# Patient Record
Sex: FEMALE | Race: WHITE | Employment: UNEMPLOYED | URBAN - METROPOLITAN AREA
[De-identification: names, ages, dates, MRNs, and addresses within clinical notes are randomized per-mention and may not be internally consistent; named-entity substitution may affect disease eponyms.]

---

## 2016-09-12 LAB
AVERAGE GLUCOSE: NORMAL
HBA1C MFR BLD: 5.7 %

## 2017-01-03 ENCOUNTER — OFFICE VISIT (OUTPATIENT)
Dept: FAMILY MEDICINE CLINIC | Age: 67
End: 2017-01-03

## 2017-01-03 DIAGNOSIS — M05.762 RHEUMATOID ARTHRITIS INVOLVING LEFT KNEE WITH POSITIVE RHEUMATOID FACTOR (HCC): ICD-10-CM

## 2017-01-03 DIAGNOSIS — S29.019A THORACIC MYOFASCIAL STRAIN, INITIAL ENCOUNTER: Primary | ICD-10-CM

## 2017-01-03 PROCEDURE — 99213 OFFICE O/P EST LOW 20 MIN: CPT | Performed by: FAMILY MEDICINE

## 2017-01-03 RX ORDER — METHYLPREDNISOLONE 4 MG/1
TABLET ORAL
Qty: 21 TABLET | Refills: 1 | Status: SHIPPED | OUTPATIENT
Start: 2017-01-03 | End: 2017-01-12 | Stop reason: ALTCHOICE

## 2017-01-03 RX ORDER — DIAZEPAM 2 MG/1
2 TABLET ORAL EVERY 8 HOURS PRN
Qty: 18 TABLET | Refills: 0 | Status: SHIPPED | OUTPATIENT
Start: 2017-01-03 | End: 2017-01-09

## 2017-01-04 VITALS
BODY MASS INDEX: 23.18 KG/M2 | TEMPERATURE: 97.9 F | DIASTOLIC BLOOD PRESSURE: 86 MMHG | WEIGHT: 130.8 LBS | HEART RATE: 76 BPM | RESPIRATION RATE: 14 BRPM | HEIGHT: 63 IN | SYSTOLIC BLOOD PRESSURE: 138 MMHG

## 2017-01-08 ASSESSMENT — ENCOUNTER SYMPTOMS
BACK PAIN: 1
COUGH: 0
CHEST TIGHTNESS: 0

## 2017-01-12 ENCOUNTER — TELEPHONE (OUTPATIENT)
Dept: FAMILY MEDICINE CLINIC | Age: 67
End: 2017-01-12

## 2017-01-12 ENCOUNTER — OFFICE VISIT (OUTPATIENT)
Dept: FAMILY MEDICINE CLINIC | Age: 67
End: 2017-01-12

## 2017-01-12 VITALS
SYSTOLIC BLOOD PRESSURE: 132 MMHG | BODY MASS INDEX: 23.04 KG/M2 | TEMPERATURE: 97.7 F | RESPIRATION RATE: 12 BRPM | HEART RATE: 80 BPM | DIASTOLIC BLOOD PRESSURE: 78 MMHG | WEIGHT: 130 LBS | HEIGHT: 63 IN

## 2017-01-12 DIAGNOSIS — R30.0 DYSURIA: ICD-10-CM

## 2017-01-12 DIAGNOSIS — R31.9 HEMATURIA: ICD-10-CM

## 2017-01-12 DIAGNOSIS — S46.911S SHOULDER STRAIN, RIGHT, SEQUELA: ICD-10-CM

## 2017-01-12 DIAGNOSIS — N30.90 CYSTITIS: Primary | ICD-10-CM

## 2017-01-12 LAB
BILIRUBIN, POC: ABNORMAL
BLOOD URINE, POC: ABNORMAL
CLARITY, POC: ABNORMAL
COLOR, POC: ABNORMAL
GLUCOSE URINE, POC: ABNORMAL
KETONES, POC: ABNORMAL
LEUKOCYTE EST, POC: ABNORMAL
NITRITE, POC: ABNORMAL
PH, POC: 6
PROTEIN, POC: ABNORMAL
SPECIFIC GRAVITY, POC: 1.02
UROBILINOGEN, POC: ABNORMAL

## 2017-01-12 PROCEDURE — 99213 OFFICE O/P EST LOW 20 MIN: CPT | Performed by: FAMILY MEDICINE

## 2017-01-12 PROCEDURE — 81003 URINALYSIS AUTO W/O SCOPE: CPT | Performed by: FAMILY MEDICINE

## 2017-01-12 RX ORDER — NITROFURANTOIN 25; 75 MG/1; MG/1
100 CAPSULE ORAL 2 TIMES DAILY
Qty: 20 CAPSULE | Refills: 0 | Status: SHIPPED | OUTPATIENT
Start: 2017-01-12 | End: 2017-01-22

## 2017-01-14 ENCOUNTER — TELEPHONE (OUTPATIENT)
Dept: FAMILY MEDICINE CLINIC | Age: 67
End: 2017-01-14

## 2017-01-14 LAB
ORGANISM: ABNORMAL
URINE CULTURE, ROUTINE: ABNORMAL

## 2017-01-22 ASSESSMENT — ENCOUNTER SYMPTOMS
BLOOD IN STOOL: 0
RECTAL PAIN: 0
BACK PAIN: 1
SHORTNESS OF BREATH: 0
NAUSEA: 1
COUGH: 0
ABDOMINAL PAIN: 0
CHEST TIGHTNESS: 0

## 2017-01-24 ENCOUNTER — OFFICE VISIT (OUTPATIENT)
Dept: UROLOGY | Age: 67
End: 2017-01-24

## 2017-01-24 VITALS
HEART RATE: 80 BPM | SYSTOLIC BLOOD PRESSURE: 110 MMHG | BODY MASS INDEX: 23.39 KG/M2 | WEIGHT: 132 LBS | HEIGHT: 63 IN | DIASTOLIC BLOOD PRESSURE: 80 MMHG

## 2017-01-24 DIAGNOSIS — R33.9 URINARY RETENTION: ICD-10-CM

## 2017-01-24 DIAGNOSIS — N39.0 RECURRENT UTI: Primary | ICD-10-CM

## 2017-01-24 LAB
BILIRUBIN, POC: NORMAL
BLOOD URINE, POC: NORMAL
CLARITY, POC: CLEAR
COLOR, POC: YELLOW
GLUCOSE URINE, POC: NORMAL
KETONES, POC: NORMAL
LEUKOCYTE EST, POC: NORMAL
NITRITE, POC: NORMAL
PH, POC: 5.5
POST VOID RESIDUAL (PVR): 101 ML
PROTEIN, POC: NORMAL
SPECIFIC GRAVITY, POC: 1.01
UROBILINOGEN, POC: 0.2

## 2017-01-24 PROCEDURE — 81003 URINALYSIS AUTO W/O SCOPE: CPT | Performed by: UROLOGY

## 2017-01-24 PROCEDURE — 99213 OFFICE O/P EST LOW 20 MIN: CPT | Performed by: UROLOGY

## 2017-01-24 PROCEDURE — 51798 US URINE CAPACITY MEASURE: CPT | Performed by: UROLOGY

## 2017-01-31 ENCOUNTER — TELEPHONE (OUTPATIENT)
Dept: FAMILY MEDICINE CLINIC | Age: 67
End: 2017-01-31

## 2017-01-31 DIAGNOSIS — M05.762 RHEUMATOID ARTHRITIS INVOLVING LEFT KNEE WITH POSITIVE RHEUMATOID FACTOR (HCC): Primary | ICD-10-CM

## 2017-02-23 ENCOUNTER — HOSPITAL ENCOUNTER (OUTPATIENT)
Dept: PHYSICAL THERAPY | Age: 67
Setting detail: THERAPIES SERIES
Discharge: HOME | End: 2017-02-23
Payer: MEDICARE

## 2017-02-23 PROCEDURE — G8978 MOBILITY CURRENT STATUS: HCPCS

## 2017-02-23 PROCEDURE — 97162 PT EVAL MOD COMPLEX 30 MIN: CPT

## 2017-02-23 PROCEDURE — 97110 THERAPEUTIC EXERCISES: CPT

## 2017-02-23 PROCEDURE — G8979 MOBILITY GOAL STATUS: HCPCS

## 2017-02-23 PROCEDURE — 97530 THERAPEUTIC ACTIVITIES: CPT

## 2017-03-03 ENCOUNTER — OFFICE VISIT (OUTPATIENT)
Dept: FAMILY MEDICINE CLINIC | Age: 67
End: 2017-03-03

## 2017-03-03 VITALS
HEART RATE: 79 BPM | WEIGHT: 133 LBS | HEIGHT: 63 IN | OXYGEN SATURATION: 95 % | DIASTOLIC BLOOD PRESSURE: 70 MMHG | TEMPERATURE: 98.3 F | SYSTOLIC BLOOD PRESSURE: 120 MMHG | BODY MASS INDEX: 23.57 KG/M2 | RESPIRATION RATE: 18 BRPM

## 2017-03-03 DIAGNOSIS — J32.9 SINOBRONCHITIS: Primary | ICD-10-CM

## 2017-03-03 DIAGNOSIS — J40 SINOBRONCHITIS: Primary | ICD-10-CM

## 2017-03-03 DIAGNOSIS — M77.41 METATARSALGIA OF RIGHT FOOT: ICD-10-CM

## 2017-03-03 PROCEDURE — 99213 OFFICE O/P EST LOW 20 MIN: CPT | Performed by: FAMILY MEDICINE

## 2017-03-03 RX ORDER — PROMETHAZINE HYDROCHLORIDE AND CODEINE PHOSPHATE 6.25; 1 MG/5ML; MG/5ML
5 SYRUP ORAL 4 TIMES DAILY PRN
Qty: 180 ML | Refills: 0 | Status: SHIPPED | OUTPATIENT
Start: 2017-03-03 | End: 2017-03-10

## 2017-03-03 RX ORDER — AZITHROMYCIN 250 MG/1
TABLET, FILM COATED ORAL
Qty: 1 PACKET | Refills: 0 | Status: SHIPPED | OUTPATIENT
Start: 2017-03-03 | End: 2017-03-13

## 2017-03-13 ASSESSMENT — ENCOUNTER SYMPTOMS
CHEST TIGHTNESS: 1
COUGH: 1
TROUBLE SWALLOWING: 0
VOICE CHANGE: 0
SORE THROAT: 1
SINUS PRESSURE: 1
BLOOD IN STOOL: 0
ABDOMINAL PAIN: 0
RHINORRHEA: 1

## 2017-03-31 ENCOUNTER — TELEPHONE (OUTPATIENT)
Dept: FAMILY MEDICINE CLINIC | Age: 67
End: 2017-03-31

## 2017-03-31 ENCOUNTER — HOSPITAL ENCOUNTER (OUTPATIENT)
Dept: PHYSICAL THERAPY | Age: 67
Setting detail: THERAPIES SERIES
Discharge: HOME | End: 2017-03-31
Payer: MEDICARE

## 2017-03-31 PROCEDURE — G8978 MOBILITY CURRENT STATUS: HCPCS

## 2017-03-31 PROCEDURE — 97116 GAIT TRAINING THERAPY: CPT

## 2017-03-31 PROCEDURE — 97164 PT RE-EVAL EST PLAN CARE: CPT

## 2017-03-31 PROCEDURE — 97530 THERAPEUTIC ACTIVITIES: CPT

## 2017-03-31 PROCEDURE — G8979 MOBILITY GOAL STATUS: HCPCS

## 2017-04-03 ENCOUNTER — OFFICE VISIT (OUTPATIENT)
Dept: FAMILY MEDICINE CLINIC | Age: 67
End: 2017-04-03

## 2017-04-03 DIAGNOSIS — J01.00 ACUTE MAXILLARY SINUSITIS, RECURRENCE NOT SPECIFIED: Primary | ICD-10-CM

## 2017-04-03 PROCEDURE — 99213 OFFICE O/P EST LOW 20 MIN: CPT | Performed by: FAMILY MEDICINE

## 2017-04-03 RX ORDER — AZITHROMYCIN 250 MG/1
TABLET, FILM COATED ORAL
Qty: 1 PACKET | Refills: 0 | Status: SHIPPED | OUTPATIENT
Start: 2017-04-03 | End: 2017-04-13

## 2017-04-07 ENCOUNTER — HOSPITAL ENCOUNTER (OUTPATIENT)
Dept: PHYSICAL THERAPY | Age: 67
Discharge: HOME | End: 2017-04-07

## 2017-04-11 VITALS
HEIGHT: 63 IN | HEART RATE: 82 BPM | DIASTOLIC BLOOD PRESSURE: 88 MMHG | BODY MASS INDEX: 24.1 KG/M2 | WEIGHT: 136 LBS | RESPIRATION RATE: 12 BRPM | TEMPERATURE: 98 F | SYSTOLIC BLOOD PRESSURE: 136 MMHG

## 2017-04-11 ASSESSMENT — ENCOUNTER SYMPTOMS
CHEST TIGHTNESS: 0
ABDOMINAL PAIN: 0
COUGH: 0
BLOOD IN STOOL: 0
SHORTNESS OF BREATH: 0
SINUS PRESSURE: 1
RHINORRHEA: 1
BACK PAIN: 1

## 2017-05-19 RX ORDER — PREDNISONE 1 MG/1
TABLET ORAL
Qty: 60 TABLET | Refills: 2 | Status: SHIPPED | OUTPATIENT
Start: 2017-05-19 | End: 2017-09-20 | Stop reason: SDUPTHER

## 2017-05-22 RX ORDER — GABAPENTIN 300 MG/1
CAPSULE ORAL
Qty: 120 CAPSULE | Refills: 2 | Status: SHIPPED | OUTPATIENT
Start: 2017-05-22 | End: 2017-08-01 | Stop reason: SDUPTHER

## 2017-06-13 ENCOUNTER — TELEPHONE (OUTPATIENT)
Dept: FAMILY MEDICINE CLINIC | Age: 67
End: 2017-06-13

## 2017-06-27 DIAGNOSIS — M62.838 NOCTURNAL MUSCLE SPASM: ICD-10-CM

## 2017-06-27 RX ORDER — CYCLOBENZAPRINE HCL 10 MG
TABLET ORAL
Qty: 90 TABLET | Refills: 1 | Status: SHIPPED | OUTPATIENT
Start: 2017-06-27 | End: 2017-09-20 | Stop reason: SDUPTHER

## 2017-07-06 RX ORDER — LEVOTHYROXINE SODIUM 0.05 MG/1
TABLET ORAL
Qty: 30 TABLET | Refills: 5 | Status: SHIPPED | OUTPATIENT
Start: 2017-07-06 | End: 2018-01-04 | Stop reason: SDUPTHER

## 2017-09-07 ENCOUNTER — HOSPITAL ENCOUNTER (OUTPATIENT)
Age: 67
Setting detail: OBSERVATION
Discharge: HOME | End: 2017-09-09
Attending: EMERGENCY MEDICINE | Admitting: INTERNAL MEDICINE
Payer: MEDICARE

## 2017-09-07 ENCOUNTER — APPOINTMENT (OUTPATIENT)
Dept: GENERAL RADIOLOGY | Age: 67
End: 2017-09-07
Payer: MEDICARE

## 2017-09-07 DIAGNOSIS — N39.0 ACUTE UTI: Primary | ICD-10-CM

## 2017-09-07 DIAGNOSIS — D72.9 NEUTROPHILIC LEUKOCYTOSIS: ICD-10-CM

## 2017-09-07 LAB
ALBUMIN SERPL-MCNC: 4.4 G/DL (ref 3.9–4.9)
ALP BLD-CCNC: 75 U/L (ref 40–130)
ALT SERPL-CCNC: 14 U/L (ref 0–33)
ANION GAP SERPL CALCULATED.3IONS-SCNC: 18 MEQ/L (ref 7–13)
AST SERPL-CCNC: 19 U/L (ref 0–35)
BACTERIA: ABNORMAL /HPF
BANDED NEUTROPHILS RELATIVE PERCENT: 2 % (ref 5–11)
BASOPHILS ABSOLUTE: 0 K/UL (ref 0–0.2)
BASOPHILS RELATIVE PERCENT: 0 %
BILIRUB SERPL-MCNC: 0.6 MG/DL (ref 0–1.2)
BILIRUBIN URINE: NEGATIVE
BLOOD, URINE: ABNORMAL
BUN BLDV-MCNC: 20 MG/DL (ref 8–23)
CALCIUM SERPL-MCNC: 10.9 MG/DL (ref 8.6–10.2)
CHLORIDE BLD-SCNC: 95 MEQ/L (ref 98–107)
CLARITY: ABNORMAL
CO2: 24 MEQ/L (ref 22–29)
COLOR: YELLOW
CREAT SERPL-MCNC: 0.69 MG/DL (ref 0.5–0.9)
EOSINOPHILS ABSOLUTE: 0 K/UL (ref 0–0.7)
EOSINOPHILS RELATIVE PERCENT: 0 %
EPITHELIAL CELLS, UA: ABNORMAL /HPF
GFR AFRICAN AMERICAN: >60
GFR NON-AFRICAN AMERICAN: >60
GLOBULIN: 2.8 G/DL (ref 2.3–3.5)
GLUCOSE BLD-MCNC: 127 MG/DL (ref 74–109)
GLUCOSE URINE: NEGATIVE MG/DL
HCT VFR BLD CALC: 44.3 % (ref 37–47)
HEMOGLOBIN: 14.8 G/DL (ref 12–16)
KETONES, URINE: NEGATIVE MG/DL
LACTIC ACID: 2 MMOL/L (ref 0.5–2.2)
LEUKOCYTE ESTERASE, URINE: ABNORMAL
LYMPHOCYTES ABSOLUTE: 0.9 K/UL (ref 1–4.8)
LYMPHOCYTES RELATIVE PERCENT: 3 %
MAGNESIUM: 1.7 MG/DL (ref 1.7–2.3)
MCH RBC QN AUTO: 31.3 PG (ref 27–31.3)
MCHC RBC AUTO-ENTMCNC: 33.3 % (ref 33–37)
MCV RBC AUTO: 94 FL (ref 82–100)
MONOCYTES ABSOLUTE: 0.3 K/UL (ref 0.2–0.8)
MONOCYTES RELATIVE PERCENT: 1 %
NEUTROPHILS ABSOLUTE: 29.6 K/UL (ref 1.4–6.5)
NEUTROPHILS RELATIVE PERCENT: 94 %
NITRITE, URINE: NEGATIVE
PDW BLD-RTO: 16.6 % (ref 11.5–14.5)
PH UA: 5.5 (ref 5–9)
PLATELET # BLD: 174 K/UL (ref 130–400)
POTASSIUM SERPL-SCNC: 3.9 MEQ/L (ref 3.5–5.1)
PROTEIN UA: NEGATIVE MG/DL
RBC # BLD: 4.72 M/UL (ref 4.2–5.4)
RBC # BLD: NORMAL 10*6/UL
RBC UA: ABNORMAL /HPF (ref 0–2)
SODIUM BLD-SCNC: 137 MEQ/L (ref 132–144)
SPECIFIC GRAVITY UA: 1.01 (ref 1–1.03)
TOTAL PROTEIN: 7.2 G/DL (ref 6.4–8.1)
TSH SERPL DL<=0.05 MIU/L-ACNC: 1.03 UIU/ML (ref 0.27–4.2)
URINE REFLEX TO CULTURE: YES
URINE TYPE: ABNORMAL
UROBILINOGEN, URINE: 0.2 E.U./DL
WBC # BLD: 30.8 K/UL (ref 4.8–10.8)
WBC UA: ABNORMAL /HPF (ref 0–5)

## 2017-09-07 PROCEDURE — 6370000000 HC RX 637 (ALT 250 FOR IP): Performed by: INTERNAL MEDICINE

## 2017-09-07 PROCEDURE — 96365 THER/PROPH/DIAG IV INF INIT: CPT

## 2017-09-07 PROCEDURE — 87086 URINE CULTURE/COLONY COUNT: CPT

## 2017-09-07 PROCEDURE — 6360000002 HC RX W HCPCS: Performed by: INTERNAL MEDICINE

## 2017-09-07 PROCEDURE — 99285 EMERGENCY DEPT VISIT HI MDM: CPT

## 2017-09-07 PROCEDURE — 2580000003 HC RX 258: Performed by: INTERNAL MEDICINE

## 2017-09-07 PROCEDURE — 81001 URINALYSIS AUTO W/SCOPE: CPT

## 2017-09-07 PROCEDURE — 71010 XR CHEST PORTABLE: CPT

## 2017-09-07 PROCEDURE — 6360000002 HC RX W HCPCS: Performed by: EMERGENCY MEDICINE

## 2017-09-07 PROCEDURE — 93005 ELECTROCARDIOGRAM TRACING: CPT

## 2017-09-07 PROCEDURE — 85025 COMPLETE CBC W/AUTO DIFF WBC: CPT

## 2017-09-07 PROCEDURE — 96375 TX/PRO/DX INJ NEW DRUG ADDON: CPT

## 2017-09-07 PROCEDURE — 2580000003 HC RX 258: Performed by: EMERGENCY MEDICINE

## 2017-09-07 PROCEDURE — 83735 ASSAY OF MAGNESIUM: CPT

## 2017-09-07 PROCEDURE — 87186 SC STD MICRODIL/AGAR DIL: CPT

## 2017-09-07 PROCEDURE — G0378 HOSPITAL OBSERVATION PER HR: HCPCS

## 2017-09-07 PROCEDURE — 80053 COMPREHEN METABOLIC PANEL: CPT

## 2017-09-07 PROCEDURE — 36415 COLL VENOUS BLD VENIPUNCTURE: CPT

## 2017-09-07 PROCEDURE — 87077 CULTURE AEROBIC IDENTIFY: CPT

## 2017-09-07 PROCEDURE — 84443 ASSAY THYROID STIM HORMONE: CPT

## 2017-09-07 PROCEDURE — 96366 THER/PROPH/DIAG IV INF ADDON: CPT

## 2017-09-07 PROCEDURE — 96367 TX/PROPH/DG ADDL SEQ IV INF: CPT

## 2017-09-07 PROCEDURE — 83605 ASSAY OF LACTIC ACID: CPT

## 2017-09-07 PROCEDURE — 87040 BLOOD CULTURE FOR BACTERIA: CPT

## 2017-09-07 RX ORDER — KETOROLAC TROMETHAMINE 30 MG/ML
30 INJECTION, SOLUTION INTRAMUSCULAR; INTRAVENOUS ONCE
Status: COMPLETED | OUTPATIENT
Start: 2017-09-07 | End: 2017-09-07

## 2017-09-07 RX ORDER — SODIUM CHLORIDE 9 MG/ML
INJECTION, SOLUTION INTRAVENOUS CONTINUOUS
Status: DISPENSED | OUTPATIENT
Start: 2017-09-07 | End: 2017-09-08

## 2017-09-07 RX ORDER — CYCLOBENZAPRINE HCL 10 MG
10 TABLET ORAL NIGHTLY
Status: DISCONTINUED | OUTPATIENT
Start: 2017-09-07 | End: 2017-09-09 | Stop reason: HOSPADM

## 2017-09-07 RX ORDER — LEVOTHYROXINE SODIUM 0.05 MG/1
50 TABLET ORAL DAILY
Status: DISCONTINUED | OUTPATIENT
Start: 2017-09-07 | End: 2017-09-09 | Stop reason: HOSPADM

## 2017-09-07 RX ORDER — PREDNISONE 1 MG/1
5 TABLET ORAL 2 TIMES DAILY
Status: DISCONTINUED | OUTPATIENT
Start: 2017-09-07 | End: 2017-09-09 | Stop reason: HOSPADM

## 2017-09-07 RX ORDER — ACETAMINOPHEN 325 MG/1
650 TABLET ORAL EVERY 6 HOURS PRN
Status: DISCONTINUED | OUTPATIENT
Start: 2017-09-07 | End: 2017-09-09 | Stop reason: HOSPADM

## 2017-09-07 RX ORDER — LEVOFLOXACIN 5 MG/ML
750 INJECTION, SOLUTION INTRAVENOUS ONCE
Status: COMPLETED | OUTPATIENT
Start: 2017-09-07 | End: 2017-09-07

## 2017-09-07 RX ORDER — CYCLOBENZAPRINE HCL 10 MG
5 TABLET ORAL 3 TIMES DAILY PRN
Status: DISCONTINUED | OUTPATIENT
Start: 2017-09-07 | End: 2017-09-07

## 2017-09-07 RX ORDER — CYCLOBENZAPRINE HCL 10 MG
10 TABLET ORAL 2 TIMES DAILY PRN
Status: DISCONTINUED | OUTPATIENT
Start: 2017-09-07 | End: 2017-09-07

## 2017-09-07 RX ORDER — MECLIZINE HCL 12.5 MG/1
12.5 TABLET ORAL 3 TIMES DAILY PRN
Status: DISCONTINUED | OUTPATIENT
Start: 2017-09-07 | End: 2017-09-09 | Stop reason: HOSPADM

## 2017-09-07 RX ORDER — 0.9 % SODIUM CHLORIDE 0.9 %
1000 INTRAVENOUS SOLUTION INTRAVENOUS ONCE
Status: COMPLETED | OUTPATIENT
Start: 2017-09-07 | End: 2017-09-07

## 2017-09-07 RX ORDER — ONDANSETRON 2 MG/ML
4 INJECTION INTRAMUSCULAR; INTRAVENOUS ONCE
Status: COMPLETED | OUTPATIENT
Start: 2017-09-07 | End: 2017-09-07

## 2017-09-07 RX ORDER — OXYCODONE HYDROCHLORIDE 5 MG/1
5 TABLET ORAL EVERY 6 HOURS PRN
Status: DISCONTINUED | OUTPATIENT
Start: 2017-09-07 | End: 2017-09-09 | Stop reason: HOSPADM

## 2017-09-07 RX ORDER — GABAPENTIN 300 MG/1
300 CAPSULE ORAL 2 TIMES DAILY
Status: DISCONTINUED | OUTPATIENT
Start: 2017-09-07 | End: 2017-09-09 | Stop reason: HOSPADM

## 2017-09-07 RX ADMIN — OXYCODONE HYDROCHLORIDE 5 MG: 5 TABLET ORAL at 21:34

## 2017-09-07 RX ADMIN — LEVOFLOXACIN 750 MG: 750 INJECTION, SOLUTION INTRAVENOUS at 14:09

## 2017-09-07 RX ADMIN — CYCLOBENZAPRINE HYDROCHLORIDE 10 MG: 10 TABLET, FILM COATED ORAL at 20:09

## 2017-09-07 RX ADMIN — SODIUM CHLORIDE 1000 ML: 9 INJECTION, SOLUTION INTRAVENOUS at 12:41

## 2017-09-07 RX ADMIN — PREDNISONE 5 MG: 5 TABLET ORAL at 20:09

## 2017-09-07 RX ADMIN — CEFTRIAXONE 1 G: 1 INJECTION, POWDER, FOR SOLUTION INTRAMUSCULAR; INTRAVENOUS at 17:29

## 2017-09-07 RX ADMIN — KETOROLAC TROMETHAMINE 30 MG: 30 INJECTION, SOLUTION INTRAMUSCULAR at 12:41

## 2017-09-07 RX ADMIN — ACETAMINOPHEN 650 MG: 325 TABLET ORAL at 23:48

## 2017-09-07 RX ADMIN — ACETAMINOPHEN 650 MG: 325 TABLET ORAL at 18:55

## 2017-09-07 RX ADMIN — GABAPENTIN 300 MG: 300 CAPSULE ORAL at 20:09

## 2017-09-07 RX ADMIN — PIPERACILLIN SODIUM,TAZOBACTAM SODIUM 3.38 G: 3; .375 INJECTION, POWDER, FOR SOLUTION INTRAVENOUS at 18:02

## 2017-09-07 RX ADMIN — ONDANSETRON 4 MG: 2 INJECTION INTRAMUSCULAR; INTRAVENOUS at 12:41

## 2017-09-07 RX ADMIN — SODIUM CHLORIDE: 9 INJECTION, SOLUTION INTRAVENOUS at 21:45

## 2017-09-07 ASSESSMENT — ENCOUNTER SYMPTOMS
NAUSEA: 0
SORE THROAT: 0
WHEEZING: 0
ABDOMINAL DISTENTION: 0
COUGH: 0
BLURRED VISION: 0
APNEA: 0
SHORTNESS OF BREATH: 0
ABDOMINAL PAIN: 0
BACK PAIN: 0
DOUBLE VISION: 0
DIARRHEA: 0
PHOTOPHOBIA: 0
RHINORRHEA: 0
SINUS PRESSURE: 0
COLOR CHANGE: 0
HEARTBURN: 0
EYE PAIN: 0
CONSTIPATION: 0
VOMITING: 0

## 2017-09-07 ASSESSMENT — PAIN SCALES - GENERAL
PAINLEVEL_OUTOF10: 6
PAINLEVEL_OUTOF10: 5
PAINLEVEL_OUTOF10: 6

## 2017-09-07 ASSESSMENT — PAIN DESCRIPTION - FREQUENCY
FREQUENCY: CONTINUOUS
FREQUENCY: CONTINUOUS

## 2017-09-07 ASSESSMENT — PAIN DESCRIPTION - LOCATION
LOCATION: HEAD
LOCATION: HAND

## 2017-09-07 ASSESSMENT — PAIN DESCRIPTION - DESCRIPTORS
DESCRIPTORS: ACHING
DESCRIPTORS: ACHING

## 2017-09-07 ASSESSMENT — PAIN DESCRIPTION - PAIN TYPE
TYPE: ACUTE PAIN
TYPE: ACUTE PAIN

## 2017-09-08 VITALS
HEIGHT: 63 IN | SYSTOLIC BLOOD PRESSURE: 155 MMHG | TEMPERATURE: 97.8 F | DIASTOLIC BLOOD PRESSURE: 68 MMHG | RESPIRATION RATE: 16 BRPM | OXYGEN SATURATION: 98 % | BODY MASS INDEX: 23.04 KG/M2 | WEIGHT: 130 LBS | HEART RATE: 68 BPM

## 2017-09-08 LAB
ANION GAP SERPL CALCULATED.3IONS-SCNC: 14 MEQ/L (ref 7–13)
BASOPHILS ABSOLUTE: 0 K/UL (ref 0–0.2)
BASOPHILS RELATIVE PERCENT: 0.2 %
BUN BLDV-MCNC: 17 MG/DL (ref 8–23)
CALCIUM SERPL-MCNC: 9.5 MG/DL (ref 8.6–10.2)
CHLORIDE BLD-SCNC: 105 MEQ/L (ref 98–107)
CO2: 24 MEQ/L (ref 22–29)
CREAT SERPL-MCNC: 0.73 MG/DL (ref 0.5–0.9)
EOSINOPHILS ABSOLUTE: 0 K/UL (ref 0–0.7)
EOSINOPHILS RELATIVE PERCENT: 0 %
GFR AFRICAN AMERICAN: >60
GFR NON-AFRICAN AMERICAN: >60
GLUCOSE BLD-MCNC: 147 MG/DL (ref 74–109)
HCT VFR BLD CALC: 37.8 % (ref 37–47)
HEMOGLOBIN: 12.7 G/DL (ref 12–16)
LYMPHOCYTES ABSOLUTE: 0.9 K/UL (ref 1–4.8)
LYMPHOCYTES RELATIVE PERCENT: 5.6 %
MCH RBC QN AUTO: 31.5 PG (ref 27–31.3)
MCHC RBC AUTO-ENTMCNC: 33.5 % (ref 33–37)
MCV RBC AUTO: 94 FL (ref 82–100)
MONOCYTES ABSOLUTE: 0.3 K/UL (ref 0.2–0.8)
MONOCYTES RELATIVE PERCENT: 1.9 %
NEUTROPHILS ABSOLUTE: 14.8 K/UL (ref 1.4–6.5)
NEUTROPHILS RELATIVE PERCENT: 92.3 %
PDW BLD-RTO: 16.9 % (ref 11.5–14.5)
PLATELET # BLD: 162 K/UL (ref 130–400)
POTASSIUM SERPL-SCNC: 4.7 MEQ/L (ref 3.5–5.1)
RBC # BLD: 4.03 M/UL (ref 4.2–5.4)
SODIUM BLD-SCNC: 143 MEQ/L (ref 132–144)
WBC # BLD: 16.1 K/UL (ref 4.8–10.8)

## 2017-09-08 PROCEDURE — 6370000000 HC RX 637 (ALT 250 FOR IP): Performed by: INTERNAL MEDICINE

## 2017-09-08 PROCEDURE — 36415 COLL VENOUS BLD VENIPUNCTURE: CPT

## 2017-09-08 PROCEDURE — 2580000003 HC RX 258: Performed by: INTERNAL MEDICINE

## 2017-09-08 PROCEDURE — 6360000002 HC RX W HCPCS: Performed by: INTERNAL MEDICINE

## 2017-09-08 PROCEDURE — 96366 THER/PROPH/DIAG IV INF ADDON: CPT

## 2017-09-08 PROCEDURE — 85025 COMPLETE CBC W/AUTO DIFF WBC: CPT

## 2017-09-08 PROCEDURE — G0378 HOSPITAL OBSERVATION PER HR: HCPCS

## 2017-09-08 PROCEDURE — 80048 BASIC METABOLIC PNL TOTAL CA: CPT

## 2017-09-08 PROCEDURE — 96372 THER/PROPH/DIAG INJ SC/IM: CPT

## 2017-09-08 RX ORDER — SODIUM CHLORIDE 9 MG/ML
INJECTION, SOLUTION INTRAVENOUS CONTINUOUS
Status: DISPENSED | OUTPATIENT
Start: 2017-09-08 | End: 2017-09-09

## 2017-09-08 RX ADMIN — ENOXAPARIN SODIUM 40 MG: 40 INJECTION SUBCUTANEOUS at 11:50

## 2017-09-08 RX ADMIN — ACETAMINOPHEN 650 MG: 325 TABLET ORAL at 05:24

## 2017-09-08 RX ADMIN — GABAPENTIN 300 MG: 300 CAPSULE ORAL at 08:24

## 2017-09-08 RX ADMIN — LEVOTHYROXINE SODIUM 50 MCG: 50 TABLET ORAL at 08:24

## 2017-09-08 RX ADMIN — SODIUM CHLORIDE: 9 INJECTION, SOLUTION INTRAVENOUS at 11:50

## 2017-09-08 RX ADMIN — GABAPENTIN 300 MG: 300 CAPSULE ORAL at 22:13

## 2017-09-08 RX ADMIN — PREDNISONE 5 MG: 5 TABLET ORAL at 22:13

## 2017-09-08 RX ADMIN — PREDNISONE 5 MG: 5 TABLET ORAL at 08:24

## 2017-09-08 RX ADMIN — CYCLOBENZAPRINE HYDROCHLORIDE 10 MG: 10 TABLET, FILM COATED ORAL at 22:13

## 2017-09-08 RX ADMIN — ACETAMINOPHEN 650 MG: 325 TABLET ORAL at 14:46

## 2017-09-08 RX ADMIN — CEFTRIAXONE 1 G: 1 INJECTION, POWDER, FOR SOLUTION INTRAMUSCULAR; INTRAVENOUS at 14:43

## 2017-09-08 ASSESSMENT — PAIN SCALES - GENERAL
PAINLEVEL_OUTOF10: 0
PAINLEVEL_OUTOF10: 6
PAINLEVEL_OUTOF10: 4
PAINLEVEL_OUTOF10: 4
PAINLEVEL_OUTOF10: 0

## 2017-09-08 ASSESSMENT — PAIN DESCRIPTION - LOCATION: LOCATION: HEAD

## 2017-09-08 ASSESSMENT — PAIN DESCRIPTION - PAIN TYPE: TYPE: ACUTE PAIN

## 2017-09-09 LAB
ANION GAP SERPL CALCULATED.3IONS-SCNC: 16 MEQ/L (ref 7–13)
BASOPHILS ABSOLUTE: 0 K/UL (ref 0–0.2)
BASOPHILS RELATIVE PERCENT: 0.2 %
BUN BLDV-MCNC: 14 MG/DL (ref 8–23)
CALCIUM SERPL-MCNC: 8.9 MG/DL (ref 8.6–10.2)
CHLORIDE BLD-SCNC: 108 MEQ/L (ref 98–107)
CO2: 21 MEQ/L (ref 22–29)
CREAT SERPL-MCNC: 0.6 MG/DL (ref 0.5–0.9)
EOSINOPHILS ABSOLUTE: 0.1 K/UL (ref 0–0.7)
EOSINOPHILS RELATIVE PERCENT: 0.5 %
GFR AFRICAN AMERICAN: >60
GFR NON-AFRICAN AMERICAN: >60
GLUCOSE BLD-MCNC: 105 MG/DL (ref 74–109)
HCT VFR BLD CALC: 38.8 % (ref 37–47)
HEMOGLOBIN: 12.9 G/DL (ref 12–16)
LYMPHOCYTES ABSOLUTE: 1 K/UL (ref 1–4.8)
LYMPHOCYTES RELATIVE PERCENT: 10.6 %
MCH RBC QN AUTO: 31.6 PG (ref 27–31.3)
MCHC RBC AUTO-ENTMCNC: 33.3 % (ref 33–37)
MCV RBC AUTO: 95 FL (ref 82–100)
MONOCYTES ABSOLUTE: 0.2 K/UL (ref 0.2–0.8)
MONOCYTES RELATIVE PERCENT: 2.6 %
NEUTROPHILS ABSOLUTE: 8 K/UL (ref 1.4–6.5)
NEUTROPHILS RELATIVE PERCENT: 86.1 %
PDW BLD-RTO: 16.9 % (ref 11.5–14.5)
PLATELET # BLD: 165 K/UL (ref 130–400)
POTASSIUM SERPL-SCNC: 4.2 MEQ/L (ref 3.5–5.1)
RBC # BLD: 4.08 M/UL (ref 4.2–5.4)
SODIUM BLD-SCNC: 145 MEQ/L (ref 132–144)
WBC # BLD: 9.3 K/UL (ref 4.8–10.8)

## 2017-09-09 PROCEDURE — G0378 HOSPITAL OBSERVATION PER HR: HCPCS

## 2017-09-09 PROCEDURE — 36415 COLL VENOUS BLD VENIPUNCTURE: CPT

## 2017-09-09 PROCEDURE — 96372 THER/PROPH/DIAG INJ SC/IM: CPT

## 2017-09-09 PROCEDURE — 85025 COMPLETE CBC W/AUTO DIFF WBC: CPT

## 2017-09-09 PROCEDURE — 6370000000 HC RX 637 (ALT 250 FOR IP): Performed by: INTERNAL MEDICINE

## 2017-09-09 PROCEDURE — 6360000002 HC RX W HCPCS: Performed by: INTERNAL MEDICINE

## 2017-09-09 PROCEDURE — 80048 BASIC METABOLIC PNL TOTAL CA: CPT

## 2017-09-09 RX ORDER — ASPIRIN 81 MG/1
81 TABLET ORAL DAILY
Qty: 30 TABLET | Refills: 3 | Status: ON HOLD | OUTPATIENT
Start: 2017-09-09 | End: 2019-03-27

## 2017-09-09 RX ORDER — NITROFURANTOIN 25; 75 MG/1; MG/1
100 CAPSULE ORAL 2 TIMES DAILY
Qty: 14 CAPSULE | Refills: 0 | Status: SHIPPED | OUTPATIENT
Start: 2017-09-09 | End: 2017-09-19

## 2017-09-09 RX ADMIN — PREDNISONE 5 MG: 5 TABLET ORAL at 08:25

## 2017-09-09 RX ADMIN — ENOXAPARIN SODIUM 40 MG: 40 INJECTION SUBCUTANEOUS at 08:25

## 2017-09-09 RX ADMIN — LEVOTHYROXINE SODIUM 50 MCG: 50 TABLET ORAL at 08:25

## 2017-09-09 RX ADMIN — GABAPENTIN 300 MG: 300 CAPSULE ORAL at 08:25

## 2017-09-09 ASSESSMENT — PAIN SCALES - GENERAL: PAINLEVEL_OUTOF10: 0

## 2017-09-10 LAB
ORGANISM: ABNORMAL
URINE CULTURE, ROUTINE: ABNORMAL

## 2017-09-11 ENCOUNTER — CARE COORDINATION (OUTPATIENT)
Dept: CASE MANAGEMENT | Age: 67
End: 2017-09-11

## 2017-09-12 LAB
BLOOD CULTURE, ROUTINE: NORMAL
CULTURE, BLOOD 2: NORMAL

## 2017-09-20 ENCOUNTER — OFFICE VISIT (OUTPATIENT)
Dept: FAMILY MEDICINE CLINIC | Age: 67
End: 2017-09-20

## 2017-09-20 VITALS
HEIGHT: 63 IN | TEMPERATURE: 97.8 F | BODY MASS INDEX: 22.5 KG/M2 | RESPIRATION RATE: 14 BRPM | WEIGHT: 127 LBS | SYSTOLIC BLOOD PRESSURE: 102 MMHG | HEART RATE: 66 BPM | DIASTOLIC BLOOD PRESSURE: 74 MMHG

## 2017-09-20 DIAGNOSIS — J31.0 CHRONIC RHINITIS: ICD-10-CM

## 2017-09-20 DIAGNOSIS — R73.01 IFG (IMPAIRED FASTING GLUCOSE): ICD-10-CM

## 2017-09-20 DIAGNOSIS — D72.829 LEUKOCYTOSIS, UNSPECIFIED TYPE: ICD-10-CM

## 2017-09-20 DIAGNOSIS — Z12.31 ENCOUNTER FOR SCREENING MAMMOGRAM FOR BREAST CANCER: ICD-10-CM

## 2017-09-20 DIAGNOSIS — Z12.11 SCREENING FOR COLON CANCER: ICD-10-CM

## 2017-09-20 DIAGNOSIS — M05.772 RHEUMATOID ARTHRITIS INVOLVING BOTH ANKLES WITH POSITIVE RHEUMATOID FACTOR (HCC): ICD-10-CM

## 2017-09-20 DIAGNOSIS — M05.771 RHEUMATOID ARTHRITIS INVOLVING BOTH ANKLES WITH POSITIVE RHEUMATOID FACTOR (HCC): ICD-10-CM

## 2017-09-20 DIAGNOSIS — N39.0 ACUTE UTI: Primary | ICD-10-CM

## 2017-09-20 DIAGNOSIS — M62.838 NOCTURNAL MUSCLE SPASM: ICD-10-CM

## 2017-09-20 DIAGNOSIS — G60.3 IDIOPATHIC PROGRESSIVE NEUROPATHY: ICD-10-CM

## 2017-09-20 LAB
BASOPHILS ABSOLUTE: 0.1 K/UL (ref 0–0.2)
BASOPHILS RELATIVE PERCENT: 0.9 %
BILIRUBIN, POC: NORMAL
BLOOD URINE, POC: NORMAL
CLARITY, POC: NORMAL
COLOR, POC: YELLOW
EOSINOPHILS ABSOLUTE: 0.1 K/UL (ref 0–0.7)
EOSINOPHILS RELATIVE PERCENT: 0.6 %
GLUCOSE URINE, POC: NORMAL
HBA1C MFR BLD: 5.6 % (ref 4.8–5.9)
HCT VFR BLD CALC: 43 % (ref 37–47)
HEMOGLOBIN: 13.7 G/DL (ref 12–16)
KETONES, POC: NORMAL
LEUKOCYTE EST, POC: NORMAL
LYMPHOCYTES ABSOLUTE: 2.7 K/UL (ref 1–4.8)
LYMPHOCYTES RELATIVE PERCENT: 29.9 %
MCH RBC QN AUTO: 31.2 PG (ref 27–31.3)
MCHC RBC AUTO-ENTMCNC: 32 % (ref 33–37)
MCV RBC AUTO: 97.7 FL (ref 82–100)
MONOCYTES ABSOLUTE: 0.4 K/UL (ref 0.2–0.8)
MONOCYTES RELATIVE PERCENT: 4.6 %
NEUTROPHILS ABSOLUTE: 5.8 K/UL (ref 1.4–6.5)
NEUTROPHILS RELATIVE PERCENT: 64 %
NITRITE, POC: NORMAL
PDW BLD-RTO: 17 % (ref 11.5–14.5)
PH, POC: 6.5
PLATELET # BLD: 223 K/UL (ref 130–400)
PROTEIN, POC: NORMAL
RBC # BLD: 4.4 M/UL (ref 4.2–5.4)
SPECIFIC GRAVITY, POC: 1.02
UROBILINOGEN, POC: NORMAL
WBC # BLD: 9.1 K/UL (ref 4.8–10.8)

## 2017-09-20 PROCEDURE — 99214 OFFICE O/P EST MOD 30 MIN: CPT | Performed by: FAMILY MEDICINE

## 2017-09-20 PROCEDURE — 81003 URINALYSIS AUTO W/O SCOPE: CPT | Performed by: FAMILY MEDICINE

## 2017-09-20 RX ORDER — CYCLOBENZAPRINE HCL 10 MG
TABLET ORAL
Qty: 30 TABLET | Refills: 3 | Status: ON HOLD | OUTPATIENT
Start: 2017-09-20 | End: 2019-03-27

## 2017-09-20 RX ORDER — PREDNISONE 1 MG/1
TABLET ORAL
Qty: 60 TABLET | Refills: 3 | Status: SHIPPED | OUTPATIENT
Start: 2017-09-20 | End: 2018-01-30 | Stop reason: SDUPTHER

## 2017-09-20 RX ORDER — PREDNISONE 1 MG/1
TABLET ORAL
Qty: 60 TABLET | Refills: 2 | Status: CANCELLED | OUTPATIENT
Start: 2017-09-20

## 2017-09-20 RX ORDER — PREDNISOLONE ACETATE 10 MG/ML
SUSPENSION/ DROPS OPHTHALMIC
Refills: 0 | COMMUNITY
Start: 2017-07-14

## 2017-09-20 RX ORDER — CYCLOBENZAPRINE HCL 10 MG
TABLET ORAL
Qty: 90 TABLET | Refills: 1 | Status: CANCELLED | OUTPATIENT
Start: 2017-09-20

## 2017-09-20 RX ORDER — FLUTICASONE PROPIONATE 50 MCG
1 SPRAY, SUSPENSION (ML) NASAL DAILY
Qty: 1 BOTTLE | Refills: 3 | Status: SHIPPED | OUTPATIENT
Start: 2017-09-20 | End: 2018-10-16 | Stop reason: ALTCHOICE

## 2017-09-20 ASSESSMENT — PATIENT HEALTH QUESTIONNAIRE - PHQ9
SUM OF ALL RESPONSES TO PHQ QUESTIONS 1-9: 0
SUM OF ALL RESPONSES TO PHQ9 QUESTIONS 1 & 2: 0
2. FEELING DOWN, DEPRESSED OR HOPELESS: 0
1. LITTLE INTEREST OR PLEASURE IN DOING THINGS: 0

## 2017-09-20 NOTE — MR AVS SNAPSHOT
After Visit Summary             Diane Carlisle   2017 2:30 PM   Office Visit    Description:  Female : 1950   Provider:  Harriet Reyes DO   Department:  [facility]              Your Follow-Up and Future Appointments         Below is a list of your follow-up and future appointments. This may not be a complete list as you may have made appointments directly with providers that we are not aware of or your providers may have made some for you. Please call your providers to confirm appointments. It is important to keep your appointments. Please bring your current insurance card, photo ID, co-pay, and all medication bottles to your appointment. If self-pay, payment is expected at the time of service. Your To-Do List     Future Appointments Provider Department Dept Phone    2017 1:00 PM DO Sameer Day [phone]    Please arrive 15 minutes prior to appointment, bring photo ID and insurance card. Future Orders Complete By Expires    Hemoglobin A1c [LAB90 Custom]  10/20/2017 2018    JEN Digital Screen Bilateral [HTH7173] [ Custom]  10/20/2017 2018    POCT FECAL IMMUNOCHEMICAL TEST (FIT) [[id number] Custom]  10/20/2017 2018    CBC Auto Differential [NDM6527 Custom]  2017    Follow-Up    Return in about 2 months (around 2017). Information from Your Visit        Department     Name Address Phone Fax    Sameer Ybarra PCP [address] [facility].   [place] [address] [phone]      You Were Seen for:         Comments    Acute UTI   [120231]         Vital Signs     Blood Pressure Pulse Temperature Respirations Height Weight    102/74 66 97.8 °F (36.6 °C) (Temporal) 14 5' 3\" (1.6 m) 127 lb (57.6 kg)    Last Menstrual Period Body Mass Index Smoking Status             (LMP Unknown) 22.5 kg/m2 Never Smoker            Today's Medication Changes Allergies           No Known Allergies      We Ordered/Performed the following           POCT Urinalysis No Micro (Auto)          Result Summary for POCT Urinalysis No Micro (Auto)      Result Information       Status          Normal Final result (Collected: 9/20/2017  2:43 PM)           9/20/2017  2:44 PM      Component Results     Component Value    Color, UA Yellow    Clarity, UA Cloudy    Glucose, UA POC Neg    Bilirubin, UA Neg    Ketones, UA Neg    Spec Grav, UA 1.020    Blood, UA POC Neg    pH, UA 6.5    Protein, UA POC Neg    Urobilinogen, UA 3.5 umol/L    Leukocytes, UA Neg    Nitrite, UA Neg               Additional Information        Basic Information     Date Of Birth Sex Race Ethnicity Preferred Language    1950 Female White Non-/Non  English      Problem List as of 9/20/2017  Date Reviewed: 4/11/2017                Rheumatoid arthritis involving both knees with positive rheumatoid factor (HCC)    UTI due to Klebsiella species    Obstructive pyelonephritis    Diabetes mellitus, type II (HCC)    Retinal macular atrophy    History of cornea transplant    Purpura ([facility] [address]    Infection of the upper respiratory tract    Foot pain    Pain in shoulder    Rash    GERD (gastroesophageal reflux disease)    Gonalgia    Vitamin D deficiency    Extremity pain    Anxiety    Depression    OA (osteoarthritis)    Rheumatoid arthritis (HCC)    Hypercholesteremia    Hypothyroidism    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 However these guidelines can be individualized by your provider. 11/5/2016    Hemoglobin A1C (Test For Long-Term Glucose Control) 9/12/2017    Cholesterol Screening 10/20/2017 (Originally 10/3/2014)    Yearly Flu Vaccine (1) 10/20/2017 (Originally 9/1/2017)    Pneumococcal Vaccines (two) for age 72 years & over with: cerebrospinal fluid leaks, cochlear implants, hemoglobinopathies,  asplenia, immunodeficiencies, HIV infection, or chronic renal failure (1 of 2 - PCV13) 10/20/2017 (Originally 8/29/2014)    Tetanus Combination Vaccine (1 - Tdap) 11/17/2017 (Originally 8/29/1968)            [address] [place] [address] records indicate that you have an active [employer] account. You can view your After Visit Summary by going to [email or web address] and logging in with your [employer] username and password. If you don't have a [employer] username and password but a parent or guardian has access to your record, the parent or guardian should login with their own [employer] username and password and access your record to view the After Visit Summary. Additional Information  If you have questions, please contact the physician practice where you receive care. Remember, [employer] is NOT to be used for urgent needs. For medical emergencies, dial 911. For questions regarding your [employer] account call [phone]. If you have a clinical question, please call your doctor's office.

## 2017-09-20 NOTE — PROGRESS NOTES
Subjective  Shirin Allen, 75 y.o. female presents today with:  Chief Complaint   Patient presents with    Follow-Up from Hospital     f/u from [facility] for UTI        HPI  Patient presents today for a follow up from [facility] for UTI. Long disc re: elevated wbc and adm w/ presumed uti and d/c on macr--no gi-gu sxs now  Has chr rhinorrhea and no cough/cp/tavera  Rev/rxs; No abuse nor side effects on meds   Still needs neurontin for numbness in lower legs  Has RA and doing --ok 10mg pred/d--no s-e  Needs colon/breast screening  D/c on neg blood c&s and pos ua. ....  ;No cardio-pulmonary probs;compliant with diet/rxs;no new gi-gu complaints   Review of Systems   Constitutional: Positive for fatigue. Negative for fever. HENT: Positive for congestion, hearing loss and rhinorrhea. Negative for ear pain and sinus pressure. Respiratory: Negative for cough and shortness of breath. Cardiovascular: Negative for chest pain, palpitations and leg swelling. Gastrointestinal: Negative for abdominal pain and blood in stool. Genitourinary: Positive for frequency. Negative for dysuria, flank pain, hematuria and vaginal discharge. Musculoskeletal: Positive for arthralgias, back pain, joint swelling and neck pain. Skin: Negative for rash. Neurological: Positive for weakness, light-headedness and numbness. Negative for headaches. Psychiatric/Behavioral: Positive for dysphoric mood.        No Known Allergies    Past Medical History:   Diagnosis Date    Depression     GERD (gastroesophageal reflux disease)     History of kidney stones     hospitalized 9/2016 obstructive pyelonephritis    Hypercholesteremia     Hypothyroidism     Osteoarthritis     Osteopenia     RA (rheumatoid arthritis) ([facility] [address]      Past Surgical History:   Procedure Laterality Date    CYSTOSCOPY Left 08/28/2016    Mary Yeh MD  PYELOGRAM & DOUBLE-J STENT PLACEMENT    GALLBLADDER SURGERY      HYSTERECTOMY      KNEE SURGERY      left  LITHOTRIPSY Left 10/12/2016    HOLMIUM LASER LITHOTRIPSY AND REMOVAL OF LEFT J STENT  performed by Alec Redd MD at [facility]  08/09/2013    DR. RHODES    URETER SURGERY Left 10/12/2016    /left ureteroscopy/ cysto/ retrogrades/pyelogram/ holmium laser lithotripsy removal left urerteral stent performed by Alec Redd MD at [place] [address] Marital status:      Spouse name: N/A    Number of children: N/A    Years of education: N/A     Occupational History    Not on file. Social History Main Topics    Smoking status: Never Smoker    Smokeless tobacco: Never Used    Alcohol use No    Drug use: No    Sexual activity: Not Currently     Other Topics Concern    Not on file     Social History Narrative     Family History   Problem Relation Age of Onset    Diabetes Father     Heart Disease Father           Current Outpatient Prescriptions on File Prior to Visit   Medication Sig Dispense Refill    aspirin EC 81 MG EC tablet Take 1 tablet by mouth daily 30 tablet 3    gabapentin (NEURONTIN) 300 MG capsule TAKE 2 CAPSULES BY MOUTH TWICE DAILY 120 capsule 2    levothyroxine (SYNTHROID) 50 MCG tablet TAKE 1 TABLET BY MOUTH DAILY 30 tablet 5     No current facility-administered medications on file prior to visit. Objective    Vitals:    09/20/17 1424   BP: 102/74   Pulse: 66   Resp: 14   Temp: 97.8 °F (36.6 °C)   TempSrc: Temporal   Weight: 127 lb (57.6 kg)   Height: 5' 3\" (1.6 m)     Physical Exam   Constitutional: She is oriented to person, place, and time and well-developed, well-nourished, and in no distress. No distress. Eyes: No scleral icterus. Neck: Normal range of motion. Neck supple. Carotid bruit is not present. No rigidity. No thyroid mass and no thyromegaly present. Cardiovascular: Normal rate, regular rhythm, S1 normal, S2 normal and normal heart sounds. No extrasystoles are present.    No murmur 09/08/2017 0.3  0.2 - 0.8 K/uL Final    Eosinophils # 09/08/2017 0.0  0.0 - 0.7 K/uL Final    Basophils # 09/08/2017 0.0  0.0 - 0.2 K/uL Final    Sodium 09/08/2017 143  132 - 144 mEq/L Final    Potassium 09/08/2017 4.7  3.5 - 5.1 mEq/L Final    Chloride 09/08/2017 105  98 - 107 mEq/L Final    CO2 09/08/2017 24  22 - 29 mEq/L Final    Anion Gap 09/08/2017 14* 7 - 13 mEq/L Final    Glucose 09/08/2017 147* 74 - 109 mg/dL Final    BUN 09/08/2017 17  8 - 23 mg/dL Final    CREATININE 09/08/2017 0.73  0.50 - 0.90 mg/dL Final    GFR Non- 09/08/2017 >60.0  >60 Final    Comment: >60 mL/min/1.73m2 EGFR, calc. for ages 25 and older using the  MDRD formula (not corrected for weight), is valid for stable  renal function.  GFR  09/08/2017 >60.0  >60 Final    Comment: >60 mL/min/1.73m2 EGFR, calc. for ages 25 and older using the  MDRD formula (not corrected for weight), is valid for stable  renal function.       Calcium 09/08/2017 9.5  8.6 - 10.2 mg/dL Final    WBC 09/09/2017 9.3  4.8 - 10.8 K/uL Final    RBC 09/09/2017 4.08* 4.20 - 5.40 M/uL Final    Hemoglobin 09/09/2017 12.9  12.0 - 16.0 g/dL Final    Hematocrit 09/09/2017 38.8  37.0 - 47.0 % Final    MCV 09/09/2017 95.0  82.0 - 100.0 fL Final    MCH 09/09/2017 31.6* 27.0 - 31.3 pg Final    MCHC 09/09/2017 33.3  33.0 - 37.0 % Final    RDW 09/09/2017 16.9* 11.5 - 14.5 % Final    Platelets 25/94/6820 165  130 - 400 K/uL Final    Neutrophils % 09/09/2017 86.1  % Final    Lymphocytes % 09/09/2017 10.6  % Final    Monocytes % 09/09/2017 2.6  % Final    Eosinophils % 09/09/2017 0.5  % Final    Basophils % 09/09/2017 0.2  % Final    Neutrophils # 09/09/2017 8.0* 1.4 - 6.5 K/uL Final    Lymphocytes # 09/09/2017 1.0  1.0 - 4.8 K/uL Final    Monocytes # 09/09/2017 0.2  0.2 - 0.8 K/uL Final    Eosinophils # 09/09/2017 0.1  0.0 - 0.7 K/uL Final    Basophils # 09/09/2017 0.0  0.0 - 0.2 K/uL Final    Sodium 09/09/2017 145* 132 - 144 mEq/L Final    Potassium 09/09/2017 4.2  3.5 - 5.1 mEq/L Final    Chloride 09/09/2017 108* 98 - 107 mEq/L Final    CO2 09/09/2017 21* 22 - 29 mEq/L Final    Anion Gap 09/09/2017 16* 7 - 13 mEq/L Final    Glucose 09/09/2017 105  74 - 109 mg/dL Final    BUN 09/09/2017 14  8 - 23 mg/dL Final    CREATININE 09/09/2017 0.60  0.50 - 0.90 mg/dL Final    GFR Non- 09/09/2017 >60.0  >60 Final    Comment: >60 mL/min/1.73m2 EGFR, calc. for ages 25 and older using the  MDRD formula (not corrected for weight), is valid for stable  renal function.  GFR  09/09/2017 >60.0  >60 Final    Comment: >60 mL/min/1.73m2 EGFR, calc. for ages 25 and older using the  MDRD formula (not corrected for weight), is valid for stable  renal function.  Calcium 09/09/2017 8.9  8.6 - 10.2 mg/dL Final   9/20/2017  2:44 PM - Akin Andrews   Component Results   Component Collected Lab   Color, UA 09/20/2017  2:43 PM Unknown   Yellow   Clarity, UA 09/20/2017  2:43 PM Unknown   Cloudy   Glucose, UA POC 09/20/2017  2:43 PM Unknown   Neg   Bilirubin, UA 09/20/2017  2:43 PM Unknown   Neg   Ketones, UA 09/20/2017  2:43 PM Unknown   Neg   Spec Grav, UA 09/20/2017  2:43 PM Unknown   1.020   Blood, UA POC 09/20/2017  2:43 PM Unknown   Neg   pH, UA 09/20/2017  2:43 PM Unknown   6.5   Protein, UA POC 09/20/2017  2:43 PM Unknown   Neg   Urobilinogen, UA 09/20/2017  2:43 PM Unknown   3.5 umol/L   Leukocytes, UA 09/20/2017  2:43 PM Unknown   Neg   Nitrite, UA 09/20/2017  2:43 PM Unknown   Neg     Rev/ua--pt   ;detailed rev/ua/lab/dx/rx/paln   Assessment & Plan   1. Acute UTI,symptoms resolved  POCT Urinalysis No Micro (Auto)   2. Nocturnal muscle spasm  cyclobenzaprine (FLEXERIL) 10 MG tablet   3. Encounter for screening mammogram for breast cancer  JEN Digital Screen Bilateral [[id number]]   4. Screening for colon cancer  POCT FECAL IMMUNOCHEMICAL TEST (FIT)   5.  Chronic rhinitis  fluticasone (FLONASE) 50 MCG/ACT nasal spray   6. Rheumatoid arthritis involving both ankles with positive rheumatoid factor (HCC)  predniSONE (DELTASONE) 5 MG tablet   7. Leukocytosis, unspecified type,improving  CBC Auto Differential   8. IFG (impaired fasting glucose)  Hemoglobin A1c   9. Idiopathic progressive neuropathy     disc neg ua after macro;push liquids  ;See above orders, as use and side effects reviewed ;Continue same meds, as common side effects and use reviewed-call if ineffective or problems ; Outpatient Encounter Prescriptions as of 9/20/2017   Medication Sig Dispense Refill    prednisoLONE acetate (PRED FORTE) 1 % ophthalmic suspension instill 1 (ONE) DROP in left eye TWICE DAILY  0    cyclobenzaprine (FLEXERIL) 10 MG tablet TAKE ONE TABLET BY MOUTH ONCE DAILY IN THE EVENING 30 tablet 3    predniSONE (DELTASONE) 5 MG tablet TAKE ONE TABLET BY MOUTH TWICE DAILY 60 tablet 3    fluticasone (FLONASE) 50 MCG/ACT nasal spray 1 spray by Nasal route daily 1 Bottle 3    aspirin EC 81 MG EC tablet Take 1 tablet by mouth daily 30 tablet 3    gabapentin (NEURONTIN) 300 MG capsule TAKE 2 CAPSULES BY MOUTH TWICE DAILY 120 capsule 2    levothyroxine (SYNTHROID) 50 MCG tablet TAKE 1 TABLET BY MOUTH DAILY 30 tablet 5    [DISCONTINUED] cyclobenzaprine (FLEXERIL) 10 MG tablet TAKE ONE TABLET BY MOUTH ONCE DAILY IN THE EVENING 90 tablet 1    [DISCONTINUED] predniSONE (DELTASONE) 5 MG tablet TAKE ONE TABLET BY MOUTH TWICE DAILY 60 tablet 2     No facility-administered encounter medications on file as of 9/20/2017. Will call[recheck cbc]  ;Call/recheck appt. If fails to improve in 3-5 days; if significant abdominal and/or flank pain, get to ER-especially if unable to take meds or fluids.    Orders Placed This Encounter   Procedures    JEN Digital Screen Bilateral [id number]     Standing Status:   Future     Standing Expiration Date:   9/20/2018     Order Specific Question:   Reason for exam:     Answer:   Screening for malignant neoplasm of breast    Hemoglobin A1c     Standing Status:   Future     Number of Occurrences:   1     Standing Expiration Date:   2018    CBC Auto Differential     Standing Status:   Future     Number of Occurrences:   1     Standing Expiration Date:   2018    POCT Urinalysis No Micro (Auto)    POCT FECAL IMMUNOCHEMICAL TEST (FIT)     Standing Status:   Future     Standing Expiration Date:   2018     Orders Placed This Encounter   Medications    cyclobenzaprine (FLEXERIL) 10 MG tablet     Sig: TAKE ONE TABLET BY MOUTH ONCE DAILY IN THE EVENING     Dispense:  30 tablet     Refill:  3     Tolerates, as others have failed    predniSONE (DELTASONE) 5 MG tablet     Sig: TAKE ONE TABLET BY MOUTH TWICE DAILY     Dispense:  60 tablet     Refill:  3    fluticasone (FLONASE) 50 MCG/ACT nasal spray     Si spray by Nasal route daily     Dispense:  1 Bottle     Refill:  3     Medications Discontinued During This Encounter   Medication Reason    cyclobenzaprine (FLEXERIL) 10 MG tablet Reorder    predniSONE (DELTASONE) 5 MG tablet Reorder     Return in about 2 months (around 2017) for bladder/RA. Call or return to clinic prn if these symptoms worsen or fail to improve as anticipated.       Jani May, DO

## 2017-10-01 ASSESSMENT — ENCOUNTER SYMPTOMS
BLOOD IN STOOL: 0
COUGH: 0
SINUS PRESSURE: 0
RHINORRHEA: 1
BACK PAIN: 1
ABDOMINAL PAIN: 0
SHORTNESS OF BREATH: 0

## 2017-10-03 LAB
EKG ATRIAL RATE: 89 BPM
EKG P AXIS: 41 DEGREES
EKG P-R INTERVAL: 120 MS
EKG Q-T INTERVAL: 336 MS
EKG QRS DURATION: 76 MS
EKG QTC CALCULATION (BAZETT): 408 MS
EKG R AXIS: -11 DEGREES
EKG T AXIS: 28 DEGREES
EKG VENTRICULAR RATE: 89 BPM

## 2017-11-12 ENCOUNTER — HOSPITAL ENCOUNTER (EMERGENCY)
Age: 67
Discharge: HOME | End: 2017-11-12
Attending: EMERGENCY MEDICINE
Payer: MEDICARE

## 2017-11-12 ENCOUNTER — APPOINTMENT (OUTPATIENT)
Dept: CT IMAGING | Age: 67
End: 2017-11-12
Payer: MEDICARE

## 2017-11-12 ENCOUNTER — APPOINTMENT (OUTPATIENT)
Dept: GENERAL RADIOLOGY | Age: 67
End: 2017-11-12
Payer: MEDICARE

## 2017-11-12 VITALS
DIASTOLIC BLOOD PRESSURE: 89 MMHG | BODY MASS INDEX: 22.5 KG/M2 | RESPIRATION RATE: 20 BRPM | OXYGEN SATURATION: 98 % | SYSTOLIC BLOOD PRESSURE: 162 MMHG | HEART RATE: 98 BPM | WEIGHT: 127 LBS | HEIGHT: 63 IN | TEMPERATURE: 98.3 F

## 2017-11-12 DIAGNOSIS — G44.209 TENSION-TYPE HEADACHE, NOT INTRACTABLE, UNSPECIFIED CHRONICITY PATTERN: Primary | ICD-10-CM

## 2017-11-12 DIAGNOSIS — R07.9 CHEST PAIN OF UNCERTAIN ETIOLOGY: ICD-10-CM

## 2017-11-12 LAB
ALBUMIN SERPL-MCNC: 4.4 G/DL (ref 3.9–4.9)
ALP BLD-CCNC: 100 U/L (ref 40–130)
ALT SERPL-CCNC: 35 U/L (ref 0–33)
ANION GAP SERPL CALCULATED.3IONS-SCNC: 14 MEQ/L (ref 7–13)
AST SERPL-CCNC: 27 U/L (ref 0–35)
BASOPHILS ABSOLUTE: 0 K/UL (ref 0–0.2)
BASOPHILS RELATIVE PERCENT: 0.3 %
BILIRUB SERPL-MCNC: 0.4 MG/DL (ref 0–1.2)
BILIRUBIN DIRECT: 0.2 MG/DL (ref 0–0.3)
BILIRUBIN, INDIRECT: 0.2 MG/DL (ref 0–0.6)
BUN BLDV-MCNC: 22 MG/DL (ref 8–23)
CALCIUM SERPL-MCNC: 10.6 MG/DL (ref 8.6–10.2)
CHLORIDE BLD-SCNC: 103 MEQ/L (ref 98–107)
CO2: 27 MEQ/L (ref 22–29)
CREAT SERPL-MCNC: 0.66 MG/DL (ref 0.5–0.9)
EKG ATRIAL RATE: 83 BPM
EKG P AXIS: 40 DEGREES
EKG P-R INTERVAL: 120 MS
EKG Q-T INTERVAL: 362 MS
EKG QRS DURATION: 82 MS
EKG QTC CALCULATION (BAZETT): 425 MS
EKG R AXIS: -15 DEGREES
EKG T AXIS: 84 DEGREES
EKG VENTRICULAR RATE: 83 BPM
EOSINOPHILS ABSOLUTE: 0.1 K/UL (ref 0–0.7)
EOSINOPHILS RELATIVE PERCENT: 0.7 %
GFR AFRICAN AMERICAN: >60
GFR NON-AFRICAN AMERICAN: >60
GLUCOSE BLD-MCNC: 124 MG/DL (ref 74–109)
HCT VFR BLD CALC: 41.9 % (ref 37–47)
HEMOGLOBIN: 13.8 G/DL (ref 12–16)
LIPASE: 37 U/L (ref 13–60)
LYMPHOCYTES ABSOLUTE: 1.9 K/UL (ref 1–4.8)
LYMPHOCYTES RELATIVE PERCENT: 18.5 %
MCH RBC QN AUTO: 31.9 PG (ref 27–31.3)
MCHC RBC AUTO-ENTMCNC: 32.9 % (ref 33–37)
MCV RBC AUTO: 97.1 FL (ref 82–100)
MONOCYTES ABSOLUTE: 0.7 K/UL (ref 0.2–0.8)
MONOCYTES RELATIVE PERCENT: 6.6 %
NEUTROPHILS ABSOLUTE: 7.4 K/UL (ref 1.4–6.5)
NEUTROPHILS RELATIVE PERCENT: 73.9 %
PDW BLD-RTO: 13.6 % (ref 11.5–14.5)
PLATELET # BLD: 242 K/UL (ref 130–400)
POTASSIUM SERPL-SCNC: 3.9 MEQ/L (ref 3.5–5.1)
RBC # BLD: 4.32 M/UL (ref 4.2–5.4)
SODIUM BLD-SCNC: 144 MEQ/L (ref 132–144)
TOTAL PROTEIN: 7.4 G/DL (ref 6.4–8.1)
TROPONIN: <0.01 NG/ML (ref 0–0.01)
WBC # BLD: 10 K/UL (ref 4.8–10.8)

## 2017-11-12 PROCEDURE — 71020 XR CHEST STANDARD TWO VW: CPT

## 2017-11-12 PROCEDURE — 2580000003 HC RX 258: Performed by: EMERGENCY MEDICINE

## 2017-11-12 PROCEDURE — 80048 BASIC METABOLIC PNL TOTAL CA: CPT

## 2017-11-12 PROCEDURE — 80076 HEPATIC FUNCTION PANEL: CPT

## 2017-11-12 PROCEDURE — 93005 ELECTROCARDIOGRAM TRACING: CPT

## 2017-11-12 PROCEDURE — 99284 EMERGENCY DEPT VISIT MOD MDM: CPT

## 2017-11-12 PROCEDURE — 36415 COLL VENOUS BLD VENIPUNCTURE: CPT

## 2017-11-12 PROCEDURE — 84484 ASSAY OF TROPONIN QUANT: CPT

## 2017-11-12 PROCEDURE — 70450 CT HEAD/BRAIN W/O DYE: CPT

## 2017-11-12 PROCEDURE — 85025 COMPLETE CBC W/AUTO DIFF WBC: CPT

## 2017-11-12 PROCEDURE — 83690 ASSAY OF LIPASE: CPT

## 2017-11-12 RX ORDER — SODIUM CHLORIDE 9 MG/ML
INJECTION, SOLUTION INTRAVENOUS
Status: DISCONTINUED
Start: 2017-11-12 | End: 2017-11-12 | Stop reason: HOSPADM

## 2017-11-12 RX ORDER — 0.9 % SODIUM CHLORIDE 0.9 %
1000 INTRAVENOUS SOLUTION INTRAVENOUS ONCE
Status: COMPLETED | OUTPATIENT
Start: 2017-11-12 | End: 2017-11-12

## 2017-11-12 RX ADMIN — SODIUM CHLORIDE 1000 ML: 9 INJECTION, SOLUTION INTRAVENOUS at 13:30

## 2017-11-12 ASSESSMENT — ENCOUNTER SYMPTOMS
COUGH: 0
NAUSEA: 1
SORE THROAT: 0
DIARRHEA: 0
SHORTNESS OF BREATH: 0
BACK PAIN: 0
WHEEZING: 0
VOMITING: 0
ABDOMINAL PAIN: 0
CHEST TIGHTNESS: 0

## 2017-11-12 ASSESSMENT — PAIN DESCRIPTION - PAIN TYPE: TYPE: ACUTE PAIN

## 2017-11-12 ASSESSMENT — PAIN DESCRIPTION - DESCRIPTORS: DESCRIPTORS: ACHING

## 2017-11-12 ASSESSMENT — PAIN DESCRIPTION - LOCATION: LOCATION: HEAD

## 2017-11-12 ASSESSMENT — PAIN DESCRIPTION - FREQUENCY: FREQUENCY: CONTINUOUS

## 2017-11-12 ASSESSMENT — PAIN SCALES - GENERAL: PAINLEVEL_OUTOF10: 7

## 2017-11-12 NOTE — ED NOTES
Patient resting in bed. Denies any new distress at this time. Vitals stable. Will cont.  To monitor     Mary Bullock RN  11/12/17 6787

## 2017-11-12 NOTE — ED NOTES
Placed follow up appointment & left pt information with [facility] PCP scheduling center at this time.      Maria Del Rosario Chapa  11/12/17 2816

## 2017-11-12 NOTE — ED PROVIDER NOTES
eye TWICE DAILY    PREDNISONE (DELTASONE) 5 MG TABLET    TAKE ONE TABLET BY MOUTH TWICE DAILY       ALLERGIES     Review of patient's allergies indicates no known allergies. FAMILY HISTORY       Family History   Problem Relation Age of Onset    Diabetes Father     Heart Disease Father           SOCIAL HISTORY       Social History     Social History    Marital status:      Spouse name: N/A    Number of children: N/A    Years of education: N/A     Social History Main Topics    Smoking status: Never Smoker    Smokeless tobacco: Never Used    Alcohol use No    Drug use: No    Sexual activity: Not Currently     Other Topics Concern    Not on file     Social History Narrative    No narrative on file       SCREENINGS   NIH Stroke Scale  Interval: Baseline  Level of Consciousness (1a. ): Alert  LOC Questions (1b. ): Answers both correctly  LOC Commands (1c. ): Obeys both correctly  Best Gaze (2. ): Normal  Visual (3. ): No visual loss  Facial Palsy (4. ): Normal  Motor Arm, Left (5a. ): No drift  Motor Arm, Right (5b. ): No drift  Motor Leg, Left (6a. ): No drift  Motor Leg, Right (6b. ): No drift  Limb Ataxia (7. ): Absent  Sensory (8. ): Normal  Best Language (9. ): No aphasia  Dysarthria (10. ): Normal  Extinction and Inattention (11): No neglect  Total: 0Glasgow Coma Scale  Eye Opening: Spontaneous  Best Verbal Response: Oriented  Best Motor Response: Obeys commands  [place] Coma Scale Score: 15      PHYSICAL EXAM    (up to 7 for level 4, 8 or more for level 5)     ED Triage Vitals [11/12/17 1308]   BP Temp Temp Source Pulse Resp SpO2 Height Weight   (!) 181/102 98.3 °F (36.8 °C) Oral 87 16 94 % 5' 3\" (1.6 m) 127 lb (57.6 kg)       Physical Exam   Constitutional: She is oriented to person, place, and time. She appears well-developed and well-nourished. No distress. HENT:   Head: Normocephalic and atraumatic. Nose: Nose normal.   Mouth/Throat: No oropharyngeal exudate.    Eyes: Conjunctivae are normal. Pupils are equal, round, and reactive to light. Right eye exhibits no discharge. Left eye exhibits no discharge. Neck: Normal range of motion. Neck supple. Cardiovascular: Normal rate, regular rhythm and normal heart sounds. Exam reveals no friction rub. No murmur heard. Pulmonary/Chest: Effort normal and breath sounds normal. No stridor. No respiratory distress. She has no wheezes. Abdominal: Soft. Bowel sounds are normal. She exhibits no distension. There is no rebound and no guarding. Musculoskeletal: Normal range of motion. She exhibits no edema or tenderness. Lymphadenopathy:     She has no cervical adenopathy. Neurological: She is alert and oriented to person, place, and time. She displays normal reflexes. No cranial nerve deficit. Coordination normal.   Skin: Skin is warm and dry. No rash noted. Psychiatric: She has a normal mood and affect. Her behavior is normal. Thought content normal.   Nursing note and vitals reviewed. DIAGNOSTIC RESULTS     EKG: All EKG's are interpreted by the Emergency Department Physician who either signs or Co-signs this chart in the absence of a cardiologist.    Sinus rhythm at 83 beats per minute, LVH, , normal OH interval at 120    RADIOLOGY:   Non-plain film images such as CT, Ultrasound and MRI are read by the radiologist. Plain radiographic images are visualized and preliminarily interpreted by the emergency physician with the below findings:      Interpretation per the Radiologist below (if available at the time of note entry):    CT Head WO Contrast   Final Result   NO CHANGE. NO ACUTE INTRACRANIAL PATHOLOGY. XR CHEST STANDARD (2 VW)    (Results Pending)     Two-view chest x-ray negative for acute process based on my independent interpretation.   She does have chronic changes but it looks unchanged from her most recent period    LABS:  [address] - Abnormal; Notable for the following: intervention. This includes discussing the case with consultants, reviewing laboratory studies and images independently, arranging disposition, and speaking with patient/family    CONSULTS:  None    PROCEDURES:  Unless otherwise noted below, none     Procedures    FINAL IMPRESSION      1. Tension-type headache, not intractable, unspecified chronicity pattern    2.  Chest pain of uncertain etiology          DISPOSITION/PLAN   DISPOSITION Decision to Discharge    PATIENT REFERRED TO:  Saroj Steele DO  [address] [id number] [phone]    In 2 days        DISCHARGE MEDICATIONS:  New Prescriptions    No medications on file          (Please note that portions of this note were completed with a voice recognition program.  Efforts were made to edit the dictations but occasionally words and phrases are mis-transcribed.)    Navjot Alberts MD (electronically signed)  Attending Emergency Physician              Audra Rico MD  11/12/17 5900

## 2017-11-14 ENCOUNTER — OFFICE VISIT (OUTPATIENT)
Dept: FAMILY MEDICINE CLINIC | Age: 67
End: 2017-11-14

## 2017-11-14 VITALS
HEIGHT: 63 IN | RESPIRATION RATE: 14 BRPM | SYSTOLIC BLOOD PRESSURE: 128 MMHG | WEIGHT: 126 LBS | HEART RATE: 66 BPM | BODY MASS INDEX: 22.32 KG/M2 | DIASTOLIC BLOOD PRESSURE: 86 MMHG | TEMPERATURE: 97.5 F

## 2017-11-14 DIAGNOSIS — M05.762 RHEUMATOID ARTHRITIS INVOLVING BOTH KNEES WITH POSITIVE RHEUMATOID FACTOR (HCC): ICD-10-CM

## 2017-11-14 DIAGNOSIS — M05.761 RHEUMATOID ARTHRITIS INVOLVING BOTH KNEES WITH POSITIVE RHEUMATOID FACTOR (HCC): ICD-10-CM

## 2017-11-14 DIAGNOSIS — G44.229 CHRONIC TENSION-TYPE HEADACHE, NOT INTRACTABLE: ICD-10-CM

## 2017-11-14 DIAGNOSIS — G43.109 MIGRAINE WITH AURA AND WITHOUT STATUS MIGRAINOSUS, NOT INTRACTABLE: Primary | ICD-10-CM

## 2017-11-14 PROCEDURE — 99214 OFFICE O/P EST MOD 30 MIN: CPT | Performed by: FAMILY MEDICINE

## 2017-11-14 RX ORDER — BUTALBITAL, ACETAMINOPHEN AND CAFFEINE 50; 325; 40 MG/1; MG/1; MG/1
1 TABLET ORAL EVERY 6 HOURS PRN
Qty: 25 TABLET | Refills: 1 | Status: ON HOLD | OUTPATIENT
Start: 2017-11-14 | End: 2019-03-27

## 2017-11-23 ASSESSMENT — ENCOUNTER SYMPTOMS
SHORTNESS OF BREATH: 0
COUGH: 0
NAUSEA: 0
BACK PAIN: 1
ABDOMINAL PAIN: 0
EYE PAIN: 0
BLOOD IN STOOL: 0

## 2017-11-23 NOTE — PROGRESS NOTES
Subjective:      Patient ID: Richi Espinoza is a 75 y.o. female.     HPI    Review of Systems    Objective:   Physical Exam    Assessment:      ***      Plan:      ***
intractable  butalbital-acetaminophen-caffeine (FIORICET, ESGIC) -40 MG per tablet   2. Chronic tension-type headache, not intractable  butalbital-acetaminophen-caffeine (FIORICET, ESGIC) -40 MG per tablet   3. Rheumatoid arthritis involving both knees with positive rheumatoid factor (HCC)     disc rx options  Disc er eval w/ pt-family  ;See above orders, as use and side effects reviewed ;Continue same meds, as common side effects and use reviewed-call if ineffective or problems ; Outpatient Encounter Prescriptions as of 11/14/2017   Medication Sig Dispense Refill    prednisoLONE acetate (PRED FORTE) 1 % ophthalmic suspension instill 1 (ONE) DROP in left eye TWICE DAILY  0    cyclobenzaprine (FLEXERIL) 10 MG tablet TAKE ONE TABLET BY MOUTH ONCE DAILY IN THE EVENING 30 tablet 3    fluticasone (FLONASE) 50 MCG/ACT nasal spray 1 spray by Nasal route daily 1 Bottle 3    aspirin EC 81 MG EC tablet Take 1 tablet by mouth daily 30 tablet 3    levothyroxine (SYNTHROID) 50 MCG tablet TAKE 1 TABLET BY MOUTH DAILY 30 tablet 5    butalbital-acetaminophen-caffeine (FIORICET, ESGIC) -40 MG per tablet Take 1 tablet by mouth every 6 hours as needed for Headaches 25 tablet 1    predniSONE (DELTASONE) 5 MG tablet TAKE ONE TABLET BY MOUTH TWICE DAILY 60 tablet 3    gabapentin (NEURONTIN) 300 MG capsule TAKE 2 CAPSULES BY MOUTH TWICE DAILY 120 capsule 2     No facility-administered encounter medications on file as of 11/14/2017.     call if worse  Start fioricet prn  Recheck 2 mos for ? ra-meds  No orders of the defined types were placed in this encounter. Orders Placed This Encounter   Medications    butalbital-acetaminophen-caffeine (FIORICET, ESGIC) -40 MG per tablet     Sig: Take 1 tablet by mouth every 6 hours as needed for Headaches     Dispense:  25 tablet     Refill:  1     There are no discontinued medications. Return in about 2 months (around 1/14/2018) for ra/ha.     Call or return to

## 2017-12-08 ENCOUNTER — TELEPHONE (OUTPATIENT)
Dept: OTHER | Facility: CLINIC | Age: 67
End: 2017-12-08

## 2018-01-04 RX ORDER — LEVOTHYROXINE SODIUM 0.05 MG/1
TABLET ORAL
Qty: 30 TABLET | Refills: 5 | Status: SHIPPED | OUTPATIENT
Start: 2018-01-04 | End: 2018-01-30 | Stop reason: SDUPTHER

## 2018-01-04 NOTE — TELEPHONE ENCOUNTER
PHARMACY REQUESTING REFILL  PATIENT LAST SEEN 11/14/17  LAST REFILL 7/6/17  PLEASE APPROVE OR DENY.        Future Appointments  Date Time Provider Jeanine Ortiz   1/22/2018 1:00 PM DO LAUREANO [facility] PCP [facility] [place]

## 2018-01-16 ENCOUNTER — TELEPHONE (OUTPATIENT)
Dept: FAMILY MEDICINE CLINIC | Age: 68
End: 2018-01-16

## 2018-01-16 DIAGNOSIS — M05.762 RHEUMATOID ARTHRITIS INVOLVING BOTH KNEES WITH POSITIVE RHEUMATOID FACTOR (HCC): Primary | ICD-10-CM

## 2018-01-16 DIAGNOSIS — M05.761 RHEUMATOID ARTHRITIS INVOLVING BOTH KNEES WITH POSITIVE RHEUMATOID FACTOR (HCC): Primary | ICD-10-CM

## 2018-01-16 RX ORDER — METHYLPREDNISOLONE 4 MG/1
TABLET ORAL
Qty: 21 TABLET | Refills: 1 | Status: SHIPPED | OUTPATIENT
Start: 2018-01-16 | End: 2018-01-30 | Stop reason: ALTCHOICE

## 2018-01-20 ENCOUNTER — APPOINTMENT (OUTPATIENT)
Dept: CT IMAGING | Age: 68
End: 2018-01-20
Payer: MEDICARE

## 2018-01-20 ENCOUNTER — HOSPITAL ENCOUNTER (EMERGENCY)
Age: 68
Discharge: HOME | End: 2018-01-20
Attending: EMERGENCY MEDICINE
Payer: MEDICARE

## 2018-01-20 ENCOUNTER — APPOINTMENT (OUTPATIENT)
Dept: GENERAL RADIOLOGY | Age: 68
End: 2018-01-20
Payer: MEDICARE

## 2018-01-20 VITALS
RESPIRATION RATE: 16 BRPM | DIASTOLIC BLOOD PRESSURE: 96 MMHG | HEART RATE: 75 BPM | SYSTOLIC BLOOD PRESSURE: 168 MMHG | OXYGEN SATURATION: 98 % | HEIGHT: 63 IN | WEIGHT: 130 LBS | BODY MASS INDEX: 23.04 KG/M2 | TEMPERATURE: 98.4 F

## 2018-01-20 DIAGNOSIS — S70.02XA HEMATOMA OF LEFT HIP, INITIAL ENCOUNTER: Primary | ICD-10-CM
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
## 2018-01-20 DIAGNOSIS — E87.20 LACTIC ACIDOSIS: ICD-10-CM

## 2018-01-20 LAB
ANION GAP SERPL CALCULATED.3IONS-SCNC: 18 MEQ/L (ref 7–13)
APTT: 21 SEC (ref 21.6–35.4)
BASOPHILS ABSOLUTE: 0 K/UL (ref 0–0.2)
BASOPHILS RELATIVE PERCENT: 0.1 %
BILIRUBIN URINE: NEGATIVE
BLOOD, URINE: NEGATIVE
BUN BLDV-MCNC: 19 MG/DL (ref 8–23)
CALCIUM SERPL-MCNC: 9.8 MG/DL (ref 8.6–10.2)
CHLORIDE BLD-SCNC: 98 MEQ/L (ref 98–107)
CLARITY: CLEAR
CO2: 24 MEQ/L (ref 22–29)
COLOR: YELLOW
CREAT SERPL-MCNC: 0.59 MG/DL (ref 0.5–0.9)
EKG ATRIAL RATE: 135 BPM
EKG P AXIS: -16 DEGREES
EKG P-R INTERVAL: 114 MS
EKG Q-T INTERVAL: 270 MS
EKG QRS DURATION: 74 MS
EKG QTC CALCULATION (BAZETT): 405 MS
EKG R AXIS: -16 DEGREES
EKG T AXIS: 190 DEGREES
EKG VENTRICULAR RATE: 135 BPM
EOSINOPHILS ABSOLUTE: 0 K/UL (ref 0–0.7)
EOSINOPHILS RELATIVE PERCENT: 0 %
GFR AFRICAN AMERICAN: >60
GFR NON-AFRICAN AMERICAN: >60
GLUCOSE BLD-MCNC: 186 MG/DL (ref 74–109)
GLUCOSE URINE: NEGATIVE MG/DL
HCT VFR BLD CALC: 42.1 % (ref 37–47)
HEMOGLOBIN: 14 G/DL (ref 12–16)
INR BLD: 1
KETONES, URINE: NEGATIVE MG/DL
LACTIC ACID: 1.5 MMOL/L (ref 0.5–2.2)
LACTIC ACID: 3.3 MMOL/L (ref 0.5–2.2)
LEUKOCYTE ESTERASE, URINE: NEGATIVE
LYMPHOCYTES ABSOLUTE: 1.6 K/UL (ref 1–4.8)
LYMPHOCYTES RELATIVE PERCENT: 10.5 %
MAGNESIUM: 1.9 MG/DL (ref 1.7–2.3)
MCH RBC QN AUTO: 32.2 PG (ref 27–31.3)
MCHC RBC AUTO-ENTMCNC: 33.3 % (ref 33–37)
MCV RBC AUTO: 96.7 FL (ref 82–100)
MONOCYTES ABSOLUTE: 0.2 K/UL (ref 0.2–0.8)
MONOCYTES RELATIVE PERCENT: 1.2 %
NEUTROPHILS ABSOLUTE: 13.5 K/UL (ref 1.4–6.5)
NEUTROPHILS RELATIVE PERCENT: 88.2 %
NITRITE, URINE: NEGATIVE
PDW BLD-RTO: 14.6 % (ref 11.5–14.5)
PH UA: 6.5 (ref 5–9)
PLATELET # BLD: 299 K/UL (ref 130–400)
POTASSIUM SERPL-SCNC: 3.8 MEQ/L (ref 3.5–5.1)
PROTEIN UA: NEGATIVE MG/DL
PROTHROMBIN TIME: 9.6 SEC (ref 9.6–12.3)
RBC # BLD: 4.35 M/UL (ref 4.2–5.4)
SODIUM BLD-SCNC: 140 MEQ/L (ref 132–144)
SPECIFIC GRAVITY UA: 1.01 (ref 1–1.03)
URINE REFLEX TO CULTURE: NORMAL
UROBILINOGEN, URINE: 0.2 E.U./DL
WBC # BLD: 15.3 K/UL (ref 4.8–10.8)

## 2018-01-20 PROCEDURE — 83735 ASSAY OF MAGNESIUM: CPT

## 2018-01-20 PROCEDURE — 93005 ELECTROCARDIOGRAM TRACING: CPT

## 2018-01-20 PROCEDURE — 81003 URINALYSIS AUTO W/O SCOPE: CPT

## 2018-01-20 PROCEDURE — 2580000003 HC RX 258: Performed by: EMERGENCY MEDICINE

## 2018-01-20 PROCEDURE — 73702 CT LWR EXTREMITY W/O&W/DYE: CPT

## 2018-01-20 PROCEDURE — 6360000002 HC RX W HCPCS: Performed by: EMERGENCY MEDICINE

## 2018-01-20 PROCEDURE — 85610 PROTHROMBIN TIME: CPT

## 2018-01-20 PROCEDURE — 85025 COMPLETE CBC W/AUTO DIFF WBC: CPT

## 2018-01-20 PROCEDURE — 99284 EMERGENCY DEPT VISIT MOD MDM: CPT

## 2018-01-20 PROCEDURE — 87040 BLOOD CULTURE FOR BACTERIA: CPT

## 2018-01-20 PROCEDURE — 71045 X-RAY EXAM CHEST 1 VIEW: CPT

## 2018-01-20 PROCEDURE — 96374 THER/PROPH/DIAG INJ IV PUSH: CPT

## 2018-01-20 PROCEDURE — 6360000004 HC RX CONTRAST MEDICATION: Performed by: EMERGENCY MEDICINE

## 2018-01-20 PROCEDURE — 36415 COLL VENOUS BLD VENIPUNCTURE: CPT

## 2018-01-20 PROCEDURE — 85730 THROMBOPLASTIN TIME PARTIAL: CPT

## 2018-01-20 PROCEDURE — 80048 BASIC METABOLIC PNL TOTAL CA: CPT

## 2018-01-20 PROCEDURE — 96361 HYDRATE IV INFUSION ADD-ON: CPT

## 2018-01-20 PROCEDURE — 83605 ASSAY OF LACTIC ACID: CPT

## 2018-01-20 RX ORDER — 0.9 % SODIUM CHLORIDE 0.9 %
1000 INTRAVENOUS SOLUTION INTRAVENOUS ONCE
Status: COMPLETED | OUTPATIENT
Start: 2018-01-20 | End: 2018-01-20

## 2018-01-20 RX ORDER — KETOROLAC TROMETHAMINE 10 MG/1
10 TABLET, FILM COATED ORAL EVERY 6 HOURS PRN
Qty: 20 TABLET | Refills: 0 | Status: SHIPPED | OUTPATIENT
Start: 2018-01-20 | End: 2018-04-04

## 2018-01-20 RX ORDER — KETOROLAC TROMETHAMINE 15 MG/ML
15 INJECTION, SOLUTION INTRAMUSCULAR; INTRAVENOUS ONCE
Status: COMPLETED | OUTPATIENT
Start: 2018-01-20 | End: 2018-01-20

## 2018-01-20 RX ADMIN — SODIUM CHLORIDE 1000 ML: 9 INJECTION, SOLUTION INTRAVENOUS at 16:48

## 2018-01-20 RX ADMIN — SODIUM CHLORIDE 1000 ML: 9 INJECTION, SOLUTION INTRAVENOUS at 18:04

## 2018-01-20 RX ADMIN — KETOROLAC TROMETHAMINE 15 MG: 15 INJECTION, SOLUTION INTRAMUSCULAR; INTRAVENOUS at 16:48

## 2018-01-20 RX ADMIN — IOPAMIDOL 100 ML: 755 INJECTION, SOLUTION INTRAVENOUS at 17:17

## 2018-01-20 ASSESSMENT — ENCOUNTER SYMPTOMS
CONSTIPATION: 0
ABDOMINAL DISTENTION: 0
WHEEZING: 0
RHINORRHEA: 0
DIARRHEA: 0
COLOR CHANGE: 0
VOMITING: 0
NAUSEA: 0
SINUS PRESSURE: 0
COUGH: 0
PHOTOPHOBIA: 0
BACK PAIN: 0
APNEA: 0
ABDOMINAL PAIN: 0
SHORTNESS OF BREATH: 0
SORE THROAT: 0
EYE PAIN: 0

## 2018-01-20 ASSESSMENT — PAIN DESCRIPTION - LOCATION
LOCATION: HIP
LOCATION: HIP

## 2018-01-20 ASSESSMENT — PAIN DESCRIPTION - PAIN TYPE
TYPE: ACUTE PAIN

## 2018-01-20 ASSESSMENT — PAIN SCALES - GENERAL
PAINLEVEL_OUTOF10: 3
PAINLEVEL_OUTOF10: 3
PAINLEVEL_OUTOF10: 5

## 2018-01-20 ASSESSMENT — PAIN DESCRIPTION - ORIENTATION
ORIENTATION: LEFT
ORIENTATION: LEFT

## 2018-01-20 ASSESSMENT — PAIN DESCRIPTION - PROGRESSION: CLINICAL_PROGRESSION: GRADUALLY IMPROVING

## 2018-01-20 ASSESSMENT — PAIN - FUNCTIONAL ASSESSMENT: PAIN_FUNCTIONAL_ASSESSMENT: ADVANCED DEMENTIA

## 2018-01-20 ASSESSMENT — PAIN DESCRIPTION - DESCRIPTORS
DESCRIPTORS: ACHING
DESCRIPTORS: ACHING

## 2018-01-20 ASSESSMENT — PAIN DESCRIPTION - ONSET: ONSET: ON-GOING

## 2018-01-20 ASSESSMENT — PAIN DESCRIPTION - FREQUENCY: FREQUENCY: CONTINUOUS

## 2018-01-20 NOTE — ED PROVIDER NOTES
[facility] ED  eMERGENCY dEPARTMENT eNCOUnter      Pt Name: Lloyd Ceja  MRN: [id number]  [place] 1950  Date of evaluation: 1/20/2018  Provider: Gopal Chicas MD    [address] [place] [address]       Chief Complaint   Patient presents with    Hip Pain     fell today         HISTORY OF PRESENT ILLNESS   (Location/Symptom, Timing/Onset, Context/Setting, Quality, Duration, Modifying Factors, Severity)  Note limiting factors. Lloyd Ceja is a 75 y.o. female who presents to the emergency department with complaint of Left hip pain and swelling status post trip and fall earlier today. Dionne Valencia at her mom's after tripping on carpet, and striking left hip on a chair. Was able to get up immediately and walk. Denies head injury. Pain is 5 in a scale of 1-10. Pain is sharp and nonradiating. Also has bruising to the face from fall last week. Denies chest pain or dizziness prior to falls. Denies palpitations or any other systemic symptoms. HPI    Nursing Notes were reviewed. REVIEW OF SYSTEMS    (2-9 systems for level 4, 10 or more for level 5)     Review of Systems   Constitutional: Negative. Negative for activity change, appetite change, chills, fatigue and fever. HENT: Negative for congestion, ear discharge, ear pain, hearing loss, rhinorrhea, sinus pressure and sore throat. Eyes: Negative for photophobia, pain and visual disturbance. Respiratory: Negative for apnea, cough, shortness of breath and wheezing. Cardiovascular: Positive for leg swelling. Negative for chest pain and palpitations. Gastrointestinal: Negative for abdominal distention, abdominal pain, constipation, diarrhea, nausea and vomiting. Endocrine: Negative for cold intolerance, heat intolerance and polyuria. Genitourinary: Negative for dysuria, flank pain, frequency and urgency. Musculoskeletal: Positive for arthralgias and joint swelling. Negative for back pain, gait problem, myalgias and neck stiffness.    Skin: Negative for color change, pallor and rash. Allergic/Immunologic: Negative for food allergies and immunocompromised state. Neurological: Negative for dizziness, tremors, syncope, weakness, light-headedness and headaches. Psychiatric/Behavioral: Negative for agitation, confusion and hallucinations. All other systems reviewed and are negative. Except as noted above the remainder of the review of systems was reviewed and negative. PAST MEDICAL HISTORY     Past Medical History:   Diagnosis Date    Depression     GERD (gastroesophageal reflux disease)     History of kidney stones     hospitalized 9/2016 obstructive pyelonephritis    Hypercholesteremia     Hypothyroidism     Osteoarthritis     Osteopenia     RA (rheumatoid arthritis) ([facility] [address]          SURGICAL HISTORY       Past Surgical History:   Procedure Laterality Date    CYSTOSCOPY Left 08/28/2016    Michelle Morales MD  PYELOGRAM & DOUBLE-J STENT PLACEMENT    GALLBLADDER SURGERY      HYSTERECTOMY      KNEE SURGERY      left    LITHOTRIPSY Left 10/12/2016    HOLMIUM LASER LITHOTRIPSY AND REMOVAL OF LEFT J STENT  performed by Tani Childers MD at [facility]  08/09/2013    DR. RHODES    URETER SURGERY Left 10/12/2016    /left ureteroscopy/ cysto/ retrogrades/pyelogram/ holmium laser lithotripsy removal left urerteral stent performed by Tani Childers MD at [facility]       Discharge Medication List as of 1/20/2018  8:13 PM      CONTINUE these medications which have NOT CHANGED    Details   methylPREDNISolone (MEDROL DOSEPACK) 4 MG tablet Take by mouth., Disp-21 tablet, R-1Normal      levothyroxine (SYNTHROID) 50 MCG tablet TAKE 1 TABLET BY MOUTH DAILY, Disp-30 tablet, R-5This prescription was filled on 1/4/2018.  Any refills authorized will be placed on file for next fill in FebNormal      butalbital-acetaminophen-caffeine (FIORICET, ESGIC) -40 MG per tablet Take 1 tablet by place, and time. She appears well-developed and well-nourished. No distress. HENT:   Head: Normocephalic and atraumatic. Nose: Nose normal.   Mouth/Throat: Oropharynx is clear and moist. No oropharyngeal exudate. Resolving facial ecchymoses   Eyes: Conjunctivae and EOM are normal. Pupils are equal, round, and reactive to light. Right eye exhibits no discharge. Left eye exhibits no discharge. No scleral icterus. Neck: Normal range of motion. Neck supple. No JVD present. No tracheal deviation present. No thyromegaly present. Cardiovascular: Regular rhythm, normal heart sounds and intact distal pulses. Exam reveals no gallop and no friction rub. No murmur heard. Tachycardia   Pulmonary/Chest: Effort normal and breath sounds normal. No stridor. No respiratory distress. She has no wheezes. She has no rales. She exhibits no tenderness. Abdominal: Soft. Bowel sounds are normal. She exhibits no distension and no mass. There is no tenderness. There is no rebound and no guarding. Musculoskeletal: Normal range of motion. She exhibits no edema, tenderness or deformity. Significant swelling lateral to the left hip concerning for hematoma versus fracture. Good distal pulses. Lymphadenopathy:     She has no cervical adenopathy. Neurological: She is alert and oriented to person, place, and time. She has normal reflexes. No cranial nerve deficit. She exhibits normal muscle tone. Coordination normal.   Skin: Skin is warm and dry. No rash noted. She is not diaphoretic. No erythema. No pallor. Psychiatric: She has a normal mood and affect. Her behavior is normal. Judgment and thought content normal.   Nursing note and vitals reviewed.       DIAGNOSTIC RESULTS     EKG: All EKG's are interpreted by the Emergency Department Physician who either signs or Co-signs this chart in the absence of a cardiologist.    Reviewed EKG shows sinus tachycardia with occasional premature ventricular complexes, rate 1 35 bpm, normal electrical axis, normal intervals, no acute ST-T wave changes. RADIOLOGY:   Non-plain film images such as CT, Ultrasound and MRI are read by the radiologist. Plain radiographic images are visualized and preliminarily interpreted by the emergency physician with the below findings:    Chest x-ray shows no acute cardiopulmonary process. Interpretation per the Radiologist below, if available at the time of this note:    XR CHEST PORTABLE   Final Result      No acute cardiopulmonary disease. CT HIP LEFT W WO CONTRAST    (Results Pending)         ED BEDSIDE ULTRASOUND:   Performed by ED Physician - none    LABS:  Labs Reviewed   BASIC METABOLIC PANEL - Abnormal; Notable for the following:        Result Value    Anion Gap 18 (*)     Glucose 186 (*)     All other components within normal limits   CBC WITH AUTO DIFFERENTIAL - Abnormal; Notable for the following:     WBC 15.3 (*)     MCH 32.2 (*)     RDW 14.6 (*)     Neutrophils # 13.5 (*)     All other components within normal limits   LACTIC ACID, PLASMA - Abnormal; Notable for the following:     Lactic Acid 3.3 (*)     All other components within normal limits    Narrative:     CALL  Nicole  [facility] tel. [phone],  Chemistry results called to and read back by Minor vasquez rn, 01/20/2018 17:01,  by [facility]   APTT - Abnormal; Notable for the following:     aPTT 21.0 (*)     All other components within normal limits   CULTURE BLOOD #1   CULTURE BLOOD #2   MAGNESIUM   URINE RT REFLEX TO CULTURE   PROTIME-INR   LACTIC ACID, PLASMA       All other labs were within normal range or not returned as of this dictation. EMERGENCY DEPARTMENT COURSE and DIFFERENTIAL DIAGNOSIS/MDM:    X-rays did not and CT scan did not show any acute fractures but in the lateral upper thigh at the level of the the left hipshowed a large subcutaneous hematoma measuring 9 x 5 x 5 cm.     Discussed care with orthopedic on call Dr. Chicho Saul who advised discharge and follow-up in his office on Monday , in so much as patient is adequately bearing weight, and she is. Sepsis workup was completed giving pulse rate in the 130s. Lactic acid was 3. Patient was hydrated with 2 L of normal saline. We'll repeat lactic acid level and plan discharge. Plan were discussed with patient and spouse and they are agreeable. All of their questions were addressed to their satisfaction. Vitals:    Vitals:    01/20/18 1601 01/20/18 1803 01/20/18 2034   BP: (!) 178/97 (!) 168/96    Pulse: 134 80 75   Resp: 12 20 16   Temp: 98.4 °F (36.9 °C)  98.4 °F (36.9 °C)   TempSrc: Oral  Oral   SpO2: 97% 98% 98%   Weight: 130 lb (59 kg)     Height: 5' 3\" (1.6 m)             MDM  Number of Diagnoses or Management Options     Amount and/or Complexity of Data Reviewed  Clinical lab tests: reviewed and ordered  Tests in the radiology section of CPT®: reviewed and ordered    Risk of Complications, Morbidity, and/or Mortality  Presenting problems: moderate  Diagnostic procedures: moderate  Management options: moderate    Patient Progress  Patient progress: improved      CRITICAL CARE TIME   Total Critical Care time was  minutes, excluding separately reportable procedures. There was a high probability of clinically significant/life threatening deterioration in the patient's condition which required my urgent intervention. CONSULTS:  None    PROCEDURES:  Unless otherwise noted below, none     Procedures    FINAL IMPRESSION      1. Hematoma of left hip, initial encounter    2. Accident due to mechanical fall without injury, initial encounter    3.  Lactic acidosis          DISPOSITION/PLAN   DISPOSITION        PATIENT REFERRED TO:  Eva Momin DO  [address] [place] [address] [place] [address]  [phone]    In 2 days      Sumit Oconnor MD  [address]   [address] [place] [address]  [phone]    In 2 days        DISCHARGE MEDICATIONS:  Discharge Medication List as of 1/20/2018  8:13 PM      START taking these medications

## 2018-01-21 NOTE — ED NOTES
Per Dr. Carlos Bryant a consult to Dr. Samara Trimble was called at 19:04.     Dario Peters  01/20/18 1904    Dr. Samara Trimble called back at 19:05.     Dario Peters  01/20/18 1909

## 2018-01-25 LAB
BLOOD CULTURE, ROUTINE: NORMAL
CULTURE, BLOOD 2: NORMAL

## 2018-01-30 ENCOUNTER — OFFICE VISIT (OUTPATIENT)
Dept: FAMILY MEDICINE CLINIC | Age: 68
End: 2018-01-30
Payer: MEDICARE

## 2018-01-30 VITALS
DIASTOLIC BLOOD PRESSURE: 80 MMHG | HEART RATE: 80 BPM | BODY MASS INDEX: 21.62 KG/M2 | WEIGHT: 122 LBS | HEIGHT: 63 IN | SYSTOLIC BLOOD PRESSURE: 122 MMHG | RESPIRATION RATE: 14 BRPM | TEMPERATURE: 98.1 F

## 2018-01-30 DIAGNOSIS — M05.771 RHEUMATOID ARTHRITIS INVOLVING BOTH ANKLES WITH POSITIVE RHEUMATOID FACTOR (HCC): ICD-10-CM

## 2018-01-30 DIAGNOSIS — E03.9 ACQUIRED HYPOTHYROIDISM: ICD-10-CM

## 2018-01-30 DIAGNOSIS — S70.02XD: Primary | ICD-10-CM

## 2018-01-30 DIAGNOSIS — Z23 NEED FOR INFLUENZA VACCINATION: ICD-10-CM

## 2018-01-30 DIAGNOSIS — M05.772 RHEUMATOID ARTHRITIS INVOLVING BOTH ANKLES WITH POSITIVE RHEUMATOID FACTOR (HCC): ICD-10-CM

## 2018-01-30 PROCEDURE — 99213 OFFICE O/P EST LOW 20 MIN: CPT | Performed by: FAMILY MEDICINE

## 2018-01-30 PROCEDURE — 90662 IIV NO PRSV INCREASED AG IM: CPT | Performed by: FAMILY MEDICINE

## 2018-01-30 PROCEDURE — G0008 ADMIN INFLUENZA VIRUS VAC: HCPCS | Performed by: FAMILY MEDICINE

## 2018-01-30 RX ORDER — LEVOTHYROXINE SODIUM 0.05 MG/1
TABLET ORAL
Qty: 30 TABLET | Refills: 5 | Status: SHIPPED | OUTPATIENT
Start: 2018-01-30 | End: 2018-08-07 | Stop reason: SDUPTHER

## 2018-01-30 RX ORDER — PREDNISONE 1 MG/1
TABLET ORAL
Qty: 60 TABLET | Refills: 3 | Status: SHIPPED | OUTPATIENT
Start: 2018-01-30 | End: 2018-09-24 | Stop reason: SDUPTHER

## 2018-01-30 RX ORDER — GABAPENTIN 300 MG/1
CAPSULE ORAL
Qty: 180 CAPSULE | Refills: 2 | Status: SHIPPED | OUTPATIENT
Start: 2018-01-30 | End: 2018-03-27 | Stop reason: SDUPTHER

## 2018-01-30 NOTE — PROGRESS NOTES
Height: 5' 3\" (1.6 m)     Physical Exam   Constitutional: She is well-developed, well-nourished, and in no distress. No distress. Eyes: No scleral icterus. Neck: Normal range of motion. Neck supple. Carotid bruit is not present. No neck rigidity. No thyroid mass and no thyromegaly present. Cardiovascular: Normal rate, regular rhythm, S1 normal, S2 normal and normal heart sounds. No extrasystoles are present. No murmur heard. Pulses:       Dorsalis pedis pulses are 2+ on the right side, and 2+ on the left side. Posterior tibial pulses are 2+ on the right side, and 2+ on the left side. Pulmonary/Chest: Effort normal and breath sounds normal.   Abdominal: Soft. Normal appearance and bowel sounds are normal. She exhibits no abdominal bruit and no mass. There is no hepatosplenomegaly. There is no tenderness. There is no rebound and no CVA tenderness. Musculoskeletal: She exhibits no edema. Left hip: She exhibits decreased range of motion, decreased strength, tenderness and swelling (8x9cm hematoma lateral hip). Left hand: She exhibits decreased range of motion, bony tenderness and swelling. She exhibits normal capillary refill. Hands:       Legs:  Neurological: She is alert. She displays no atrophy and no tremor. She has a normal Straight Leg Raise Test and a normal Cerebellar Exam. Gait abnormal.   Skin: She is not diaphoretic. Psychiatric: Affect normal. She exhibits a depressed mood (mildly).      ;  Assessment & Plan   1. Traumatic hematoma of left hip, subsequent encounter     2. Rheumatoid arthritis involving both ankles with positive rheumatoid factor (HCC)  predniSONE (DELTASONE) 5 MG tablet    gabapentin (NEURONTIN) 300 MG capsule   3. Need for influenza vaccination  INFLUENZA, HIGH DOSE, 65 YRS +, IM, PF, PREFILL SYR, 0.5ML (FLUZONE HD)   4.  Acquired hypothyroidism  levothyroxine (SYNTHROID) 50 MCG tablet   disc tx-options and prefers increase in pred;call if worse

## 2018-02-07 ASSESSMENT — ENCOUNTER SYMPTOMS: BACK PAIN: 1

## 2018-03-27 ENCOUNTER — OFFICE VISIT (OUTPATIENT)
Dept: FAMILY MEDICINE CLINIC | Age: 68
End: 2018-03-27
Payer: MEDICARE

## 2018-03-27 VITALS
BODY MASS INDEX: 20.2 KG/M2 | TEMPERATURE: 97.5 F | SYSTOLIC BLOOD PRESSURE: 126 MMHG | WEIGHT: 114 LBS | HEIGHT: 63 IN | DIASTOLIC BLOOD PRESSURE: 82 MMHG | HEART RATE: 67 BPM | RESPIRATION RATE: 14 BRPM

## 2018-03-27 DIAGNOSIS — M05.731 RHEUMATOID ARTHRITIS INVOLVING BOTH WRISTS WITH POSITIVE RHEUMATOID FACTOR (HCC): Primary | ICD-10-CM

## 2018-03-27 DIAGNOSIS — F43.21 ADJUSTMENT REACTION WITH PROLONGED DEPRESSIVE REACTION: ICD-10-CM

## 2018-03-27 DIAGNOSIS — M05.732 RHEUMATOID ARTHRITIS INVOLVING BOTH WRISTS WITH POSITIVE RHEUMATOID FACTOR (HCC): Primary | ICD-10-CM

## 2018-03-27 DIAGNOSIS — G60.9 HEREDITARY AND IDIOPATHIC PERIPHERAL NEUROPATHY: ICD-10-CM

## 2018-03-27 DIAGNOSIS — E03.9 ACQUIRED HYPOTHYROIDISM: ICD-10-CM

## 2018-03-27 DIAGNOSIS — R73.01 IFG (IMPAIRED FASTING GLUCOSE): ICD-10-CM

## 2018-03-27 PROCEDURE — 99213 OFFICE O/P EST LOW 20 MIN: CPT | Performed by: FAMILY MEDICINE

## 2018-03-27 RX ORDER — GABAPENTIN 300 MG/1
CAPSULE ORAL
Qty: 180 CAPSULE | Refills: 2 | Status: SHIPPED | OUTPATIENT
Start: 2018-03-27 | End: 2018-05-31 | Stop reason: DRUGHIGH

## 2018-03-27 RX ORDER — METHYLPREDNISOLONE 4 MG/1
TABLET ORAL
Qty: 21 TABLET | Refills: 1 | Status: SHIPPED | OUTPATIENT
Start: 2018-03-27 | End: 2018-05-31 | Stop reason: SDUPTHER

## 2018-03-27 RX ORDER — FLUOXETINE 10 MG/1
CAPSULE ORAL
Qty: 30 CAPSULE | Refills: 3 | Status: ON HOLD | OUTPATIENT
Start: 2018-03-27 | End: 2019-03-27

## 2018-03-27 RX ORDER — METHYLPREDNISOLONE 4 MG/1
TABLET ORAL
Refills: 1 | COMMUNITY
Start: 2018-02-11 | End: 2018-03-27 | Stop reason: SDUPTHER

## 2018-03-28 ENCOUNTER — TELEPHONE (OUTPATIENT)
Dept: FAMILY MEDICINE CLINIC | Age: 68
End: 2018-03-28

## 2018-03-28 DIAGNOSIS — G60.0 HEREDITARY SENSORIMOTOR NEUROPATHY: Primary | ICD-10-CM

## 2018-03-28 RX ORDER — GABAPENTIN 300 MG/1
CAPSULE ORAL
Qty: 270 CAPSULE | Refills: 3 | Status: SHIPPED | OUTPATIENT
Start: 2018-03-28 | End: 2019-02-22 | Stop reason: SDUPTHER

## 2018-04-04 ASSESSMENT — ENCOUNTER SYMPTOMS
NAUSEA: 1
BACK PAIN: 1
BLOOD IN STOOL: 0
SHORTNESS OF BREATH: 0
ABDOMINAL PAIN: 0
COUGH: 0
CHEST TIGHTNESS: 0

## 2018-05-23 DIAGNOSIS — R73.01 IFG (IMPAIRED FASTING GLUCOSE): ICD-10-CM

## 2018-05-23 LAB
ANION GAP SERPL CALCULATED.3IONS-SCNC: 17 MEQ/L (ref 7–13)
BUN BLDV-MCNC: 17 MG/DL (ref 8–23)
CALCIUM SERPL-MCNC: 9.7 MG/DL (ref 8.6–10.2)
CHLORIDE BLD-SCNC: 103 MEQ/L (ref 98–107)
CO2: 23 MEQ/L (ref 22–29)
CREAT SERPL-MCNC: 0.54 MG/DL (ref 0.5–0.9)
GFR AFRICAN AMERICAN: >60
GFR NON-AFRICAN AMERICAN: >60
GLUCOSE BLD-MCNC: 102 MG/DL (ref 74–109)
HBA1C MFR BLD: 5.8 % (ref 4.8–5.9)
POTASSIUM SERPL-SCNC: 3.9 MEQ/L (ref 3.5–5.1)
SODIUM BLD-SCNC: 143 MEQ/L (ref 132–144)

## 2018-05-31 ENCOUNTER — OFFICE VISIT (OUTPATIENT)
Dept: FAMILY MEDICINE CLINIC | Age: 68
End: 2018-05-31
Payer: MEDICARE

## 2018-05-31 VITALS
HEART RATE: 60 BPM | DIASTOLIC BLOOD PRESSURE: 88 MMHG | BODY MASS INDEX: 20.55 KG/M2 | TEMPERATURE: 98.4 F | SYSTOLIC BLOOD PRESSURE: 136 MMHG | HEIGHT: 63 IN | WEIGHT: 116 LBS | RESPIRATION RATE: 16 BRPM

## 2018-05-31 DIAGNOSIS — R23.3 SPONTANEOUS ECCHYMOSES: ICD-10-CM

## 2018-05-31 DIAGNOSIS — Z12.11 SCREENING FOR COLON CANCER: ICD-10-CM

## 2018-05-31 DIAGNOSIS — M05.731 RHEUMATOID ARTHRITIS INVOLVING BOTH WRISTS WITH POSITIVE RHEUMATOID FACTOR (HCC): Primary | ICD-10-CM

## 2018-05-31 DIAGNOSIS — F43.21 ADJUSTMENT REACTION WITH PROLONGED DEPRESSIVE REACTION: ICD-10-CM

## 2018-05-31 DIAGNOSIS — M05.732 RHEUMATOID ARTHRITIS INVOLVING BOTH WRISTS WITH POSITIVE RHEUMATOID FACTOR (HCC): Primary | ICD-10-CM

## 2018-05-31 DIAGNOSIS — E03.9 ACQUIRED HYPOTHYROIDISM: ICD-10-CM

## 2018-05-31 LAB
BASOPHILS ABSOLUTE: 0 K/UL (ref 0–0.2)
BASOPHILS RELATIVE PERCENT: 0.2 %
EOSINOPHILS ABSOLUTE: 0 K/UL (ref 0–0.7)
EOSINOPHILS RELATIVE PERCENT: 0.1 %
HCT VFR BLD CALC: 42.1 % (ref 37–47)
HEMOGLOBIN: 13.8 G/DL (ref 12–16)
LYMPHOCYTES ABSOLUTE: 1.7 K/UL (ref 1–4.8)
LYMPHOCYTES RELATIVE PERCENT: 18.9 %
MCH RBC QN AUTO: 34.1 PG (ref 27–31.3)
MCHC RBC AUTO-ENTMCNC: 32.7 % (ref 33–37)
MCV RBC AUTO: 104.1 FL (ref 82–100)
MONOCYTES ABSOLUTE: 0.4 K/UL (ref 0.2–0.8)
MONOCYTES RELATIVE PERCENT: 4.8 %
NEUTROPHILS ABSOLUTE: 6.6 K/UL (ref 1.4–6.5)
NEUTROPHILS RELATIVE PERCENT: 76 %
PDW BLD-RTO: 14.9 % (ref 11.5–14.5)
PLATELET # BLD: 216 K/UL (ref 130–400)
RBC # BLD: 4.05 M/UL (ref 4.2–5.4)
WBC # BLD: 8.7 K/UL (ref 4.8–10.8)

## 2018-05-31 PROCEDURE — 99213 OFFICE O/P EST LOW 20 MIN: CPT | Performed by: FAMILY MEDICINE

## 2018-05-31 RX ORDER — METHYLPREDNISOLONE 4 MG/1
TABLET ORAL
Qty: 21 TABLET | Refills: 1 | Status: SHIPPED | OUTPATIENT
Start: 2018-05-31 | End: 2018-10-16 | Stop reason: ALTCHOICE

## 2018-05-31 RX ORDER — CELECOXIB 200 MG/1
CAPSULE ORAL
Qty: 30 CAPSULE | Refills: 3 | Status: SHIPPED | OUTPATIENT
Start: 2018-05-31 | End: 2018-11-01 | Stop reason: ALTCHOICE

## 2018-06-01 DIAGNOSIS — G60.3 IDIOPATHIC PROGRESSIVE NEUROPATHY: ICD-10-CM

## 2018-06-01 DIAGNOSIS — D53.9 MACROCYTIC ANEMIA: Primary | ICD-10-CM

## 2018-06-05 ENCOUNTER — TELEPHONE (OUTPATIENT)
Dept: FAMILY MEDICINE CLINIC | Age: 68
End: 2018-06-05

## 2018-06-06 PROBLEM — F43.21 ADJUSTMENT REACTION WITH PROLONGED DEPRESSIVE REACTION: Status: ACTIVE | Noted: 2018-06-06

## 2018-06-06 ASSESSMENT — ENCOUNTER SYMPTOMS
BACK PAIN: 1
EYE ITCHING: 0
CHEST TIGHTNESS: 0
EYE PAIN: 0
SHORTNESS OF BREATH: 0
EYE REDNESS: 0
ABDOMINAL PAIN: 0
EYE DISCHARGE: 0
NAUSEA: 0

## 2018-06-07 ENCOUNTER — TELEPHONE (OUTPATIENT)
Dept: FAMILY MEDICINE CLINIC | Age: 68
End: 2018-06-07

## 2018-06-07 DIAGNOSIS — M05.761 RHEUMATOID ARTHRITIS INVOLVING BOTH KNEES WITH POSITIVE RHEUMATOID FACTOR (HCC): Primary | ICD-10-CM

## 2018-06-07 DIAGNOSIS — M05.762 RHEUMATOID ARTHRITIS INVOLVING BOTH KNEES WITH POSITIVE RHEUMATOID FACTOR (HCC): Primary | ICD-10-CM

## 2018-06-07 RX ORDER — NAPROXEN 375 MG/1
375 TABLET ORAL 2 TIMES DAILY WITH MEALS
Qty: 60 TABLET | Refills: 3 | Status: SHIPPED | OUTPATIENT
Start: 2018-06-07 | End: 2018-10-04 | Stop reason: SDUPTHER

## 2018-08-07 DIAGNOSIS — E03.9 ACQUIRED HYPOTHYROIDISM: ICD-10-CM

## 2018-08-07 RX ORDER — LEVOTHYROXINE SODIUM 0.05 MG/1
TABLET ORAL
Qty: 90 TABLET | Refills: 3 | Status: SHIPPED | OUTPATIENT
Start: 2018-08-07

## 2018-08-20 ENCOUNTER — TELEPHONE (OUTPATIENT)
Dept: FAMILY MEDICINE CLINIC | Age: 68
End: 2018-08-20

## 2018-08-20 RX ORDER — DESLORATADINE 5 MG/1
5 TABLET ORAL DAILY
Qty: 30 TABLET | Refills: 3 | Status: ON HOLD | OUTPATIENT
Start: 2018-08-20 | End: 2019-03-27

## 2018-08-20 NOTE — TELEPHONE ENCOUNTER
PT CALLED IN HAVING A LOT OF TROUBLE WITH EYES, WATERING A LOT AND HEADACHES, EYE DR PRESCRIBED DROPS THAT ARE NOT HELPING, SHE WANTS DR CARRERA ADVICE  SHE HAS TRIED OTC THINGS AND NONE HELP      PLEASE ADVISE      PT [facility]: [phone]

## 2018-08-28 ENCOUNTER — TELEPHONE (OUTPATIENT)
Dept: FAMILY MEDICINE CLINIC | Age: 68
End: 2018-08-28

## 2018-08-28 DIAGNOSIS — M05.762 RHEUMATOID ARTHRITIS INVOLVING BOTH KNEES WITH POSITIVE RHEUMATOID FACTOR (HCC): Primary | ICD-10-CM

## 2018-08-28 DIAGNOSIS — M05.761 RHEUMATOID ARTHRITIS INVOLVING BOTH KNEES WITH POSITIVE RHEUMATOID FACTOR (HCC): Primary | ICD-10-CM

## 2018-08-28 RX ORDER — METHYLPREDNISOLONE 4 MG/1
TABLET ORAL
Qty: 21 TABLET | Refills: 1 | Status: SHIPPED | OUTPATIENT
Start: 2018-08-28 | End: 2018-10-04 | Stop reason: ALTCHOICE

## 2018-08-28 NOTE — TELEPHONE ENCOUNTER
PATIENT CALLED STATING HER ARTHRITIS IS ACTING UP DUE TO THE WEATHER, AIDE SAID SHE USUALLY GETS A ZPAK.  PLEASE ADVISE     LAST SEEN 5/31/18

## 2018-10-04 ENCOUNTER — OFFICE VISIT (OUTPATIENT)
Dept: FAMILY MEDICINE CLINIC | Age: 68
End: 2018-10-04
Payer: MEDICARE

## 2018-10-04 VITALS
HEART RATE: 64 BPM | BODY MASS INDEX: 20.69 KG/M2 | TEMPERATURE: 98.2 F | RESPIRATION RATE: 14 BRPM | DIASTOLIC BLOOD PRESSURE: 64 MMHG | WEIGHT: 116.8 LBS | SYSTOLIC BLOOD PRESSURE: 122 MMHG

## 2018-10-04 DIAGNOSIS — M94.0 COSTOCHONDRITIS: Primary | ICD-10-CM

## 2018-10-04 PROCEDURE — 99213 OFFICE O/P EST LOW 20 MIN: CPT | Performed by: INTERNAL MEDICINE

## 2018-10-04 RX ORDER — OMEPRAZOLE 20 MG/1
20 CAPSULE, DELAYED RELEASE ORAL DAILY
Qty: 30 CAPSULE | Refills: 1 | Status: SHIPPED | OUTPATIENT
Start: 2018-10-04 | End: 2018-10-04 | Stop reason: ALTCHOICE

## 2018-10-04 RX ORDER — NAPROXEN 375 MG/1
375 TABLET ORAL 2 TIMES DAILY WITH MEALS
Qty: 60 TABLET | Refills: 0 | Status: SHIPPED | OUTPATIENT
Start: 2018-10-04 | End: 2019-03-22

## 2018-10-04 ASSESSMENT — PATIENT HEALTH QUESTIONNAIRE - PHQ9
2. FEELING DOWN, DEPRESSED OR HOPELESS: 0
SUM OF ALL RESPONSES TO PHQ QUESTIONS 1-9: 0
SUM OF ALL RESPONSES TO PHQ QUESTIONS 1-9: 0
SUM OF ALL RESPONSES TO PHQ9 QUESTIONS 1 & 2: 0
1. LITTLE INTEREST OR PLEASURE IN DOING THINGS: 0

## 2018-10-04 NOTE — PROGRESS NOTES
daily 1 Bottle 3    aspirin EC 81 MG EC tablet Take 1 tablet by mouth daily 30 tablet 3    gabapentin (NEURONTIN) 300 MG capsule Take 3 qhs. 270 capsule 3     No current facility-administered medications on file prior to visit. Review of Systems   Constitutional: Negative for chills, diaphoresis, fatigue and fever. HENT: Negative for congestion, ear discharge, ear pain, postnasal drip, rhinorrhea, sinus pain, sinus pressure, sneezing, sore throat and trouble swallowing. Respiratory: Negative for cough, shortness of breath and wheezing. Cardiovascular: Positive for chest pain. Negative for palpitations. Gastrointestinal: Negative for abdominal pain, diarrhea, nausea and vomiting. Endocrine: Negative for cold intolerance and heat intolerance. Genitourinary: Negative for dysuria, flank pain and frequency. Skin: Negative for rash. Neurological: Negative for dizziness and light-headedness. Psychiatric/Behavioral: Negative for decreased concentration. The patient is not nervous/anxious. Objective:   /64   Pulse 64   Temp 98.2 °F (36.8 °C) (Temporal)   Resp 14   Wt 116 lb 12.8 oz (53 kg)   LMP  (LMP Unknown)   BMI 20.69 kg/m²     Physical Exam   Constitutional: She is oriented to person, place, and time. She appears well-developed and well-nourished. No distress. Cardiovascular: Normal rate, regular rhythm and normal heart sounds. Pulmonary/Chest: Effort normal and breath sounds normal. No respiratory distress. She has no wheezes. She exhibits tenderness (right chest wall TTP). Abdominal: Soft. Normal appearance and bowel sounds are normal. There is no hepatosplenomegaly. There is no tenderness. There is no CVA tenderness. Neurological: She is alert and oriented to person, place, and time. Skin: No rash noted. No erythema. Assessment:       Diagnosis Orders   1.  Costochondritis  naproxen (NAPROSYN) 375 MG tablet         Plan:      No orders of the defined

## 2018-10-05 ASSESSMENT — ENCOUNTER SYMPTOMS
DIARRHEA: 0
SHORTNESS OF BREATH: 0
RHINORRHEA: 0
VOMITING: 0
SINUS PRESSURE: 0
SINUS PAIN: 0
ABDOMINAL PAIN: 0
NAUSEA: 0
COUGH: 0
SORE THROAT: 0
WHEEZING: 0
TROUBLE SWALLOWING: 0

## 2018-10-08 ENCOUNTER — TELEPHONE (OUTPATIENT)
Dept: FAMILY MEDICINE CLINIC | Age: 68
End: 2018-10-08

## 2018-10-08 DIAGNOSIS — R07.89 ACUTE CHEST WALL PAIN: Primary | ICD-10-CM

## 2018-10-08 RX ORDER — METHYLPREDNISOLONE 4 MG/1
TABLET ORAL
Qty: 21 TABLET | Refills: 1 | Status: SHIPPED | OUTPATIENT
Start: 2018-10-08 | End: 2018-10-16 | Stop reason: ALTCHOICE

## 2018-10-08 NOTE — TELEPHONE ENCOUNTER
Benedicto calling for states Jc saw Dr. Clau Levine recently and was prescribed same medication she had at home. She is having a burning sensation in right upper chest.  They would like to discuss with Dr. Jonathan May to see if there is another medication that would work. Send to [facility] in [facility].

## 2018-10-11 ENCOUNTER — TELEPHONE (OUTPATIENT)
Dept: FAMILY MEDICINE CLINIC | Age: 68
End: 2018-10-11

## 2018-10-11 DIAGNOSIS — M94.0 COSTOCHONDRITIS, ACUTE: Primary | ICD-10-CM

## 2018-10-11 RX ORDER — PREDNISONE 10 MG/1
TABLET ORAL
Qty: 19 TABLET | Refills: 0 | Status: ON HOLD | OUTPATIENT
Start: 2018-10-11 | End: 2019-03-29 | Stop reason: HOSPADM

## 2018-10-11 RX ORDER — TRAMADOL HYDROCHLORIDE 50 MG/1
50 TABLET ORAL EVERY 8 HOURS PRN
Qty: 15 TABLET | Refills: 0 | Status: SHIPPED | OUTPATIENT
Start: 2018-10-11 | End: 2018-10-16

## 2018-10-11 NOTE — TELEPHONE ENCOUNTER
PT CALLED IN, THE MEDS THAT PICKED UP ON Monday IS NOT HELPING AND IS CONCERNED SHE IS DEALING WITH THE SAME  URINE INFECTION SHE HAS LAST TIME THAT WENT INTO HER BLOOD.       SHE IS HAVING THE PAIN IN THE UPPER CHEST,      PLEASE ADVISE       PLEASE CALL AND LET THEM KNOW WHATS GOING ON      [phone]

## 2018-10-16 ENCOUNTER — TELEPHONE (OUTPATIENT)
Dept: FAMILY MEDICINE CLINIC | Age: 68
End: 2018-10-16

## 2018-10-16 ENCOUNTER — OFFICE VISIT (OUTPATIENT)
Dept: FAMILY MEDICINE CLINIC | Age: 68
End: 2018-10-16
Payer: MEDICARE

## 2018-10-16 VITALS
BODY MASS INDEX: 21.62 KG/M2 | TEMPERATURE: 97.5 F | SYSTOLIC BLOOD PRESSURE: 108 MMHG | RESPIRATION RATE: 14 BRPM | WEIGHT: 122 LBS | HEART RATE: 71 BPM | DIASTOLIC BLOOD PRESSURE: 70 MMHG | HEIGHT: 63 IN

## 2018-10-16 DIAGNOSIS — S23.41XD: Primary | ICD-10-CM

## 2018-10-16 DIAGNOSIS — R94.31 ABNORMAL ELECTROCARDIOGRAM: ICD-10-CM

## 2018-10-16 DIAGNOSIS — M05.719 RHEUMATOID ARTHRITIS INVOLVING SHOULDER WITH POSITIVE RHEUMATOID FACTOR, UNSPECIFIED LATERALITY (HCC): ICD-10-CM

## 2018-10-16 DIAGNOSIS — R07.1 CHEST PAIN ON BREATHING: ICD-10-CM

## 2018-10-16 DIAGNOSIS — E78.00 HYPERCHOLESTEREMIA: ICD-10-CM

## 2018-10-16 PROCEDURE — 93000 ELECTROCARDIOGRAM COMPLETE: CPT | Performed by: FAMILY MEDICINE

## 2018-10-16 PROCEDURE — 99214 OFFICE O/P EST MOD 30 MIN: CPT | Performed by: FAMILY MEDICINE

## 2018-10-16 RX ORDER — OXYCODONE HYDROCHLORIDE AND ACETAMINOPHEN 5; 325 MG/1; MG/1
1 TABLET ORAL EVERY 8 HOURS PRN
Qty: 45 TABLET | Refills: 0 | Status: SHIPPED | OUTPATIENT
Start: 2018-10-16 | End: 2019-03-26 | Stop reason: SDUPTHER

## 2018-10-16 ASSESSMENT — ENCOUNTER SYMPTOMS
SHORTNESS OF BREATH: 1
VOMITING: 0
COUGH: 0
BLOOD IN STOOL: 0
NAUSEA: 0
BACK PAIN: 1
WHEEZING: 0
ABDOMINAL DISTENTION: 0
ABDOMINAL PAIN: 0

## 2018-10-19 ENCOUNTER — HOSPITAL ENCOUNTER (OUTPATIENT)
Dept: NON INVASIVE DIAGNOSTICS | Age: 68
Discharge: HOME | End: 2018-10-19
Payer: MEDICARE

## 2018-10-19 ENCOUNTER — HOSPITAL ENCOUNTER (OUTPATIENT)
Dept: NUCLEAR MEDICINE | Age: 68
Discharge: HOME | End: 2018-10-21
Payer: MEDICARE

## 2018-10-19 VITALS — DIASTOLIC BLOOD PRESSURE: 80 MMHG | SYSTOLIC BLOOD PRESSURE: 130 MMHG

## 2018-10-19 DIAGNOSIS — S23.41XD: ICD-10-CM

## 2018-10-19 DIAGNOSIS — R07.1 CHEST PAIN ON BREATHING: ICD-10-CM

## 2018-10-19 DIAGNOSIS — R94.31 ABNORMAL ELECTROCARDIOGRAM: ICD-10-CM

## 2018-10-19 PROCEDURE — A9502 TC99M TETROFOSMIN: HCPCS | Performed by: FAMILY MEDICINE

## 2018-10-19 PROCEDURE — 93017 CV STRESS TEST TRACING ONLY: CPT

## 2018-10-19 PROCEDURE — 2580000003 HC RX 258: Performed by: FAMILY MEDICINE

## 2018-10-19 PROCEDURE — 6360000004 HC RX CONTRAST MEDICATION: Performed by: FAMILY MEDICINE

## 2018-10-19 PROCEDURE — 3430000000 HC RX DIAGNOSTIC RADIOPHARMACEUTICAL: Performed by: FAMILY MEDICINE

## 2018-10-19 PROCEDURE — 78452 HT MUSCLE IMAGE SPECT MULT: CPT

## 2018-10-19 RX ORDER — SODIUM CHLORIDE 0.9 % (FLUSH) 0.9 %
10 SYRINGE (ML) INJECTION PRN
Status: COMPLETED | OUTPATIENT
Start: 2018-10-19 | End: 2018-10-19

## 2018-10-19 RX ADMIN — Medication 10 ML: at 10:44

## 2018-10-19 RX ADMIN — TETROFOSMIN 10.9 MILLICURIE: 0.23 INJECTION, POWDER, LYOPHILIZED, FOR SOLUTION INTRAVENOUS at 08:23

## 2018-10-19 RX ADMIN — REGADENOSON 0.4 MG: 0.08 INJECTION, SOLUTION INTRAVENOUS at 10:42

## 2018-10-19 RX ADMIN — TETROFOSMIN 34.9 MILLICURIE: 0.23 INJECTION, POWDER, LYOPHILIZED, FOR SOLUTION INTRAVENOUS at 10:43

## 2018-10-19 RX ADMIN — Medication 10 ML: at 08:35

## 2018-10-19 RX ADMIN — Medication 10 ML: at 10:00

## 2018-11-01 ENCOUNTER — TELEPHONE (OUTPATIENT)
Dept: PHARMACY | Facility: CLINIC | Age: 68
End: 2018-11-01

## 2018-11-01 NOTE — TELEPHONE ENCOUNTER
CLINICAL PHARMACY NOTE - Medication/Adherence Review    Subjective: Identified as RA care gap for [place]: DMARD therapy. Objective: Allergies  No Known Allergies    Medications per current medication list  Current Outpatient Prescriptions   Medication Sig Dispense Refill    predniSONE (DELTASONE) 10 MG tablet Take 3 tabs/day x 3 days, 2 tabs/day x 3 days, then 1 tab daily until gone with food 19 tablet 0    naproxen (NAPROSYN) 375 MG tablet Take 1 tablet by mouth 2 times daily (with meals) 60 tablet 0    predniSONE (DELTASONE) 5 MG tablet TAKE 1 TABLET BY MOUTH TWICE DAILY 60 tablet 2    desloratadine (CLARINEX) 5 MG tablet Take 1 tablet by mouth daily 30 tablet 3    levothyroxine (SYNTHROID) 50 MCG tablet TAKE 1 TABLET BY MOUTH ONCE DAILY 90 tablet 3    gabapentin (NEURONTIN) 300 MG capsule Take 3 qhs. 270 capsule 3    FLUoxetine (PROZAC) 10 MG capsule Take 1 qam after breakfast for mood 30 capsule 3    butalbital-acetaminophen-caffeine (FIORICET, ESGIC) -40 MG per tablet Take 1 tablet by mouth every 6 hours as needed for Headaches 25 tablet 1    prednisoLONE acetate (PRED FORTE) 1 % ophthalmic suspension instill 1 (ONE) DROP in left eye TWICE DAILY  0    cyclobenzaprine (FLEXERIL) 10 MG tablet TAKE ONE TABLET BY MOUTH ONCE DAILY IN THE EVENING 30 tablet 3    aspirin EC 81 MG EC tablet Take 1 tablet by mouth daily 30 tablet 3     No current facility-administered medications for this visit. Labs:  Lab    Rheumatoid Factor (IU/mL)    CRP/ C-Reactive Protein (mg/L) 4.8   Sed Rate / Erthrocyte Sedimentation Rate (mm)  25       Assessment:   Patient was previously seen by Dr. Rico Mendoza , Rheumatologist   Patient is currently taking prednisone for rheumatoid arthritis   Patient has been placed on a DMARD agent in the past including methotrexate and leflunomide. Patient was not open to biologic DMARDs due to cost per patient chart.      Plan:   Patient was contacted by phone and stated her pain is well controlled on her steroid and that she is no longer taking celecoxib. With past use and discontinuation of DMARDs due to cost and side effects, patient is not a candidate for DMARD therapy at this time.     - Celecoxib removed from patient's medication list     David Pierre PharmD  PGY-1 Pharmacy Resident   11/1/2018  4:19 PM

## 2019-02-04 ENCOUNTER — TELEPHONE (OUTPATIENT)
Dept: FAMILY MEDICINE CLINIC | Age: 69
End: 2019-02-04

## 2019-02-15 ENCOUNTER — TELEPHONE (OUTPATIENT)
Dept: FAMILY MEDICINE CLINIC | Age: 69
End: 2019-02-15

## 2019-02-22 DIAGNOSIS — M65.9 SYNOVITIS OF MULTIPLE SITES: ICD-10-CM

## 2019-02-22 DIAGNOSIS — G60.0 HEREDITARY SENSORIMOTOR NEUROPATHY: ICD-10-CM

## 2019-02-22 RX ORDER — GABAPENTIN 300 MG/1
CAPSULE ORAL
Qty: 90 CAPSULE | Refills: 1 | Status: SHIPPED | OUTPATIENT
Start: 2019-02-22 | End: 2021-11-08

## 2019-02-22 RX ORDER — METHYLPREDNISOLONE 4 MG/1
TABLET ORAL
Qty: 1 KIT | Refills: 1 | Status: ON HOLD | OUTPATIENT
Start: 2019-02-22 | End: 2019-03-29 | Stop reason: HOSPADM

## 2019-03-01 DIAGNOSIS — M05.761 RHEUMATOID ARTHRITIS INVOLVING BOTH KNEES WITH POSITIVE RHEUMATOID FACTOR (HCC): ICD-10-CM

## 2019-03-01 DIAGNOSIS — M05.762 RHEUMATOID ARTHRITIS INVOLVING BOTH KNEES WITH POSITIVE RHEUMATOID FACTOR (HCC): ICD-10-CM

## 2019-03-01 RX ORDER — NAPROXEN 375 MG/1
TABLET ORAL
Qty: 60 TABLET | Refills: 3 | Status: SHIPPED | OUTPATIENT
Start: 2019-03-01 | End: 2019-03-22

## 2019-03-22 ENCOUNTER — HOSPITAL ENCOUNTER (EMERGENCY)
Age: 69
Discharge: HOME | End: 2019-03-22
Payer: MEDICARE

## 2019-03-22 ENCOUNTER — APPOINTMENT (OUTPATIENT)
Dept: CT IMAGING | Age: 69
End: 2019-03-22
Payer: MEDICARE

## 2019-03-22 VITALS
BODY MASS INDEX: 22.15 KG/M2 | SYSTOLIC BLOOD PRESSURE: 134 MMHG | WEIGHT: 125 LBS | HEART RATE: 66 BPM | TEMPERATURE: 97.8 F | HEIGHT: 63 IN | DIASTOLIC BLOOD PRESSURE: 84 MMHG | RESPIRATION RATE: 16 BRPM | OXYGEN SATURATION: 99 %

## 2019-03-22 DIAGNOSIS — R10.9 RIGHT FLANK PAIN: ICD-10-CM

## 2019-03-22 DIAGNOSIS — N30.00 ACUTE CYSTITIS WITHOUT HEMATURIA: Primary | ICD-10-CM

## 2019-03-22 LAB
ALBUMIN SERPL-MCNC: 4.4 G/DL (ref 3.5–4.6)
ALP BLD-CCNC: 61 U/L (ref 40–130)
ALT SERPL-CCNC: 16 U/L (ref 0–33)
ANION GAP SERPL CALCULATED.3IONS-SCNC: 14 MEQ/L (ref 9–15)
AST SERPL-CCNC: 40 U/L (ref 0–35)
BACTERIA: ABNORMAL /HPF
BASOPHILS ABSOLUTE: 0 K/UL (ref 0–0.2)
BASOPHILS RELATIVE PERCENT: 0.3 %
BILIRUB SERPL-MCNC: 0.3 MG/DL (ref 0.2–0.7)
BILIRUBIN URINE: NEGATIVE
BLOOD, URINE: NEGATIVE
BUN BLDV-MCNC: 16 MG/DL (ref 8–23)
CALCIUM SERPL-MCNC: 9.9 MG/DL (ref 8.5–9.9)
CHLORIDE BLD-SCNC: 102 MEQ/L (ref 95–107)
CLARITY: ABNORMAL
CO2: 19 MEQ/L (ref 20–31)
COLOR: YELLOW
CREAT SERPL-MCNC: 0.71 MG/DL (ref 0.5–0.9)
EOSINOPHILS ABSOLUTE: 0 K/UL (ref 0–0.7)
EOSINOPHILS RELATIVE PERCENT: 0.3 %
EPITHELIAL CELLS, UA: ABNORMAL /HPF (ref 0–5)
GFR AFRICAN AMERICAN: >60
GFR NON-AFRICAN AMERICAN: >60
GLOBULIN: 3.4 G/DL (ref 2.3–3.5)
GLUCOSE BLD-MCNC: 94 MG/DL (ref 70–99)
GLUCOSE URINE: NEGATIVE MG/DL
HCT VFR BLD CALC: 39.8 % (ref 37–47)
HEMOGLOBIN: 13.1 G/DL (ref 12–16)
HYALINE CASTS: ABNORMAL /HPF (ref 0–5)
KETONES, URINE: NEGATIVE MG/DL
LACTIC ACID: 2.1 MMOL/L (ref 0.5–2.2)
LEUKOCYTE ESTERASE, URINE: ABNORMAL
LIPASE: 32 U/L (ref 12–95)
LYMPHOCYTES ABSOLUTE: 1.9 K/UL (ref 1–4.8)
LYMPHOCYTES RELATIVE PERCENT: 21.6 %
MCH RBC QN AUTO: 33.4 PG (ref 27–31.3)
MCHC RBC AUTO-ENTMCNC: 33 % (ref 33–37)
MCV RBC AUTO: 101.2 FL (ref 82–100)
MONOCYTES ABSOLUTE: 0.5 K/UL (ref 0.2–0.8)
MONOCYTES RELATIVE PERCENT: 6.2 %
NEUTROPHILS ABSOLUTE: 6.1 K/UL (ref 1.4–6.5)
NEUTROPHILS RELATIVE PERCENT: 71.6 %
NITRITE, URINE: NEGATIVE
PDW BLD-RTO: 17 % (ref 11.5–14.5)
PH UA: 5.5 (ref 5–9)
PLATELET # BLD: 229 K/UL (ref 130–400)
POTASSIUM SERPL-SCNC: 5.5 MEQ/L (ref 3.4–4.9)
PROTEIN UA: 30 MG/DL
RBC # BLD: 3.93 M/UL (ref 4.2–5.4)
RBC UA: ABNORMAL /HPF (ref 0–5)
SODIUM BLD-SCNC: 135 MEQ/L (ref 135–144)
SPECIFIC GRAVITY UA: 1.02 (ref 1–1.03)
TOTAL PROTEIN: 7.8 G/DL (ref 6.3–8)
URINE REFLEX TO CULTURE: YES
UROBILINOGEN, URINE: 0.2 E.U./DL
WBC # BLD: 8.6 K/UL (ref 4.8–10.8)
WBC UA: >100 /HPF (ref 0–5)

## 2019-03-22 PROCEDURE — 87077 CULTURE AEROBIC IDENTIFY: CPT

## 2019-03-22 PROCEDURE — 80053 COMPREHEN METABOLIC PANEL: CPT

## 2019-03-22 PROCEDURE — 2580000003 HC RX 258: Performed by: PHYSICIAN ASSISTANT

## 2019-03-22 PROCEDURE — 85025 COMPLETE CBC W/AUTO DIFF WBC: CPT

## 2019-03-22 PROCEDURE — 81001 URINALYSIS AUTO W/SCOPE: CPT

## 2019-03-22 PROCEDURE — 83690 ASSAY OF LIPASE: CPT

## 2019-03-22 PROCEDURE — 6370000000 HC RX 637 (ALT 250 FOR IP): Performed by: PHYSICIAN ASSISTANT

## 2019-03-22 PROCEDURE — 96365 THER/PROPH/DIAG IV INF INIT: CPT

## 2019-03-22 PROCEDURE — 99284 EMERGENCY DEPT VISIT MOD MDM: CPT

## 2019-03-22 PROCEDURE — 96375 TX/PRO/DX INJ NEW DRUG ADDON: CPT

## 2019-03-22 PROCEDURE — 6360000002 HC RX W HCPCS: Performed by: PHYSICIAN ASSISTANT

## 2019-03-22 PROCEDURE — 36415 COLL VENOUS BLD VENIPUNCTURE: CPT

## 2019-03-22 PROCEDURE — 87086 URINE CULTURE/COLONY COUNT: CPT

## 2019-03-22 PROCEDURE — 74150 CT ABDOMEN W/O CONTRAST: CPT

## 2019-03-22 PROCEDURE — 83605 ASSAY OF LACTIC ACID: CPT

## 2019-03-22 PROCEDURE — 87186 SC STD MICRODIL/AGAR DIL: CPT

## 2019-03-22 RX ORDER — TRAMADOL HYDROCHLORIDE 50 MG/1
50 TABLET ORAL EVERY 6 HOURS PRN
Qty: 12 TABLET | Refills: 0 | Status: SHIPPED | OUTPATIENT
Start: 2019-03-22 | End: 2019-03-25

## 2019-03-22 RX ORDER — CEPHALEXIN 500 MG/1
500 CAPSULE ORAL 4 TIMES DAILY
Qty: 40 CAPSULE | Refills: 0 | Status: ON HOLD | OUTPATIENT
Start: 2019-03-22 | End: 2019-03-29 | Stop reason: HOSPADM

## 2019-03-22 RX ORDER — MORPHINE SULFATE 2 MG/ML
4 INJECTION, SOLUTION INTRAMUSCULAR; INTRAVENOUS ONCE
Status: COMPLETED | OUTPATIENT
Start: 2019-03-22 | End: 2019-03-22

## 2019-03-22 RX ORDER — ONDANSETRON 2 MG/ML
4 INJECTION INTRAMUSCULAR; INTRAVENOUS ONCE
Status: COMPLETED | OUTPATIENT
Start: 2019-03-22 | End: 2019-03-22

## 2019-03-22 RX ORDER — 0.9 % SODIUM CHLORIDE 0.9 %
1000 INTRAVENOUS SOLUTION INTRAVENOUS ONCE
Status: COMPLETED | OUTPATIENT
Start: 2019-03-22 | End: 2019-03-22

## 2019-03-22 RX ORDER — NAPROXEN 500 MG/1
500 TABLET ORAL 2 TIMES DAILY
Qty: 60 TABLET | Refills: 0 | Status: SHIPPED | OUTPATIENT
Start: 2019-03-22 | End: 2021-08-29

## 2019-03-22 RX ORDER — KETOROLAC TROMETHAMINE 15 MG/ML
15 INJECTION, SOLUTION INTRAMUSCULAR; INTRAVENOUS ONCE
Status: COMPLETED | OUTPATIENT
Start: 2019-03-22 | End: 2019-03-22

## 2019-03-22 RX ORDER — ONDANSETRON 4 MG/1
4 TABLET, ORALLY DISINTEGRATING ORAL EVERY 8 HOURS PRN
Qty: 20 TABLET | Refills: 0 | Status: SHIPPED | OUTPATIENT
Start: 2019-03-22

## 2019-03-22 RX ORDER — TRAMADOL HYDROCHLORIDE 50 MG/1
100 TABLET ORAL ONCE
Status: COMPLETED | OUTPATIENT
Start: 2019-03-22 | End: 2019-03-22

## 2019-03-22 RX ADMIN — KETOROLAC TROMETHAMINE 15 MG: 15 INJECTION, SOLUTION INTRAMUSCULAR; INTRAVENOUS at 14:43

## 2019-03-22 RX ADMIN — ONDANSETRON 4 MG: 2 INJECTION INTRAMUSCULAR; INTRAVENOUS at 14:43

## 2019-03-22 RX ADMIN — CEFTRIAXONE SODIUM 1 G: 1 INJECTION, POWDER, FOR SOLUTION INTRAMUSCULAR; INTRAVENOUS at 17:28

## 2019-03-22 RX ADMIN — TRAMADOL HYDROCHLORIDE 100 MG: 50 TABLET, FILM COATED ORAL at 17:58

## 2019-03-22 RX ADMIN — SODIUM CHLORIDE 1000 ML: 9 INJECTION, SOLUTION INTRAVENOUS at 14:43

## 2019-03-22 RX ADMIN — HYDROMORPHONE HYDROCHLORIDE 0.5 MG: 1 INJECTION, SOLUTION INTRAMUSCULAR; INTRAVENOUS; SUBCUTANEOUS at 17:27

## 2019-03-22 RX ADMIN — MORPHINE SULFATE 4 MG: 2 INJECTION, SOLUTION INTRAMUSCULAR; INTRAVENOUS at 15:52

## 2019-03-22 ASSESSMENT — PAIN DESCRIPTION - PAIN TYPE
TYPE: ACUTE PAIN

## 2019-03-22 ASSESSMENT — PAIN SCALES - GENERAL
PAINLEVEL_OUTOF10: 9

## 2019-03-22 ASSESSMENT — PAIN DESCRIPTION - DESCRIPTORS
DESCRIPTORS: ACHING;SHARP

## 2019-03-22 ASSESSMENT — PAIN DESCRIPTION - LOCATION
LOCATION: FLANK

## 2019-03-22 ASSESSMENT — ENCOUNTER SYMPTOMS
ALLERGIC/IMMUNOLOGIC NEGATIVE: 1
VOMITING: 1
TROUBLE SWALLOWING: 0
DIARRHEA: 0
NAUSEA: 1
COLOR CHANGE: 0
EYE PAIN: 0
SHORTNESS OF BREATH: 0
ABDOMINAL PAIN: 0
APNEA: 0

## 2019-03-22 ASSESSMENT — PAIN DESCRIPTION - ORIENTATION
ORIENTATION: RIGHT

## 2019-03-22 ASSESSMENT — PAIN DESCRIPTION - FREQUENCY
FREQUENCY: CONTINUOUS

## 2019-03-24 LAB
ORGANISM: ABNORMAL
URINE CULTURE, ROUTINE: ABNORMAL
URINE CULTURE, ROUTINE: ABNORMAL

## 2019-03-25 ENCOUNTER — TELEPHONE (OUTPATIENT)
Dept: FAMILY MEDICINE CLINIC | Age: 69
End: 2019-03-25

## 2019-03-25 ENCOUNTER — TELEPHONE (OUTPATIENT)
Dept: ENDOCRINOLOGY | Age: 69
End: 2019-03-25

## 2019-03-26 DIAGNOSIS — M05.761 RHEUMATOID ARTHRITIS INVOLVING BOTH KNEES WITH POSITIVE RHEUMATOID FACTOR (HCC): Primary | ICD-10-CM

## 2019-03-26 DIAGNOSIS — M05.762 RHEUMATOID ARTHRITIS INVOLVING BOTH KNEES WITH POSITIVE RHEUMATOID FACTOR (HCC): Primary | ICD-10-CM

## 2019-03-26 DIAGNOSIS — M65.9 SYNOVITIS OF MULTIPLE SITES: ICD-10-CM

## 2019-03-26 DIAGNOSIS — S23.41XD: ICD-10-CM

## 2019-03-26 RX ORDER — OXYCODONE HYDROCHLORIDE AND ACETAMINOPHEN 5; 325 MG/1; MG/1
1 TABLET ORAL EVERY 8 HOURS PRN
Qty: 45 TABLET | Refills: 0 | Status: SHIPPED | OUTPATIENT
Start: 2019-03-26 | End: 2019-04-10

## 2019-03-27 ENCOUNTER — HOSPITAL ENCOUNTER (INPATIENT)
Age: 69
LOS: 2 days | Discharge: HOME | DRG: 690 | End: 2019-03-29
Attending: STUDENT IN AN ORGANIZED HEALTH CARE EDUCATION/TRAINING PROGRAM | Admitting: INTERNAL MEDICINE
Payer: MEDICARE

## 2019-03-27 DIAGNOSIS — Z78.9 FAILURE OF OUTPATIENT TREATMENT: ICD-10-CM

## 2019-03-27 DIAGNOSIS — N10 ACUTE PYELONEPHRITIS: Primary | ICD-10-CM

## 2019-03-27 PROBLEM — Z16.12 UTI DUE TO EXTENDED-SPECTRUM BETA LACTAMASE (ESBL) PRODUCING ESCHERICHIA COLI: Status: ACTIVE | Noted: 2019-03-27

## 2019-03-27 PROBLEM — B96.29 UTI DUE TO EXTENDED-SPECTRUM BETA LACTAMASE (ESBL) PRODUCING ESCHERICHIA COLI: Status: ACTIVE | Noted: 2019-03-27

## 2019-03-27 PROBLEM — N39.0 UTI DUE TO EXTENDED-SPECTRUM BETA LACTAMASE (ESBL) PRODUCING ESCHERICHIA COLI: Status: ACTIVE | Noted: 2019-03-27

## 2019-03-27 LAB
ALBUMIN SERPL-MCNC: 3.6 G/DL (ref 3.5–4.6)
ALP BLD-CCNC: 64 U/L (ref 40–130)
ALT SERPL-CCNC: 35 U/L (ref 0–33)
ANION GAP SERPL CALCULATED.3IONS-SCNC: 11 MEQ/L (ref 9–15)
APTT: 22.1 SEC (ref 21.6–35.4)
AST SERPL-CCNC: 43 U/L (ref 0–35)
BACTERIA: ABNORMAL /HPF
BASOPHILS ABSOLUTE: 0.1 K/UL (ref 0–0.2)
BASOPHILS RELATIVE PERCENT: 0.8 %
BILIRUB SERPL-MCNC: 0.4 MG/DL (ref 0.2–0.7)
BILIRUBIN URINE: NEGATIVE
BLOOD, URINE: ABNORMAL
BUN BLDV-MCNC: 20 MG/DL (ref 8–23)
C-REACTIVE PROTEIN: 19.2 MG/L (ref 0–5)
CALCIUM SERPL-MCNC: 8.8 MG/DL (ref 8.5–9.9)
CHLORIDE BLD-SCNC: 104 MEQ/L (ref 95–107)
CLARITY: CLEAR
CO2: 21 MEQ/L (ref 20–31)
COLOR: YELLOW
CREAT SERPL-MCNC: 0.53 MG/DL (ref 0.5–0.9)
EOSINOPHILS ABSOLUTE: 0.1 K/UL (ref 0–0.7)
EOSINOPHILS RELATIVE PERCENT: 1.7 %
EPITHELIAL CELLS, UA: ABNORMAL /HPF (ref 0–5)
GFR AFRICAN AMERICAN: >60
GFR NON-AFRICAN AMERICAN: >60
GLOBULIN: 2.8 G/DL (ref 2.3–3.5)
GLUCOSE BLD-MCNC: 92 MG/DL (ref 70–99)
GLUCOSE URINE: NEGATIVE MG/DL
HCT VFR BLD CALC: 35.5 % (ref 37–47)
HEMOGLOBIN: 11.5 G/DL (ref 12–16)
HYALINE CASTS: ABNORMAL /HPF (ref 0–5)
INR BLD: 1
KETONES, URINE: NEGATIVE MG/DL
LACTIC ACID: 0.8 MMOL/L (ref 0.5–2.2)
LEUKOCYTE ESTERASE, URINE: NEGATIVE
LYMPHOCYTES ABSOLUTE: 1.1 K/UL (ref 1–4.8)
LYMPHOCYTES RELATIVE PERCENT: 14 %
MCH RBC QN AUTO: 32.8 PG (ref 27–31.3)
MCHC RBC AUTO-ENTMCNC: 32.4 % (ref 33–37)
MCV RBC AUTO: 101.1 FL (ref 82–100)
MONOCYTES ABSOLUTE: 0.6 K/UL (ref 0.2–0.8)
MONOCYTES RELATIVE PERCENT: 8 %
NEUTROPHILS ABSOLUTE: 6.1 K/UL (ref 1.4–6.5)
NEUTROPHILS RELATIVE PERCENT: 75.5 %
NITRITE, URINE: POSITIVE
PDW BLD-RTO: 17.2 % (ref 11.5–14.5)
PH UA: 5 (ref 5–9)
PLATELET # BLD: 162 K/UL (ref 130–400)
POTASSIUM SERPL-SCNC: 4.1 MEQ/L (ref 3.4–4.9)
PROTEIN UA: NEGATIVE MG/DL
PROTHROMBIN TIME: 10.3 SEC (ref 9–11.5)
RBC # BLD: 3.51 M/UL (ref 4.2–5.4)
RBC UA: ABNORMAL /HPF (ref 0–5)
SODIUM BLD-SCNC: 136 MEQ/L (ref 135–144)
SPECIFIC GRAVITY UA: 1.03 (ref 1–1.03)
TOTAL CK: 58 U/L (ref 0–170)
TOTAL PROTEIN: 6.4 G/DL (ref 6.3–8)
URINE REFLEX TO CULTURE: YES
UROBILINOGEN, URINE: 0.2 E.U./DL
WBC # BLD: 8.1 K/UL (ref 4.8–10.8)
WBC UA: ABNORMAL /HPF (ref 0–5)

## 2019-03-27 PROCEDURE — 96374 THER/PROPH/DIAG INJ IV PUSH: CPT

## 2019-03-27 PROCEDURE — 86140 C-REACTIVE PROTEIN: CPT

## 2019-03-27 PROCEDURE — 87186 SC STD MICRODIL/AGAR DIL: CPT

## 2019-03-27 PROCEDURE — 2060000000 HC ICU INTERMEDIATE R&B

## 2019-03-27 PROCEDURE — 81001 URINALYSIS AUTO W/SCOPE: CPT

## 2019-03-27 PROCEDURE — 87040 BLOOD CULTURE FOR BACTERIA: CPT

## 2019-03-27 PROCEDURE — 96375 TX/PRO/DX INJ NEW DRUG ADDON: CPT

## 2019-03-27 PROCEDURE — 6370000000 HC RX 637 (ALT 250 FOR IP): Performed by: INTERNAL MEDICINE

## 2019-03-27 PROCEDURE — 6360000002 HC RX W HCPCS: Performed by: PHYSICIAN ASSISTANT

## 2019-03-27 PROCEDURE — 87086 URINE CULTURE/COLONY COUNT: CPT

## 2019-03-27 PROCEDURE — 6370000000 HC RX 637 (ALT 250 FOR IP): Performed by: STUDENT IN AN ORGANIZED HEALTH CARE EDUCATION/TRAINING PROGRAM

## 2019-03-27 PROCEDURE — 83605 ASSAY OF LACTIC ACID: CPT

## 2019-03-27 PROCEDURE — 96365 THER/PROPH/DIAG IV INF INIT: CPT

## 2019-03-27 PROCEDURE — 85025 COMPLETE CBC W/AUTO DIFF WBC: CPT

## 2019-03-27 PROCEDURE — 2580000003 HC RX 258: Performed by: PHYSICIAN ASSISTANT

## 2019-03-27 PROCEDURE — 36415 COLL VENOUS BLD VENIPUNCTURE: CPT

## 2019-03-27 PROCEDURE — 6360000002 HC RX W HCPCS: Performed by: INTERNAL MEDICINE

## 2019-03-27 PROCEDURE — 85610 PROTHROMBIN TIME: CPT

## 2019-03-27 PROCEDURE — 82550 ASSAY OF CK (CPK): CPT

## 2019-03-27 PROCEDURE — 85730 THROMBOPLASTIN TIME PARTIAL: CPT

## 2019-03-27 PROCEDURE — 96372 THER/PROPH/DIAG INJ SC/IM: CPT

## 2019-03-27 PROCEDURE — 87077 CULTURE AEROBIC IDENTIFY: CPT

## 2019-03-27 PROCEDURE — 99284 EMERGENCY DEPT VISIT MOD MDM: CPT

## 2019-03-27 PROCEDURE — 6360000002 HC RX W HCPCS: Performed by: STUDENT IN AN ORGANIZED HEALTH CARE EDUCATION/TRAINING PROGRAM

## 2019-03-27 PROCEDURE — 2580000003 HC RX 258: Performed by: STUDENT IN AN ORGANIZED HEALTH CARE EDUCATION/TRAINING PROGRAM

## 2019-03-27 PROCEDURE — 96376 TX/PRO/DX INJ SAME DRUG ADON: CPT

## 2019-03-27 PROCEDURE — 80053 COMPREHEN METABOLIC PANEL: CPT

## 2019-03-27 RX ORDER — GABAPENTIN 300 MG/1
900 CAPSULE ORAL NIGHTLY
Status: DISCONTINUED | OUTPATIENT
Start: 2019-03-27 | End: 2019-03-29 | Stop reason: HOSPADM

## 2019-03-27 RX ORDER — SODIUM CHLORIDE 0.9 % (FLUSH) 0.9 %
10 SYRINGE (ML) INJECTION EVERY 12 HOURS SCHEDULED
Status: DISCONTINUED | OUTPATIENT
Start: 2019-03-27 | End: 2019-03-29 | Stop reason: HOSPADM

## 2019-03-27 RX ORDER — METHOCARBAMOL 750 MG/1
750 TABLET, FILM COATED ORAL 3 TIMES DAILY
Status: DISCONTINUED | OUTPATIENT
Start: 2019-03-27 | End: 2019-03-28

## 2019-03-27 RX ORDER — KETOROLAC TROMETHAMINE 15 MG/ML
15 INJECTION, SOLUTION INTRAMUSCULAR; INTRAVENOUS EVERY 6 HOURS PRN
Status: DISCONTINUED | OUTPATIENT
Start: 2019-03-27 | End: 2019-03-29 | Stop reason: HOSPADM

## 2019-03-27 RX ORDER — LEVOTHYROXINE SODIUM 0.05 MG/1
50 TABLET ORAL DAILY
Status: DISCONTINUED | OUTPATIENT
Start: 2019-03-28 | End: 2019-03-29 | Stop reason: HOSPADM

## 2019-03-27 RX ORDER — FENTANYL CITRATE 50 UG/ML
50 INJECTION, SOLUTION INTRAMUSCULAR; INTRAVENOUS ONCE
Status: COMPLETED | OUTPATIENT
Start: 2019-03-27 | End: 2019-03-27

## 2019-03-27 RX ORDER — SODIUM CHLORIDE 0.9 % (FLUSH) 0.9 %
10 SYRINGE (ML) INJECTION PRN
Status: DISCONTINUED | OUTPATIENT
Start: 2019-03-27 | End: 2019-03-29 | Stop reason: HOSPADM

## 2019-03-27 RX ORDER — KETOROLAC TROMETHAMINE 15 MG/ML
15 INJECTION, SOLUTION INTRAMUSCULAR; INTRAVENOUS ONCE
Status: COMPLETED | OUTPATIENT
Start: 2019-03-27 | End: 2019-03-27

## 2019-03-27 RX ORDER — PREDNISONE 1 MG/1
5 TABLET ORAL 2 TIMES DAILY
Status: DISCONTINUED | OUTPATIENT
Start: 2019-03-27 | End: 2019-03-29 | Stop reason: HOSPADM

## 2019-03-27 RX ORDER — SODIUM CHLORIDE 9 MG/ML
INJECTION, SOLUTION INTRAVENOUS CONTINUOUS
Status: DISCONTINUED | OUTPATIENT
Start: 2019-03-27 | End: 2019-03-29 | Stop reason: HOSPADM

## 2019-03-27 RX ORDER — TRAMADOL HYDROCHLORIDE 50 MG/1
50 TABLET ORAL EVERY 6 HOURS PRN
Status: DISCONTINUED | OUTPATIENT
Start: 2019-03-27 | End: 2019-03-28

## 2019-03-27 RX ORDER — ACETAMINOPHEN 500 MG
1000 TABLET ORAL ONCE
Status: COMPLETED | OUTPATIENT
Start: 2019-03-27 | End: 2019-03-27

## 2019-03-27 RX ORDER — ACETAMINOPHEN 325 MG/1
650 TABLET ORAL EVERY 6 HOURS PRN
Status: DISCONTINUED | OUTPATIENT
Start: 2019-03-27 | End: 2019-03-29 | Stop reason: HOSPADM

## 2019-03-27 RX ORDER — ONDANSETRON 2 MG/ML
4 INJECTION INTRAMUSCULAR; INTRAVENOUS EVERY 6 HOURS PRN
Status: DISCONTINUED | OUTPATIENT
Start: 2019-03-27 | End: 2019-03-29 | Stop reason: HOSPADM

## 2019-03-27 RX ADMIN — METHOCARBAMOL TABLETS 750 MG: 750 TABLET, COATED ORAL at 20:35

## 2019-03-27 RX ADMIN — TRAMADOL HYDROCHLORIDE 50 MG: 50 TABLET, FILM COATED ORAL at 23:20

## 2019-03-27 RX ADMIN — SODIUM CHLORIDE: 9 INJECTION, SOLUTION INTRAVENOUS at 17:02

## 2019-03-27 RX ADMIN — FENTANYL CITRATE 50 MCG: 50 INJECTION, SOLUTION INTRAMUSCULAR; INTRAVENOUS at 15:04

## 2019-03-27 RX ADMIN — ACETAMINOPHEN 1000 MG: 500 TABLET ORAL at 15:04

## 2019-03-27 RX ADMIN — MEROPENEM 1 G: 1 INJECTION, POWDER, FOR SOLUTION INTRAVENOUS at 14:51

## 2019-03-27 RX ADMIN — TRAMADOL HYDROCHLORIDE 50 MG: 50 TABLET, FILM COATED ORAL at 17:51

## 2019-03-27 RX ADMIN — KETOROLAC TROMETHAMINE 15 MG: 15 INJECTION, SOLUTION INTRAMUSCULAR; INTRAVENOUS at 20:35

## 2019-03-27 RX ADMIN — KETOROLAC TROMETHAMINE 15 MG: 15 INJECTION, SOLUTION INTRAMUSCULAR; INTRAVENOUS at 14:17

## 2019-03-27 RX ADMIN — GABAPENTIN 900 MG: 300 CAPSULE ORAL at 20:35

## 2019-03-27 RX ADMIN — ENOXAPARIN SODIUM 40 MG: 40 INJECTION SUBCUTANEOUS at 17:02

## 2019-03-27 ASSESSMENT — PAIN SCALES - GENERAL
PAINLEVEL_OUTOF10: 9
PAINLEVEL_OUTOF10: 10
PAINLEVEL_OUTOF10: 8
PAINLEVEL_OUTOF10: 5
PAINLEVEL_OUTOF10: 0
PAINLEVEL_OUTOF10: 7
PAINLEVEL_OUTOF10: 9

## 2019-03-27 ASSESSMENT — PAIN DESCRIPTION - DESCRIPTORS
DESCRIPTORS: BURNING;SHARP
DESCRIPTORS: BURNING;SHARP

## 2019-03-27 ASSESSMENT — PAIN DESCRIPTION - PAIN TYPE
TYPE: ACUTE PAIN

## 2019-03-27 ASSESSMENT — PAIN DESCRIPTION - FREQUENCY
FREQUENCY: CONTINUOUS
FREQUENCY: CONTINUOUS

## 2019-03-27 ASSESSMENT — PAIN DESCRIPTION - PROGRESSION
CLINICAL_PROGRESSION: GRADUALLY WORSENING
CLINICAL_PROGRESSION: GRADUALLY WORSENING

## 2019-03-27 ASSESSMENT — ENCOUNTER SYMPTOMS
BACK PAIN: 0
WHEEZING: 0
ABDOMINAL PAIN: 0
TROUBLE SWALLOWING: 0
STRIDOR: 0
CHEST TIGHTNESS: 0
VOMITING: 0
SHORTNESS OF BREATH: 0
COUGH: 0
SINUS PRESSURE: 0
DIARRHEA: 0

## 2019-03-27 ASSESSMENT — PAIN DESCRIPTION - ORIENTATION
ORIENTATION: RIGHT;LOWER

## 2019-03-27 ASSESSMENT — PAIN DESCRIPTION - ONSET
ONSET: ON-GOING
ONSET: ON-GOING

## 2019-03-27 ASSESSMENT — PAIN DESCRIPTION - LOCATION
LOCATION: BACK

## 2019-03-27 NOTE — H&P
daily 3/22/19   Thelma Barragan PA-C   ondansetron (ZOFRAN ODT) 4 MG disintegrating tablet Take 1 tablet by mouth every 8 hours as needed for Nausea 3/22/19   Thelma Barragan PA-C   methylPREDNISolone (MEDROL DOSEPACK) 4 MG tablet TAKE BY MOUTH AS DIRECTED ON INSIDE OF PACKAGE 2/22/19   Jenny Yates, DO   gabapentin (NEURONTIN) 300 MG capsule TAKE 3 CAPSULES BY MOUTH AT BEDTIME 2/22/19 3/24/19  Jenny Almontem, DO   predniSONE (DELTASONE) 5 MG tablet TAKE 1 TABLET BY MOUTH TWICE DAILY 2/10/19   Jenny [facility], DO   predniSONE (DELTASONE) 10 MG tablet Take 3 tabs/day x 3 days, 2 tabs/day x 3 days, then 1 tab daily until gone with food 10/11/18   Jenny [facility], DO   desloratadine (CLARINEX) 5 MG tablet Take 1 tablet by mouth daily 8/20/18   rKystyna Mtz MD   levothyroxine (SYNTHROID) 50 MCG tablet TAKE 1 TABLET BY MOUTH ONCE DAILY 8/7/18   Jenny [facility], DO   FLUoxetine (PROZAC) 10 MG capsule Take 1 qam after breakfast for mood 3/27/18   Jenny [facility], DO   butalbital-acetaminophen-caffeine (FIORICET, ESGIC) -42 MG per tablet Take 1 tablet by mouth every 6 hours as needed for Headaches 11/14/17   [facility], DO   prednisoLONE acetate (PRED FORTE) 1 % ophthalmic suspension instill 1 (ONE) DROP in left eye TWICE DAILY 7/14/17   Historical Provider, MD   cyclobenzaprine (FLEXERIL) 10 MG tablet TAKE ONE TABLET BY MOUTH ONCE DAILY IN THE EVENING 9/20/17   [facility], DO   aspirin EC 81 MG EC tablet Take 1 tablet by mouth daily 9/9/17   Yanely Arshad MD       Allergies:  Patient has no known allergies. Social History:      The patient currently lives home    TOBACCO:   reports that she has never smoked. She has never used smokeless tobacco.  ETOH:   reports that she does not drink alcohol. Family History:       Positive as follows:        Problem Relation Age of Onset    Diabetes Father     Heart Disease Father        REVIEW OF SYSTEMS:   Pertinent positives as noted in the HPI.  All other systems reviewed and with the following interpretation: pending  EKG:  I have reviewed the EKG with the following interpretation: pending    No orders to display       ASSESSMENT:    Active Hospital Problems    Diagnosis Date Noted    UTI due to extended-spectrum beta lactamase (ESBL) producing Escherichia coli [N39.0, B96.29, Z16.12] 03/27/2019    Acute pyelonephritis [N10] 03/27/2019       PLAN:        DVT Prophylaxis: lovenox  Diet: No diet orders on file  Code Status: Prior    PT/OT Eval Status: eval and tx    1. Acute pyelonephritis-will admit and consult ID. Will medicate with iv meropenem  2. Failure of outpatient therapy       Merlin Sings, [place]    Thank you Dao Rico DO for the opportunity to be involved in this patient's care. If you have any questions or concerns please feel free to contact me.

## 2019-03-27 NOTE — ED PROVIDER NOTES
[facility] ED  eMERGENCY dEPARTMENT eNCOUnter      Pt Name: Brian Vela  MRN: [id number]  [place] 1950  Date of evaluation: 3/27/2019  Provider: Daisy Wright DO    CHIEF COMPLAINT       Chief Complaint   Patient presents with    Back Pain     right low back pain, said on friday she was diagnosed with an infection in ureter per family report         HISTORY OF PRESENT ILLNESS   (Location/Symptom, Timing/Onset,Context/Setting, Quality, Duration, Modifying Factors, Severity)  Note limiting factors. Brian Vela is a 70 y.o. female who presents to the emergency department with c/o low persistent right flank pain. Patient seen in ED 3/22/2019 and Dx with UTI. Patient is taking keflex but pain is getting worse. The urine culture shows sensitivity to meropenem. Patient complains that her pain is much worse. Patient denies fever, chills. Patient does state that she does not have as much of an appetite. Denies vomiting or diarrhea. The history is provided by the patient and the spouse. NursingNotes were reviewed. REVIEW OF SYSTEMS    (2-9 systems for level 4, 10 or more for level 5)     Review of Systems   Constitutional: Positive for appetite change and fatigue. Negative for activity change, chills, fever and unexpected weight change. HENT: Negative for drooling, ear pain, nosebleeds, sinus pressure and trouble swallowing. Respiratory: Negative for cough, chest tightness, shortness of breath, wheezing and stridor. Cardiovascular: Negative for chest pain and leg swelling. Gastrointestinal: Negative for abdominal pain, diarrhea and vomiting. Endocrine: Negative for polydipsia and polyphagia. Genitourinary: Positive for flank pain. Negative for dysuria and frequency. Musculoskeletal: Negative for back pain and myalgias. Skin: Negative for pallor and rash. Neurological: Negative for syncope, weakness and headaches.    Hematological: Does not bruise/bleed easily. All other systems reviewed and are negative. Except as noted above the remainder of the review of systems was reviewed and negative. PAST MEDICAL HISTORY     Past Medical History:   Diagnosis Date    Depression     GERD (gastroesophageal reflux disease)     History of kidney stones     hospitalized 9/2016 obstructive pyelonephritis    Hypercholesteremia     Hypothyroidism     Osteoarthritis     Osteopenia     RA (rheumatoid arthritis) ([facility] [address]          SURGICALHISTORY       Past Surgical History:   Procedure Laterality Date    CYSTOSCOPY Left 08/28/2016    Cornell Abreu MD  PYELOGRAM & DOUBLE-J STENT PLACEMENT    GALLBLADDER SURGERY      HYSTERECTOMY      KNEE SURGERY      left    LITHOTRIPSY Left 10/12/2016    HOLMIUM LASER LITHOTRIPSY AND REMOVAL OF LEFT J STENT  performed by Fern Gudino MD at [address]  08/09/2013    DR. RHODES    URETER SURGERY Left 10/12/2016    /left ureteroscopy/ cysto/ retrogrades/pyelogram/ holmium laser lithotripsy removal left urerteral stent performed by Fern Gudino MD at [address] [facility]       Previous Medications    ASPIRIN EC 81 MG EC TABLET    Take 1 tablet by mouth daily    BUTALBITAL-ACETAMINOPHEN-CAFFEINE (FIORICET, ESGIC) -40 MG PER TABLET    Take 1 tablet by mouth every 6 hours as needed for Headaches    CEPHALEXIN (KEFLEX) 500 MG CAPSULE    Take 1 capsule by mouth 4 times daily    CYCLOBENZAPRINE (FLEXERIL) 10 MG TABLET    TAKE ONE TABLET BY MOUTH ONCE DAILY IN THE EVENING    DESLORATADINE (CLARINEX) 5 MG TABLET    Take 1 tablet by mouth daily    FLUOXETINE (PROZAC) 10 MG CAPSULE    Take 1 qam after breakfast for mood    GABAPENTIN (NEURONTIN) 300 MG CAPSULE    TAKE 3 CAPSULES BY MOUTH AT BEDTIME    LEVOTHYROXINE (SYNTHROID) 50 MCG TABLET    TAKE 1 TABLET BY MOUTH ONCE DAILY    METHYLPREDNISOLONE (MEDROL DOSEPACK) 4 MG TABLET    TAKE BY MOUTH AS DIRECTED ON INSIDE OF PACKAGE Topics Concern    None   Social History Narrative    None       SCREENINGS      @FLOW([id number])@      PHYSICAL EXAM    (up to 7 for level 4, 8 or more for level 5)     ED Triage Vitals   BP Temp Temp Source Pulse Resp SpO2 Height Weight   03/27/19 1315 03/27/19 1315 03/27/19 1315 03/27/19 1315 03/27/19 1407 03/27/19 1315 03/27/19 1315 03/27/19 1315   (!) 153/80 98 °F (36.7 °C) Oral 67 18 97 % 5' 3\" (1.6 m) 130 lb (59 kg)       Physical Exam   Constitutional: She is oriented to person, place, and time. She appears well-developed and well-nourished. No distress. HENT:   Head: Normocephalic and atraumatic. Head is without Arellano's sign. Right Ear: External ear normal.   Left Ear: External ear normal.   Nose: Nose normal.   Mouth/Throat: Oropharynx is clear and moist. No oropharyngeal exudate. Eyes: Pupils are equal, round, and reactive to light. Conjunctivae and EOM are normal. Right eye exhibits no discharge. No foreign body present in the right eye. Left eye exhibits no discharge and no exudate. No scleral icterus. Neck: Normal range of motion. Neck supple. No JVD present. No neck rigidity. No tracheal deviation present. No thyromegaly present. Cardiovascular: Normal rate, regular rhythm, normal heart sounds and intact distal pulses. Exam reveals no gallop, no distant heart sounds and no friction rub. No murmur heard. Pulmonary/Chest: Effort normal and breath sounds normal. No stridor. No respiratory distress. She has no wheezes. She has no rales. She exhibits no tenderness. Abdominal: Soft. Normal appearance, normal aorta and bowel sounds are normal. She exhibits no shifting dullness, no distension, no pulsatile liver, no fluid wave, no abdominal bruit, no ascites, no pulsatile midline mass and no mass. There is no hepatosplenomegaly. There is no tenderness. There is CVA tenderness ( On the right). There is no rebound and no guarding. Musculoskeletal: Normal range of motion.  She exhibits no edema normal limits   CULTURE BLOOD #2   CULTURE BLOOD #1   URINE CULTURE   CK   LACTIC ACID, PLASMA   PROTIME-INR   APTT   MICROSCOPIC URINALYSIS       All other labs were within normal range or not returned as of this dictation. EMERGENCY DEPARTMENT COURSE and DIFFERENTIAL DIAGNOSIS/MDM:   Vitals:    Vitals:    03/27/19 1315 03/27/19 1407   BP: (!) 153/80    Pulse: 67    Resp:  18   Temp: 98 °F (36.7 °C)    TempSrc: Oral    SpO2: 97%    Weight: 130 lb (59 kg)    Height: 5' 3\" (1.6 m)            MDM  Patient started on ertapenem for failure of outpatient antibiotic therapy. Patient was given Toradol IV for pain without any relief. Patient states that she has been taking Percocet without any relief of her pain either. Patient was then ordered IV fentanyl. The ER physician believes the patient will need hospitalization and IV antibiotics. The ER physician spoke via teleconference with Dr. Trice Graf who accepted the patient to her service. The patient stated she had a very mild headache. She has no focal neurological deficits and Tylenol was ordered for this discomfort. CONSULTS:  IP CONSULT TO HOSPITALIST    PROCEDURES:  Unless otherwise noted below, none     Procedures    FINAL IMPRESSION      1. Acute pyelonephritis    2. Failure of outpatient treatment          DISPOSITION/PLAN   DISPOSITION Decision To Admit 03/27/2019 02:53:19 PM      PATIENT REFERRED TO:  No follow-up provider specified.     DISCHARGE MEDICATIONS:  New Prescriptions    No medications on file          (Please note that portions of this note were completed with a voice recognition program.  Efforts were made to edit the dictations but occasionally words are mis-transcribed.)    Angelic Lind DO (electronically signed)  Attending Emergency Physician          Angelic Lind DO  03/27/19 1528

## 2019-03-27 NOTE — ED NOTES
Pt updated that she will be admitted to the hospital.  Lights turned off for patient's comfort.  at bedside.        Colleen Sandifer, RN  03/27/19 1239

## 2019-03-28 LAB
ANION GAP SERPL CALCULATED.3IONS-SCNC: 12 MEQ/L (ref 9–15)
BASOPHILS ABSOLUTE: 0 K/UL (ref 0–0.2)
BASOPHILS RELATIVE PERCENT: 0.9 %
BUN BLDV-MCNC: 21 MG/DL (ref 8–23)
CALCIUM SERPL-MCNC: 8.8 MG/DL (ref 8.5–9.9)
CHLORIDE BLD-SCNC: 108 MEQ/L (ref 95–107)
CO2: 19 MEQ/L (ref 20–31)
CREAT SERPL-MCNC: 0.54 MG/DL (ref 0.5–0.9)
EOSINOPHILS ABSOLUTE: 0 K/UL (ref 0–0.7)
EOSINOPHILS RELATIVE PERCENT: 0.5 %
GFR AFRICAN AMERICAN: >60
GFR NON-AFRICAN AMERICAN: >60
GLUCOSE BLD-MCNC: 133 MG/DL (ref 70–99)
HCT VFR BLD CALC: 31.9 % (ref 37–47)
HEMOGLOBIN: 10.3 G/DL (ref 12–16)
LYMPHOCYTES ABSOLUTE: 0.8 K/UL (ref 1–4.8)
LYMPHOCYTES RELATIVE PERCENT: 19.6 %
MCH RBC QN AUTO: 33.3 PG (ref 27–31.3)
MCHC RBC AUTO-ENTMCNC: 32.2 % (ref 33–37)
MCV RBC AUTO: 103.6 FL (ref 82–100)
MONOCYTES ABSOLUTE: 0.2 K/UL (ref 0.2–0.8)
MONOCYTES RELATIVE PERCENT: 5.2 %
NEUTROPHILS ABSOLUTE: 3 K/UL (ref 1.4–6.5)
NEUTROPHILS RELATIVE PERCENT: 73.8 %
PDW BLD-RTO: 17.5 % (ref 11.5–14.5)
PLATELET # BLD: 168 K/UL (ref 130–400)
POTASSIUM REFLEX MAGNESIUM: 4.5 MEQ/L (ref 3.4–4.9)
RBC # BLD: 3.08 M/UL (ref 4.2–5.4)
SODIUM BLD-SCNC: 139 MEQ/L (ref 135–144)
WBC # BLD: 4 K/UL (ref 4.8–10.8)

## 2019-03-28 PROCEDURE — 6360000002 HC RX W HCPCS: Performed by: PHYSICIAN ASSISTANT

## 2019-03-28 PROCEDURE — 2580000003 HC RX 258: Performed by: PHYSICIAN ASSISTANT

## 2019-03-28 PROCEDURE — 80048 BASIC METABOLIC PNL TOTAL CA: CPT

## 2019-03-28 PROCEDURE — 6370000000 HC RX 637 (ALT 250 FOR IP): Performed by: INTERNAL MEDICINE

## 2019-03-28 PROCEDURE — 6360000002 HC RX W HCPCS: Performed by: INTERNAL MEDICINE

## 2019-03-28 PROCEDURE — 36415 COLL VENOUS BLD VENIPUNCTURE: CPT

## 2019-03-28 PROCEDURE — 99222 1ST HOSP IP/OBS MODERATE 55: CPT | Performed by: INTERNAL MEDICINE

## 2019-03-28 PROCEDURE — 96375 TX/PRO/DX INJ NEW DRUG ADDON: CPT

## 2019-03-28 PROCEDURE — 96372 THER/PROPH/DIAG INJ SC/IM: CPT

## 2019-03-28 PROCEDURE — 85025 COMPLETE CBC W/AUTO DIFF WBC: CPT

## 2019-03-28 PROCEDURE — 96376 TX/PRO/DX INJ SAME DRUG ADON: CPT

## 2019-03-28 PROCEDURE — 2060000000 HC ICU INTERMEDIATE R&B

## 2019-03-28 RX ORDER — METHOCARBAMOL 750 MG/1
750 TABLET, FILM COATED ORAL 4 TIMES DAILY
Status: DISCONTINUED | OUTPATIENT
Start: 2019-03-28 | End: 2019-03-29 | Stop reason: HOSPADM

## 2019-03-28 RX ORDER — TRAMADOL HYDROCHLORIDE 50 MG/1
50 TABLET ORAL EVERY 6 HOURS
Status: DISCONTINUED | OUTPATIENT
Start: 2019-03-28 | End: 2019-03-29 | Stop reason: HOSPADM

## 2019-03-28 RX ORDER — MORPHINE SULFATE 2 MG/ML
2 INJECTION, SOLUTION INTRAMUSCULAR; INTRAVENOUS EVERY 4 HOURS PRN
Status: DISCONTINUED | OUTPATIENT
Start: 2019-03-28 | End: 2019-03-29 | Stop reason: HOSPADM

## 2019-03-28 RX ADMIN — Medication 10 ML: at 20:55

## 2019-03-28 RX ADMIN — KETOROLAC TROMETHAMINE 15 MG: 15 INJECTION, SOLUTION INTRAMUSCULAR; INTRAVENOUS at 03:20

## 2019-03-28 RX ADMIN — MORPHINE SULFATE 2 MG: 2 INJECTION, SOLUTION INTRAMUSCULAR; INTRAVENOUS at 22:27

## 2019-03-28 RX ADMIN — Medication 10 ML: at 11:14

## 2019-03-28 RX ADMIN — METHOCARBAMOL TABLETS 750 MG: 750 TABLET, COATED ORAL at 11:11

## 2019-03-28 RX ADMIN — MEROPENEM 1 G: 1 INJECTION, POWDER, FOR SOLUTION INTRAVENOUS at 00:20

## 2019-03-28 RX ADMIN — TRAMADOL HYDROCHLORIDE 50 MG: 50 TABLET ORAL at 18:50

## 2019-03-28 RX ADMIN — GABAPENTIN 900 MG: 300 CAPSULE ORAL at 20:55

## 2019-03-28 RX ADMIN — PREDNISONE 5 MG: 5 TABLET ORAL at 11:12

## 2019-03-28 RX ADMIN — METHOCARBAMOL TABLETS 750 MG: 750 TABLET, COATED ORAL at 20:55

## 2019-03-28 RX ADMIN — METHOCARBAMOL TABLETS 750 MG: 750 TABLET, COATED ORAL at 18:50

## 2019-03-28 RX ADMIN — LEVOTHYROXINE SODIUM 50 MCG: 50 TABLET ORAL at 11:11

## 2019-03-28 RX ADMIN — SODIUM CHLORIDE: 9 INJECTION, SOLUTION INTRAVENOUS at 05:28

## 2019-03-28 RX ADMIN — ACETAMINOPHEN 650 MG: 325 TABLET ORAL at 01:17

## 2019-03-28 RX ADMIN — ENOXAPARIN SODIUM 40 MG: 40 INJECTION SUBCUTANEOUS at 11:12

## 2019-03-28 RX ADMIN — MORPHINE SULFATE 2 MG: 2 INJECTION, SOLUTION INTRAMUSCULAR; INTRAVENOUS at 14:41

## 2019-03-28 RX ADMIN — TRAMADOL HYDROCHLORIDE 50 MG: 50 TABLET, FILM COATED ORAL at 05:28

## 2019-03-28 RX ADMIN — MEROPENEM 1 G: 1 INJECTION, POWDER, FOR SOLUTION INTRAVENOUS at 11:12

## 2019-03-28 RX ADMIN — PREDNISONE 5 MG: 5 TABLET ORAL at 20:55

## 2019-03-28 RX ADMIN — TRAMADOL HYDROCHLORIDE 50 MG: 50 TABLET ORAL at 13:21

## 2019-03-28 ASSESSMENT — PAIN DESCRIPTION - FREQUENCY
FREQUENCY: CONTINUOUS
FREQUENCY: CONTINUOUS

## 2019-03-28 ASSESSMENT — PAIN DESCRIPTION - ONSET
ONSET: AWAKENED FROM SLEEP
ONSET: PROGRESSIVE

## 2019-03-28 ASSESSMENT — PAIN SCALES - GENERAL
PAINLEVEL_OUTOF10: 0
PAINLEVEL_OUTOF10: 9
PAINLEVEL_OUTOF10: 7
PAINLEVEL_OUTOF10: 10
PAINLEVEL_OUTOF10: 9
PAINLEVEL_OUTOF10: 3
PAINLEVEL_OUTOF10: 9

## 2019-03-28 ASSESSMENT — PAIN DESCRIPTION - PROGRESSION
CLINICAL_PROGRESSION: GRADUALLY WORSENING
CLINICAL_PROGRESSION: GRADUALLY WORSENING

## 2019-03-28 ASSESSMENT — ENCOUNTER SYMPTOMS
GASTROINTESTINAL NEGATIVE: 1
RESPIRATORY NEGATIVE: 1

## 2019-03-28 ASSESSMENT — PAIN DESCRIPTION - ORIENTATION
ORIENTATION: LEFT;MID
ORIENTATION: RIGHT;LOWER;MID

## 2019-03-28 ASSESSMENT — PAIN DESCRIPTION - LOCATION
LOCATION: BACK
LOCATION: HEAD

## 2019-03-28 ASSESSMENT — PAIN DESCRIPTION - PAIN TYPE
TYPE: ACUTE PAIN
TYPE: ACUTE PAIN

## 2019-03-28 ASSESSMENT — PAIN DESCRIPTION - DESCRIPTORS
DESCRIPTORS: CONSTANT
DESCRIPTORS: ACHING;HEADACHE

## 2019-03-28 NOTE — CONSULTS
Consults   Infectious Disease     Patient Name: Beryl Carbajal  Date: 3/28/2019  YOB: 1950  Medical Record Number: [id number]        Right side pain      History of Present Illness:    Past medical history depression history esophageal reflux disease kidney stones hypothyroidism rheumatoid arthritis    right flank pain 5 days   Placed on oral Keflex what she was told was a urinary tract infection  Patient's pain is located over the iliac crest not the kidney        Right flank pain has continued to increase no nausea vomiting fevers chills dysuria or frequency reported    Urine is growing ESBL E. Coli from culture from 3/22/19  Urinalysis from 322 showed moderate inflammation based on leukocyte esterase no urine microscopic is been performed yesterday's urinalysis shows no leukocytes esterase present still growing 100,000 colonies of E. coli  CT scan from 3/20/19 shows no evidence of intra-abdominal infection      No fevers since admission  White blood cell count normal    Component Value Ref Range & Units Status Collected Lab   Color, UA Yellow  Straw/Yellow Final 03/27/2019  1:30 PM [address] [place] [address] Clear  Clear Final 03/27/2019  1:30 PM [address] [facility]   Glucose, Ur Negative  Negative mg/dL Final 03/27/2019  1:30 PM  - [facility]   Bilirubin Urine Negative  Negative Final 03/27/2019  1:30 PM [address] [facility]   Ketones, Urine Negative  Negative mg/dL Final 03/27/2019  1:30 PM  - [facility]   Specific [place], UA 1.026  1.005 - 1.030 Final 03/27/2019  1:30 PM [address] [place] [facility]   Blood, Urine SMALLAbnormal   Negative Final 03/27/2019  1:30 PM [address] [place] [facility]   pH, UA 5.0  5.0 - 9.0 Final 03/27/2019  1:30 PM [address] [place] [facility]   Protein, UA Negative  Negative mg/dL Final 03/27/2019  1:30 PM [address] [place] [facility]   Urobilinogen, Urine 0.2  <2.0 E.U./dL Final 03/27/2019  1:30 PM [address] [place] [facility]   Nitrite, Urine POSITIVEAbnormal   Negative Final 03/27/2019  1:30 PM [address] [facility]   Leukocyte Esterase, Urine Negative  Negative Final 03/27/2019  1:30 PM [address] [facility]   Urine Reflex to Culture YES   Final 03/27/2019  1:30 PM [address] [facility]       Microscopic Urinalysis [[id number]] (Abnormal) Collected: 03/27/19 1330       Updated: 03/27/19 1555      Bacteria, UA MANY /HPF       Hyaline Casts, UA 0-1 /HPF       WBC, UA 10-20 /HPF       RBC, UA 0-2 /HPF       Epi Cells 0-2         Urine Culture [[id number]] (Abnormal) Collected: 03/27/19 1330   Order Status: Completed Specimen: Urine, clean catch Updated: 03/28/19 1045    Urine Culture, Routine --    Organism Escherichia coliAbnormal     Urine Culture, Routine --    >100,000 CFU/ml   Sensitivity to follow      EXAMINATION:  CT KIDNEY WO CONTRAST       CLINICAL HISTORY:  right flank pain        COMPARISON: 8/27/2016       TECHNIQUE: Spiral scans without contrast. Multi-planar 2-D reconstructions. All CT scans at this facility use dose modulation, iterative reconstruction, and/or weight based dosing when appropriate to reduce radiation dose to as low as reasonably    achievable.        FINDINGS: There is no urolithiasis, acute perinephric stranding, or hydroureteronephrosis. 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 The ureters and urinary bladder are unremarkable. Patient is status post hysterectomy. There is no adnexal pathology.       The bowel is nonspecific. Patient is status post subtotal gastrectomy. There are multiple surgical sutures and clips in the distal paraesophageal region and upper abdomen. There is marked diverticulosis. There are no CT findings of acute diverticulitis    or colitis. There is no evidence of bowel obstruction or perforation. There is no abscess. There is no free fluid or free air. The appendix is normal. There is fecalization of small bowel contents suggesting stasis.  There is an undigested hyperdense    medicinal capsule in the cecum.       Patient is status post cholecystectomy. There is no biliary dilatation. The pancreas is unremarkable.       There is no mesenteric or retroperitoneal adenopathy.       There is spondylosis. There is S-shaped lumbar scoliosis with upper to mid convexity to the right and lower convexity to the left. There are old healed fractures of the right superior and inferior ischiopubic rami. There are emphysematous changes in the    lung bases. There are atherosclerotic calcifications in the root of the thoracic aorta and in the coronary arteries.       Note: This interpretation includes assessment of the liver, spleen, gallbladder, bile ducts, pancreas, adrenal glands, kidneys, urogenital structures, abdominal vasculature, retroperitoneum, gastrointestinal tract, mesentery, lymph nodes, inguinal    regions, osseous structures, abdominopelvic walls, and visualized portions of the lower thorax.  Some structures may be congenitally or surgically absent/altered. Amaris Ormond otherwise indicated in this report, these organs and structures demonstrate no    significant pathology or demonstrate findings which do not require additional follow-up/evaluation.            Impression   MARKED DIVERTICULOSIS. NO ACUTE PATHOLOGY; SPECIFICALLY, NO UROLITHIASIS OR OBSTRUCTIVE UROPATHY. ADDITIONAL FINDINGS AS ABOVE. Review of Systems   Constitutional: Negative for chills, diaphoresis, fatigue and fever. HENT: Negative. Respiratory: Negative. Cardiovascular: Negative. Gastrointestinal: Negative. Genitourinary: Negative. Negative for dysuria, flank pain, frequency, hematuria, pelvic pain and urgency. Musculoskeletal: Negative. Neurological: Negative. Review of Systems: All 14 review of systems negative other than as stated above    Social History     Tobacco Use    Smoking status: Never Smoker    Smokeless tobacco: Never Used   Substance Use Topics    Alcohol use: No    Drug use:  No TWICE DAILY  0    methylPREDNISolone (MEDROL DOSEPACK) 4 MG tablet TAKE BY MOUTH AS DIRECTED ON INSIDE OF PACKAGE 1 kit 1    predniSONE (DELTASONE) 5 MG tablet TAKE 1 TABLET BY MOUTH TWICE DAILY 60 tablet 2       No Known Allergies      Family History   Problem Relation Age of Onset    Diabetes Father     Heart Disease Father          Physical Exam:      Physical Exam   HENT:   Head: Normocephalic. Eyes: Pupils are equal, round, and reactive to light. Neck: Normal range of motion. No JVD present. No tracheal deviation present. No thyromegaly present. Cardiovascular: Intact distal pulses. Exam reveals no gallop. No murmur heard. Pulmonary/Chest: Breath sounds normal. No respiratory distress. She has no wheezes. She has no rales. She exhibits no tenderness. Abdominal: Bowel sounds are normal. She exhibits no distension and no mass. There is no tenderness. There is no rebound and no guarding. Musculoskeletal: She exhibits no edema or tenderness. Lymphadenopathy:     She has no cervical adenopathy. Neurological: She is alert. Skin: Skin is warm. No erythema. Blood pressure 137/84, pulse 81, temperature 98.6 °F (37 °C), resp. rate 18, height 5' 3\" (1.6 m), weight 114 lb 6.4 oz (51.9 kg), SpO2 97 %, not currently breastfeeding.       .   Lab Results   Component Value Date    WBC 4.0 (L) 03/28/2019    HGB 10.3 (L) 03/28/2019    HCT 31.9 (L) 03/28/2019    .6 (H) 03/28/2019     03/28/2019     Lab Results   Component Value Date     03/28/2019    K 4.5 03/28/2019     03/28/2019    CO2 19 03/28/2019    BUN 21 03/28/2019    CREATININE 0.54 03/28/2019    GLUCOSE 133 03/28/2019    GLUCOSE 84 01/17/2012    CALCIUM 8.8 03/28/2019                ASSESSMENT:  Patient Active Problem List   Diagnosis    Anxiety    OA (osteoarthritis)    Rheumatoid arthritis ([facility] [address]    Hypercholesteremia    Acquired hypothyroidism    GERD (gastroesophageal reflux disease)    Obstructive pyelonephritis    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    Foot pain    Extremity pain    Purpura (HCC)    Rash    Retinal macular atrophy    History of cornea transplant    Pain in shoulder    Vitamin D deficiency    Rheumatoid arthritis involving both knees with positive rheumatoid factor (HCC)    Adjustment reaction with prolonged depressive reaction    UTI due to extended-spectrum beta lactamase (ESBL) producing Escherichia coli    Acute pyelonephritis         PLAN:    Right-sided pain  Presenting or over the pelvis than in the CVA region urine shows minimal inflammation CT scan shows no evidence of inflammation of the kidney kidney obstruction kidney stone or diverticulitis    Discontinue antibiotics obtain urine for straight cath    Bone scan to evaluate the pelvis for the pain

## 2019-03-28 NOTE — FLOWSHEET NOTE
She voiced that the toradol 15 mg. ivp did not ease her pain at all.she rated her pain 9/10,so the tramadol 50 mg. Was given for her right lower  Back pain . she is alert and oriented,lungs clear,respirations were effortless,hear rate regular at 88 beats/ minute,no edema. assisted her back to bed and she was instructed to call nurse if her pain get worst call light is in easy reach

## 2019-03-28 NOTE — PROGRESS NOTES
Physical Therapy Missed Treatment   Facility/Department: [facility] MED SURG B706/F608-72    NAME: Liang Morton    : 1950 (70 y.o.)  MRN: [id number]    Account: [de-identified]  Gender: female      PT evaluation and treatment orders received. Chart reviewed. PT evaluation attempted. Pt standing at bedside.  visiting. Educated pt on role of PT in the hosp and PT referral. Pt adamantly declining mobility assessment stating that her pain was too great. RN notified. Will follow and attempt PT evaluation again at earliest availability.         Iram Limon, PT, 19 at 2:10 PM

## 2019-03-28 NOTE — CARE COORDINATION
TH PATIENT WHO IS FROM HOME WITH SPOUSE. SHE USES A CANE SOMETIMES. SHE DOES NOT DRIVE. DC PLAN IS TO RETURN HOME. PATIENT AND SPOUSE BOTH DENY ANY DC NEEDS.   ARE TEAM TO FOLLOW

## 2019-03-28 NOTE — PROGRESS NOTES
Department of Internal Medicine  Progress Note      SUBJECTIVE: complains of pain over right flank  No acute events overnight.        ROS:  All 12 systems reviewed and negative except mentioned in HPI and Assessment and plan    MEDICATIONS:  Current Facility-Administered Medications   Medication Dose Route Frequency Provider Last Rate Last Dose    traMADol (ULTRAM) tablet 50 mg  50 mg Oral Q6H Stephanie Burks MD   50 mg at 03/28/19 1321    methocarbamol (ROBAXIN) tablet 750 mg  750 mg Oral 4x Daily Stephanie Burks MD        morphine (PF) injection 2 mg  2 mg Intravenous Q4H PRN Stephanie Bartholomew MD        0.9 % sodium chloride infusion   Intravenous Continuous Flor Perachel, PA 75 mL/hr at 03/28/19 0528      sodium chloride flush 0.9 % injection 10 mL  10 mL Intravenous 2 times per day Flor Laura PA   10 mL at 03/28/19 1114    sodium chloride flush 0.9 % injection 10 mL  10 mL Intravenous PRN JAS Be        magnesium hydroxide (MILK OF MAGNESIA) 400 MG/5ML suspension 30 mL  30 mL Oral Daily PRN JAS Be        ondansetron [facility]) injection 4 mg  4 mg Intravenous Q6H PRN JAS Be        enoxaparin (LOVENOX) injection 40 mg  40 mg Subcutaneous Daily JAS Be   40 mg at 03/28/19 1112    meropenem (MERREM) 1 g in sodium chloride 0.9 % 100 mL IVPB (mini-bag)  1 g Intravenous Q8H Flor [place]   Stopped at 03/28/19 1142    gabapentin (NEURONTIN) capsule 900 mg  900 mg Oral Nightly Stephanie Burks MD   900 mg at 03/27/19 2035    levothyroxine (SYNTHROID) tablet 50 mcg  50 mcg Oral Daily Stephanie Burks MD   50 mcg at 03/28/19 1111    predniSONE (DELTASONE) tablet 5 mg  5 mg Oral BID Stephanie Burks MD   5 mg at 03/28/19 1112    ketorolac (TORADOL) injection 15 mg  15 mg Intravenous Q6H PRN Stephanie Burks MD   15 mg at 03/28/19 0320    acetaminophen (TYLENOL) tablet 650 mg  650 mg Oral Q6H PRN Ermalene Meter, MD   650 mg at 03/28/19 0117         OBJECTIVE:  Vital Signs:  Vitals:    03/28/19 0752   BP: (!) 142/76   Pulse: 75   Resp: 18   Temp: 98.4 °F (36.9 °C)   SpO2: 98%       Focal exam:  Right flank tenderness     General Exam (except as mentioned above):  CONSTITUTIONAL: Awake, alert, no apparent distress  EYES:  PERRL, conjunctiva normal  ENT:  Normocephalic, atraumatic  NECK:  Supple  BACK:  Symmetric  LUNGS:  CTAB except bilateral basilar crackles. CARDIOVASCULAR:  S1S2 present  ABDOMEN:  soft, non-distended, non-tender  MUSCULOSKELETAL:  There is no redness, warmth, or swelling of the joints. NEUROLOGIC:  Alert, awake, oriented x 3. No FND  EXTREMITIES: Warm and well perfused. LABS  Recent Labs     03/27/19  1330 03/28/19  0557   WBC 8.1 4.0*   RBC 3.51* 3.08*   HGB 11.5* 10.3*   HCT 35.5* 31.9*   .1* 103.6*   MCH 32.8* 33.3*   MCHC 32.4* 32.2*   RDW 17.2* 17.5*    168       Recent Labs     03/27/19  1330 03/28/19  0557    139   K 4.1 4.5    108*   CO2 21 19*   BUN 20 21   CREATININE 0.53 0.54   GLUCOSE 92 133*   CALCIUM 8.8 8.8       No results for input(s): MG in the last 72 hours. ASSESSMENT AND PLAN    Active Hospital Problems    Diagnosis Date Noted    UTI due to extended-spectrum beta lactamase (ESBL) producing Escherichia coli [N39.0, B96.29, Z16.12] 03/27/2019    Acute pyelonephritis [N10] 03/27/2019       ESBL UTI: Started on meropenem. Encourage PO intake.  Renal funtion is normal. CT scan 4 days ago did not show any renal stones  Acute pyelonephritis: meropenem  Continue home meds  DVT prophylaxis: Philip Elizondo MD  Pager : [phone]

## 2019-03-29 ENCOUNTER — APPOINTMENT (OUTPATIENT)
Dept: NUCLEAR MEDICINE | Age: 69
DRG: 690 | End: 2019-03-29
Payer: MEDICARE

## 2019-03-29 VITALS
WEIGHT: 114.4 LBS | RESPIRATION RATE: 17 BRPM | OXYGEN SATURATION: 100 % | SYSTOLIC BLOOD PRESSURE: 164 MMHG | TEMPERATURE: 98.9 F | BODY MASS INDEX: 20.27 KG/M2 | DIASTOLIC BLOOD PRESSURE: 91 MMHG | HEART RATE: 76 BPM | HEIGHT: 63 IN

## 2019-03-29 LAB
ANION GAP SERPL CALCULATED.3IONS-SCNC: 10 MEQ/L (ref 9–15)
BASOPHILS ABSOLUTE: 0 K/UL (ref 0–0.2)
BASOPHILS RELATIVE PERCENT: 0.3 %
BILIRUBIN URINE: NEGATIVE
BLOOD, URINE: NEGATIVE
BUN BLDV-MCNC: 13 MG/DL (ref 8–23)
CALCIUM SERPL-MCNC: 8.9 MG/DL (ref 8.5–9.9)
CHLORIDE BLD-SCNC: 107 MEQ/L (ref 95–107)
CLARITY: CLEAR
CO2: 23 MEQ/L (ref 20–31)
COLOR: YELLOW
CREAT SERPL-MCNC: 0.5 MG/DL (ref 0.5–0.9)
EOSINOPHILS ABSOLUTE: 0 K/UL (ref 0–0.7)
EOSINOPHILS RELATIVE PERCENT: 0.1 %
GFR AFRICAN AMERICAN: >60
GFR NON-AFRICAN AMERICAN: >60
GLUCOSE BLD-MCNC: 136 MG/DL (ref 70–99)
GLUCOSE URINE: NEGATIVE MG/DL
HCT VFR BLD CALC: 31.8 % (ref 37–47)
HEMOGLOBIN: 10.3 G/DL (ref 12–16)
KETONES, URINE: NEGATIVE MG/DL
LEUKOCYTE ESTERASE, URINE: NEGATIVE
LYMPHOCYTES ABSOLUTE: 0.9 K/UL (ref 1–4.8)
LYMPHOCYTES RELATIVE PERCENT: 23.3 %
MCH RBC QN AUTO: 33.6 PG (ref 27–31.3)
MCHC RBC AUTO-ENTMCNC: 32.3 % (ref 33–37)
MCV RBC AUTO: 104.1 FL (ref 82–100)
MONOCYTES ABSOLUTE: 0.2 K/UL (ref 0.2–0.8)
MONOCYTES RELATIVE PERCENT: 5 %
NEUTROPHILS ABSOLUTE: 2.8 K/UL (ref 1.4–6.5)
NEUTROPHILS RELATIVE PERCENT: 71.3 %
NITRITE, URINE: NEGATIVE
ORGANISM: ABNORMAL
PDW BLD-RTO: 17.1 % (ref 11.5–14.5)
PH UA: 7 (ref 5–9)
PLATELET # BLD: 176 K/UL (ref 130–400)
POTASSIUM REFLEX MAGNESIUM: 4.7 MEQ/L (ref 3.4–4.9)
PROTEIN UA: NEGATIVE MG/DL
RBC # BLD: 3.06 M/UL (ref 4.2–5.4)
SLIDE REVIEW: ABNORMAL
SODIUM BLD-SCNC: 140 MEQ/L (ref 135–144)
SPECIFIC GRAVITY UA: 1.01 (ref 1–1.03)
URINE CULTURE, ROUTINE: ABNORMAL
URINE CULTURE, ROUTINE: ABNORMAL
UROBILINOGEN, URINE: 0.2 E.U./DL
WBC # BLD: 3.9 K/UL (ref 4.8–10.8)

## 2019-03-29 PROCEDURE — 96376 TX/PRO/DX INJ SAME DRUG ADON: CPT

## 2019-03-29 PROCEDURE — 87086 URINE CULTURE/COLONY COUNT: CPT

## 2019-03-29 PROCEDURE — 3430000000 HC RX DIAGNOSTIC RADIOPHARMACEUTICAL: Performed by: INTERNAL MEDICINE

## 2019-03-29 PROCEDURE — 6360000002 HC RX W HCPCS: Performed by: PHYSICIAN ASSISTANT

## 2019-03-29 PROCEDURE — 6360000002 HC RX W HCPCS: Performed by: INTERNAL MEDICINE

## 2019-03-29 PROCEDURE — A9503 TC99M MEDRONATE: HCPCS | Performed by: INTERNAL MEDICINE

## 2019-03-29 PROCEDURE — 97166 OT EVAL MOD COMPLEX 45 MIN: CPT

## 2019-03-29 PROCEDURE — 80048 BASIC METABOLIC PNL TOTAL CA: CPT

## 2019-03-29 PROCEDURE — 96372 THER/PROPH/DIAG INJ SC/IM: CPT

## 2019-03-29 PROCEDURE — 97162 PT EVAL MOD COMPLEX 30 MIN: CPT

## 2019-03-29 PROCEDURE — 85025 COMPLETE CBC W/AUTO DIFF WBC: CPT

## 2019-03-29 PROCEDURE — 6370000000 HC RX 637 (ALT 250 FOR IP): Performed by: INTERNAL MEDICINE

## 2019-03-29 PROCEDURE — 78306 BONE IMAGING WHOLE BODY: CPT

## 2019-03-29 PROCEDURE — 36415 COLL VENOUS BLD VENIPUNCTURE: CPT

## 2019-03-29 PROCEDURE — 2580000003 HC RX 258: Performed by: PHYSICIAN ASSISTANT

## 2019-03-29 PROCEDURE — 99232 SBSQ HOSP IP/OBS MODERATE 35: CPT | Performed by: INTERNAL MEDICINE

## 2019-03-29 PROCEDURE — 81003 URINALYSIS AUTO W/O SCOPE: CPT

## 2019-03-29 RX ORDER — METHOCARBAMOL 750 MG/1
750 TABLET, FILM COATED ORAL 4 TIMES DAILY
Qty: 40 TABLET | Refills: 0 | Status: SHIPPED | OUTPATIENT
Start: 2019-03-29 | End: 2019-03-29 | Stop reason: HOSPADM

## 2019-03-29 RX ORDER — SODIUM CHLORIDE 0.9 % (FLUSH) 0.9 %
10 SYRINGE (ML) INJECTION PRN
Status: DISCONTINUED | OUTPATIENT
Start: 2019-03-29 | End: 2019-03-29 | Stop reason: HOSPADM

## 2019-03-29 RX ORDER — TC 99M MEDRONATE 20 MG/10ML
25 INJECTION, POWDER, LYOPHILIZED, FOR SOLUTION INTRAVENOUS
Status: COMPLETED | OUTPATIENT
Start: 2019-03-29 | End: 2019-03-29

## 2019-03-29 RX ORDER — METHOCARBAMOL 500 MG/1
500 TABLET, FILM COATED ORAL 4 TIMES DAILY
Qty: 40 TABLET | Refills: 0 | Status: SHIPPED | OUTPATIENT
Start: 2019-03-29 | End: 2019-04-08

## 2019-03-29 RX ADMIN — KETOROLAC TROMETHAMINE 15 MG: 15 INJECTION, SOLUTION INTRAMUSCULAR; INTRAVENOUS at 01:07

## 2019-03-29 RX ADMIN — TRAMADOL HYDROCHLORIDE 50 MG: 50 TABLET ORAL at 06:26

## 2019-03-29 RX ADMIN — MORPHINE SULFATE 2 MG: 2 INJECTION, SOLUTION INTRAMUSCULAR; INTRAVENOUS at 10:51

## 2019-03-29 RX ADMIN — METHOCARBAMOL TABLETS 750 MG: 750 TABLET, COATED ORAL at 14:10

## 2019-03-29 RX ADMIN — SODIUM CHLORIDE: 9 INJECTION, SOLUTION INTRAVENOUS at 00:12

## 2019-03-29 RX ADMIN — ENOXAPARIN SODIUM 40 MG: 40 INJECTION SUBCUTANEOUS at 09:19

## 2019-03-29 RX ADMIN — MORPHINE SULFATE 2 MG: 2 INJECTION, SOLUTION INTRAMUSCULAR; INTRAVENOUS at 15:14

## 2019-03-29 RX ADMIN — SODIUM CHLORIDE: 9 INJECTION, SOLUTION INTRAVENOUS at 10:19

## 2019-03-29 RX ADMIN — PREDNISONE 5 MG: 5 TABLET ORAL at 09:20

## 2019-03-29 RX ADMIN — TRAMADOL HYDROCHLORIDE 50 MG: 50 TABLET ORAL at 18:37

## 2019-03-29 RX ADMIN — TRAMADOL HYDROCHLORIDE 50 MG: 50 TABLET ORAL at 14:10

## 2019-03-29 RX ADMIN — METHOCARBAMOL TABLETS 750 MG: 750 TABLET, COATED ORAL at 18:37

## 2019-03-29 RX ADMIN — KETOROLAC TROMETHAMINE 15 MG: 15 INJECTION, SOLUTION INTRAMUSCULAR; INTRAVENOUS at 09:20

## 2019-03-29 RX ADMIN — TC 99M MEDRONATE 30 MILLICURIE: 20 INJECTION, POWDER, LYOPHILIZED, FOR SOLUTION INTRAVENOUS at 09:00

## 2019-03-29 RX ADMIN — ACETAMINOPHEN 650 MG: 325 TABLET ORAL at 09:20

## 2019-03-29 RX ADMIN — Medication 10 ML: at 09:20

## 2019-03-29 RX ADMIN — METHOCARBAMOL TABLETS 750 MG: 750 TABLET, COATED ORAL at 09:22

## 2019-03-29 RX ADMIN — LEVOTHYROXINE SODIUM 50 MCG: 50 TABLET ORAL at 09:20

## 2019-03-29 ASSESSMENT — PAIN DESCRIPTION - LOCATION: LOCATION: ABDOMEN

## 2019-03-29 ASSESSMENT — PAIN SCALES - GENERAL
PAINLEVEL_OUTOF10: 8
PAINLEVEL_OUTOF10: 0
PAINLEVEL_OUTOF10: 7
PAINLEVEL_OUTOF10: 6
PAINLEVEL_OUTOF10: 4
PAINLEVEL_OUTOF10: 8
PAINLEVEL_OUTOF10: 3
PAINLEVEL_OUTOF10: 7
PAINLEVEL_OUTOF10: 8

## 2019-03-29 ASSESSMENT — ENCOUNTER SYMPTOMS
RESPIRATORY NEGATIVE: 1
GASTROINTESTINAL NEGATIVE: 1

## 2019-03-29 NOTE — PROGRESS NOTES
Department of Internal Medicine  Progress Note      SUBJECTIVE: Complains of pain over right flank. No acute events overnight.        ROS:  All 12 systems reviewed and negative except mentioned in HPI and Assessment and plan    MEDICATIONS:  Current Facility-Administered Medications   Medication Dose Route Frequency Provider Last Rate Last Dose    sodium chloride flush 0.9 % injection 10 mL  10 mL Intravenous PRN Jose Rai MD        traMADol Deborah Nelson) tablet 50 mg  50 mg Oral Q6H Stephanie Bustillos MD   50 mg at 03/29/19 1410    methocarbamol (ROBAXIN) tablet 750 mg  750 mg Oral 4x Daily Stephanie Bustillos MD   750 mg at 03/29/19 1410    morphine (PF) injection 2 mg  2 mg Intravenous Q4H PRN Stephanie Burks MD   2 mg at 03/29/19 1051    0.9 % sodium chloride infusion   Intravenous Continuous JAS Davis 75 mL/hr at 03/29/19 1019      sodium chloride flush 0.9 % injection 10 mL  10 mL Intravenous 2 times per day JAS Davis   10 mL at 03/29/19 0920    sodium chloride flush 0.9 % injection 10 mL  10 mL Intravenous PRN JAS Portillo        magnesium hydroxide (MILK OF MAGNESIA) 400 MG/5ML suspension 30 mL  30 mL Oral Daily PRN JAS Portillo        ondansetron [facility]) injection 4 mg  4 mg Intravenous Q6H PRN JAS Portillo        enoxaparin (LOVENOX) injection 40 mg  40 mg Subcutaneous Daily JAS Portillo   40 mg at 03/29/19 0919    gabapentin (NEURONTIN) capsule 900 mg  900 mg Oral Nightly Stephanie Burks MD   900 mg at 03/28/19 2055    levothyroxine (SYNTHROID) tablet 50 mcg  50 mcg Oral Daily Stephanie Burks MD   50 mcg at 03/29/19 0920    predniSONE (DELTASONE) tablet 5 mg  5 mg Oral BID Stephanie Burks MD   5 mg at 03/29/19 0920    ketorolac (TORADOL) injection 15 mg  15 mg Intravenous Q6H PRN Stephanie Burks MD   15 mg at 03/29/19 0920    acetaminophen (TYLENOL) tablet 650 mg  650 mg Oral Q6H PRN Sveta Fan MD   650 mg at 03/29/19 113 OBJECTIVE:  Vital Signs:  Vitals:    03/29/19 0801   BP: (!) 168/73   Pulse: 64   Resp: 18   Temp: 97.4 °F (36.3 °C)   SpO2: 100%       General Exam (except as mentioned above):  CONSTITUTIONAL: Awake, alert, no apparent distress  EYES:  PERRL, conjunctiva normal  ENT:  Normocephalic, atraumatic  NECK:  Supple  BACK:  Symmetric  LUNGS:  CTAB except bilateral basilar crackles. CARDIOVASCULAR:  S1S2 present  ABDOMEN:  soft, non-distended, non-tender  MUSCULOSKELETAL:  There is no redness, warmth, or swelling of the joints. NEUROLOGIC:  Alert, awake, oriented x 3. No FND  EXTREMITIES: Warm and well perfused. LABS  Recent Labs     03/27/19  1330 03/28/19  0557 03/29/19  0621   WBC 8.1 4.0* 3.9*   RBC 3.51* 3.08* 3.06*   HGB 11.5* 10.3* 10.3*   HCT 35.5* 31.9* 31.8*   .1* 103.6* 104.1*   MCH 32.8* 33.3* 33.6*   MCHC 32.4* 32.2* 32.3*   RDW 17.2* 17.5* 17.1*    168 176       Recent Labs     03/27/19  1330 03/28/19  0557 03/29/19  0621    139 140   K 4.1 4.5 4.7    108* 107   CO2 21 19* 23   BUN 20 21 13   CREATININE 0.53 0.54 0.50   GLUCOSE 92 133* 136*   CALCIUM 8.8 8.8 8.9       No results for input(s): MG in the last 72 hours. ASSESSMENT AND PLAN    Active Hospital Problems    Diagnosis Date Noted    UTI due to extended-spectrum beta lactamase (ESBL) producing Escherichia coli [N39.0, B96.29, Z16.12] 03/27/2019    Acute pyelonephritis [N10] 03/27/2019     ESBL UTI: repeat urine cultures sent. CT scan 4 days ago did not show any renal stones  Bone scan to evaluate pelvic pain and tenderness  Hypothyroidism: Synthroid    DVT prophylaxis: Lovenox  Litzy Alexander MD  Pager : [phone]

## 2019-03-29 NOTE — PLAN OF CARE
Therapy evaluation completed. Please see daily notes and/or progress notes for details related to planned treatment interventions, goals and functional abilities.

## 2019-03-29 NOTE — PROGRESS NOTES
Meredith Santizo is a 70 y. o.female patient. Right side pain      urine for straight cath     URINALYSIS [[id number]] Collected: 03/29/19 1115      Specimen: Urine, straight catheter Updated: 03/29/19 1157      Color, UA Yellow      Clarity, UA Clear      Glucose, Ur Negative mg/dL       Bilirubin Urine Negative      Ketones, Urine Negative mg/dL       Specific Gravity, UA 1.010      Blood, Urine Negative      pH, UA 7.0      Protein, UA Negative mg/dL       Urobilinogen, Urine 0.2 E.U./dL       Nitrite, Urine Negative      Leukocyte Esterase, Urine Negative               Bone scan to evaluate the pelvis for the pain  EXAMINATION:  NM BONE SCAN WHOLE BODY       CLINICAL HISTORY:  Bone pain right ilium        COMPARISONS:  CT kidney without contrast 3/22/2019, CT left hip 1/20/2018       TECHNIQUE:  30 mCi technetium MDP IV. Whole body bone scintigrams. Multi projection spot scintigrams of the pelvis.       FINDINGS: Reda Barrera is no abnormal activity in the pelvis and hips. There is focal increased activity at the anterior ends of the right 5th and 6th ribs and the anterior ends of the left 5th through 9th ribs concordant with old healed rib fractures. There    is increased activity in both wrists consistent with arthropathy. There is a focus of asymmetric activity in the distal left forearm representing the radionuclide injection site. There is multifocal increased activity in the spine consistent with    spondylosis. There is lumbar dextroscoliosis. There is focal increased activity in the left maxilla, consistent with apical periodontal disease or maxillary sinus inflammatory disease. There is mild increased activity at the lateral left mid foot which    may represent degenerative, posttraumatic, or inflammatory phenomenon. Urinary tract activity is normal.           Impression   NO SCINTIGRAPHIC ABNORMALITY IN THE PELVIS.  SCINTIGRAPHIC FINDINGS CONSISTENT WITH MULTIPLE OLD RIB FRACTURES, BILATERAL WRIST ARTHROPATHY, AND SPONDYLOSIS. FOCAL LEFT MAXILLARY ACTIVITY CONSISTENT WITH APICAL PERIODONTAL DISEASE OR LEFT    MAXILLARY SINUS INFLAMMATORY DISEASE. NONSPECIFIC MILD INCREASED ACTIVITY IN THE LEFT LATERAL MIDFOOT WHICH MAY REPRESENT DEGENERATIVE, POSTTRAUMATIC, OR INFLAMMATORY PHENOMENON.                  Current Facility-Administered Medications   Medication Dose Route Frequency Provider Last Rate Last Dose    sodium chloride flush 0.9 % injection 10 mL  10 mL Intravenous PRN Tutu Oates MD        traMADol Constantino Flies) tablet 50 mg  50 mg Oral Q6H Stephanie Zeng MD   50 mg at 03/29/19 1410    methocarbamol (ROBAXIN) tablet 750 mg  750 mg Oral 4x Daily Stephanie Zeng MD   750 mg at 03/29/19 1410    morphine (PF) injection 2 mg  2 mg Intravenous Q4H PRN Stephanie Zeng MD   2 mg at 03/29/19 1514    0.9 % sodium chloride infusion   Intravenous Continuous JAS Tolliver 75 mL/hr at 03/29/19 1019      sodium chloride flush 0.9 % injection 10 mL  10 mL Intravenous 2 times per day JAS Tolliver   10 mL at 03/29/19 0920    sodium chloride flush 0.9 % injection 10 mL  10 mL Intravenous PRN JAS Clarke        magnesium hydroxide (MILK OF MAGNESIA) 400 MG/5ML suspension 30 mL  30 mL Oral Daily PRN JAS Clarke        ondansetron [facility]) injection 4 mg  4 mg Intravenous Q6H PRN JAS Clarke        enoxaparin (LOVENOX) injection 40 mg  40 mg Subcutaneous Daily JAS Clarke   40 mg at 03/29/19 0919    gabapentin (NEURONTIN) capsule 900 mg  900 mg Oral Nightly Stephanie Burks MD   900 mg at 03/28/19 2055    levothyroxine (SYNTHROID) tablet 50 mcg  50 mcg Oral Daily Stephanie Burks MD   50 mcg at 03/29/19 0920    predniSONE (DELTASONE) tablet 5 mg  5 mg Oral BID Stephanie Burks MD   5 mg at 03/29/19 0920    ketorolac (TORADOL) injection 15 mg  15 mg Intravenous Q6H PRN Stephanie Burks MD   15 mg at 03/29/19 0920    acetaminophen (TYLENOL) tablet 650 mg 650 mg Oral Q6H PRN Meaghan Estevez MD   650 mg at 03/29/19 0920       No Known Allergies  Patient Active Problem List    Diagnosis Date Noted    UTI due to extended-spectrum beta lactamase (ESBL) producing Escherichia coli 03/27/2019    Acute pyelonephritis 03/27/2019    Adjustment reaction with prolonged depressive reaction 06/06/2018    Rheumatoid arthritis involving both knees with positive rheumatoid factor ([facility] 11/20/2016    Obstructive pyelonephritis 09/19/2016    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/06/2016    History of cornea transplant 03/06/2016    Purpura ([address] 04/03/2015    Foot pain 02/03/2015    Pain in shoulder 03/06/2014    Rash 03/05/2014    GERD (gastroesophageal reflux disease) 01/08/2014    Vitamin D deficiency 11/11/2013    Extremity pain 10/24/2013    Anxiety 10/17/2011    OA (osteoarthritis) 10/17/2011    Rheumatoid arthritis ([address] 10/17/2011    Hypercholesteremia 10/17/2011    Acquired hypothyroidism 10/17/2011    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 10/19/2004       BP (!) 164/91   Pulse 76   Temp 98.9 °F (37.2 °C)   Resp 17   Ht 5' 3\" (1.6 m)   Wt 114 lb 6.4 oz (51.9 kg)   LMP  (LMP Unknown)   SpO2 100%   BMI 20.27 kg/m²     Review of Systems   HENT: Negative. Respiratory: Negative. Cardiovascular: Negative. Gastrointestinal: Negative. Genitourinary: Negative. Musculoskeletal:        Pain on right side lower back to right iliac crest       Physical Exam   HENT:   Head: Normocephalic. Cardiovascular: Normal rate, normal heart sounds and intact distal pulses. Exam reveals no gallop and no friction rub. No murmur heard. Pulmonary/Chest: No respiratory distress. She has no wheezes. She has no rales. She exhibits no tenderness. Abdominal: Soft. Bowel sounds are normal. She exhibits no distension and no mass. There is no tenderness. There is no rebound and no guarding.        Assessment/Plan:  Right-sided pain  Presenting or over the pelvis than in the CVA region urine shows minimal inflammation CT scan shows no evidence of inflammation of the kidney kidney obstruction kidney stone or diverticulitis       Bone scan does not show any abnormal uptake in the right pelvis area where she complains of pain      Patient straight catheterization shows no inflammation at all in the urine    No antibiotics at this time  no evidence of urinary tract infection

## 2019-03-29 NOTE — PROGRESS NOTES
GALLBLADDER SURGERY      HYSTERECTOMY      KNEE SURGERY      left    LITHOTRIPSY Left 10/12/2016    HOLMIUM LASER LITHOTRIPSY AND REMOVAL OF LEFT J STENT  performed by Joy Yoder MD at [address]  08/09/2013    DR. RHODES    URETER SURGERY Left 10/12/2016    /left ureteroscopy/ cysto/ retrogrades/pyelogram/ holmium laser lithotripsy removal left urerteral stent performed by Joy Yoder MD at [facility]       Chart Reviewed: Yes  Family / Caregiver Present: No  General Comment  Comments: Pt getting up from [facility] - agreeable to PT evaluation with encouargement    Restrictions:  Restrictions/Precautions: Fall Risk     SUBJECTIVE: Subjective: \"I have pain. \"  Pre Treatment Pain Screening  Pain at present: 10  Intervention List: Patient able to continue with treatment;Nurse/physician notified  Comments / Details: R flank - pt recently medicated    Post Treatment Pain Screening:   Pain Assessment  Pain Assessment: (unchanged)    Prior Level of Function:  Social/Functional History  Lives With: Spouse  Type of Home: House  Home Layout: Two level, Able to Live on Main level with bedroom/bathroom  Home Access: Stairs to enter with rails  Entrance Stairs - Number of Steps: 2  Home Equipment: Cane, Rolling walker, Roll About  ADL Assistance: Independent  Ambulation Assistance: Independent(cane in community; no AD in house)  Transfer Assistance: Independent  Active : No    OBJECTIVE:   Vision/Hearing:  Vision Exceptions: Legally blind  Hearing: Within functional limits    Cognition:  Overall Orientation Status: Within Functional Limits  Follows Commands: Within Functional Limits    Observation/Palpation  Observation: Pt up to [facility]. no acute distress noted.      ROM:  RLE PROM: WFL  LLE PROM: WFL    Strength:  Strength RLE  Strength RLE: WFL  Strength LLE  Strength LLE: WFL    Neuro:  Balance  Sitting - Static: Good  Sitting - Dynamic: Good  Standing - Static: Fair  Standing - Dynamic: Fair  Comments: Pt with delayed righting responses and delayed righting responses. Decreased safety awareness     Motor Control  Gross Motor?: WFL  Sensation  Overall Sensation Status: WFL    Bed mobility  Rolling to Left: Supervision  Rolling to Right: Supervision  Supine to Sit: Supervision  Sit to Supine: Supervision    Transfers  Sit to Stand: Supervision  Stand to sit: Supervision  Bed to Chair: Supervision  Comment: Pt requiring verbal cues for manamgent of IV line and safety managing hospital room environment. Ambulation 1  Device: Single point cane  Assistance: Stand by assistance  Quality of Gait: shortened steps and slow jose; shuffling steps with decreased foot clearance. Distance: 15ft  Stairs/Curb  Stairs?: No    Activity Tolerance  Activity Tolerance: Patient limited by pain  Activity Tolerance: self-limiting          ASSESSMENT:   Body structures, Functions, Activity limitations: Increased Pain;Decreased balance;Decreased functional mobility ; Decreased endurance;Decreased coordination;Decreased safe awareness  Decision Making: Medium Complexity  History: High  Exam: Med  Clinical Presentation: Med    Prognosis: Good  Patient Education: PT POC; DC recs; role of PT In the hosp; importance of OOB activity and increasing activity tolerance in preparation for DC  Barriers to Learning: self limiting factors; pain; health beliefs    DISCHARGE RECOMMENDATIONS:  Discharge Recommendations: Continue to assess pending progress, Patient would benefit from continued therapy after discharge    Assessment: Pt presents with pain which has negatively impacted her functional mobility and activity tolerance. Continued PT indicated to progress mobility and prevent further decline in function.    REQUIRES PT FOLLOW UP: Yes      PLAN OF CARE:  Plan  Times per week: 3-6  Current Treatment Recommendations: Strengthening, ROM, Balance Training, ADL/Self-care Training, Endurance Training, Stair training, Transfer Training, Gait Training, Functional Mobility Training, Neuromuscular Re-education, Home Exercise Program, Equipment Evaluation, Education, & procurement, Safety Education & Training, Patient/Caregiver Education & Training, Positioning, Modalities  Safety Devices  Type of devices:  All fall risk precautions in place    Goals:  Short term goals  Short term goal 1: Pt to complete HEP with indep  Long term goals  Long term goal 1: Pt to complete all bed mobility with indep  Long term goal 2: Pt to complete all transfers with indep  Long term goal 3: Pt to ambulate 100ft with SPC and indep  Long term goal 4: Pt to manage 3 steps with HR and indep    AMPAC (6 CLICK) BASIC MOBILITY  AM-PAC Inpatient Mobility Raw Score : 22     Therapy Time:   Individual   Time In 1455   Time Out 1515   Minutes [address] [facility], [address] [place] [address], 03/29/19 at 3:22 PM

## 2019-03-29 NOTE — PROGRESS NOTES
Pt requesting pain medication, morphine IV given waiting for results. Pt up to bed side commode, back resting in bed at this time.

## 2019-03-29 NOTE — PROGRESS NOTES
[facility] OCCUPATIONAL THERAPY EVALUATION - ACUTE     Date: 3/29/2019  Patient Name: Tracy Jimenez        MRN: [id number]  Account: [de-identified]   : 1950  (70 y.o.)  Room: [place] [id number]    Chart Review:  Diagnosis:  The primary encounter diagnosis was Acute pyelonephritis. A diagnosis of Failure of outpatient treatment was also pertinent to this visit. Past Medical History:   Diagnosis Date    Depression     GERD (gastroesophageal reflux disease)     History of kidney stones     hospitalized 2016 obstructive pyelonephritis    Hypercholesteremia     Hypothyroidism     Osteoarthritis     Osteopenia     RA [facility]      Past Surgical History:   Procedure Laterality Date    CYSTOSCOPY Left 2016    Alexandria Swanson MD  PYELOGRAM & DOUBLE-J STENT PLACEMENT    GALLBLADDER SURGERY      HYSTERECTOMY      KNEE SURGERY      left    LITHOTRIPSY Left 10/12/2016    HOLMIUM LASER LITHOTRIPSY AND REMOVAL OF LEFT J STENT  performed by Bebe Reynolds MD at [address] [place] [address]  2013    DR. RHODES    URETER SURGERY Left 10/12/2016    /left ureteroscopy/ cysto/ retrogrades/pyelogram/ holmium laser lithotripsy removal left urerteral stent performed by Bebe Reynolds MD at [facility]       Restrictions  Restrictions/Precautions: Fall Risk     Safety Devices: Safety Devices  Safety Devices in place: Yes  Type of devices:  All fall risk precautions in place    Subjective   Pt  present during OT eval.     Pain Reassessment:   Pain Assessment  Patient Currently in Pain: Yes  Pain Assessment: 0-10  Pain Level: 8  Pain Location: Abdomen       Orientation  Orientation  Overall Orientation Status: Within Functional Limits    Prior Level of Function:  Social/Functional History  Lives With: Spouse  Type of Home: House  Home Layout: Two level  Home Access: Stairs to enter with rails  Entrance Stairs - Number of Steps: 2  Bathroom Shower/Tub: Tub/Shower unit(main floor )  Bathroom Toilet: Standard  Bathroom Equipment: Grab bars in shower, Toilet raiser  Home Equipment: Cane, Rolling walker, [employer] About  ADL Assistance: Independent  Homemaking Assistance: ( cooks, wife helps cleans )  Ambulation Assistance: Independent(cane in community, furniture walk in house )  Active : No    OBJECTIVE:     Orientation Status:  Orientation  Overall Orientation Status: Within Functional Limits    Observation:  Observation/Palpation  Observation: Pt lying in bed. Multiple bruises on arms, pt reported from IV's. Cognition Status:  Cognition  Cognition Comment: Pt follows simple commands consistently. Pt impulsive at times. Perception Status:  Perception  Overall Perceptual Status: WFL    Sensation Status:  Sensation  Overall Sensation Status: WFL    Vision and Hearing Status:  Vision  Vision: Impaired  Vision Exceptions: Legally blind(pt reported having sensitivity to light )  Hearing  Hearing: Within functional limits     ROM:   LUE AROM (degrees)  LUE AROM : WFL  Left Hand AROM (degrees)  Left Hand AROM: WFL  RUE AROM (degrees)  RUE AROM : WFL  RUE General AROM: Pt stated pain at IV site when flexing shouler and elbow   Right Hand AROM (degrees)  Right Hand AROM: WFL    Strength:  LUE Strength  L Hand Grasp: 4-/5  L Hand Release: 4-/5  LUE Strength Comment: 4-/5  RUE Strength  R Hand Grasp: 4-/5  R Hand Release: 4-/5  RUE Strength Comment: 4-/5    Coordination, Tone, Quality of Movement:    Tone RUE  RUE Tone: Normotonic  Tone LUE  LUE Tone: Normotonic  Coordination  Movements Are Fluid And Coordinated: Yes    Hand Dominance:  Hand Dominance  Hand Dominance: Left    ADL Status:  ADL  Feeding: Independent  Grooming: Setup  UE Bathing: Supervision  LE Bathing: Contact guard assistance  UE Dressing: Minimal assistance  LE Dressing: Minimal assistance  Toileting: Stand by assistance  Additional Comments: ADL's simulated EOB or standing bedside   Toilet Transfers  Toilet Transfer: Stand by assistance  Toilet Transfers Comments: anticiapted as observed by sit to stand from bed      Functional Mobility:  Functional Mobility  Functional - Mobility Device: Other  Activity: Other  Assist Level: Contact guard assistance  Functional Mobility Comments: Pt 2 steps from bed. Pt with lateral sway. Pt stated balance is off because of \"old age\", but did not answer if it has become mre frequent. Pt stated she does better at home usually.      Bed Mobility  Bed mobility  Supine to Sit: Stand by assistance  Sit to Supine: Stand by assistance    Seated and Standing Balance:  Balance  Sitting Balance: Supervision  Standing Balance: Contact guard assistance    Functional Endurance:  Activity Tolerance  Activity Tolerance: fair     D/C Recommendations:    Equipment Recommendations:    OT Follow Up:  OT D/C RECOMMENDATIONS  REQUIRES OT FOLLOW UP: Yes       Assessment/Discharge Disposition:     Performance deficits / Impairments: Decreased functional mobility , Decreased ADL status, Decreased balance, Decreased safe awareness, Decreased endurance, Decreased high-level IADLs  Discharge Recommendations: Continue to assess pending progress  History: Mulit comorb   Exam: 6 per imp   Assistance / Modification: Min A     Six Click Score   How much help for putting on and taking off regular lower body clothing?: A Little  How much help for Bathing?: A Little  How much help for Toileting?: A Little  How much help for putting on and taking off regular upper body clothing?: A Little  How much help for taking care of personal grooming?: A Little  How much help for eating meals?: None  AM-[facility] Inpatient Daily Activity Raw Score: 19  AM-PAC Inpatient ADL T-Scale Score : 40.22  ADL Inpatient CMS 0-100% Score: 42.8    Plan:  Plan  Times per week: 1-3  Plan weeks: LOS  Current Treatment Recommendations: Strengthening, Balance Training, Functional Mobility Training, Endurance Training, Safety Education & Training, Patient/Caregiver Education & Training, Self-Care / ADL, Home Management Training    Goals:   Patient will:    - Improve functional endurance to tolerate/complete 30 mins of ADL's  - Be IND in UB ADLs   - Be Mod IND in LB ADLs  - Be Mod IND in ADL transfers without LOB  - Be IND in toileting tasks    Patient Goal: Home    Discussed and agreed upon: Yes Comments:     Therapy Time:   OT Individual Minutes  Time In: 1130  Time Out: [address]  Minutes: 17    Electronically signed by:     MAHENDRA Gamez  3/29/2019, 12:00 PM

## 2019-03-30 NOTE — DISCHARGE SUMMARY
Physician Discharge Summary     Patient ID:  Lacey Koroma  [id number]  70 y.o.  1950    Admit date: 3/27/2019    Discharge date and time:3/29/2019  7:30 PM    Admitting Physician: Manuela Garcia MD     Discharge Physician: Manuela Garcia MD    Admission Diagnoses: UTI due to extended-spectrum beta lactamase (ESBL) producing Escherichia coli [N39.0, B96.29, Z16.12]  Acute pyelonephritis [N10]    Discharge Diagnoses: Active Hospital Problems    Diagnosis Date Noted    UTI due to extended-spectrum beta lactamase (ESBL) producing Escherichia coli [N39.0, B96.29, Z16.12] 03/27/2019    Acute pyelonephritis [N10] 03/27/2019       Admission Condition: fair    Discharged Condition: good    Indication for Admission: flank pain    Hospital Course: Patient was admitted due to concern for ESBL infection. She was complaining of right flank pain. She had CT kidneys done which did not show pyelonephritis, nephrolithiasis or hydronephrosis. She was evaluated by ID who ruled out any UTI. She had been afebrile, HDS and no leukocytosis during her hospital stay. She complained of pelvic pain and hence had bone scan done which did not show any acute process. She was discharged in stable condition on muscle relaxant and advised to follow up with ehr PCP. and treated for above conditions. The patient was discharged in stable condition    Inpatient meds:    Labs:  LABS  Recent Labs     03/28/19  0557 03/29/19  0621   WBC 4.0* 3.9*   RBC 3.08* 3.06*   HGB 10.3* 10.3*   HCT 31.9* 31.8*   .6* 104.1*   MCH 33.3* 33.6*   MCHC 32.2* 32.3*   RDW 17.5* 17.1*    176       Recent Labs     03/28/19  0557 03/29/19  0621    140   K 4.5 4.7   * 107   CO2 19* 23   BUN 21 13   CREATININE 0.54 0.50   GLUCOSE 133* 136*   CALCIUM 8.8 8.9       No results for input(s): MG in the last 72 hours. Imaging: No results found.       Discharge Exam:  General Appearance:    Alert, cooperative, no distress, appears stated age Lungs:     Clear to auscultation bilaterally, respirations unlabored    Heart:    Regular rate and rhythm, S1 and S2 normal, no murmur, rub   or gallop   Abdomen:     Soft, non-tender, bowel sounds active all four quadrants,     no masses, no organomegaly   Extremities:   Extremities normal, atraumatic, no cyanosis or edema   Pulses:   2+ and symmetric all extremities   Neurologic:   CNII-XII intact, normal strength, sensation and reflexes     throughout     In process/preliminary results:  Outstanding Order Results     Date and Time Order Name Status Description    3/29/2019 1114 URINE CULTURE Preliminary     3/27/2019 1404 Culture Blood #2 Preliminary     3/27/2019 1403 Culture Blood #1 Preliminary           Discharge medications     Medication List      START taking these medications    methocarbamol 500 MG tablet  Commonly known as:  ROBAXIN  Take 1 tablet by mouth 4 times daily for 10 days        CHANGE how you take these medications    predniSONE 5 MG tablet  Commonly known as:  DELTASONE  TAKE 1 TABLET BY MOUTH TWICE DAILY  What changed:  Another medication with the same name was removed. Continue taking this medication, and follow the directions you see here. CONTINUE taking these medications    gabapentin 300 MG capsule  Commonly known as:  NEURONTIN  TAKE 3 CAPSULES BY MOUTH AT BEDTIME     levothyroxine 50 MCG tablet  Commonly known as:  SYNTHROID  TAKE 1 TABLET BY MOUTH ONCE DAILY     naproxen 500 MG tablet  Commonly known as:  NAPROSYN  Take 1 tablet by mouth 2 times daily     ondansetron 4 MG disintegrating tablet  Commonly known as:  ZOFRAN ODT  Take 1 tablet by mouth every 8 hours as needed for Nausea     oxyCODONE-acetaminophen 5-325 MG per tablet  Commonly known as:  PERCOCET  Take 1 tablet by mouth every 8 hours as needed for Pain for up to 15 days.      prednisoLONE acetate 1 % ophthalmic suspension  Commonly known as:  PRED FORTE        STOP taking these medications    aspirin EC 81 MG EC tablet     butalbital-acetaminophen-caffeine -40 MG per tablet  Commonly known as:  FIORICET, ESGIC     cephALEXin 500 MG capsule  Commonly known as:  KEFLEX     cyclobenzaprine 10 MG tablet  Commonly known as:  FLEXERIL     desloratadine 5 MG tablet  Commonly known as:  CLARINEX     FLUoxetine 10 MG capsule  Commonly known as:  PROZAC     methylPREDNISolone 4 MG tablet  Commonly known as:  MEDROL DOSEPACK           Where to Get Your Medications      You can get these medications from any pharmacy    Bring a paper prescription for each of these medications  · methocarbamol 500 MG tablet           Patient Instructions:   Discharge Medication List as of 3/29/2019  7:03 PM      CONTINUE these medications which have CHANGED    Details   methocarbamol (ROBAXIN) 500 MG tablet Take 1 tablet by mouth 4 times daily for 10 days, Disp-40 tablet, R-0Print         CONTINUE these medications which have NOT CHANGED    Details   oxyCODONE-acetaminophen (PERCOCET) 5-325 MG per tablet Take 1 tablet by mouth every 8 hours as needed for Pain for up to 15 days. , Disp-45 tablet, R-0Print      naproxen (NAPROSYN) 500 MG tablet Take 1 tablet by mouth 2 times daily, Disp-60 tablet, R-0Print      ondansetron (ZOFRAN ODT) 4 MG disintegrating tablet Take 1 tablet by mouth every 8 hours as needed for Nausea, Disp-20 tablet, R-0Print      gabapentin (NEURONTIN) 300 MG capsule TAKE 3 CAPSULES BY MOUTH AT BEDTIME, Disp-90 capsule, R-1Normal      levothyroxine (SYNTHROID) 50 MCG tablet TAKE 1 TABLET BY MOUTH ONCE DAILY, Disp-90 tablet, R-3Please consider 90 day supplies to promote better adherenceNormal      prednisoLONE acetate (PRED FORTE) 1 % ophthalmic suspension instill 1 (ONE) DROP in left eye TWICE DAILY, R-0Historical Med      predniSONE (DELTASONE) 5 MG tablet TAKE 1 TABLET BY MOUTH TWICE DAILY, Disp-60 tablet, R-2Please consider 90 day supplies to promote better adherenceNormal         STOP taking these medications       cephALEXin (KEFLEX) 500 MG capsule Comments:   Reason for Stopping:         methylPREDNISolone (MEDROL DOSEPACK) 4 MG tablet Comments:   Reason for Stopping:         desloratadine (CLARINEX) 5 MG tablet Comments:   Reason for Stopping:         FLUoxetine (PROZAC) 10 MG capsule Comments:   Reason for Stopping:         butalbital-acetaminophen-caffeine (FIORICET, ESGIC) -40 MG per tablet Comments:   Reason for Stopping:         cyclobenzaprine (FLEXERIL) 10 MG tablet Comments:   Reason for Stopping:         aspirin EC 81 MG EC tablet Comments:   Reason for Stopping:             Activity: activity as tolerated  Diet: regular diet and diabetic diet ( if indicated)    I spent more than 30 minutes talking to the patient, family and the nurse and preparing the discharge      Signed:  Bert Dior MD  Pager : [phone]  3/30/2019  3:43 PM

## 2019-03-31 ENCOUNTER — APPOINTMENT (OUTPATIENT)
Dept: CT IMAGING | Age: 69
End: 2019-03-31
Payer: MEDICARE

## 2019-03-31 ENCOUNTER — HOSPITAL ENCOUNTER (EMERGENCY)
Age: 69
Discharge: HOME | End: 2019-03-31
Attending: EMERGENCY MEDICINE
Payer: MEDICARE

## 2019-03-31 VITALS
OXYGEN SATURATION: 99 % | WEIGHT: 113 LBS | SYSTOLIC BLOOD PRESSURE: 179 MMHG | BODY MASS INDEX: 20.02 KG/M2 | RESPIRATION RATE: 20 BRPM | TEMPERATURE: 98.3 F | HEIGHT: 63 IN | HEART RATE: 84 BPM | DIASTOLIC BLOOD PRESSURE: 83 MMHG

## 2019-03-31 DIAGNOSIS — K59.00 CONSTIPATION, UNSPECIFIED CONSTIPATION TYPE: ICD-10-CM

## 2019-03-31 DIAGNOSIS — R10.9 ACUTE RIGHT FLANK PAIN: Primary | ICD-10-CM

## 2019-03-31 LAB
BACTERIA: NORMAL /HPF
BILIRUBIN URINE: NEGATIVE
BLOOD, URINE: NORMAL
CLARITY: CLEAR
COLOR: YELLOW
EPITHELIAL CELLS, UA: NORMAL /HPF
GLUCOSE URINE: NEGATIVE MG/DL
KETONES, URINE: NORMAL MG/DL
LEUKOCYTE ESTERASE, URINE: NEGATIVE
NITRITE, URINE: NEGATIVE
PH UA: 6.5 (ref 5–9)
PROTEIN UA: NEGATIVE MG/DL
RBC UA: NORMAL /HPF (ref 0–2)
SPECIFIC GRAVITY UA: 1.01 (ref 1–1.03)
URINE CULTURE, ROUTINE: NORMAL
URINE REFLEX TO CULTURE: YES
UROBILINOGEN, URINE: 0.2 E.U./DL
WBC UA: NORMAL /HPF (ref 0–5)

## 2019-03-31 PROCEDURE — 96374 THER/PROPH/DIAG INJ IV PUSH: CPT

## 2019-03-31 PROCEDURE — 87086 URINE CULTURE/COLONY COUNT: CPT

## 2019-03-31 PROCEDURE — 81001 URINALYSIS AUTO W/SCOPE: CPT

## 2019-03-31 PROCEDURE — 96372 THER/PROPH/DIAG INJ SC/IM: CPT

## 2019-03-31 PROCEDURE — 74177 CT ABD & PELVIS W/CONTRAST: CPT

## 2019-03-31 PROCEDURE — 99284 EMERGENCY DEPT VISIT MOD MDM: CPT

## 2019-03-31 PROCEDURE — 6360000002 HC RX W HCPCS: Performed by: EMERGENCY MEDICINE

## 2019-03-31 PROCEDURE — 87077 CULTURE AEROBIC IDENTIFY: CPT

## 2019-03-31 PROCEDURE — 6360000004 HC RX CONTRAST MEDICATION: Performed by: EMERGENCY MEDICINE

## 2019-03-31 PROCEDURE — 87186 SC STD MICRODIL/AGAR DIL: CPT

## 2019-03-31 RX ORDER — KETOROLAC TROMETHAMINE 15 MG/ML
15 INJECTION, SOLUTION INTRAMUSCULAR; INTRAVENOUS ONCE
Status: DISCONTINUED | OUTPATIENT
Start: 2019-03-31 | End: 2019-03-31

## 2019-03-31 RX ORDER — MAGNESIUM CARB/ALUMINUM HYDROX 105-160MG
296 TABLET,CHEWABLE ORAL ONCE
Qty: 296 ML | Refills: 0 | Status: SHIPPED | OUTPATIENT
Start: 2019-03-31 | End: 2019-03-31

## 2019-03-31 RX ORDER — KETOROLAC TROMETHAMINE 30 MG/ML
30 INJECTION, SOLUTION INTRAMUSCULAR; INTRAVENOUS ONCE
Status: COMPLETED | OUTPATIENT
Start: 2019-03-31 | End: 2019-03-31

## 2019-03-31 RX ORDER — POLYETHYLENE GLYCOL 3350 17 G/17G
17 POWDER, FOR SOLUTION ORAL DAILY PRN
Qty: 10 EACH | Refills: 0 | Status: SHIPPED | OUTPATIENT
Start: 2019-03-31 | End: 2019-04-07

## 2019-03-31 RX ADMIN — HYDROMORPHONE HYDROCHLORIDE 1 MG: 1 INJECTION, SOLUTION INTRAMUSCULAR; INTRAVENOUS; SUBCUTANEOUS at 20:58

## 2019-03-31 RX ADMIN — KETOROLAC TROMETHAMINE 30 MG: 30 INJECTION, SOLUTION INTRAMUSCULAR; INTRAVENOUS at 22:16

## 2019-03-31 RX ADMIN — IOPAMIDOL 100 ML: 755 INJECTION, SOLUTION INTRAVENOUS at 21:03

## 2019-03-31 ASSESSMENT — PAIN SCALES - GENERAL
PAINLEVEL_OUTOF10: 5
PAINLEVEL_OUTOF10: 5
PAINLEVEL_OUTOF10: 7
PAINLEVEL_OUTOF10: 6

## 2019-03-31 ASSESSMENT — ENCOUNTER SYMPTOMS
VOMITING: 0
COUGH: 0

## 2019-03-31 ASSESSMENT — PAIN DESCRIPTION - PROGRESSION
CLINICAL_PROGRESSION: GRADUALLY IMPROVING
CLINICAL_PROGRESSION: GRADUALLY IMPROVING
CLINICAL_PROGRESSION: NOT CHANGED

## 2019-03-31 ASSESSMENT — PAIN DESCRIPTION - DESCRIPTORS
DESCRIPTORS: ACHING
DESCRIPTORS: SHARP
DESCRIPTORS: SHARP

## 2019-03-31 ASSESSMENT — PAIN DESCRIPTION - ONSET
ONSET: ON-GOING
ONSET: ON-GOING

## 2019-03-31 ASSESSMENT — PAIN DESCRIPTION - LOCATION
LOCATION: ABDOMEN;FLANK
LOCATION: FLANK;ABDOMEN
LOCATION: FLANK

## 2019-03-31 ASSESSMENT — PAIN DESCRIPTION - ORIENTATION
ORIENTATION: RIGHT

## 2019-03-31 ASSESSMENT — PAIN DESCRIPTION - FREQUENCY
FREQUENCY: CONTINUOUS
FREQUENCY: CONTINUOUS

## 2019-03-31 ASSESSMENT — PAIN DESCRIPTION - PAIN TYPE
TYPE: ACUTE PAIN

## 2019-03-31 ASSESSMENT — PAIN - FUNCTIONAL ASSESSMENT: PAIN_FUNCTIONAL_ASSESSMENT: 0-10

## 2019-04-01 ENCOUNTER — HOSPITAL ENCOUNTER (EMERGENCY)
Age: 69
Discharge: HOME | End: 2019-04-01
Attending: EMERGENCY MEDICINE
Payer: MEDICARE

## 2019-04-01 VITALS
SYSTOLIC BLOOD PRESSURE: 138 MMHG | BODY MASS INDEX: 20.02 KG/M2 | TEMPERATURE: 98.3 F | HEART RATE: 79 BPM | OXYGEN SATURATION: 96 % | DIASTOLIC BLOOD PRESSURE: 65 MMHG | WEIGHT: 113 LBS | HEIGHT: 63 IN | RESPIRATION RATE: 18 BRPM

## 2019-04-01 DIAGNOSIS — G89.29 ACUTE EXACERBATION OF CHRONIC LOW BACK PAIN: Primary | ICD-10-CM

## 2019-04-01 DIAGNOSIS — M54.50 ACUTE EXACERBATION OF CHRONIC LOW BACK PAIN: Primary | ICD-10-CM

## 2019-04-01 LAB
BACTERIA: NORMAL /HPF
BILIRUBIN URINE: NEGATIVE
BLOOD CULTURE, ROUTINE: NORMAL
BLOOD, URINE: NORMAL
CLARITY: CLEAR
COLOR: YELLOW
CULTURE, BLOOD 2: NORMAL
GLUCOSE URINE: NEGATIVE MG/DL
KETONES, URINE: NORMAL MG/DL
LEUKOCYTE ESTERASE, URINE: NEGATIVE
NITRITE, URINE: NEGATIVE
PH UA: 6 (ref 5–9)
PROTEIN UA: NEGATIVE MG/DL
RBC UA: NORMAL /HPF (ref 0–2)
SPECIFIC GRAVITY UA: 1.01 (ref 1–1.03)
URINE REFLEX TO CULTURE: YES
UROBILINOGEN, URINE: 0.2 E.U./DL
WBC UA: NORMAL /HPF (ref 0–5)

## 2019-04-01 PROCEDURE — 6360000002 HC RX W HCPCS: Performed by: EMERGENCY MEDICINE

## 2019-04-01 PROCEDURE — 96374 THER/PROPH/DIAG INJ IV PUSH: CPT

## 2019-04-01 PROCEDURE — 87086 URINE CULTURE/COLONY COUNT: CPT

## 2019-04-01 PROCEDURE — 96372 THER/PROPH/DIAG INJ SC/IM: CPT

## 2019-04-01 PROCEDURE — 99283 EMERGENCY DEPT VISIT LOW MDM: CPT

## 2019-04-01 PROCEDURE — 81001 URINALYSIS AUTO W/SCOPE: CPT

## 2019-04-01 RX ORDER — TRAMADOL HYDROCHLORIDE 50 MG/1
50 TABLET ORAL EVERY 8 HOURS PRN
Qty: 20 TABLET | Refills: 0 | Status: SHIPPED | OUTPATIENT
Start: 2019-04-01 | End: 2019-04-08

## 2019-04-01 RX ORDER — METHYLPREDNISOLONE ACETATE 40 MG/ML
40 INJECTION, SUSPENSION INTRA-ARTICULAR; INTRALESIONAL; INTRAMUSCULAR; SOFT TISSUE ONCE
Status: COMPLETED | OUTPATIENT
Start: 2019-04-01 | End: 2019-04-01

## 2019-04-01 RX ADMIN — HYDROMORPHONE HYDROCHLORIDE 0.5 MG: 1 INJECTION, SOLUTION INTRAMUSCULAR; INTRAVENOUS; SUBCUTANEOUS at 15:26

## 2019-04-01 RX ADMIN — METHYLPREDNISOLONE ACETATE 40 MG: 40 INJECTION, SUSPENSION INTRA-ARTICULAR; INTRALESIONAL; INTRAMUSCULAR; SOFT TISSUE at 15:25

## 2019-04-01 ASSESSMENT — ENCOUNTER SYMPTOMS
WHEEZING: 0
STRIDOR: 0
ABDOMINAL PAIN: 0
EYE PAIN: 0
COUGH: 0
SINUS PRESSURE: 0
CONSTIPATION: 0
VOICE CHANGE: 0
BLOOD IN STOOL: 0
TROUBLE SWALLOWING: 0
EYE DISCHARGE: 0
EYE REDNESS: 0
VOMITING: 0
SORE THROAT: 0
FACIAL SWELLING: 0
DIARRHEA: 0
CHOKING: 0
SHORTNESS OF BREATH: 0
CHEST TIGHTNESS: 0
BACK PAIN: 1

## 2019-04-01 ASSESSMENT — PAIN DESCRIPTION - DESCRIPTORS: DESCRIPTORS: SHARP;CONSTANT

## 2019-04-01 ASSESSMENT — PAIN DESCRIPTION - LOCATION
LOCATION: BACK
LOCATION: BACK

## 2019-04-01 ASSESSMENT — PAIN DESCRIPTION - ONSET: ONSET: ON-GOING

## 2019-04-01 ASSESSMENT — PAIN SCALES - GENERAL
PAINLEVEL_OUTOF10: 9
PAINLEVEL_OUTOF10: 6
PAINLEVEL_OUTOF10: 9

## 2019-04-01 ASSESSMENT — PAIN DESCRIPTION - ORIENTATION: ORIENTATION: RIGHT;LOWER

## 2019-04-01 ASSESSMENT — PAIN DESCRIPTION - PAIN TYPE: TYPE: ACUTE PAIN

## 2019-04-01 NOTE — ED NOTES
Pt discharged to home via wc accompanied by  with all documented belongings. Pt was stable at time of discharge with no questions or concerns. All prescriptions were sent with patient.  Electronically signed by Antoni Taveras RN on 3/31/2019 at 10:38 PM        Antoni Taveras RN  03/31/19 5913

## 2019-04-01 NOTE — ED PROVIDER NOTES
Genitourinary: Negative for dysuria, flank pain, frequency, genital sores and urgency. Musculoskeletal: Positive for arthralgias and back pain. Negative for joint swelling, neck pain and neck stiffness. Skin: Negative for pallor and rash. Neurological: Negative for tremors, seizures, syncope, weakness, numbness and headaches. Hematological: Negative for adenopathy. Does not bruise/bleed easily. Psychiatric/Behavioral: Negative for agitation, behavioral problems, hallucinations and sleep disturbance. The patient is not hyperactive. All other systems reviewed and are negative. Except as noted above the remainder of the review of systems was reviewed and negative. PAST MEDICAL HISTORY     Past Medical History:   Diagnosis Date    Depression     GERD (gastroesophageal reflux disease)     History of kidney stones     hospitalized 9/2016 obstructive pyelonephritis    Hypercholesteremia     Hypothyroidism     Osteoarthritis     Osteopenia     RA (rheumatoid arthritis) ([facility] [address]          SURGICALHISTORY       Past Surgical History:   Procedure Laterality Date    CYSTOSCOPY Left 08/28/2016    Michaela Ball MD  PYELOGRAM & DOUBLE-J STENT PLACEMENT    GALLBLADDER SURGERY      HYSTERECTOMY      KNEE SURGERY      left    LITHOTRIPSY Left 10/12/2016    HOLMIUM LASER LITHOTRIPSY AND REMOVAL OF LEFT J STENT  performed by Higinio Tuttle MD at [facility]  08/09/2013    DR. RHODES    URETER SURGERY Left 10/12/2016    /left ureteroscopy/ cysto/ retrogrades/pyelogram/ holmium laser lithotripsy removal left urerteral stent performed by Higinio Tuttle MD at [address] [place] [address] [address]       Previous Medications    GABAPENTIN (NEURONTIN) 300 MG CAPSULE    TAKE 3 CAPSULES BY MOUTH AT BEDTIME    LEVOTHYROXINE (SYNTHROID) 50 MCG TABLET    TAKE 1 TABLET BY MOUTH ONCE DAILY    METHOCARBAMOL (ROBAXIN) 500 MG TABLET    Take 1 tablet by mouth 4 times daily for 10 days History Narrative    None       SCREENINGS      @FLOW([id number])@      PHYSICAL EXAM    (up to 7 for level 4, 8 or more for level 5)     ED Triage Vitals [04/01/19 1450]   BP Temp Temp Source Pulse Resp SpO2 Height Weight   138/65 98.3 °F (36.8 °C) Oral 79 18 96 % 5' 3\" (1.6 m) 113 lb (51.3 kg)       Physical Exam   Constitutional: She is oriented to person, place, and time. She appears well-developed and well-nourished. Non-toxic patient ambulates the bathroom slowly and change position slowly because of the back issue   HENT:   Head: Normocephalic. Left Ear: External ear normal.   Eyes: Pupils are equal, round, and reactive to light. Neck: Normal range of motion. Neck supple. Cardiovascular: Normal rate and normal heart sounds. Exam reveals no gallop. No murmur heard. Pulmonary/Chest: Breath sounds normal. No respiratory distress. She has no wheezes. Abdominal: Soft. Bowel sounds are normal. She exhibits no mass. There is no rebound. Musculoskeletal: Normal range of motion. She exhibits tenderness. She exhibits no edema. Attention to the back patient had no deformity patient has a diffuse spasm and tenderness to the paralumbar area patient has no point tenderness no shingles no rash no bruises patient has no CVA tenderness   Neurological: She is alert and oriented to person, place, and time. No cranial nerve deficit. She exhibits normal muscle tone. Skin: Skin is warm. No rash noted. No erythema. Psychiatric: Her behavior is normal. Thought content normal.   Nursing note and vitals reviewed.       DIAGNOSTIC RESULTS     EKG: All EKG's are interpreted by the Emergency Department Physician who either signs or Co-signsthis chart in the absence of a cardiologist.        RADIOLOGY:   Non-plain filmimages such as CT, Ultrasound and MRI are read by the radiologist. Plain radiographic images are visualized and preliminarily interpreted by the emergency physician with the below findings:        Interpretation per the Radiologist below, if available at the time ofthis note:    No orders to display         ED BEDSIDE ULTRASOUND:   Performed by ED Physician - none    LABS:  Labs Reviewed   URINE CULTURE   URINE RT REFLEX TO CULTURE   MICROSCOPIC URINALYSIS       All other labs were within normal range or not returned as of this dictation. EMERGENCY DEPARTMENT COURSE and DIFFERENTIAL DIAGNOSIS/MDM:   Vitals:    Vitals:    04/01/19 1450   BP: 138/65   Pulse: 79   Resp: 18   Temp: 98.3 °F (36.8 °C)   TempSrc: Oral   SpO2: 96%   Weight: 113 lb (51.3 kg)   Height: 5' 3\" (1.6 m)           MDM    CRITICAL CARE TIME   Total Critical Care time was minutes, excluding separately reportableprocedures. There was a high probability of clinicallysignificant/life threatening deterioration in the patient's condition which required my urgent intervention. ONSULTS:  None    PROCEDURES:  Unless otherwise noted below, none     Procedures    FINAL IMPRESSION      1. Acute exacerbation of chronic low back pain          DISPOSITION/PLAN   DISPOSITION        PATIENT REFERRED TO:  Caroline Yusuf, DO  [address] [facility] [address]  [phone]    In 2 days        DISCHARGE MEDICATIONS:  New Prescriptions    TRAMADOL (ULTRAM) 50 MG TABLET    Take 1 tablet by mouth every 8 hours as needed for Pain for up to 7 days.           (Please note that portions of this note were completed with a voice recognition program.  Efforts were made to edit the dictations but occasionally words are mis-transcribed.)    Rk Collazo MD (electronically signed)  Attending Emergency Physician       Rk Collazo MD  04/01/19 1841

## 2019-04-01 NOTE — ED PROVIDER NOTES
[facility] ED  [address]  Phone: [address] [facility]  eMERGENCY dEPARTMENT eNCOUnter      Pt Name: Meredith Santizo  MRN: [id number]  [address] 1950  Date of evaluation: 3/31/2019  Provider: Sylvie Lim Dr       Chief Complaint   Patient presents with    Abdominal Pain    Flank Pain     right side         HISTORY OF PRESENT ILLNESS   (Location/Symptom, Timing/Onset,Context/Setting, Quality, Duration, Modifying Factors, Severity)  Note limiting factors. Meredith Santizo is a 70 y.o. female who presents to the emergency department for persistence of right flank/back pain. It is sharp in nature. It's worse when she leans back. It does not radiate anywhere else. No vomiting or diarrhea. This is been going on for over a week. She first went to [facility] on March 22, had a noncontrast CT and normal blood work, all of which were grossly unremarkable, she did end up having a urinary tract infection, she presented back to the emergency department and was admitted for IV antibiotics. She had a bone scan done which was also unremarkable but she states she continues to have pain despite Percocet and Robaxin use at home. Nursing Notes were reviewed. REVIEW OF SYSTEMS    (2-9systems for level 4, 10 or more for level 5)     Review of Systems   Constitutional: Negative for fever. Respiratory: Negative for cough. Cardiovascular: Negative for chest pain. Gastrointestinal: Negative for vomiting. Musculoskeletal:        Right flank pain   Skin: Negative for rash. Neurological: Negative for weakness and numbness. Except asnoted above the remainder of the review of systems was reviewed and negative.        PAST MEDICAL HISTORY     Past Medical History:   Diagnosis Date    Depression     GERD (gastroesophageal reflux disease)     History of kidney stones     hospitalized 9/2016 obstructive pyelonephritis  Hypercholesteremia     Hypothyroidism     Osteoarthritis     Osteopenia     RA (rheumatoid arthritis) ([address]          SURGICAL HISTORY       Past Surgical History:   Procedure Laterality Date    CYSTOSCOPY Left 08/28/2016    Kaci Mcmahon MD  PYELOGRAM & DOUBLE-J STENT PLACEMENT    GALLBLADDER SURGERY      HYSTERECTOMY      KNEE SURGERY      left    LITHOTRIPSY Left 10/12/2016    HOLMIUM LASER LITHOTRIPSY AND REMOVAL OF LEFT J STENT  performed by David Shi MD at [address]  08/09/2013    DR. RHODES    URETER SURGERY Left 10/12/2016    /left ureteroscopy/ cysto/ retrogrades/pyelogram/ holmium laser lithotripsy removal left urerteral stent performed by David Shi MD at [address] [place] [address]     Previous Medications    GABAPENTIN (NEURONTIN) 300 MG CAPSULE    TAKE 3 CAPSULES BY MOUTH AT BEDTIME    LEVOTHYROXINE (SYNTHROID) 50 MCG TABLET    TAKE 1 TABLET BY MOUTH ONCE DAILY    METHOCARBAMOL (ROBAXIN) 500 MG TABLET    Take 1 tablet by mouth 4 times daily for 10 days    NAPROXEN (NAPROSYN) 500 MG TABLET    Take 1 tablet by mouth 2 times daily    ONDANSETRON (ZOFRAN ODT) 4 MG DISINTEGRATING TABLET    Take 1 tablet by mouth every 8 hours as needed for Nausea    OXYCODONE-ACETAMINOPHEN (PERCOCET) 5-325 MG PER TABLET    Take 1 tablet by mouth every 8 hours as needed for Pain for up to 15 days. PREDNISOLONE ACETATE (PRED FORTE) 1 % OPHTHALMIC SUSPENSION    instill 1 (ONE) DROP in left eye TWICE DAILY    PREDNISONE (DELTASONE) 5 MG TABLET    TAKE 1 TABLET BY MOUTH TWICE DAILY       ALLERGIES     Patient has no known allergies.     FAMILY HISTORY       Family History   Problem Relation Age of Onset    Diabetes Father     Heart Disease Father           SOCIAL HISTORY       Social History     Socioeconomic History    Marital status:      Spouse name: None    Number of children: None    Years of education: None    Highest education level: None Occupational History    None   Social Needs    Financial resource strain: None    Food insecurity:     Worry: None     Inability: None    Transportation needs:     Medical: None     Non-medical: None   Tobacco Use    Smoking status: Never Smoker    Smokeless tobacco: Never Used   Substance and Sexual Activity    Alcohol use: No    Drug use: No    Sexual activity: Not Currently   Lifestyle    Physical activity:     Days per week: None     Minutes per session: None    Stress: None   Relationships    Social connections:     Talks on phone: None     Gets together: None     Attends Taoism service: None     Active member of club or organization: None     Attends meetings of clubs or organizations: None     Relationship status: None    Intimate partner violence:     Fear of current or ex partner: None     Emotionally abused: None     Physically abused: None     Forced sexual activity: None   Other Topics Concern    None   Social History Narrative    None       SCREENINGS             PHYSICAL EXAM    (up to 7 for level 4, 8 or more for level 5)     ED Triage Vitals [03/31/19 2021]   BP Temp Temp Source Pulse Resp SpO2 Height Weight   (!) 190/89 98.2 °F (36.8 °C) Oral 86 19 96 % 5' 3\" (1.6 m) 113 lb (51.3 kg)       Physical Exam   Constitutional: She appears well-developed and well-nourished. No distress. HENT:   Head: Normocephalic and atraumatic. Mouth/Throat: Oropharynx is clear and moist.   Eyes: Conjunctivae are normal.   Pupils are equally round and reacting normally. Extraoccular muscles are grossly intact. Neck: Normal range of motion. No tracheal deviation present. Cardiovascular: Normal rate, regular rhythm, normal heart sounds and intact distal pulses. Exam reveals no friction rub. No murmur heard. Pulmonary/Chest: Effort normal and breath sounds normal. No stridor. No respiratory distress. She has no wheezes. She has no rales. She exhibits no tenderness. Abdominal: Soft.  She continuing to follow-up for her flank pain but I do not find any emergent cause today. At this time the patient is without objective evidence of an acute process requiring hospitalization or inpatient management. They have remained hemodynamically stable throughout their entire ED visit and are stable for discharge with outpatient follow-up. Standard anticipatory guidance given to patient upon discharge. Have given them a specific time frame in which to follow-up and who to follow-up with. I have also advised them that they should return to the emergency department if they get worse, or not getting better or develop any new or concerning symptoms. Patient demonstrates understanding. PROCEDURES:  Unless otherwise noted below, none     Procedures    FINAL IMPRESSION      1. Acute right flank pain    2.  Constipation, unspecified constipation type          DISPOSITION/PLAN   DISPOSITION Decision To Discharge 03/31/2019 10:02:26 PM      PATIENT REFERRED TO:  Masoud Parks DO  [address] [place] [address]  [phone]    In 3 days        DISCHARGE MEDICATIONS:  New Prescriptions    MAGNESIUM CITRATE 1.745 GM/30ML SOLUTION    Take 296 mLs by mouth once for 1 dose    POLYETHYLENE GLYCOL (MIRALAX) PACKET    Take 17 g by mouth daily as needed for Constipation          (Please note that portions of this note were completed with a voice recognition program.  Efforts were made to edit the dictations but occasionally words are mis-transcribed.)    Jeanette Zimmerman DO (electronically signed)  Board Certified Emergency Physician          Jeanette Zimmerman DO  03/31/19 3940

## 2019-04-03 ENCOUNTER — HOSPITAL ENCOUNTER (INPATIENT)
Age: 69
LOS: 3 days | Discharge: HOME | DRG: 690 | End: 2019-04-06
Attending: EMERGENCY MEDICINE | Admitting: INTERNAL MEDICINE
Payer: MEDICARE

## 2019-04-03 ENCOUNTER — APPOINTMENT (OUTPATIENT)
Dept: CT IMAGING | Age: 69
DRG: 690 | End: 2019-04-03
Payer: MEDICARE

## 2019-04-03 DIAGNOSIS — E86.0 DEHYDRATION: Primary | ICD-10-CM

## 2019-04-03 DIAGNOSIS — N30.00 ACUTE CYSTITIS WITHOUT HEMATURIA: ICD-10-CM

## 2019-04-03 LAB
ALBUMIN SERPL-MCNC: 3.3 G/DL (ref 3.5–4.6)
ALP BLD-CCNC: 87 U/L (ref 40–130)
ALT SERPL-CCNC: 16 U/L (ref 0–33)
ANION GAP SERPL CALCULATED.3IONS-SCNC: 13 MEQ/L (ref 9–15)
AST SERPL-CCNC: 20 U/L (ref 0–35)
BACTERIA: ABNORMAL /HPF
BANDED NEUTROPHILS RELATIVE PERCENT: 14 % (ref 5–11)
BASOPHILS ABSOLUTE: 0 K/UL (ref 0–0.2)
BASOPHILS RELATIVE PERCENT: 0.3 %
BILIRUB SERPL-MCNC: 0.4 MG/DL (ref 0.2–0.7)
BILIRUBIN URINE: NEGATIVE
BLOOD, URINE: ABNORMAL
BUN BLDV-MCNC: 32 MG/DL (ref 8–23)
CALCIUM SERPL-MCNC: 9.3 MG/DL (ref 8.5–9.9)
CHLORIDE BLD-SCNC: 95 MEQ/L (ref 95–107)
CLARITY: CLEAR
CO2: 25 MEQ/L (ref 20–31)
COLOR: YELLOW
CREAT SERPL-MCNC: 0.67 MG/DL (ref 0.5–0.9)
EOSINOPHILS ABSOLUTE: 0 K/UL (ref 0–0.7)
EOSINOPHILS RELATIVE PERCENT: 0 %
EPITHELIAL CELLS, UA: ABNORMAL /HPF
GFR AFRICAN AMERICAN: >60
GFR NON-AFRICAN AMERICAN: >60
GLOBULIN: 3 G/DL (ref 2.3–3.5)
GLUCOSE BLD-MCNC: 131 MG/DL (ref 70–99)
GLUCOSE URINE: NEGATIVE MG/DL
HCT VFR BLD CALC: 31.6 % (ref 37–47)
HEMOGLOBIN: 10.1 G/DL (ref 12–16)
KETONES, URINE: NEGATIVE MG/DL
LEUKOCYTE ESTERASE, URINE: ABNORMAL
LYMPHOCYTES ABSOLUTE: 0.3 K/UL (ref 1–4.8)
LYMPHOCYTES RELATIVE PERCENT: 1 %
MACROCYTES: PRESENT
MCH RBC QN AUTO: 32.7 PG (ref 27–31.3)
MCHC RBC AUTO-ENTMCNC: 32.1 % (ref 33–37)
MCV RBC AUTO: 101.8 FL (ref 82–100)
MONOCYTES ABSOLUTE: 2.9 K/UL (ref 0.2–0.8)
MONOCYTES RELATIVE PERCENT: 10 %
NEUTROPHILS ABSOLUTE: 25.8 K/UL (ref 1.4–6.5)
NEUTROPHILS RELATIVE PERCENT: 75 %
NITRITE, URINE: NEGATIVE
PDW BLD-RTO: 16.4 % (ref 11.5–14.5)
PH UA: 5.5 (ref 5–9)
PLATELET # BLD: 238 K/UL (ref 130–400)
POTASSIUM SERPL-SCNC: 4.2 MEQ/L (ref 3.4–4.9)
PROTEIN UA: NEGATIVE MG/DL
RBC # BLD: 3.1 M/UL (ref 4.2–5.4)
RBC UA: ABNORMAL /HPF (ref 0–2)
SODIUM BLD-SCNC: 133 MEQ/L (ref 135–144)
SPECIFIC GRAVITY UA: <=1.005 (ref 1–1.03)
TOTAL PROTEIN: 6.3 G/DL (ref 6.3–8)
URINE REFLEX TO CULTURE: YES
UROBILINOGEN, URINE: 0.2 E.U./DL
WBC # BLD: 29 K/UL (ref 4.8–10.8)
WBC UA: ABNORMAL /HPF (ref 0–5)

## 2019-04-03 PROCEDURE — 96361 HYDRATE IV INFUSION ADD-ON: CPT

## 2019-04-03 PROCEDURE — 87086 URINE CULTURE/COLONY COUNT: CPT

## 2019-04-03 PROCEDURE — 99222 1ST HOSP IP/OBS MODERATE 55: CPT | Performed by: INTERNAL MEDICINE

## 2019-04-03 PROCEDURE — 96375 TX/PRO/DX INJ NEW DRUG ADDON: CPT

## 2019-04-03 PROCEDURE — 85025 COMPLETE CBC W/AUTO DIFF WBC: CPT

## 2019-04-03 PROCEDURE — 6370000000 HC RX 637 (ALT 250 FOR IP): Performed by: INTERNAL MEDICINE

## 2019-04-03 PROCEDURE — 87186 SC STD MICRODIL/AGAR DIL: CPT

## 2019-04-03 PROCEDURE — 96372 THER/PROPH/DIAG INJ SC/IM: CPT

## 2019-04-03 PROCEDURE — 6360000002 HC RX W HCPCS: Performed by: EMERGENCY MEDICINE

## 2019-04-03 PROCEDURE — 96365 THER/PROPH/DIAG IV INF INIT: CPT

## 2019-04-03 PROCEDURE — 2580000003 HC RX 258: Performed by: INTERNAL MEDICINE

## 2019-04-03 PROCEDURE — 80053 COMPREHEN METABOLIC PANEL: CPT

## 2019-04-03 PROCEDURE — 2580000003 HC RX 258: Performed by: EMERGENCY MEDICINE

## 2019-04-03 PROCEDURE — 96367 TX/PROPH/DG ADDL SEQ IV INF: CPT

## 2019-04-03 PROCEDURE — 87077 CULTURE AEROBIC IDENTIFY: CPT

## 2019-04-03 PROCEDURE — 81001 URINALYSIS AUTO W/SCOPE: CPT

## 2019-04-03 PROCEDURE — 6360000002 HC RX W HCPCS: Performed by: INTERNAL MEDICINE

## 2019-04-03 PROCEDURE — 74176 CT ABD & PELVIS W/O CONTRAST: CPT

## 2019-04-03 PROCEDURE — 36415 COLL VENOUS BLD VENIPUNCTURE: CPT

## 2019-04-03 PROCEDURE — 99285 EMERGENCY DEPT VISIT HI MDM: CPT

## 2019-04-03 PROCEDURE — 1210000000 HC MED SURG R&B

## 2019-04-03 PROCEDURE — 96376 TX/PRO/DX INJ SAME DRUG ADON: CPT

## 2019-04-03 RX ORDER — PREDNISOLONE ACETATE 10 MG/ML
1 SUSPENSION/ DROPS OPHTHALMIC 2 TIMES DAILY
Status: DISCONTINUED | OUTPATIENT
Start: 2019-04-03 | End: 2019-04-06 | Stop reason: HOSPADM

## 2019-04-03 RX ORDER — OXYCODONE HYDROCHLORIDE AND ACETAMINOPHEN 5; 325 MG/1; MG/1
1 TABLET ORAL EVERY 6 HOURS PRN
Status: DISCONTINUED | OUTPATIENT
Start: 2019-04-03 | End: 2019-04-06 | Stop reason: HOSPADM

## 2019-04-03 RX ORDER — 0.9 % SODIUM CHLORIDE 0.9 %
500 INTRAVENOUS SOLUTION INTRAVENOUS ONCE
Status: COMPLETED | OUTPATIENT
Start: 2019-04-03 | End: 2019-04-03

## 2019-04-03 RX ORDER — KETOROLAC TROMETHAMINE 15 MG/ML
15 INJECTION, SOLUTION INTRAMUSCULAR; INTRAVENOUS EVERY 6 HOURS PRN
Status: DISCONTINUED | OUTPATIENT
Start: 2019-04-03 | End: 2019-04-06 | Stop reason: HOSPADM

## 2019-04-03 RX ORDER — LEVOTHYROXINE SODIUM 0.05 MG/1
50 TABLET ORAL DAILY
Status: DISCONTINUED | OUTPATIENT
Start: 2019-04-03 | End: 2019-04-06 | Stop reason: HOSPADM

## 2019-04-03 RX ORDER — METHOCARBAMOL 500 MG/1
500 TABLET, FILM COATED ORAL 4 TIMES DAILY
Status: DISCONTINUED | OUTPATIENT
Start: 2019-04-03 | End: 2019-04-04

## 2019-04-03 RX ORDER — TRAMADOL HYDROCHLORIDE 50 MG/1
50 TABLET ORAL EVERY 6 HOURS PRN
Status: DISCONTINUED | OUTPATIENT
Start: 2019-04-03 | End: 2019-04-04

## 2019-04-03 RX ORDER — ONDANSETRON 4 MG/1
4 TABLET, ORALLY DISINTEGRATING ORAL EVERY 8 HOURS PRN
Status: DISCONTINUED | OUTPATIENT
Start: 2019-04-03 | End: 2019-04-06 | Stop reason: HOSPADM

## 2019-04-03 RX ORDER — POLYETHYLENE GLYCOL 3350 17 G/17G
17 POWDER, FOR SOLUTION ORAL DAILY
Status: DISCONTINUED | OUTPATIENT
Start: 2019-04-03 | End: 2019-04-05

## 2019-04-03 RX ORDER — PREDNISONE 10 MG/1
5 TABLET ORAL 2 TIMES DAILY
Status: DISCONTINUED | OUTPATIENT
Start: 2019-04-03 | End: 2019-04-03

## 2019-04-03 RX ORDER — MORPHINE SULFATE 4 MG/ML
4 INJECTION, SOLUTION INTRAMUSCULAR; INTRAVENOUS ONCE
Status: COMPLETED | OUTPATIENT
Start: 2019-04-03 | End: 2019-04-03

## 2019-04-03 RX ORDER — MORPHINE SULFATE 4 MG/ML
4 INJECTION, SOLUTION INTRAMUSCULAR; INTRAVENOUS ONCE
Status: DISCONTINUED | OUTPATIENT
Start: 2019-04-03 | End: 2019-04-03

## 2019-04-03 RX ORDER — ONDANSETRON 2 MG/ML
4 INJECTION INTRAMUSCULAR; INTRAVENOUS ONCE
Status: COMPLETED | OUTPATIENT
Start: 2019-04-03 | End: 2019-04-03

## 2019-04-03 RX ORDER — NAPROXEN 500 MG/1
500 TABLET ORAL 2 TIMES DAILY
Status: DISCONTINUED | OUTPATIENT
Start: 2019-04-03 | End: 2019-04-03

## 2019-04-03 RX ORDER — SODIUM CHLORIDE 9 MG/ML
INJECTION, SOLUTION INTRAVENOUS CONTINUOUS
Status: DISCONTINUED | OUTPATIENT
Start: 2019-04-03 | End: 2019-04-03

## 2019-04-03 RX ORDER — SODIUM CHLORIDE 9 MG/ML
INJECTION, SOLUTION INTRAVENOUS CONTINUOUS
Status: DISCONTINUED | OUTPATIENT
Start: 2019-04-03 | End: 2019-04-04

## 2019-04-03 RX ORDER — ONDANSETRON 2 MG/ML
4 INJECTION INTRAMUSCULAR; INTRAVENOUS EVERY 6 HOURS PRN
Status: DISCONTINUED | OUTPATIENT
Start: 2019-04-03 | End: 2019-04-06 | Stop reason: HOSPADM

## 2019-04-03 RX ORDER — GABAPENTIN 300 MG/1
300 CAPSULE ORAL NIGHTLY
Status: DISCONTINUED | OUTPATIENT
Start: 2019-04-03 | End: 2019-04-06 | Stop reason: HOSPADM

## 2019-04-03 RX ORDER — SODIUM CHLORIDE 0.9 % (FLUSH) 0.9 %
10 SYRINGE (ML) INJECTION PRN
Status: DISCONTINUED | OUTPATIENT
Start: 2019-04-03 | End: 2019-04-06 | Stop reason: HOSPADM

## 2019-04-03 RX ORDER — SODIUM CHLORIDE 0.9 % (FLUSH) 0.9 %
10 SYRINGE (ML) INJECTION EVERY 12 HOURS SCHEDULED
Status: DISCONTINUED | OUTPATIENT
Start: 2019-04-03 | End: 2019-04-06 | Stop reason: HOSPADM

## 2019-04-03 RX ORDER — ONDANSETRON 4 MG/1
4 TABLET, ORALLY DISINTEGRATING ORAL ONCE
Status: DISCONTINUED | OUTPATIENT
Start: 2019-04-03 | End: 2019-04-03

## 2019-04-03 RX ADMIN — SODIUM CHLORIDE 500 ML: 9 INJECTION, SOLUTION INTRAVENOUS at 20:45

## 2019-04-03 RX ADMIN — TRAMADOL HYDROCHLORIDE 50 MG: 50 TABLET, FILM COATED ORAL at 17:09

## 2019-04-03 RX ADMIN — GABAPENTIN 300 MG: 300 CAPSULE ORAL at 20:45

## 2019-04-03 RX ADMIN — HYDROCORTISONE SODIUM SUCCINATE 50 MG: 100 INJECTION, POWDER, FOR SOLUTION INTRAMUSCULAR; INTRAVENOUS at 20:45

## 2019-04-03 RX ADMIN — TRAMADOL HYDROCHLORIDE 50 MG: 50 TABLET, FILM COATED ORAL at 22:51

## 2019-04-03 RX ADMIN — KETOROLAC TROMETHAMINE 15 MG: 15 INJECTION, SOLUTION INTRAMUSCULAR; INTRAVENOUS at 19:19

## 2019-04-03 RX ADMIN — OXYCODONE AND ACETAMINOPHEN 1 TABLET: 5; 325 TABLET ORAL at 18:04

## 2019-04-03 RX ADMIN — METHOCARBAMOL 500 MG: 500 TABLET ORAL at 16:50

## 2019-04-03 RX ADMIN — SODIUM CHLORIDE: 9 INJECTION, SOLUTION INTRAVENOUS at 16:50

## 2019-04-03 RX ADMIN — ENOXAPARIN SODIUM 40 MG: 40 INJECTION SUBCUTANEOUS at 16:53

## 2019-04-03 RX ADMIN — CEFTRIAXONE 1 G: 1 INJECTION, POWDER, FOR SOLUTION INTRAMUSCULAR; INTRAVENOUS at 14:53

## 2019-04-03 RX ADMIN — Medication 10 ML: at 20:46

## 2019-04-03 RX ADMIN — ONDANSETRON 4 MG: 2 INJECTION INTRAMUSCULAR; INTRAVENOUS at 14:35

## 2019-04-03 RX ADMIN — HYDROMORPHONE HYDROCHLORIDE 0.5 MG: 1 INJECTION, SOLUTION INTRAMUSCULAR; INTRAVENOUS; SUBCUTANEOUS at 19:19

## 2019-04-03 RX ADMIN — MORPHINE SULFATE 4 MG: 4 INJECTION INTRAVENOUS at 14:35

## 2019-04-03 RX ADMIN — MEROPENEM 1 G: 1 INJECTION, POWDER, FOR SOLUTION INTRAVENOUS at 16:49

## 2019-04-03 RX ADMIN — METHOCARBAMOL 500 MG: 500 TABLET ORAL at 20:46

## 2019-04-03 RX ADMIN — SODIUM CHLORIDE 500 ML: 9 INJECTION, SOLUTION INTRAVENOUS at 14:34

## 2019-04-03 ASSESSMENT — PAIN DESCRIPTION - ORIENTATION
ORIENTATION: LOWER
ORIENTATION: LOWER

## 2019-04-03 ASSESSMENT — PAIN DESCRIPTION - PROGRESSION
CLINICAL_PROGRESSION: NOT CHANGED
CLINICAL_PROGRESSION: NOT CHANGED

## 2019-04-03 ASSESSMENT — ENCOUNTER SYMPTOMS
ABDOMINAL PAIN: 1
DIARRHEA: 0
CONSTIPATION: 1
VOMITING: 0
RECTAL PAIN: 0
BLOOD IN STOOL: 0
RESPIRATORY NEGATIVE: 1
NAUSEA: 1

## 2019-04-03 ASSESSMENT — PAIN SCALES - GENERAL
PAINLEVEL_OUTOF10: 9
PAINLEVEL_OUTOF10: 8
PAINLEVEL_OUTOF10: 8
PAINLEVEL_OUTOF10: 9

## 2019-04-03 ASSESSMENT — PAIN DESCRIPTION - ONSET: ONSET: ON-GOING

## 2019-04-03 ASSESSMENT — PAIN DESCRIPTION - PAIN TYPE
TYPE: ACUTE PAIN
TYPE: ACUTE PAIN

## 2019-04-03 ASSESSMENT — PAIN DESCRIPTION - FREQUENCY: FREQUENCY: CONTINUOUS

## 2019-04-03 ASSESSMENT — PAIN DESCRIPTION - LOCATION
LOCATION: ABDOMEN
LOCATION: BACK

## 2019-04-03 ASSESSMENT — PAIN DESCRIPTION - DESCRIPTORS
DESCRIPTORS: SHARP
DESCRIPTORS: CRAMPING

## 2019-04-03 NOTE — ED PROVIDER NOTES
[address]  eMERGENCY dEPARTMENT eNCOUnter      Pt Name: Marlene Zhao  MRN: [id number]  [place] 1950  Date of evaluation: 4/3/2019  Provider: Oz Clay MD    [address] [place] [address]       Chief Complaint   Patient presents with    Abdominal Pain    Hemorrhoids       HISTORY OF PRESENT ILLNESS   (Location/Symptom, Timing/Onset,Context/Setting, Quality, Duration, Modifying Factors, Severity)  Note limiting factors. Marlene Zhao is a 70 y.o. female who presents to the emergency department and is known to us from previous multiple encounter to this emergency because of the UTI abdominal pain and flank pain back pain patient has a CAT scan done ×2 with contrast and they are negative patient was given pain medication only 2 days ago along with the steroid the patient presents to this emergency because of the abdominal discomfort patient is doing anxiety arthritis rheumatoid arthritis hypercholesterolemia hypothyroidism GERD  HPI    NursingNotes were reviewed. REVIEW OF SYSTEMS    (2-9 systems for level 4, 10 or more for level 5)     Review of Systems   Constitutional: Positive for fatigue. Negative for activity change and fever. HENT: Negative for congestion, drooling, facial swelling, mouth sores, nosebleeds, sinus pressure, sore throat, trouble swallowing and voice change. Eyes: Negative for pain, discharge, redness and visual disturbance. Respiratory: Negative for cough, choking, chest tightness, shortness of breath, wheezing and stridor. Cardiovascular: Negative for chest pain, palpitations and leg swelling. Gastrointestinal: Positive for abdominal pain and nausea. Negative for blood in stool, constipation, diarrhea and vomiting. Endocrine: Negative for cold intolerance, polyphagia and polyuria. Genitourinary: Negative for dysuria, flank pain, frequency, genital sores and urgency. Musculoskeletal: Negative for back pain, joint swelling, neck pain and neck stiffness.    Skin: Relationship status: None    Intimate partner violence:     Fear of current or ex partner: None     Emotionally abused: None     Physically abused: None     Forced sexual activity: None   Other Topics Concern    None   Social History Narrative    None       SCREENINGS    [place] Coma Scale  Eye Opening: Spontaneous  Best Verbal Response: Oriented  Best Motor Response: Obeys commands  [place] Coma Scale Score: 15 @FLOW([id number])@      PHYSICAL EXAM    (up to 7 for level 4, 8 or more for level 5)     ED Triage Vitals   BP Temp Temp src Pulse Resp SpO2 Height Weight   04/03/19 1336 04/03/19 1336 -- 04/03/19 1336 04/03/19 1336 04/03/19 1336 04/03/19 1333 04/03/19 1333   125/64 98.9 °F (37.2 °C)  76 20 96 % 5' 3\" (1.6 m) 113 lb (51.3 kg)       Physical Exam   Constitutional: She is oriented to person, place, and time. She appears well-developed. Active alert cooperative patient no evidence of dehydration paternal very anxious at this time along with nausea   HENT:   Head: Normocephalic. Right Ear: External ear normal.   Left Ear: External ear normal.   Nose: Nose normal.   Mouth/Throat: Oropharynx is clear and moist.   Eyes: Pupils are equal, round, and reactive to light. Neck: Normal range of motion. Neck supple. Cardiovascular: Normal rate and normal heart sounds. Exam reveals no gallop. No murmur heard. Pulmonary/Chest: Breath sounds normal. No respiratory distress. She has no wheezes. Abdominal: Soft. Bowel sounds are normal. She exhibits no distension and no mass. There is tenderness. There is no rebound and no guarding. Attention given to the abdomen no distention no guarding no rebound tenderness patient has no Wen sign or McBurney's point and mild diffuse tenderness generalized noted no masses felt no hernia felt good bowel sounds   Musculoskeletal: Normal range of motion. She exhibits no edema or tenderness. Neurological: She is alert and oriented to person, place, and time.  No cranial nerve deficit. She exhibits normal muscle tone. Skin: Skin is warm. No rash noted. No erythema. Psychiatric: Her behavior is normal. Thought content normal.   Nursing note and vitals reviewed. DIAGNOSTIC RESULTS     EKG: All EKG's are interpreted by the Emergency Department Physician who either signs or Co-signsthis chart in the absence of a cardiologist.        RADIOLOGY:   Darliss Snow such as CT, Ultrasound and MRI are read by the radiologist. Plain radiographic images are visualized and preliminarily interpreted by the emergency physician with the below findings:        Interpretation per the Radiologist below, if available at the time ofthis note:    [address]   Final Result   Markedly delay in transient of the barium into the terminal ileum. The terminal ileum appears unremarkable. No bowel obstruction is seen. CT ABDOMEN PELVIS WO CONTRAST Additional Contrast? None   Final Result   Probable intermittent small bowel obstruction. Transitional zone appears to be in the small bowel in the region of the ileum. All CT scans at this facility use dose modulation, iterative reconstruction, and/or weight based dosing when appropriate to reduce radiation dose to as low as reasonably achievable. ED BEDSIDE ULTRASOUND:   Performed by ED Physician - none    LABS:  Labs Reviewed   URINE CULTURE - Abnormal; Notable for the following components:       Result Value    Organism Escherichia coli ESBL (*)     Urine Culture, Routine   (*)     Value: >100,000 CFU/ml  CONTACT PRECAUTIONS INDICATED  This organism possesses an Extended Spectrum Beta Lactamase  that is inhibited by Clavulanic Acid.       All other components within normal limits    Narrative:     ORDER#: [id number]                          ORDERED BY: Elsa Dan  SOURCE: Urine Clean Catch                  COLLECTED:  04/03/19 13:44  ANTIBIOTICS AT BRIGITTE.:                      RECEIVED :  04/03/19 16:32  CALL  [facility] [place] tel. [phone], [phone]  ESBL results called to and read back by Gill Black, 04/06/2019 09:09,  by [id number] RT REFLEX TO CULTURE - Abnormal; Notable for the following components:    Blood, Urine MODERATE (*)     Leukocyte Esterase, Urine SMALL (*)     All other components within normal limits   MICROSCOPIC URINALYSIS - Abnormal; Notable for the following components:    WBC, UA 6-10 (*)     All other components within normal limits   COMPREHENSIVE METABOLIC PANEL - Abnormal; Notable for the following components:    Sodium 133 (*)     Glucose 131 (*)     BUN 32 (*)     Alb 3.3 (*)     All other components within normal limits   CBC WITH AUTO DIFFERENTIAL - Abnormal; Notable for the following components:    WBC 29.0 (*)     RBC 3.10 (*)     Hemoglobin 10.1 (*)     Hematocrit 31.6 (*)     .8 (*)     MCH 32.7 (*)     MCHC 32.1 (*)     RDW 16.4 (*)     Neutrophils # 25.8 (*)     Lymphocytes # 0.3 (*)     Monocytes # 2.9 (*)     Bands Relative 14 (*)     All other components within normal limits   CBC - Abnormal; Notable for the following components:    WBC 25.5 (*)     RBC 3.30 (*)     Hemoglobin 10.7 (*)     Hematocrit 33.9 (*)     .6 (*)     MCH 32.4 (*)     MCHC 31.6 (*)     RDW 18.4 (*)     All other components within normal limits   BASIC METABOLIC PANEL W/ REFLEX TO MG FOR LOW K - Abnormal; Notable for the following components:    Glucose 140 (*)     All other components within normal limits   CBC WITH AUTO DIFFERENTIAL - Abnormal; Notable for the following components:    WBC 14.7 (*)     RBC 2.76 (*)     Hemoglobin 9.0 (*)     Hematocrit 28.0 (*)     .3 (*)     MCH 32.6 (*)     MCHC 32.2 (*)     RDW 17.7 (*)     Neutrophils # 13.7 (*)     Lymphocytes # 0.8 (*)     All other components within normal limits   BASIC METABOLIC PANEL - Abnormal; Notable for the following components:    Glucose 129 (*)     CREATININE 0.38 (*)     Calcium 8.1 (*)     All other components within normal limits   CBC WITH AUTO DIFFERENTIAL - Abnormal; Notable for the following components:    RBC 2.83 (*)     Hemoglobin 9.2 (*)     Hematocrit 28.7 (*)     .5 (*)     MCH 32.6 (*)     MCHC 32.1 (*)     RDW 17.9 (*)     Neutrophils # 7.6 (*)     All other components within normal limits       All other labs were within normal range or not returned as of this dictation. EMERGENCY DEPARTMENT COURSE and DIFFERENTIAL DIAGNOSIS/MDM:   Vitals:    Vitals:    04/05/19 0335 04/05/19 0741 04/06/19 0021 04/06/19 0554   BP: 135/75   117/65   Pulse: 75   65   Resp: 20   18   Temp: 98.4 °F (36.9 °C)   97.2 °F (36.2 °C)   TempSrc: Oral   Oral   SpO2: 97% 93% 95% 97%   Weight:       Height:               MDM    CRITICAL CARE TIME   Total Critical Care time was minutes, excluding separately reportableprocedures. There was a high probability of clinicallysignificant/life threatening deterioration in the patient's condition which required my urgent intervention. CONSULTS:  IP CONSULT TO HOSPITALIST  IP CONSULT TO INFECTIOUS DISEASES  IP CONSULT TO GENERAL SURGERY  IP CONSULT TO PAIN MANAGEMENT  IP CONSULT TO HOME CARE NEEDS    PROCEDURES:  Unless otherwise noted below, none     Procedures    FINAL IMPRESSION      1. Dehydration    2.  Acute cystitis without hematuria          DISPOSITION/PLAN   DISPOSITION        PATIENT REFERRED TO:  Rody Hilario DO  [address] [facility]  [facility] [address]  [phone]          Rody Hilario DO    In 1 week        DISCHARGE MEDICATIONS:  Discharge Medication List as of 4/6/2019 10:17 AM             (Please note that portions of this note were completed with a voice recognition program.  Efforts were made to edit the dictations but occasionally words are mis-transcribed.)    Santiago Linn MD (electronically signed)  Attending Emergency Physician       Santiago Linn MD  04/03/19 [id number]       Santiago Linn MD  04/03/19 7176       Santiago Linn MD  05/15/19 1055

## 2019-04-03 NOTE — ED NOTES
Report given to Bryon Tracy RN at bedside. Patient transferred to floor via wheelchair with RN.  took belongings. No signs or symptoms of pain or distress noted.      Keaton Mahmood RN  04/03/19 [id number]

## 2019-04-03 NOTE — ED TRIAGE NOTES
Patient complains of lower abdomen cramping and hemorrhoidal bleeding. She moved her bowels yesterday. She noted some bleeding on her underwear and with wiping.

## 2019-04-03 NOTE — CONSULTS
Consults   Infectious Disease     Patient Name: Rodger Hutson  Date: 4/3/2019  YOB: 1950  Medical Record Number: [id number]      Leukocytosis abdominal pain        History of Present Illness:  arthritis rheumatoid arthritis hypercholesterolemia hypothyroidism GERD  History of urinary tract infection    Abdominal pain and flank pain      No fevers  Patient's flank pain had improved and improved by a shot into her right side    Patient was given an enema had a large amount of stool out yesterday before the abdominal pain started today    I'd seen the patient on 3/29/19 evaluation for pain urine tract infection there was no urinary tract infection at that time urinalysis was negative bone scan was performed because of pain in the right side right pelvis reason and was negative  Urine had grown E. coli with a small number of white cells seen in the urine when urine was repeated after her admission to [facility] it was completely normal    Returns now for abdominal pain     white Cell count elevated 29,000    Urinalysis not impressive only 6-10 white cells per high-power field small amount of leukocyte esterase not consistent with patient's pain or white count      CT scan shows dilated loops of bowel with a diagnosis of possibly intermittent bowel obstruction no inflammation no diverticulitis      COMPARISON: March 22, 2019; March 31, 2019.       HISTORY:  for sbo        COMMENTS: N39.0 UTI due to extended-spectrum beta lactamase (ESBL) producing Escherichia coli ICD10       TECHNIQUE: procedure Thin slice spiral CT was performed from the lung bases through the iliac crests without contrast administration. Contrast enhancement was not employed due to clinician's order. Sagittal and coronal reconstructions were also    performed.           FINDINGS: Images through the lung bases demonstrate platelike atelectasis in the visualized right base. There is also atelectasis seen in the left base.  A primary mm nodule is seen in the right lower lobe (series 2 image 1).        Non-contrast images of the liver, pancreas, spleen and adrenals are unremarkable. The gallbladder surgically absent. A calcification is seen in the spleen consistent with old granulomatous disease.  No hydro-nephrosis, renal calculi or michelle-nephric    fluid seen in the kidneys. There are bilateral extrarenal pelvises. Atherosclerotic calcifications are seen. No aneurysm  is visualized.        There are surgical clips seen in the anterior abdomen, in the gallbladder fossa, in the left upper abdomen near the stomach.       Mildly dilated loops of small bowel are seen. The transition to normal caliber appears to be in the distal small bowel. Diverticulosis coli is seen diffusely in the sigmoid colon. No inflammatory changes are seen to indicate diverticulitis. The urinary    bladder is unremarkable. Calcifications are seen in the prostate gland. Compression fracture is again seen at L1. No fracture is also again seen in the pubic bone on the right.            Impression   Probable intermittent small bowel obstruction. Transitional zone appears to be in the small bowel in the region of the ileum.        All CT scans at this facility use dose modulation, iterative reconstruction, and/or weight based dosing when appropriate to reduce radiation dose to as low as reasonably achievable. Review of Systems   HENT: Negative. Respiratory: Negative. Cardiovascular: Negative. Gastrointestinal: Positive for abdominal pain, constipation and nausea. Negative for blood in stool, diarrhea, rectal pain and vomiting. Genitourinary: Negative. Musculoskeletal: Negative. Skin: Negative. Neurological: Negative. Review of Systems: All 14 review of systems negative other than as stated above    Social History     Tobacco Use    Smoking status: Never Smoker    Smokeless tobacco: Never Used   Substance Use Topics    Alcohol use: No    Drug use:  No Past Medical History:   Diagnosis Date    Depression     GERD (gastroesophageal reflux disease)     History of kidney stones     hospitalized 9/2016 obstructive pyelonephritis    Hypercholesteremia     Hypothyroidism     Osteoarthritis     Osteopenia     RA (rheumatoid arthritis) ([address]            Past Surgical History:   Procedure Laterality Date    CYSTOSCOPY Left 08/28/2016    Loyda Rico MD  PYELOGRAM & DOUBLE-J STENT PLACEMENT    GALLBLADDER SURGERY      HYSTERECTOMY      KNEE SURGERY      left    LITHOTRIPSY Left 10/12/2016    HOLMIUM LASER LITHOTRIPSY AND REMOVAL OF LEFT J STENT  performed by Sarita Olivarez MD at [place] [address]  08/09/2013    DR. RHODES    URETER SURGERY Left 10/12/2016    /left ureteroscopy/ cysto/ retrogrades/pyelogram/ holmium laser lithotripsy removal left urerteral stent performed by [facility] at [facility]         No current facility-administered medications on file prior to encounter. Current Outpatient Medications on File Prior to Encounter   Medication Sig Dispense Refill    traMADol (ULTRAM) 50 MG tablet Take 1 tablet by mouth every 8 hours as needed for Pain for up to 7 days. 20 tablet 0    polyethylene glycol (MIRALAX) packet Take 17 g by mouth daily as needed for Constipation 10 each 0    methocarbamol (ROBAXIN) 500 MG tablet Take 1 tablet by mouth 4 times daily for 10 days 40 tablet 0    oxyCODONE-acetaminophen (PERCOCET) 5-325 MG per tablet Take 1 tablet by mouth every 8 hours as needed for Pain for up to 15 days.  45 tablet 0    naproxen (NAPROSYN) 500 MG tablet Take 1 tablet by mouth 2 times daily 60 tablet 0    ondansetron (ZOFRAN ODT) 4 MG disintegrating tablet Take 1 tablet by mouth every 8 hours as needed for Nausea 20 tablet 0    gabapentin (NEURONTIN) 300 MG capsule TAKE 3 CAPSULES BY MOUTH AT BEDTIME 90 capsule 1    predniSONE (DELTASONE) 5 MG tablet TAKE 1 TABLET BY MOUTH TWICE DAILY 60 tablet 2  levothyroxine (SYNTHROID) 50 MCG tablet TAKE 1 TABLET BY MOUTH ONCE DAILY 90 tablet 3    prednisoLONE acetate (PRED FORTE) 1 % ophthalmic suspension instill 1 (ONE) DROP in left eye TWICE DAILY  0       No Known Allergies      Family History   Problem Relation Age of Onset    Diabetes Father     Heart Disease Father          Physical Exam:      Physical Exam   HENT:   Head: Normocephalic. Neck: Normal range of motion. No JVD present. No tracheal deviation present. No thyromegaly present. Cardiovascular: Intact distal pulses. No murmur heard. Pulmonary/Chest: Effort normal and breath sounds normal. No respiratory distress. She has no wheezes. She has no rales. She exhibits no tenderness. Abdominal: Bowel sounds are normal. She exhibits no distension and no mass. There is tenderness. There is guarding. There is no rebound. Musculoskeletal: She exhibits no edema or tenderness. Lymphadenopathy:     She has no cervical adenopathy. Neurological: She is alert. Skin: Skin is warm. No erythema. Blood pressure 94/62, pulse 81, temperature 98.4 °F (36.9 °C), temperature source Oral, resp. rate 8, height 5' 3\" (1.6 m), weight 113 lb (51.3 kg), SpO2 94 %, not currently breastfeeding.       .   Lab Results   Component Value Date    WBC 29.0 (H) 04/03/2019    HGB 10.1 (L) 04/03/2019    HCT 31.6 (L) 04/03/2019    .8 (H) 04/03/2019     04/03/2019     Lab Results   Component Value Date     04/03/2019    K 4.2 04/03/2019    K 4.7 03/29/2019    CL 95 04/03/2019    CO2 25 04/03/2019    BUN 32 04/03/2019    CREATININE 0.67 04/03/2019    GLUCOSE 131 04/03/2019    GLUCOSE 84 01/17/2012    CALCIUM 9.3 04/03/2019                ASSESSMENT:  Patient Active Problem List   Diagnosis    Anxiety    OA (osteoarthritis)    Rheumatoid arthritis ([address]    Hypercholesteremia    Acquired hypothyroidism    GERD (gastroesophageal reflux disease)    Obstructive pyelonephritis    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    Foot pain    Extremity pain    Purpura (HCC)    Rash    Retinal macular atrophy    History of cornea transplant    Pain in shoulder    Vitamin D deficiency    Rheumatoid arthritis involving both knees with positive rheumatoid factor (HCC)    Adjustment reaction with prolonged depressive reaction    UTI due to extended-spectrum beta lactamase (ESBL) producing Escherichia coli    Acute pyelonephritis         PLAN:    Leukocytosis abdominal pain      Continue the current antibiotic meropenem given the abdominal discomfort and elevated white blood cell count    I do not believe patient has urinary tract infection

## 2019-04-03 NOTE — ED NOTES
Dr. Merly Joseph accepted this patient. Spoke with supervisor Bebeto Garner for a room assignment, patient will be in room 226. Admitting notified.      Sydni Durham  04/03/19 1522

## 2019-04-04 ENCOUNTER — APPOINTMENT (OUTPATIENT)
Dept: GENERAL RADIOLOGY | Age: 69
DRG: 690 | End: 2019-04-04
Payer: MEDICARE

## 2019-04-04 PROBLEM — R10.84 GENERALIZED ABDOMINAL PAIN: Status: ACTIVE | Noted: 2019-04-04

## 2019-04-04 PROBLEM — M54.9 DORSALGIA: Status: ACTIVE | Noted: 2019-04-04

## 2019-04-04 PROBLEM — M79.10 MYALGIA: Status: ACTIVE | Noted: 2019-04-04

## 2019-04-04 PROBLEM — R10.9 FLANK PAIN: Status: ACTIVE | Noted: 2019-04-04

## 2019-04-04 LAB
ANION GAP SERPL CALCULATED.3IONS-SCNC: 13 MEQ/L (ref 9–15)
BUN BLDV-MCNC: 20 MG/DL (ref 8–23)
CALCIUM SERPL-MCNC: 9 MG/DL (ref 8.5–9.9)
CHLORIDE BLD-SCNC: 104 MEQ/L (ref 95–107)
CO2: 24 MEQ/L (ref 20–31)
CREAT SERPL-MCNC: 0.51 MG/DL (ref 0.5–0.9)
GFR AFRICAN AMERICAN: >60
GFR NON-AFRICAN AMERICAN: >60
GLUCOSE BLD-MCNC: 140 MG/DL (ref 70–99)
HCT VFR BLD CALC: 33.9 % (ref 37–47)
HEMOGLOBIN: 10.7 G/DL (ref 12–16)
MCH RBC QN AUTO: 32.4 PG (ref 27–31.3)
MCHC RBC AUTO-ENTMCNC: 31.6 % (ref 33–37)
MCV RBC AUTO: 102.6 FL (ref 82–100)
ORGANISM: ABNORMAL
ORGANISM: ABNORMAL
PDW BLD-RTO: 18.4 % (ref 11.5–14.5)
PLATELET # BLD: 255 K/UL (ref 130–400)
POTASSIUM REFLEX MAGNESIUM: 4.4 MEQ/L (ref 3.4–4.9)
RBC # BLD: 3.3 M/UL (ref 4.2–5.4)
SODIUM BLD-SCNC: 141 MEQ/L (ref 135–144)
URINE CULTURE, ROUTINE: ABNORMAL
WBC # BLD: 25.5 K/UL (ref 4.8–10.8)

## 2019-04-04 PROCEDURE — 85027 COMPLETE CBC AUTOMATED: CPT

## 2019-04-04 PROCEDURE — 97530 THERAPEUTIC ACTIVITIES: CPT

## 2019-04-04 PROCEDURE — 6370000000 HC RX 637 (ALT 250 FOR IP): Performed by: INTERNAL MEDICINE

## 2019-04-04 PROCEDURE — 6370000000 HC RX 637 (ALT 250 FOR IP): Performed by: ANESTHESIOLOGY

## 2019-04-04 PROCEDURE — 97162 PT EVAL MOD COMPLEX 30 MIN: CPT

## 2019-04-04 PROCEDURE — 80048 BASIC METABOLIC PNL TOTAL CA: CPT

## 2019-04-04 PROCEDURE — 6360000002 HC RX W HCPCS: Performed by: INTERNAL MEDICINE

## 2019-04-04 PROCEDURE — 74250 X-RAY XM SM INT 1CNTRST STD: CPT

## 2019-04-04 PROCEDURE — 2500000003 HC RX 250 WO HCPCS: Performed by: INTERNAL MEDICINE

## 2019-04-04 PROCEDURE — 2580000003 HC RX 258: Performed by: INTERNAL MEDICINE

## 2019-04-04 PROCEDURE — 96376 TX/PRO/DX INJ SAME DRUG ADON: CPT

## 2019-04-04 PROCEDURE — 96366 THER/PROPH/DIAG IV INF ADDON: CPT

## 2019-04-04 PROCEDURE — 1210000000 HC MED SURG R&B

## 2019-04-04 PROCEDURE — 36415 COLL VENOUS BLD VENIPUNCTURE: CPT

## 2019-04-04 PROCEDURE — 96372 THER/PROPH/DIAG INJ SC/IM: CPT

## 2019-04-04 PROCEDURE — 97166 OT EVAL MOD COMPLEX 45 MIN: CPT

## 2019-04-04 RX ORDER — SODIUM CHLORIDE 9 MG/ML
INJECTION, SOLUTION INTRAVENOUS CONTINUOUS
Status: DISCONTINUED | OUTPATIENT
Start: 2019-04-04 | End: 2019-04-05

## 2019-04-04 RX ORDER — DICYCLOMINE HYDROCHLORIDE 10 MG/1
10 CAPSULE ORAL
Status: DISCONTINUED | OUTPATIENT
Start: 2019-04-04 | End: 2019-04-06 | Stop reason: HOSPADM

## 2019-04-04 RX ORDER — METHOCARBAMOL 500 MG/1
500 TABLET, FILM COATED ORAL EVERY 6 HOURS PRN
Status: DISCONTINUED | OUTPATIENT
Start: 2019-04-04 | End: 2019-04-06 | Stop reason: HOSPADM

## 2019-04-04 RX ORDER — ACETAMINOPHEN 500 MG
500 TABLET ORAL EVERY 6 HOURS PRN
Status: DISCONTINUED | OUTPATIENT
Start: 2019-04-04 | End: 2019-04-06 | Stop reason: HOSPADM

## 2019-04-04 RX ADMIN — OXYCODONE AND ACETAMINOPHEN 1 TABLET: 5; 325 TABLET ORAL at 19:50

## 2019-04-04 RX ADMIN — METHOCARBAMOL 500 MG: 500 TABLET ORAL at 14:13

## 2019-04-04 RX ADMIN — Medication 10 ML: at 09:52

## 2019-04-04 RX ADMIN — POLYETHYLENE GLYCOL 3350 17 G: 17 POWDER, FOR SOLUTION ORAL at 09:51

## 2019-04-04 RX ADMIN — BARIUM SULFATE 176 G: 960 POWDER, FOR SUSPENSION ORAL at 13:35

## 2019-04-04 RX ADMIN — MEROPENEM 1 G: 1 INJECTION, POWDER, FOR SOLUTION INTRAVENOUS at 04:02

## 2019-04-04 RX ADMIN — HYDROCORTISONE SODIUM SUCCINATE 50 MG: 100 INJECTION, POWDER, FOR SOLUTION INTRAMUSCULAR; INTRAVENOUS at 03:25

## 2019-04-04 RX ADMIN — LEVOTHYROXINE SODIUM 50 MCG: 50 TABLET ORAL at 09:50

## 2019-04-04 RX ADMIN — OXYCODONE AND ACETAMINOPHEN 1 TABLET: 5; 325 TABLET ORAL at 00:04

## 2019-04-04 RX ADMIN — PREDNISOLONE ACETATE 1 DROP: 10 SUSPENSION/ DROPS OPHTHALMIC at 21:21

## 2019-04-04 RX ADMIN — GABAPENTIN 300 MG: 300 CAPSULE ORAL at 21:13

## 2019-04-04 RX ADMIN — METHOCARBAMOL 500 MG: 500 TABLET ORAL at 09:50

## 2019-04-04 RX ADMIN — SODIUM CHLORIDE: 9 INJECTION, SOLUTION INTRAVENOUS at 09:49

## 2019-04-04 RX ADMIN — HYDROCORTISONE SODIUM SUCCINATE 50 MG: 100 INJECTION, POWDER, FOR SOLUTION INTRAMUSCULAR; INTRAVENOUS at 19:50

## 2019-04-04 RX ADMIN — BARIUM SULFATE 135 ML: 960 POWDER, FOR SUSPENSION ORAL at 13:35

## 2019-04-04 RX ADMIN — HYDROCORTISONE SODIUM SUCCINATE: 100 INJECTION, POWDER, FOR SOLUTION INTRAMUSCULAR; INTRAVENOUS at 10:18

## 2019-04-04 RX ADMIN — KETOROLAC TROMETHAMINE 15 MG: 15 INJECTION, SOLUTION INTRAMUSCULAR; INTRAVENOUS at 10:18

## 2019-04-04 RX ADMIN — SODIUM CHLORIDE: 900 INJECTION, SOLUTION INTRAVENOUS at 12:02

## 2019-04-04 RX ADMIN — HYDROMORPHONE HYDROCHLORIDE 0.5 MG: 1 INJECTION, SOLUTION INTRAMUSCULAR; INTRAVENOUS; SUBCUTANEOUS at 23:17

## 2019-04-04 RX ADMIN — DICYCLOMINE HYDROCHLORIDE 10 MG: 10 CAPSULE ORAL at 21:13

## 2019-04-04 RX ADMIN — MEROPENEM 1 G: 1 INJECTION, POWDER, FOR SOLUTION INTRAVENOUS at 17:47

## 2019-04-04 RX ADMIN — METHOCARBAMOL 500 MG: 500 TABLET ORAL at 17:46

## 2019-04-04 RX ADMIN — ENOXAPARIN SODIUM 40 MG: 40 INJECTION SUBCUTANEOUS at 09:50

## 2019-04-04 RX ADMIN — TRAMADOL HYDROCHLORIDE 50 MG: 50 TABLET, FILM COATED ORAL at 12:02

## 2019-04-04 ASSESSMENT — PAIN DESCRIPTION - PROGRESSION

## 2019-04-04 ASSESSMENT — PAIN SCALES - GENERAL
PAINLEVEL_OUTOF10: 8
PAINLEVEL_OUTOF10: 8
PAINLEVEL_OUTOF10: 5
PAINLEVEL_OUTOF10: 8
PAINLEVEL_OUTOF10: 8
PAINLEVEL_OUTOF10: 7
PAINLEVEL_OUTOF10: 8
PAINLEVEL_OUTOF10: 7
PAINLEVEL_OUTOF10: 9

## 2019-04-04 ASSESSMENT — ENCOUNTER SYMPTOMS
ABDOMINAL PAIN: 1
BLOOD IN STOOL: 0
VOMITING: 0
RESPIRATORY NEGATIVE: 1
NAUSEA: 0
DIARRHEA: 0
EYES NEGATIVE: 1
BACK PAIN: 1
RECTAL PAIN: 0
ANAL BLEEDING: 0
CONSTIPATION: 1
ALLERGIC/IMMUNOLOGIC NEGATIVE: 1
ABDOMINAL DISTENTION: 1

## 2019-04-04 ASSESSMENT — PAIN DESCRIPTION - LOCATION
LOCATION: FLANK
LOCATION: ABDOMEN;BACK
LOCATION: ABDOMEN

## 2019-04-04 ASSESSMENT — PAIN DESCRIPTION - ONSET
ONSET: ON-GOING
ONSET: ON-GOING

## 2019-04-04 ASSESSMENT — PAIN DESCRIPTION - FREQUENCY
FREQUENCY: CONTINUOUS
FREQUENCY: CONTINUOUS

## 2019-04-04 ASSESSMENT — PAIN DESCRIPTION - PAIN TYPE
TYPE: ACUTE PAIN

## 2019-04-04 ASSESSMENT — PAIN DESCRIPTION - ORIENTATION
ORIENTATION: MID;LOWER
ORIENTATION: RIGHT;LOWER

## 2019-04-04 ASSESSMENT — PAIN DESCRIPTION - DESCRIPTORS: DESCRIPTORS: SHARP

## 2019-04-04 NOTE — CONSULTS
[address] [facility] inpatient    SERVICE DATE:  4/4/2019   SERVICE TIME:  7:02 PM  Admission date 4/3/2019  REASON FORCONSULT:  Recurrent abdominal pain and back pain/multiple pain complaints  REQUESTING PHYSICIAN:  Kate Juarez M.D. PRIMARY CARE PHYSICIAN:Trip Eller DO    Chief complaint: Abdominal pain and flank pain    HISTORY OF PRESENTILLNESS:  Ms. Suzan Hartmann is a 70 y.o. female who presents for recurrent abdominal pain and flank pain. Patient has recent admission to the hospital as well as multiple ER visits for the same pattern of pain. Patient has no prior history of chronic abdominal pain in the past, she was admitted and noted elevated white blood cell count in addition to the abdominal pain. Patient has chronic history was rheumatoid arthritis affecting upper extremity joints, she has history of UTI in the past.    According to chart review patient denies fevers chills, she is describing mainly alternating symptoms of GI with constipation and abdominal distention however she had recent bowel movement.     Patient went through an extensive evaluation was multiple CAT scan of the abdomen that done in the past 2 weeks including also CAT scan of the kidney which is only significant for diverticulosis with no other acute findings    Due to I positive for E. coli however become normal patient continued having elevated white cell count    Patient described her pain mainly around the flank region, alternating also spelled it and abdominal area pain she denies any tingling or numbness in the back or lower extremities denies any weakness in the lower extremities she denies any history of lower back pain in the past    PAIN  ASSESSMENT:    constant    aching, stabbing and throbbing    pain is perceived as moderate (4-6 pain scale)    PAST MEDICAL HISTORY:    Past Medical History:   Diagnosis Date    Depression     GERD (gastroesophageal reflux disease)     History of kidney stones hospitalized 9/2016 obstructive pyelonephritis    Hypercholesteremia     Hypothyroidism     Osteoarthritis     Osteopenia     RA (rheumatoid arthritis) ([facility] [address]      PAST SURGICAL HISTORY:    Past Surgical History:   Procedure Laterality Date    CYSTOSCOPY Left 08/28/2016    Jw Gil MD  PYELOGRAM & DOUBLE-J STENT PLACEMENT    GALLBLADDER SURGERY      HYSTERECTOMY      KNEE SURGERY      left    LITHOTRIPSY Left 10/12/2016    HOLMIUM LASER LITHOTRIPSY AND REMOVAL OF LEFT J STENT  performed by Erlin Barillas MD at [place] [address]  08/09/2013    DR. RHODES    URETER SURGERY Left 10/12/2016    /left ureteroscopy/ cysto/ retrogrades/pyelogram/ holmium laser lithotripsy removal left urerteral stent performed by Erlin Barillas MD at [address] [place] [address]:    Family History   Problem Relation Age of Onset    Diabetes Father     Heart Disease Father      SOCIALHISTORY:    Social History     Socioeconomic History    Marital status:      Spouse name: Not on file    Number of children: Not on file    Years of education: Not on file    Highest education level: Not on file   Occupational History    Not on file   Social Needs    Financial resource strain: Not on file    Food insecurity:     Worry: Not on file     Inability: Not on file    Transportation needs:     Medical: Not on file     Non-medical: Not on file   Tobacco Use    Smoking status: Never Smoker    Smokeless tobacco: Never Used   Substance and Sexual Activity    Alcohol use: No    Drug use: No    Sexual activity: Not Currently   Lifestyle    Physical activity:     Days per week: Not on file     Minutes per session: Not on file    Stress: Not on file   Relationships    Social connections:     Talks on phone: Not on file     Gets together: Not on file     Attends Denominational service: Not on file     Active member of club or organization: Not on file     Attends meetings of clubs or organizations: Not on file     Relationship status: Not on file    Intimate partner violence:     Fear of current or ex partner: Not on file     Emotionally abused: Not on file     Physically abused: Not on file     Forced sexual activity: Not on file   Other Topics Concern    Not on file   Social History Narrative    Not on file     PSYCHOLOGICAL HISTORY: What appears to be history of anxiety disorders    MEDICATIONS:  Medications Prior to Admission: traMADol (ULTRAM) 50 MG tablet, Take 1 tablet by mouth every 8 hours as needed for Pain for up to 7 days. polyethylene glycol (MIRALAX) packet, Take 17 g by mouth daily as needed for Constipation  methocarbamol (ROBAXIN) 500 MG tablet, Take 1 tablet by mouth 4 times daily for 10 days  oxyCODONE-acetaminophen (PERCOCET) 5-325 MG per tablet, Take 1 tablet by mouth every 8 hours as needed for Pain for up to 15 days. naproxen (NAPROSYN) 500 MG tablet, Take 1 tablet by mouth 2 times daily  ondansetron (ZOFRAN ODT) 4 MG disintegrating tablet, Take 1 tablet by mouth every 8 hours as needed for Nausea  gabapentin (NEURONTIN) 300 MG capsule, TAKE 3 CAPSULES BY MOUTH AT BEDTIME  predniSONE (DELTASONE) 5 MG tablet, TAKE 1 TABLET BY MOUTH TWICE DAILY  levothyroxine (SYNTHROID) 50 MCG tablet, TAKE 1 TABLET BY MOUTH ONCE DAILY  prednisoLONE acetate (PRED FORTE) 1 % ophthalmic suspension, instill 1 (ONE) DROP in left eye TWICE DAILY  [unfilled]    ALLERGIES:  Patient has no known allergies. COMPLETE REVIEW OF SYSTEMS:  As noted in HPI, 12 point ROS reviewed and otherwise negative. Review of Systems   Constitutional: Positive for activity change, appetite change and fatigue. Negative for chills, diaphoresis, fever and unexpected weight change. HENT: Negative. Eyes: Negative. Respiratory: Negative. Cardiovascular: Negative. Gastrointestinal: Positive for abdominal distention, abdominal pain and constipation.  Negative for anal bleeding, blood in stool, diarrhea, nausea, rectal pain mental status appears to be at baseline and anxiety, motor and sensory appears to be symmetrical upper and lower extremity  Psychiatric:  Alert and oriented, ,, mild anxiety  Capillary Refill: Normal  Peripheral Pulses:  no lower extremity edema      DATA:   Diagnostic tests reviewed for today's visit:    All labs and imaging results reviewed. Lab Results   Component Value Date    WBC 25.5 04/04/2019    HGB 10.7 04/04/2019    HCT 33.9 04/04/2019    .6 04/04/2019     04/04/2019     04/04/2019    K 4.4 04/04/2019     04/04/2019    CO2 24 04/04/2019    BUN 20 04/04/2019    CREATININE 0.51 04/04/2019    CALCIUM 9.0 04/04/2019    PHOS 3.3 09/06/2016    BNP 52 05/18/2013    ALKPHOS 87 04/03/2019    ALT 16 04/03/2019    AST 20 04/03/2019    BILITOT 0.4 04/03/2019    BILIDIR 0.2 11/12/2017    LABALBU 3.3 04/03/2019    LABALBU 4.5 01/17/2012       Ct Abdomen Pelvis Wo Contrast Additional Contrast? None    Result Date: 4/3/2019  COMPARISON: March 22, 2019; March 31, 2019. HISTORY:  for sbo COMMENTS: N39.0 UTI due to extended-spectrum beta lactamase (ESBL) producing Escherichia coli ICD10 TECHNIQUE: procedure Thin slice spiral CT was performed from the lung bases through the iliac crests without contrast administration. Contrast enhancement was not employed due to clinician's order. Sagittal and coronal reconstructions were also performed. FINDINGS: Images through the lung bases demonstrate platelike atelectasis in the visualized right base. There is also atelectasis seen in the left base. A primary mm nodule is seen in the right lower lobe (series 2 image 1). Non-contrast images of the liver, pancreas, spleen and adrenals are unremarkable. The gallbladder surgically absent. A calcification is seen in the spleen consistent with old granulomatous disease. No hydro-nephrosis, renal calculi or michelle-nephric fluid seen in the kidneys. There are bilateral extrarenal pelvises.  Atherosclerotic calcifications are seen. No aneurysm  is visualized. There are surgical clips seen in the anterior abdomen, in the gallbladder fossa, in the left upper abdomen near the stomach. Mildly dilated loops of small bowel are seen. The transition to normal caliber appears to be in the distal small bowel. Diverticulosis coli is seen diffusely in the sigmoid colon. No inflammatory changes are seen to indicate diverticulitis. The urinary bladder is unremarkable. Calcifications are seen in the prostate gland. Compression fracture is again seen at L1. No fracture is also again seen in the pubic bone on the right. Probable intermittent small bowel obstruction. Transitional zone appears to be in the small bowel in the region of the ileum. All CT scans at this facility use dose modulation, iterative reconstruction, and/or weight based dosing when appropriate to reduce radiation dose to as low as reasonably achievable. Ct Abdomen Pelvis W Iv Contrast Additional Contrast? None    Result Date: 4/1/2019  HISTORY: rt flank pain COMMENTS:  PAIN IN BACK AND RIGHT SIDE COMPARISON: March 22, 2019 TECHNIQUE: CT ABDOMEN PELVIS W IV CONTRAST CT of the abdomen and pelvis was performed  during the intravenous infusion of 100 cc of Isovue. CT ABDOMEN AND PELVIS FINDINGS: The visualized bases of the lungs contain no focal infiltrates, pleural effusion or consolidation. The gallbladder is surgically absent. The liver, spleen, pancreas, adrenal glands and kidneys are within normal limits on postcontrast images. The kidneys show uptake and excretion of contrast with no hydronephrosis. No renal calculus seen. No aneurysm  or dissection seen. There are distended loops of small bowel. Bowel wall thickening is seen and air-fluid levels. A large amount of debris is seen in the stomach. Surgical clips are seen. The appendix is not visualized A large amount of fecal material is seen throughout the colon.  Diverticulosis coli is seen without evidence for diverticulitis. No free air or free fluid is visualized. The urinary bladder is unremarkable. Compression fracture is again seen at L1. Degenerative changes are seen at multiple levels in the lumbar spine and visualized portion of the thoracic spine. Old fractures again seen in the right side of the pelvis. .     No evidence for bowel obstruction, hydronephrosis, or diverticulitis. These findings could represent ileus. All CT scans at this facility use dose modulation, iterative reconstruction, and/or weight based dosing when appropriate to reduce radiation dose to as low as reasonably achievable. Ct Kidney Wo Contrast    Result Date: 3/22/2019  EXAMINATION:  CT KIDNEY WO CONTRAST CLINICAL HISTORY:  right flank pain COMPARISON: 8/27/2016 TECHNIQUE: Spiral scans without contrast. Multi-planar 2-D reconstructions. All CT scans at this facility use dose modulation, iterative reconstruction, and/or weight based dosing when appropriate to reduce radiation dose to as low as reasonably achievable. FINDINGS: There is no urolithiasis, acute perinephric stranding, or hydroureteronephrosis. 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 The ureters and urinary bladder are unremarkable. Patient is status post hysterectomy. There is no adnexal pathology. The bowel is nonspecific. Patient is status post subtotal gastrectomy. There are multiple surgical sutures and clips in the distal paraesophageal region and upper abdomen. There is marked diverticulosis. There are no CT findings of acute diverticulitis or colitis. There is no evidence of bowel obstruction or perforation. There is no abscess. There is no free fluid or free air. The appendix is normal. There is fecalization of small bowel contents suggesting stasis. There is an undigested hyperdense medicinal capsule in the cecum. Patient is status post cholecystectomy. There is no biliary dilatation. The pancreas is unremarkable.  There is no mesenteric or retroperitoneal adenopathy. There is spondylosis. There is S-shaped lumbar scoliosis with upper to mid convexity to the right and lower convexity to the left. There are old healed fractures of the right superior and inferior ischiopubic rami. There are emphysematous changes in the lung bases. There are atherosclerotic calcifications in the root of the thoracic aorta and in the coronary arteries. Note: This interpretation includes assessment of the liver, spleen, gallbladder, bile ducts, pancreas, adrenal glands, kidneys, urogenital structures, abdominal vasculature, retroperitoneum, gastrointestinal tract, mesentery, lymph nodes, inguinal regions, osseous structures, abdominopelvic walls, and visualized portions of the lower thorax. Some structures may be congenitally or surgically absent/altered. Unless otherwise indicated in this report, these organs and structures demonstrate no significant pathology or demonstrate findings which do not require additional follow-up/evaluation. MARKED DIVERTICULOSIS. NO ACUTE PATHOLOGY; SPECIFICALLY, NO UROLITHIASIS OR OBSTRUCTIVE UROPATHY. ADDITIONAL FINDINGS AS ABOVE. Nm Bone Scan Whole Body    Result Date: 3/29/2019  EXAMINATION:  NM BONE SCAN WHOLE BODY CLINICAL HISTORY:  Bone pain right ilium COMPARISONS:  CT kidney without contrast 3/22/2019, CT left hip 1/20/2018 TECHNIQUE:  30 mCi technetium MDP IV. Whole body bone scintigrams. Multi projection spot scintigrams of the pelvis. FINDINGS:  There is no abnormal activity in the pelvis and hips. There is focal increased activity at the anterior ends of the right 5th and 6th ribs and the anterior ends of the left 5th through 9th ribs concordant with old healed rib fractures. There is increased activity in both wrists consistent with arthropathy. There is a focus of asymmetric activity in the distal left forearm representing the radionuclide injection site.  There is multifocal increased activity in the spine consistent associated with generalized muscular pain, recommended for an alternating dose of muscle relaxant, continuation of Toradol appropriate bowel regimen and trial of Bentyl    RECOMMENDATION:  SEE ORDERS    Continue with Robaxin 500 mg every 6 hours when necessary  Continue alternating multimodal analgesia between Toradol 15 g every 6 hours as well as Tylenol  Percocet for severe pain  Bentyl oral 10 mg 3 times a day  Continue to observe for developing any new neurological sign or symptoms which seems unlikely on today's exam  I do not see any indication for any advanced muscular skeletal imaging    SIGNATURE: Ervin Ruiz MD PATIENT NAME: Ferny Becker   DATE: April 4, 2019 MRN: [id number]   TIME: 7:02 PM PAGER/CONTACT #: [phone]

## 2019-04-04 NOTE — H&P
Hospital Medicine History & Physical      PCP: Severa England, DO    Date of Admission: 4/3/2019    Date of Service: 4/4/19      Chief Complaint:  Multiple pain complains      History Of Present Illness:  70 y.o. female who presented to [facility] with previous admission at [facility] 1 week ago with flank pain, suspected ESBL UTI, after Dc home had multiple visits to ER due to abd/flank pain. At home  gave her fleet enema and she had big BM there after with some improvement in her abd pain. Also she had decreased PO intake of fluids, food for several days. No fever, chills were observed at home.  became concern about dehydration and brought her back to ER for further evaluation       Past Medical History:          Diagnosis Date    Depression     GERD (gastroesophageal reflux disease)     History of kidney stones     hospitalized 9/2016 obstructive pyelonephritis    Hypercholesteremia     Hypothyroidism     Osteoarthritis     Osteopenia     RA (rheumatoid arthritis) ([address]        Past Surgical History:          Procedure Laterality Date    CYSTOSCOPY Left 08/28/2016    Carrie Quan MD  PYELOGRAM & DOUBLE-J STENT PLACEMENT    GALLBLADDER SURGERY      HYSTERECTOMY      KNEE SURGERY      left    LITHOTRIPSY Left 10/12/2016    HOLMIUM LASER LITHOTRIPSY AND REMOVAL OF LEFT J STENT  performed by Tanna Franz MD at [address] [place] [address]  08/09/2013    DR. RHODES    URETER SURGERY Left 10/12/2016    /left ureteroscopy/ cysto/ retrogrades/pyelogram/ holmium laser lithotripsy removal left urerteral stent performed by Tanna Franz MD at [facility]       Medications Prior to Admission:      Prior to Admission medications    Medication Sig Start Date End Date Taking? Authorizing Provider   traMADol (ULTRAM) 50 MG tablet Take 1 tablet by mouth every 8 hours as needed for Pain for up to 7 days.  4/1/19 4/8/19  Jeremi Perez MD   polyethylene glycol [facility]) packet Take 17 g by mouth daily as needed for Constipation 3/31/19 4/7/19  Garry Montoya,    methocarbamol (ROBAXIN) 500 MG tablet Take 1 tablet by mouth 4 times daily for 10 days 3/29/19 4/8/19  Stephanie Arshad MD   oxyCODONE-acetaminophen (PERCOCET) 5-325 MG per tablet Take 1 tablet by mouth every 8 hours as needed for Pain for up to 15 days. 3/26/19 4/10/19  Rick [place], DO   naproxen (NAPROSYN) 500 MG tablet Take 1 tablet by mouth 2 times daily 3/22/19   Valencia Ball PA-C   ondansetron (ZOFRAN ODT) 4 MG disintegrating tablet Take 1 tablet by mouth every 8 hours as needed for Nausea 3/22/19   Valencia Ball PA-C   gabapentin (NEURONTIN) 300 MG capsule TAKE 3 CAPSULES BY MOUTH AT BEDTIME 2/22/19 4/1/19  Rick Corn, DO   predniSONE (DELTASONE) 5 MG tablet TAKE 1 TABLET BY MOUTH TWICE DAILY 2/10/19   Rick Corn, DO   levothyroxine (SYNTHROID) 50 MCG tablet TAKE 1 TABLET BY MOUTH ONCE DAILY 8/7/18   Rick Corn, DO   prednisoLONE acetate (PRED FORTE) 1 % ophthalmic suspension instill 1 (ONE) DROP in left eye TWICE DAILY 7/14/17   Historical Provider, MD       Allergies:  Patient has no known allergies. Social History:      The patient currently lives at home    TOBACCO:   reports that she has never smoked. She has never used smokeless tobacco.  ETOH:   reports that she does not drink alcohol. Family History:       Reviewed in detail and negative for DM, CAD, Cancer, CVA. Positive as follows:        Problem Relation Age of Onset    Diabetes Father     Heart Disease Father        REVIEW OF SYSTEMS:   Pertinent positives as noted in the HPI. All other systems reviewed and negative. PHYSICAL EXAM:    BP (!) 153/77   Pulse 76   Temp 97.2 °F (36.2 °C) (Oral)   Resp 16   Ht 5' 3\" (1.6 m)   Wt 113 lb (51.3 kg)   LMP  (LMP Unknown)   SpO2 95%   BMI 20.02 kg/m²     General appearance:  No apparent distress, appears stated age and cooperative. HEENT:  Normal cephalic, atraumatic without obvious deformity.  Pupils equal, round, and reactive to light. Extra ocular muscles intact. Conjunctivae/corneas clear. Neck: Supple, with full range of motion. No jugular venous distention. Trachea midline. Respiratory:  Normal respiratory effort. Clear to auscultation, bilaterally without Rales/Wheezes/Rhonchi. Cardiovascular:  Regular rate and rhythm with normal S1/S2 without murmurs, rubs or gallops. Abdomen:tender in LLQ  Musculoskeletal: pain on palpation in R flank area  Skin: Skin color, texture, turgor normal.  No rashes or lesions. Neurologic:  Neurovascularly intact without any focal sensory/motor deficits. Cranial nerves: II-XII intact, grossly non-focal.  Psychiatric:  Alert and oriented, thought content appropriate, normal insight  Capillary Refill: Brisk,< 3 seconds   Peripheral Pulses: +2 palpable, equal bilaterally       Labs:     Recent Labs     04/03/19  1411 04/04/19  0555   WBC 29.0* 25.5*   HGB 10.1* 10.7*   HCT 31.6* 33.9*    255     Recent Labs     04/03/19  1411 04/04/19  0555   * 141   K 4.2 4.4   CL 95 104   CO2 25 24   BUN 32* 20   CREATININE 0.67 0.51   CALCIUM 9.3 9.0     Recent Labs     04/03/19  1411   AST 20   ALT 16   BILITOT 0.4   ALKPHOS 87     No results for input(s): INR in the last 72 hours. No results for input(s): Nanetta Bienvenido in the last 72 hours. Urinalysis:      Lab Results   Component Value Date    NITRU Negative 04/03/2019    WBCUA 6-10 04/03/2019    BACTERIA Many 04/03/2019    RBCUA 0-2 04/03/2019    BLOODU MODERATE 04/03/2019    SPECGRAV <=1.005 04/03/2019    GLUCOSEU Negative 04/03/2019       Radiology:         CT ABDOMEN PELVIS WO CONTRAST Additional Contrast? None   Final Result   Probable intermittent small bowel obstruction. Transitional zone appears to be in the small bowel in the region of the ileum.        All CT scans at this facility use dose modulation, iterative reconstruction, and/or weight based dosing when appropriate to reduce radiation dose to as low as reasonably achievable. FL SMALL BOWEL FOLLOW THROUGH ONLY    (Results Pending)       ASSESSMENT:    Active Hospital Problems    Diagnosis Date Noted    UTI due to extended-spectrum beta lactamase (ESBL) producing Escherichia coli [N39.0, B96.29, Z16.12] 03/27/2019       PLAN:        DVT Prophylaxis: lovenox  Diet: DIET CLEAR LIQUID;  Code Status: Full Code    PT/OT Eval Status: done    Dispo - Leucocytosis, UTI with ESBL e coli- appreciate ID recommendation, continue with atbs  Mild dehydration present on admission due to decreased PO intake- better with IV hydration, follow with BMP/clinically in AM  abd pain, possible SBO per CT report- small bowel follow through ordered, surgery on case   Multiple pain complains, back pain, possible musculoskeletal in nature- pain management on case   Hypothyroidism- synthroid  Medically stable for acute admission at [facility], follow up along with consultants         Marta Shukla MD    Thank you Jelena Perez DO for the opportunity to be involved in this patient's care. If you have any questions or concerns please feel free to contact me.

## 2019-04-04 NOTE — PROGRESS NOTES
Parekh  Diagnosis: Dehydration, acute cystitis without hematuria  Pain Assessment  Pain Assessment: 0-10  Pain Level: 7  Pain Type: Acute pain  Pain Location: Abdomen  Pain Orientation: Mid, Lower  Pain Descriptors: Sharp  Pain Frequency: Continuous  Pain Onset: On-going  Clinical Progression: Not changed     Social/Functional History  Social/Functional History  Lives With: Spouse  Type of Home: House  Home Layout: One level  Home Access: Stairs to enter without rails  Entrance Stairs - Number of Steps: 1  Bathroom Shower/Tub: Tub/Shower unit  Bathroom Toilet: Standard  Bathroom Equipment: Toilet raiser, Hand-held shower, Grab bars in shower, Commode  Home Equipment: Cane, Rolling walker, [employer]  Receives Help From: Family  ADL Assistance: Independent  Homemaking Responsibilities: No  Ambulation Assistance: Independent  Transfer Assistance: Independent  Active : No  Occupation: Retired       Objective   Vision: Impaired  Vision Exceptions: Wears glasses at all times  Hearing: Within functional limits    Orientation  Overall Orientation Status: Impaired  Orientation Level: Disoriented to time  Observation/Palpation  Posture: Fair  Standing Balance  Sit to stand: Contact guard assistance  Stand to sit: Contact guard assistance  Functional Mobility  Functional - Mobility Device: Rolling Walker  Activity: To/from bathroom; Other(level hallway surface)  Assist Level: Contact guard assistance  ADL  Feeding: Setup  Grooming: Supervision;Stand by assistance  UE Bathing: Stand by assistance; Increased time to complete  LE Bathing: Increased time to complete;Minimal assistance  UE Dressing: Setup;Supervision  LE Dressing: Minimal assistance; Moderate assistance; Increased time to complete  Toileting: Contact guard assistance  Tone RUE  RUE Tone: Normotonic  Tone LUE  LUE Tone: Normotonic  Coordination  Movements Are Fluid And Coordinated: Yes     Bed mobility  Rolling to Left: Modified independent  Rolling to

## 2019-04-04 NOTE — PROGRESS NOTES
Physical Therapy    Facility/Department: [facility] MED SURG UNIT  Initial Assessment    NAME: Hendricks Epley  : 1950  MRN: [id number]    Date of Service: 2019    Discharge Recommendations:  Continue to assess pending progress(SNF vs. home with home care)        Assessment   Body structures, Functions, Activity limitations: Decreased functional mobility ; Decreased endurance;Decreased vision/visual deficit; Decreased safe awareness;Decreased cognition;Decreased balance; Increased Pain  Assessment: Pt. is a 69 yo female admitted with abdomina and flank pain, dehydration. Mobilizing slightly below baseline of independence within the home with a cane. Fairly good LE strength, but decreased safety awareness with transfers and mobility and fatigues quickly. Will benefit from PT to help improve overall strength, transfers,balance, and functional endurnace. Depending on progress, may require short rehab stay vs. home with home therapy for safety evaluation. Treatment Diagnosis: Difficuty walking, impaired endurance, pain  Prognosis: Good  Decision Making: Medium Complexity  Patient Education: role of PT, treatment plan and goals  Barriers to Learning: memory? REQUIRES PT FOLLOW UP: Yes  Activity Tolerance  Activity Tolerance: Patient Tolerated treatment well;Patient limited by fatigue       Patient Diagnosis(es): The primary encounter diagnosis was Dehydration. A diagnosis of Acute cystitis without hematuria was also pertinent to this visit. has a past medical history of Depression, GERD (gastroesophageal reflux disease), History of kidney stones, Hypercholesteremia, Hypothyroidism, Osteoarthritis, Osteopenia, and RA (rheumatoid arthritis) ([facility] [address] has a past surgical history that includes knee surgery; Hysterectomy; Gallbladder surgery; Upper gastrointestinal endoscopy (2013); Cystocopy (Left, 2016);  Ureter surgery (Left, 10/12/2016); and Lithotripsy (Left, 10/12/2016). Restrictions  Restrictions/Precautions  Restrictions/Precautions: Contact Precautions, Up as Tolerated  Vision/Hearing  Vision: Impaired  Vision Exceptions: Wears glasses at all times  Hearing: Within functional limits     Subjective  General  Chart Reviewed: Yes  Patient assessed for rehabilitation services?: Yes  Family / Caregiver Present: No  Referring Practitioner: Dr. Mary Ann Gonzalez  Referral Date : 04/03/19  Diagnosis: dehydration  General Comment  Comments: Pt. up in bed, eating breakfast. Complains it is difficult because she can't see very well  Subjective  Subjective: Pt. states she usually walks in the house without a devie and uses a cane when she leaves. complains of continued abdominal pain.    Pain Screening  Patient Currently in Pain: Yes  Pain Assessment  Pain Assessment: 0-10  Pain Level: 8  Pain Type: Acute pain  Pain Location: Abdomen;Back  Pain Frequency: Continuous  Pain Onset: On-going  Clinical Progression: Not changed  Vital Signs  Patient Currently in Pain: Yes       Orientation  Orientation  Overall Orientation Status: Impaired  Orientation Level: Oriented to place;Oriented to situation(needs help for date)  Social/Functional History  Social/Functional History  Lives With: Spouse  Type of Home: House  Home Layout: One level  Home Access: Stairs to enter without rails  Entrance Stairs - Number of Steps: 1  Bathroom Shower/Tub: Tub/Shower unit  Bathroom Toilet: Standard  Bathroom Equipment: Toilet raiser, Hand-held shower, Grab bars in shower, Commode  Home Equipment: Cane, Rolling walker, [employer] Help From: Family  ADL Assistance: Independent  Ambulation Assistance: Independent  Active : No  Occupation: Retired  Cognition        Objective     Observation/Palpation  Posture: Fair    PROM RLE (degrees)  RLE PROM: WFL  PROM LLE (degrees)  LLE PROM: WFL  AROM LLE (degrees)  LLE AROM : WFL  Strength RLE  Strength RLE: WFL  Comment: tested in sitting, able to hold against strong resistance all planes  Strength LLE  Strength LLE: WFL  Comment: tested in sitting, able to hold against strong resistance all plances     Sensation  Overall Sensation Status: WFL  Bed mobility  Rolling to Left: Modified independent  Rolling to Right: Modified independent  Supine to Sit: Supervision  Transfers  Sit to Stand: Contact guard assistance  Stand to sit: Contact guard assistance(cues for hand placement)  Bed to Chair: Contact guard assistance  Ambulation  Ambulation?: Yes  Ambulation 1  Surface: level tile  Device: Rolling Walker  Assistance: Contact guard assistance  Quality of Gait: slow jose, bilateral feet sway/point to R, flexed posture, steady with walker; occ standing rest breaks  Distance: 120 ft x 1     Balance  Posture: Fair  Sitting - Static: Good  Sitting - Dynamic: Good  Standing - Static: Fair;+  Standing - Dynamic: [address]  Times per week: 5-7  Times per day: Daily  Plan weeks: 3-4 days  Current Treatment Recommendations: Strengthening, Functional Mobility Training, Neuromuscular Re-education, Home Exercise Program, Equipment Evaluation, Education, & procurement, ROM, Transfer Training, Cognitive/Perceptual Training, Gait Training, Cognitive Reorientation, Safety Education & Training, Balance Training, Endurance Training, Stair training, Pain Management, Patient/Caregiver Education & Training  Safety Devices  Type of devices: Gait belt, Patient at risk for falls, Call light within reach, Left in chair, Chair alarm in place    G-Code       OutComes Score                                                  AM-PAC Score             Goals  Short term goals  Time Frame for Short term goals: STG=LTG, 3-4 days or length of stay  Short term goal 1: modified independent with bed mobility  Short term goal 2: DS for transfers with proper hand placement  Short term goal 3: Pt. demo ability to ambulate with rolling walker >150 ft with DS  Short term goal 4: up/down

## 2019-04-05 LAB
ANION GAP SERPL CALCULATED.3IONS-SCNC: 10 MEQ/L (ref 9–15)
BASOPHILS ABSOLUTE: 0 K/UL (ref 0–0.2)
BASOPHILS RELATIVE PERCENT: 0 %
BUN BLDV-MCNC: 9 MG/DL (ref 8–23)
CALCIUM SERPL-MCNC: 8.1 MG/DL (ref 8.5–9.9)
CHLORIDE BLD-SCNC: 104 MEQ/L (ref 95–107)
CO2: 26 MEQ/L (ref 20–31)
CREAT SERPL-MCNC: 0.38 MG/DL (ref 0.5–0.9)
EOSINOPHILS ABSOLUTE: 0 K/UL (ref 0–0.7)
EOSINOPHILS RELATIVE PERCENT: 0 %
GFR AFRICAN AMERICAN: >60
GFR NON-AFRICAN AMERICAN: >60
GLUCOSE BLD-MCNC: 129 MG/DL (ref 70–99)
HCT VFR BLD CALC: 28 % (ref 37–47)
HEMOGLOBIN: 9 G/DL (ref 12–16)
LYMPHOCYTES ABSOLUTE: 0.8 K/UL (ref 1–4.8)
LYMPHOCYTES RELATIVE PERCENT: 5.7 %
MCH RBC QN AUTO: 32.6 PG (ref 27–31.3)
MCHC RBC AUTO-ENTMCNC: 32.2 % (ref 33–37)
MCV RBC AUTO: 101.3 FL (ref 82–100)
MONOCYTES ABSOLUTE: 0.2 K/UL (ref 0.2–0.8)
MONOCYTES RELATIVE PERCENT: 1.3 %
NEUTROPHILS ABSOLUTE: 13.7 K/UL (ref 1.4–6.5)
NEUTROPHILS RELATIVE PERCENT: 93 %
PDW BLD-RTO: 17.7 % (ref 11.5–14.5)
PLATELET # BLD: 231 K/UL (ref 130–400)
POTASSIUM SERPL-SCNC: 3.5 MEQ/L (ref 3.4–4.9)
RBC # BLD: 2.76 M/UL (ref 4.2–5.4)
SODIUM BLD-SCNC: 140 MEQ/L (ref 135–144)
WBC # BLD: 14.7 K/UL (ref 4.8–10.8)

## 2019-04-05 PROCEDURE — 6360000002 HC RX W HCPCS: Performed by: INTERNAL MEDICINE

## 2019-04-05 PROCEDURE — 80048 BASIC METABOLIC PNL TOTAL CA: CPT

## 2019-04-05 PROCEDURE — 85025 COMPLETE CBC W/AUTO DIFF WBC: CPT

## 2019-04-05 PROCEDURE — 97535 SELF CARE MNGMENT TRAINING: CPT

## 2019-04-05 PROCEDURE — 96366 THER/PROPH/DIAG IV INF ADDON: CPT

## 2019-04-05 PROCEDURE — 1210000000 HC MED SURG R&B

## 2019-04-05 PROCEDURE — 96372 THER/PROPH/DIAG INJ SC/IM: CPT

## 2019-04-05 PROCEDURE — 97110 THERAPEUTIC EXERCISES: CPT

## 2019-04-05 PROCEDURE — 2580000003 HC RX 258: Performed by: INTERNAL MEDICINE

## 2019-04-05 PROCEDURE — 97530 THERAPEUTIC ACTIVITIES: CPT

## 2019-04-05 PROCEDURE — 6370000000 HC RX 637 (ALT 250 FOR IP): Performed by: INTERNAL MEDICINE

## 2019-04-05 PROCEDURE — 96376 TX/PRO/DX INJ SAME DRUG ADON: CPT

## 2019-04-05 PROCEDURE — 6370000000 HC RX 637 (ALT 250 FOR IP): Performed by: ANESTHESIOLOGY

## 2019-04-05 PROCEDURE — 97116 GAIT TRAINING THERAPY: CPT

## 2019-04-05 PROCEDURE — 36415 COLL VENOUS BLD VENIPUNCTURE: CPT

## 2019-04-05 PROCEDURE — 99232 SBSQ HOSP IP/OBS MODERATE 35: CPT | Performed by: INTERNAL MEDICINE

## 2019-04-05 RX ORDER — ZOLPIDEM TARTRATE 5 MG/1
5 TABLET ORAL NIGHTLY PRN
Status: DISCONTINUED | OUTPATIENT
Start: 2019-04-05 | End: 2019-04-06 | Stop reason: HOSPADM

## 2019-04-05 RX ORDER — LOPERAMIDE HYDROCHLORIDE 2 MG/1
2 CAPSULE ORAL ONCE
Status: COMPLETED | OUTPATIENT
Start: 2019-04-05 | End: 2019-04-05

## 2019-04-05 RX ORDER — POTASSIUM CHLORIDE 750 MG/1
20 TABLET, EXTENDED RELEASE ORAL ONCE
Status: COMPLETED | OUTPATIENT
Start: 2019-04-05 | End: 2019-04-05

## 2019-04-05 RX ADMIN — LOPERAMIDE HYDROCHLORIDE 2 MG: 2 CAPSULE ORAL at 11:37

## 2019-04-05 RX ADMIN — PREDNISOLONE ACETATE 1 DROP: 10 SUSPENSION/ DROPS OPHTHALMIC at 22:35

## 2019-04-05 RX ADMIN — DICYCLOMINE HYDROCHLORIDE 10 MG: 10 CAPSULE ORAL at 06:03

## 2019-04-05 RX ADMIN — PREDNISOLONE ACETATE 1 DROP: 10 SUSPENSION/ DROPS OPHTHALMIC at 08:40

## 2019-04-05 RX ADMIN — ENOXAPARIN SODIUM 40 MG: 40 INJECTION SUBCUTANEOUS at 08:39

## 2019-04-05 RX ADMIN — Medication 10 ML: at 11:38

## 2019-04-05 RX ADMIN — MEROPENEM 1 G: 1 INJECTION, POWDER, FOR SOLUTION INTRAVENOUS at 04:11

## 2019-04-05 RX ADMIN — DICYCLOMINE HYDROCHLORIDE 10 MG: 10 CAPSULE ORAL at 16:03

## 2019-04-05 RX ADMIN — SODIUM CHLORIDE: 900 INJECTION, SOLUTION INTRAVENOUS at 03:29

## 2019-04-05 RX ADMIN — MEROPENEM 1 G: 1 INJECTION, POWDER, FOR SOLUTION INTRAVENOUS at 16:03

## 2019-04-05 RX ADMIN — Medication 10 ML: at 22:33

## 2019-04-05 RX ADMIN — POTASSIUM CHLORIDE 20 MEQ: 750 TABLET, EXTENDED RELEASE ORAL at 11:37

## 2019-04-05 RX ADMIN — DICYCLOMINE HYDROCHLORIDE 10 MG: 10 CAPSULE ORAL at 11:37

## 2019-04-05 RX ADMIN — HYDROMORPHONE HYDROCHLORIDE 0.5 MG: 1 INJECTION, SOLUTION INTRAMUSCULAR; INTRAVENOUS; SUBCUTANEOUS at 22:34

## 2019-04-05 RX ADMIN — GABAPENTIN 300 MG: 300 CAPSULE ORAL at 22:33

## 2019-04-05 RX ADMIN — HYDROCORTISONE SODIUM SUCCINATE 50 MG: 100 INJECTION, POWDER, FOR SOLUTION INTRAMUSCULAR; INTRAVENOUS at 03:29

## 2019-04-05 ASSESSMENT — PAIN DESCRIPTION - PAIN TYPE
TYPE: ACUTE PAIN

## 2019-04-05 ASSESSMENT — PAIN SCALES - GENERAL
PAINLEVEL_OUTOF10: 0
PAINLEVEL_OUTOF10: 8
PAINLEVEL_OUTOF10: 5
PAINLEVEL_OUTOF10: 7
PAINLEVEL_OUTOF10: 7
PAINLEVEL_OUTOF10: 4

## 2019-04-05 ASSESSMENT — PAIN DESCRIPTION - ONSET
ONSET: ON-GOING

## 2019-04-05 ASSESSMENT — PAIN DESCRIPTION - PROGRESSION
CLINICAL_PROGRESSION: GRADUALLY IMPROVING
CLINICAL_PROGRESSION: NOT CHANGED

## 2019-04-05 ASSESSMENT — ENCOUNTER SYMPTOMS
COUGH: 0
WHEEZING: 0
VOMITING: 0
NAUSEA: 0
ABDOMINAL PAIN: 1
SHORTNESS OF BREATH: 0
DIARRHEA: 1

## 2019-04-05 ASSESSMENT — PAIN DESCRIPTION - ORIENTATION
ORIENTATION: RIGHT;LOWER

## 2019-04-05 ASSESSMENT — PAIN DESCRIPTION - DESCRIPTORS
DESCRIPTORS: SHARP

## 2019-04-05 ASSESSMENT — PAIN DESCRIPTION - FREQUENCY
FREQUENCY: INTERMITTENT
FREQUENCY: INTERMITTENT
FREQUENCY: CONTINUOUS
FREQUENCY: CONTINUOUS

## 2019-04-05 ASSESSMENT — PAIN DESCRIPTION - LOCATION
LOCATION: FLANK

## 2019-04-05 NOTE — PROGRESS NOTES
Nancy Long is a 70 y. o.female patient.           Leukocytosis abdominal pain       No fevers chills sweats        Current Facility-Administered Medications   Medication Dose Route Frequency Provider Last Rate Last Dose    barium sulfate 98 % suspension 135 mL  135 mL Oral ONCE PRN Edna Traylor MD        acetaminophen (TYLENOL) tablet 500 mg  500 mg Oral Q6H PRN Edmond Early MD        dicyclomine (BENTYL) capsule 10 mg  10 mg Oral TID AC Edmond Early MD   10 mg at 04/05/19 1137    methocarbamol (ROBAXIN) tablet 500 mg  500 mg Oral Q6H PRN Edmond Early MD        gabapentin (NEURONTIN) capsule 300 mg  300 mg Oral Nightly Edna Traylor MD   300 mg at 04/04/19 2113    levothyroxine (SYNTHROID) tablet 50 mcg  50 mcg Oral Daily Edna Traylor MD   50 mcg at 04/04/19 0950    ondansetron (ZOFRAN-ODT) disintegrating tablet 4 mg  4 mg Oral Q8H PRN Edna Traylor MD        oxyCODONE-acetaminophen (PERCOCET) 5-325 MG per tablet 1 tablet  1 tablet Oral Q6H PRN Edmond Early MD   1 tablet at 04/04/19 1950    prednisoLONE acetate (PRED FORTE) 1 % ophthalmic suspension 1 drop  1 drop Left Eye BID Edna Traylor MD   1 drop at 04/05/19 0840    sodium chloride flush 0.9 % injection 10 mL  10 mL Intravenous 2 times per day Edna Traylor MD   10 mL at 04/05/19 1138    sodium chloride flush 0.9 % injection 10 mL  10 mL Intravenous PRN Edna Traylor MD        magnesium hydroxide (MILK OF MAGNESIA) 400 MG/5ML suspension 30 mL  30 mL Oral Daily PRN Edna Traylor MD        ondansetron [facility]) injection 4 mg  4 mg Intravenous Q6H PRN Edna Traylor MD        enoxaparin (LOVENOX) injection 40 mg  40 mg Subcutaneous Daily Edna Traylor MD   40 mg at 04/05/19 0839    meropenem (MERREM) 1 g in sodium chloride 0.9 % 100 mL IVPB (mini-bag)  1 g Intravenous Q12H Edna Traylor MD   Stopped at 04/05/19 0441    ketorolac (TORADOL) injection 15 mg  15 mg Intravenous Q6H PRN Evie Michelle MD   15 mg at 04/04/19 1018    HYDROmorphone (DILAUDID) injection 0.5 mg  0.5 mg Intravenous Q4H PRN Evie Michelle MD   0.5 mg at 04/04/19 2537       No Known Allergies  Patient Active Problem List    Diagnosis Date Noted    Flank pain 04/04/2019    Generalized abdominal pain 04/04/2019    Dorsalgia 04/04/2019    Myalgia 04/04/2019    UTI due to extended-spectrum beta lactamase (ESBL) producing Escherichia coli 03/27/2019    Acute pyelonephritis 03/27/2019    Adjustment reaction with prolonged depressive reaction 06/06/2018    Rheumatoid arthritis involving both knees with positive rheumatoid factor ([facility] [address] 11/20/2016    Obstructive pyelonephritis 09/19/2016    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/06/2016    History of cornea transplant 03/06/2016    Purpura ([facility] [address] 04/03/2015    Foot pain 02/03/2015    Pain in shoulder 03/06/2014    Rash 03/05/2014    GERD (gastroesophageal reflux disease) 01/08/2014    Vitamin D deficiency 11/11/2013    Extremity pain 10/24/2013    Anxiety 10/17/2011    OA (osteoarthritis) 10/17/2011    Rheumatoid arthritis ([facility] [address] 10/17/2011    Hypercholesteremia 10/17/2011    Acquired hypothyroidism 10/17/2011    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 10/19/2004     WBC 14. 7High   4.8 - 10.8 K/uL Final 04/05/2019  4:41 AM [facility]   RBC 2.76Low   4.20 - 5.40 M/uL Final 04/05/2019  4:41 AM [facility]   Hemoglobin 9.0Low   12.0 - 16.0 g/dL Final 04/05/2019  4:41 AM [facility]   Hematocrit 28. 0Low   37.0 - 47.0 % Final 04/05/2019  4:41 AM [facility]   . 3High   82.0 - 100.0 fL Final 04/05/2019  4:41 AM [facility]   [facility] 32.6High   27.0 - 31.3 pg Final 04/05/2019  4:41 AM [facility]   MCHC 32.2Low   33.0 - 37.0 % Final 04/05/2019  4:41 AM [facility]   RDW 17.7High   11.5 - 14.5 % Final 04/05/2019  4:41 AM [facility]   Platelets 240  018 - 400 K/uL Final 04/05/2019  4:41  [address] [facility] /75   Pulse 75   Temp 98.4 °F (36.9 °C) (Oral)   Resp 20   Ht 5' 3\" (1.6 m)   Wt 113 lb (51.3 kg)   LMP  (LMP Unknown)   SpO2 93%   BMI 20.02 kg/m²     Review of Systems   HENT: Negative. Respiratory: Negative for cough, shortness of breath and wheezing. Cardiovascular: Negative for chest pain and leg swelling. Gastrointestinal: Positive for abdominal pain and diarrhea. Negative for nausea and vomiting. Genitourinary: Negative. Musculoskeletal: Negative. Neurological: Negative. Physical Exam   Neck: Normal range of motion. Cardiovascular: Normal heart sounds. No murmur heard. Pulmonary/Chest: Breath sounds normal. No respiratory distress. She has no wheezes. She has no rales. Abdominal: Soft. She exhibits no distension and no mass. There is no tenderness. There is no rebound and no guarding. Neurological: She is alert.        Assessment/Plan:  Leukocytosis abdominal pain     Continue antibiotics to cover intra-abdominal process not convinced patient has urinary tract infection  White count decreasing

## 2019-04-05 NOTE — PROGRESS NOTES
Physical Therapy  Facility/Department: [facility] MED SURG UNIT  Daily Treatment Note  NAME: Tracy Jimenez  : 1950  MRN: [id number]    Date of Service: 2019    Discharge Recommendations:  Continue to assess pending progress        Patient Diagnosis(es): The primary encounter diagnosis was Dehydration. A diagnosis of Acute cystitis without hematuria was also pertinent to this visit. has a past medical history of Depression, GERD (gastroesophageal reflux disease), History of kidney stones, Hypercholesteremia, Hypothyroidism, Osteoarthritis, Osteopenia, and RA (rheumatoid arthritis) ([facility] [address] has a past surgical history that includes knee surgery; Hysterectomy; Gallbladder surgery; Upper gastrointestinal endoscopy (2013); Cystocopy (Left, 2016); Ureter surgery (Left, 10/12/2016); and Lithotripsy (Left, 10/12/2016). Restrictions  Restrictions/Precautions  Restrictions/Precautions: Contact Precautions, Up as Tolerated  Subjective   General  Chart Reviewed: Yes  Family / Caregiver Present: Yes  Referring Practitioner: Dr. Gregoria Wheat  Subjective  Subjective: Pt lying in bed upon arrival. Pt states she's feeling weak. General Comment  Comments: Spouse present during treatment  Pain Screening  Patient Currently in Pain: Yes  Pain Assessment  Pain Assessment: 0-10  Pain Level: 8  Patient's Stated Pain Goal: No pain  Pain Type: Acute pain  Pain Location: Flank  Pain Orientation: Right; Lower  Pain Descriptors: Sharp  Pain Frequency: Intermittent  Pain Onset: On-going  Clinical Progression: Gradually improving  Response to Pain Intervention: Asleep with RR greater than 10  Vital Signs  Patient Currently in Pain: Yes       Orientation     Cognition      Objective      Transfers  Sit to Stand: Stand by assistance  Stand to sit: Contact guard assistance  Stand Pivot Transfers: Stand by assistance  Ambulation  Ambulation?: Yes  Ambulation 1  Surface: level tile  Device: Rolling Walker  Other Apparatus: Wheelchair follow  Assistance: Contact guard assistance  Quality of Gait: slow jose,staed with FWW, R foot toes outward  Distance: 22'x2 and 44'  Comments: VC's to keep head up and stay close to [address]. Balance  Posture: Fair  Sitting - Static: Good  Sitting - Dynamic: Good  Standing - Static: Fair;+  Standing - Dynamic: Fair  Exercises  Gluteal Sets: x15 w/ 5 sec H  Hip Flexion: 2x10 seated marches  Knee Long Arc Quad: x10 RLE, x 8 L LE  Ankle Pumps: x20 seated  Comments: Pt requires tactile and VC's for proper technique of all ther Ex and assistance to keep count. Other exercises  Other exercises 1: pillow squeeze 2x10 seated                        Assessment   Body structures, Functions, Activity limitations: Decreased functional mobility ; Decreased endurance;Decreased vision/visual deficit; Decreased safe awareness;Decreased cognition;Decreased balance; Increased Pain  Assessment: Pt feels weak this session but peforms gait training with steadiness using FWW.   Treatment Diagnosis: Difficuty walking, impaired endurance, pain  Prognosis: Good  Decision Making: Medium Complexity  Activity Tolerance  Activity Tolerance: Patient Tolerated treatment well;Patient limited by fatigue     G-Code     OutComes Score                                                  AM-PAC Score             Goals  Short term goals  Time Frame for Short term goals: STG=LTG, 3-4 days or length of stay  Short term goal 1: modified independent with bed mobility  Short term goal 2: DS for transfers with proper hand placement  Short term goal 3: Pt. demo ability to ambulate with rolling walker >150 ft with DS  Short term goal 4: up/down curb step with HHA   Short term goal 5: supervision with exercises  Patient Goals   Patient goals : get stronger, return home with hsuband    Plan    Plan  Times per week: 5-7  Times per day: Daily  Plan weeks: 3-4 days  Current Treatment Recommendations: Strengthening, Functional Mobility Training, Neuromuscular Re-education, Home Exercise Program, Equipment Evaluation, Education, & procurement, ROM, Transfer Training, Cognitive/Perceptual Training, Gait Training, Cognitive Reorientation, Safety Education & Training, Balance Training, Endurance Training, Stair training, Pain Management, Patient/Caregiver Education & Training  Safety Devices  Type of devices: Gait belt, Patient at risk for falls, left in restroom and instructed to pull call light for assistance up, Chair alarm in place     Therapy Time   Individual Concurrent Group Co-treatment   Time In  1120         Time Out  1200         Minutes  [address] [facility], PTA  License and [address] Number: [id number]

## 2019-04-05 NOTE — PROGRESS NOTES
Hospitalist Progress Note      PCP: Alverna Hammans, DO    Date of Admission: 4/3/2019    Chief Complaint: diarrhea    Subjective: pt awake, alert, looks comfortable     Medications:  Reviewed    Infusion Medications   Scheduled Medications    potassium chloride  20 mEq Oral Once    dicyclomine  10 mg Oral TID AC    gabapentin  300 mg Oral Nightly    levothyroxine  50 mcg Oral Daily    prednisoLONE acetate  1 drop Left Eye BID    sodium chloride flush  10 mL Intravenous 2 times per day    enoxaparin  40 mg Subcutaneous Daily    meropenem  1 g Intravenous Q12H     PRN Meds: barium sulfate, acetaminophen, methocarbamol, ondansetron, oxyCODONE-acetaminophen, sodium chloride flush, magnesium hydroxide, ondansetron, ketorolac, HYDROmorphone    No intake or output data in the 24 hours ending 04/05/19 0956    Exam:    /75   Pulse 75   Temp 98.4 °F (36.9 °C) (Oral)   Resp 20   Ht 5' 3\" (1.6 m)   Wt 113 lb (51.3 kg)   LMP  (LMP Unknown)   SpO2 93%   BMI 20.02 kg/m²     General appearance: No apparent distress, appears stated age and cooperative. HEENT: Pupils equal, round, and reactive to light. Conjunctivae/corneas clear. Neck: Supple, with full range of motion. No jugular venous distention. Trachea midline. Respiratory:  Normal respiratory effort. Clear to auscultation, bilaterally without Rales/Wheezes/Rhonchi. Cardiovascular: Regular rate and rhythm with normal S1/S2 without murmurs, rubs or gallops. Abdomen: Soft, non-tender, non-distended with normal bowel sounds. Musculoskeletal: No clubbing, cyanosis or edema bilaterally. Full range of motion without deformity. Skin: Skin color, texture, turgor normal.  No rashes or lesions. Neurologic:  Neurovascularly intact without any focal sensory/motor deficits.  Cranial nerves: II-XII intact, grossly non-focal.  Psychiatric: Alert and oriented, thought content appropriate, normal insight  Capillary Refill: Brisk,< 3 seconds   Peripheral Pulses: +2 palpable, equal bilaterally       Labs:   Recent Labs     04/03/19  1411 04/04/19  0555 04/05/19  0441   WBC 29.0* 25.5* 14.7*   HGB 10.1* 10.7* 9.0*   HCT 31.6* 33.9* 28.0*    255 231     Recent Labs     04/03/19  1411 04/04/19  0555 04/05/19  0441   * 141 140   K 4.2 4.4 3.5   CL 95 104 104   CO2 25 24 26   BUN 32* 20 9   CREATININE 0.67 0.51 0.38*   CALCIUM 9.3 9.0 8.1*     Recent Labs     04/03/19  1411   AST 20   ALT 16   BILITOT 0.4   ALKPHOS 87     No results for input(s): INR in the last 72 hours. No results for input(s): [place] in the last 72 hours. Urinalysis:      Lab Results   Component Value Date    NITRU Negative 04/03/2019    WBCUA 6-10 04/03/2019    BACTERIA Many 04/03/2019    RBCUA 0-2 04/03/2019    BLOODU MODERATE 04/03/2019    SPECGRAV <=1.005 04/03/2019    GLUCOSEU Negative 04/03/2019       Radiology:  CT ABDOMEN PELVIS WO CONTRAST Additional Contrast? None   Final Result   Probable intermittent small bowel obstruction. Transitional zone appears to be in the small bowel in the region of the ileum. All CT scans at this facility use dose modulation, iterative reconstruction, and/or weight based dosing when appropriate to reduce radiation dose to as low as reasonably achievable.       FL SMALL BOWEL FOLLOW THROUGH ONLY    (Results Pending)           Assessment/Plan:    Active Hospital Problems    Diagnosis Date Noted    Flank pain [R10.9] 04/04/2019    Generalized abdominal pain [R10.84] 04/04/2019    Dorsalgia [M54.9] 04/04/2019    Myalgia [M79.10] 04/04/2019    UTI due to extended-spectrum beta lactamase (ESBL) producing Escherichia coli [N39.0, B96.29, Z16.12] 03/27/2019    Rheumatoid arthritis ([facility] [address] [M06.9] 10/17/2011         DVT Prophylaxis: lovenox  Diet: DIET GENERAL;  Code Status: Full Code    PT/OT Eval Status: done    Dispo - Leucocytosis, UTI with ESBL e coli- appreciate ID recommendation, continue with atbs, leucocytosis trending down, afebrile  Mild dehydration present on admission due to decreased PO intake- resolved with IV hydration,   abd pain, possible SBO per CT report- small bowel follow through done, surgery on case.  Clinically improved with multiple BM over night- advance diet to regular    Multiple pain complains, back pain, possible musculoskeletal in nature- pain management seeing patient, feels better today   Hypothyroidism- synthroid  hypokalemia replace today   Medically stable for acute admission at [facility], follow up along with consultants            Electronically signed by Brenda Condon MD on 4/5/2019 at 9:56 AM

## 2019-04-05 NOTE — PROGRESS NOTES
Occupational Therapy  Facility/Department: [facility] MED SURG UNIT  Daily Treatment Note  NAME: Tracy Jimenez  : 1950  MRN: [id number]    Date of Service: 2019    Discharge Recommendations:  (P) Continue to assess pending progress(SNF vs home with [facility] care)       Assessment   Performance deficits / Impairments: (P) Decreased functional mobility ; Decreased ADL status; Decreased strength;Decreased safe awareness;Decreased endurance;Decreased balance;Decreased high-level IADLs  Assessment: (P) Pt is a 63y/o female PLOF lived with  and was able to complete ADL I'ly. Pt presents to hospital s/p Dehydration, acute cystitis without hematuria and demo's the above resulting perf def/impair and would benefit from OT skilled services to maximize strength/end and safety/I with ADL for safe d/c transition. Prognosis: (P) Fair  History: (P) multi comorb  Exam: (P) 7 perf def/impair  REQUIRES OT FOLLOW UP: (P) Yes  Safety Devices  Safety Devices in place: (P) Yes  Type of devices: (P) All fall risk precautions in place         Patient Diagnosis(es): The primary encounter diagnosis was Dehydration. A diagnosis of Acute cystitis without hematuria was also pertinent to this visit. has a past medical history of Depression, GERD (gastroesophageal reflux disease), History of kidney stones, Hypercholesteremia, Hypothyroidism, Osteoarthritis, Osteopenia, and RA (rheumatoid arthritis) ([facility] [address] has a past surgical history that includes knee surgery; Hysterectomy; Gallbladder surgery; Upper gastrointestinal endoscopy (2013); Cystocopy (Left, 2016); Ureter surgery (Left, 10/12/2016); and Lithotripsy (Left, 10/12/2016).     Restrictions  Restrictions/Precautions  Restrictions/Precautions: (P) Contact Precautions, Up as Tolerated  Subjective   General  Chart Reviewed: (P) Yes  Patient assessed for rehabilitation services?: (P) Yes  Family / Caregiver Present: (P) yes  Referring Practitioner: (P)  Kaushal Chase  Diagnosis: (P) Dehydration, acute cystitis without hematuria  Subjective  Subjective: (P) Pt agreeable to therapy  Pain Assessment  Pain Level: (P) 7  Pain Type: (P) Acute pain  Pain Location: (P) Flank  Pain Orientation: (P) Right; Lower  Pain Descriptors: (P) Sharp  Pain Frequency: (P) Intermittent  Clinical Progression: (P) Not changed  Response to Pain Intervention: (P) Asleep with RR greater than 10  Vital Signs  Level of Consciousness: (P) Alert  Patient Currently in Pain: (P) Yes  Oxygen Therapy  O2 Device: (P) None (Room air)     Objective    ADL  Grooming: (P) Supervision;Stand by assistance  Toileting: (P) Contact guard assistance(increased time to complete d/t to BM, afraid to get off)        Standing Balance  Sit to stand: (P) Contact guard assistance  Stand to sit: (P) Contact guard assistance  Functional Mobility  Functional - Mobility Device: (P) Rolling Walker  Activity: (P) To/from bathroom; Other(level hallway surface)  Assist Level: (P) Contact guard assistance  Toilet Transfers  Toilet - Technique: (P) Ambulating  Equipment Used: (P) Grab bars  Toilet Transfer: (P) Contact guard assistance  Bed mobility  Rolling to Left: (P) Modified independent  Rolling to Right: (P) Modified independent  Supine to Sit: (P) Supervision  Transfers  Stand Step Transfers: (P) Contact guard assistance(with RW)  Sit to stand: (P) Contact guard assistance  Stand to sit: (P) Contact guard assistance                                            Type of ROM/Therapeutic Exercise  Type of ROM/Therapeutic Exercise: (P) AROM  Comment: (P) lying in bed 22 degrees supine  Exercises  Shoulder Flexion: (P) 1# therabar 1 set x10 reps   Shoulder Extension: (P) 1# therabar 1 set x10 reps   Horizontal ABduction: (P) 1# therabar 1 set x10 reps   Horizontal ADduction: (P) 1# therabar 1 set x10 reps   Elbow Flexion: (P) 1# therabar 1 set x10 reps   Elbow Extension: (P) 1# therabar 1 set x10 reps   Wrist Flexion: (P) 1# therabar 1 set x10 reps   Wrist Extension: (P) 1# therabar 1 set x10 reps   Other: (P) to increase BUE ROM, strength and endurance to improve functional task performance during ADL self care tasks. Pt seen in room for toileting tasks and arm strengthening. Pt demo ability in toileting tasks including transfer with FWW and grab bars pericare and clothing managment, requiring SBA/CGA. Instruction on BUE exercises using 1# therabar 1 set x 10 reps in all planes lying supine in bed to increase BUE ROM, strength and endurance to improve functional task performance during ADLs of self care tasks.  Family present during tx session this date                 Plan   Plan  Times per week: (P) 3-6x/wk  Times per day: (P) Daily  Plan weeks: (P) <1- med pt  Current Treatment Recommendations: (P) Strengthening, ROM, Balance Training, Functional Mobility Training, Endurance Training, Safety Education & Training, Patient/Caregiver Education & Training, Self-Care / ADL, Home Management Training, Pain Management  G-Code     OutComes Score                                                  AM-PAC Score             Goals  Short term goals  Time Frame for Short term goals: (P) 3-5 days- med pt  Short term goal 1: (P) I with BUE HEP  Short term goal 2: (P) SBA/Spv toileting  Short term goal 3: (P) CGA LB dressing and bathing  Short term goal 4: (P) Increase to 5-7min stand rodrigo/end for decreased fall risk during ADL  Long term goals  Time Frame for Long term goals : (P) Same as STGs  Long term goal 1: (P) Same as STGs  Long term goal 2: (P) Same as STGs  Patient Goals   Patient goals : (P) To go home       Therapy Time   Individual Concurrent Group Co-treatment   Time In  1:15         Time Out  1:50         Minutes  [address] [facility]

## 2019-04-05 NOTE — PROGRESS NOTES
Chart reviewed. Patient's care discussed in daily quality rounds. Patient is currently inpatient at [facility] after presenting to the ED with multiple pain complaints. Patient is being seen by hospitalist and ID. Patient was evaluated by PT/OT and therapies are recommending SNF vs. HHC upon DC from [facility]. Patient's insurance will require a precert should patient desire to remain at [facility] for skilled therapies or go to SNF. SS to continue to follow and assist in coordinating safe DC.

## 2019-04-06 VITALS
HEIGHT: 63 IN | OXYGEN SATURATION: 97 % | DIASTOLIC BLOOD PRESSURE: 65 MMHG | BODY MASS INDEX: 20.02 KG/M2 | RESPIRATION RATE: 18 BRPM | HEART RATE: 65 BPM | TEMPERATURE: 97.2 F | SYSTOLIC BLOOD PRESSURE: 117 MMHG | WEIGHT: 113 LBS

## 2019-04-06 LAB
BASOPHILS ABSOLUTE: 0 K/UL (ref 0–0.2)
BASOPHILS RELATIVE PERCENT: 0.2 %
EOSINOPHILS ABSOLUTE: 0 K/UL (ref 0–0.7)
EOSINOPHILS RELATIVE PERCENT: 0 %
HCT VFR BLD CALC: 28.7 % (ref 37–47)
HEMOGLOBIN: 9.2 G/DL (ref 12–16)
LYMPHOCYTES ABSOLUTE: 2.4 K/UL (ref 1–4.8)
LYMPHOCYTES RELATIVE PERCENT: 23.1 %
MCH RBC QN AUTO: 32.6 PG (ref 27–31.3)
MCHC RBC AUTO-ENTMCNC: 32.1 % (ref 33–37)
MCV RBC AUTO: 101.5 FL (ref 82–100)
MONOCYTES ABSOLUTE: 0.4 K/UL (ref 0.2–0.8)
MONOCYTES RELATIVE PERCENT: 3.8 %
NEUTROPHILS ABSOLUTE: 7.6 K/UL (ref 1.4–6.5)
NEUTROPHILS RELATIVE PERCENT: 72.9 %
ORGANISM: ABNORMAL
PDW BLD-RTO: 17.9 % (ref 11.5–14.5)
PLATELET # BLD: 246 K/UL (ref 130–400)
RBC # BLD: 2.83 M/UL (ref 4.2–5.4)
URINE CULTURE, ROUTINE: ABNORMAL
URINE CULTURE, ROUTINE: ABNORMAL
WBC # BLD: 10.4 K/UL (ref 4.8–10.8)

## 2019-04-06 PROCEDURE — 6370000000 HC RX 637 (ALT 250 FOR IP): Performed by: ANESTHESIOLOGY

## 2019-04-06 PROCEDURE — 6370000000 HC RX 637 (ALT 250 FOR IP): Performed by: INTERNAL MEDICINE

## 2019-04-06 PROCEDURE — 2580000003 HC RX 258: Performed by: INTERNAL MEDICINE

## 2019-04-06 PROCEDURE — 97535 SELF CARE MNGMENT TRAINING: CPT

## 2019-04-06 PROCEDURE — 96366 THER/PROPH/DIAG IV INF ADDON: CPT

## 2019-04-06 PROCEDURE — 85025 COMPLETE CBC W/AUTO DIFF WBC: CPT

## 2019-04-06 PROCEDURE — 6360000002 HC RX W HCPCS: Performed by: INTERNAL MEDICINE

## 2019-04-06 PROCEDURE — 97110 THERAPEUTIC EXERCISES: CPT

## 2019-04-06 PROCEDURE — 36415 COLL VENOUS BLD VENIPUNCTURE: CPT

## 2019-04-06 RX ORDER — PREDNISONE 1 MG/1
5 TABLET ORAL DAILY
Qty: 30 TABLET | Refills: 0 | Status: SHIPPED | OUTPATIENT
Start: 2019-04-06 | End: 2019-05-06

## 2019-04-06 RX ADMIN — LEVOTHYROXINE SODIUM 50 MCG: 50 TABLET ORAL at 08:44

## 2019-04-06 RX ADMIN — DICYCLOMINE HYDROCHLORIDE 10 MG: 10 CAPSULE ORAL at 06:21

## 2019-04-06 RX ADMIN — PREDNISOLONE ACETATE 1 DROP: 10 SUSPENSION/ DROPS OPHTHALMIC at 08:44

## 2019-04-06 RX ADMIN — OXYCODONE AND ACETAMINOPHEN 1 TABLET: 5; 325 TABLET ORAL at 06:21

## 2019-04-06 RX ADMIN — MEROPENEM 1 G: 1 INJECTION, POWDER, FOR SOLUTION INTRAVENOUS at 04:08

## 2019-04-06 RX ADMIN — Medication 10 ML: at 08:45

## 2019-04-06 ASSESSMENT — PAIN SCALES - GENERAL
PAINLEVEL_OUTOF10: 6
PAINLEVEL_OUTOF10: 0

## 2019-04-06 NOTE — PROGRESS NOTES
Physical Therapy  Facility/Department: [facility] MED SURG UNIT  Daily Treatment Note  NAME: Memo Gutierres  : 1950  MRN: [id number]    Date of Service: 2019    Discharge Recommendations:  Continue to assess pending progress        Patient Diagnosis(es): The primary encounter diagnosis was Dehydration. A diagnosis of Acute cystitis without hematuria was also pertinent to this visit. has a past medical history of Depression, GERD (gastroesophageal reflux disease), History of kidney stones, Hypercholesteremia, Hypothyroidism, Osteoarthritis, Osteopenia, and RA (rheumatoid arthritis) ([facility] [address] has a past surgical history that includes knee surgery; Hysterectomy; Gallbladder surgery; Upper gastrointestinal endoscopy (2013); Cystocopy (Left, 2016); Ureter surgery (Left, 10/12/2016); and Lithotripsy (Left, 10/12/2016). Restrictions  Restrictions/Precautions  Restrictions/Precautions: Contact Precautions, Up as Tolerated  Subjective              Orientation     Cognition      Objective                                           Assessment   Assessment: Pt being D/C and no PT needed. Offered services, but refused as  and nursing in room preparing pt to leave.         G-Code     OutComes Score                                                  AM-PAC Score             Goals  Short term goals  Time Frame for Short term goals: STG=LTG, 3-4 days or length of stay  Short term goal 1: modified independent with bed mobility  Short term goal 2: DS for transfers with proper hand placement  Short term goal 3: Pt. demo ability to ambulate with rolling walker >150 ft with DS  Short term goal 4: up/down curb step with HHA   Short term goal 5: supervision with exercises  Patient Goals   Patient goals : get stronger, return home with hsuband    Plan    Plan  Times per week: 5-7  Times per day: Daily  Plan weeks: 3-4 days  Current Treatment Recommendations: Strengthening, Functional Mobility Training,

## 2019-04-06 NOTE — PROGRESS NOTES
Occupational Therapy  Facility/Department: [facility] MED SURG UNIT  Daily Treatment Note  NAME: Manuel Hein  : 1950  MRN: [id number]    Date of Service: 2019    Discharge Recommendations:  (P) Continue to assess pending progress   Patient is being discharged today per Dr. Danny Kiran   Performance deficits / Impairments: (P) Decreased functional mobility ; Decreased ADL status; Decreased strength;Decreased safe awareness;Decreased endurance;Decreased balance;Decreased high-level IADLs  Assessment: (P) Pt is a 63y/o female PLOF lived with  and was able to complete ADL I'ly. Pt presents to hospital s/p Dehydration, acute cystitis without hematuria and demo's the above resulting perf def/impair and would benefit from OT skilled services to maximize strength/end and safety/I with ADL for safe d/c transition. Prognosis: (P) Fair  History: (P) multi comorb  Exam: (P) 7 perf def/impair  REQUIRES OT FOLLOW UP: (P) Yes  Safety Devices  Safety Devices in place: (P) Yes  Type of devices: (P) All fall risk precautions in place         Patient Diagnosis(es): The primary encounter diagnosis was Dehydration. A diagnosis of Acute cystitis without hematuria was also pertinent to this visit. has a past medical history of Depression, GERD (gastroesophageal reflux disease), History of kidney stones, Hypercholesteremia, Hypothyroidism, Osteoarthritis, Osteopenia, and RA (rheumatoid arthritis) ([facility] [address] has a past surgical history that includes knee surgery; Hysterectomy; Gallbladder surgery; Upper gastrointestinal endoscopy (2013); Cystocopy (Left, 2016); Ureter surgery (Left, 10/12/2016); and Lithotripsy (Left, 10/12/2016).     Restrictions  Restrictions/Precautions  Restrictions/Precautions: (P) Contact Precautions, Up as Tolerated  Subjective   General  Chart Reviewed: (P) Yes  Patient assessed for rehabilitation services?: Yes  Family / Caregiver Present: No  Referring Practitioner:  Parekh  Diagnosis: Dehydration, acute cystitis without hematuria  Subjective  Subjective: (P) Pt agreeable to therapy  Vital Signs  Patient Currently in Pain: (P) Denies   Orientation     Objective    ADL  Grooming: (P) Supervision;Stand by assistance(standing at sink)  UE Dressing: (P) Setup;Supervision(Min A to button shirt due to eye sight problems)  LE Dressing: (P) Minimal assistance; Moderate assistance; Increased time to complete(Mod to feed feet through pants and undergarment)  Toileting: (P) Supervision;Stand by assistance(Independent with hygiene)        Balance  Sitting Balance: (P) Supervision  Standing Balance: (P) Supervision  Standing Balance  Time: (P) 5 min  Activity: (P) Grooming/toileting  Sit to stand: (P) Supervision  Stand to sit: (P) Supervision  Functional Mobility  Functional - Mobility Device: (P) Rolling Walker  Activity: (P) To/from bathroom  Assist Level: (P) Stand by assistance  Toilet Transfers  Toilet - Technique: (P) Ambulating  Equipment Used: (P) Grab bars  Toilet Transfer: (P) Stand by assistance  Toilet Transfers Comments: (P) took extra time sitting on toilet  Bed mobility  Scooting: (P) Supervision  Transfers  Sit to stand: (P) Supervision  Stand to sit: (P) Supervision      HEP education provided and answered all questions. Patient and spouse understand all exercises due to having home health previously. They are not interested in having HH due to having a bad experience.                                                               Plan   Plan  Times per week: (P) 3-6x/wk  Times per day: (P) Daily  Plan weeks: (P) <1- med pt  Current Treatment Recommendations: (P) Strengthening, ROM, Balance Training, Functional Mobility Training, Endurance Training, Safety Education & Training, Patient/Caregiver Education & Training, Self-Care / ADL, Home Management Training, Pain Management  G-Code     OutComes Score                                                  AM-PAC Score Goals  Short term goals  Time Frame for Short term goals: (P) 3-5 days- med pt  Short term goal 1: (P) I with BUE HEP  Short term goal 2: (P) SBA/Spv toileting  Short term goal 3: (P) CGA LB dressing and bathing  Short term goal 4: (P) Increase to 5-7min stand rodrigo/end for decreased fall risk during ADL  Long term goals  Time Frame for Long term goals : (P) Same as STGs  Long term goal 1: (P) Same as STGs  Long term goal 2: (P) Same as STGs  Patient Goals   Patient goals : (P) To go home       Therapy Time   Individual Concurrent Group Co-treatment   Time In  930         Time Out  1030         Minutes  60                 Eva Friend, MIGUEL  License and Documentation Cosign  Therapy License Number: [id number]

## 2019-04-06 NOTE — DISCHARGE SUMMARY
hours as needed for Pain for up to 7 days. STOP taking these medications    predniSONE 5 MG tablet  Commonly known as:  DELTASONE            Physical Exam:    Vitals:  Vitals:    04/05/19 0335 04/05/19 0741 04/06/19 0021 04/06/19 0554   BP: 135/75   117/65   Pulse: 75   65   Resp: 20   18   Temp: 98.4 °F (36.9 °C)   97.2 °F (36.2 °C)   TempSrc: Oral   Oral   SpO2: 97% 93% 95% 97%   Weight:       Height:         Weight: Weight: 113 lb (51.3 kg)     24 hour intake/output:    Intake/Output Summary (Last 24 hours) at 4/6/2019 0804  Last data filed at 4/6/2019 8895  Gross per 24 hour   Intake 660 ml   Output --   Net 660 ml       General appearance - alert, well appearing, and in no distress  Chest - clear to auscultation, no wheezes, rales or rhonchi, symmetric air entry  Heart - normal rate, regular rhythm, normal S1, S2, no murmurs, rubs, clicks or gallops  Abdomen - soft, nontender, nondistended, no masses or organomegaly  Obese: No; Protuberant: No   Neurological - alert, oriented, normal speech, no focal findings or movement disorder noted  Extremities - peripheral pulses normal, no pedal edema, no clubbing or cyanosis  Skin - normal coloration and turgor, no rashes, no suspicious skin lesions noted        Radiology reports as per the Radiologist  Radiology: Ct Abdomen Pelvis Wo Contrast Additional Contrast? None    Result Date: 4/3/2019  COMPARISON: March 22, 2019; March 31, 2019. HISTORY:  for sbo COMMENTS: N39.0 UTI due to extended-spectrum beta lactamase (ESBL) producing Escherichia coli ICD10 TECHNIQUE: procedure Thin slice spiral CT was performed from the lung bases through the iliac crests without contrast administration. Contrast enhancement was not employed due to clinician's order. Sagittal and coronal reconstructions were also performed. FINDINGS: Images through the lung bases demonstrate platelike atelectasis in the visualized right base. There is also atelectasis seen in the left base.  A primary Healed fracture is seen in the right pubic bone. The immediate image after the administration of oral contrast shows mild dilatation of the proximal small bowel up to 3.3 cm. The rest of the small bowel appears normal caliber. There is markedly delay of transient of the barium into the terminal ileum. The barium reached the terminal ileum in approximately 5 1/2 hours. Markedly delay in transient of the barium into the terminal ileum. The terminal ileum appears unremarkable. No bowel obstruction is seen.        Results for orders placed or performed during the hospital encounter of 04/03/19   Urine Culture   Result Value Ref Range    Urine Culture, Routine      Organism Escherichia coli (A)     Urine Culture, Routine >100,000 CFU/ml  Sensitivity to follow      Urine Reflex to Culture   Result Value Ref Range    Color, UA Yellow Straw/Yellow    Clarity, UA Clear Clear    Glucose, Ur Negative Negative mg/dL    Bilirubin Urine Negative Negative    Ketones, Urine Negative Negative mg/dL    Specific Gravity, UA <=1.005 1.005 - 1.030    Blood, Urine MODERATE (A) Negative    pH, UA 5.5 5.0 - 9.0    Protein, UA Negative Negative mg/dL    Urobilinogen, Urine 0.2 <2.0 E.U./dL    Nitrite, Urine Negative Negative    Leukocyte Esterase, Urine SMALL (A) Negative    Urine Reflex to Culture YES    Microscopic Urinalysis   Result Value Ref Range    WBC, UA 6-10 (A) 0 - 5 /HPF    RBC, UA 0-2 0 - 2 /HPF    Epi Cells 0-2 /HPF    Bacteria, UA Many /HPF   Comprehensive Metabolic Panel   Result Value Ref Range    Sodium 133 (L) 135 - 144 mEq/L    Potassium 4.2 3.4 - 4.9 mEq/L    Chloride 95 95 - 107 mEq/L    CO2 25 20 - 31 mEq/L    Anion Gap 13 9 - 15 mEq/L    Glucose 131 (H) 70 - 99 mg/dL    BUN 32 (H) 8 - 23 mg/dL    CREATININE 0.67 0.50 - 0.90 mg/dL    GFR Non-African American >60.0 >60    GFR  >60.0 >60    Calcium 9.3 8.5 - 9.9 mg/dL    Total Protein 6.3 6.3 - 8.0 g/dL    Alb 3.3 (L) 3.5 - 4.6 g/dL    Total Bilirubin 0.4 0.2 - 0.7 mg/dL    Alkaline Phosphatase 87 40 - 130 U/L    ALT 16 0 - 33 U/L    AST 20 0 - 35 U/L    Globulin 3.0 2.3 - 3.5 g/dL   CBC Auto Differential   Result Value Ref Range    WBC 29.0 (H) 4.8 - 10.8 K/uL    RBC 3.10 (L) 4.20 - 5.40 M/uL    Hemoglobin 10.1 (L) 12.0 - 16.0 g/dL    Hematocrit 31.6 (L) 37.0 - 47.0 %    .8 (H) 82.0 - 100.0 fL    MCH 32.7 (H) 27.0 - 31.3 pg    MCHC 32.1 (L) 33.0 - 37.0 %    RDW 16.4 (H) 11.5 - 14.5 %    Platelets 814 909 - 727 K/uL    Neutrophils % 75.0 %    Lymphocytes % 1.0 %    Monocytes % 10.0 %    Eosinophils % 0.0 %    Basophils % 0.3 %    Neutrophils # 25.8 (H) 1.4 - 6.5 K/uL    Lymphocytes # 0.3 (L) 1.0 - 4.8 K/uL    Monocytes # 2.9 (H) 0.2 - 0.8 K/uL    Eosinophils # 0.0 0.0 - 0.7 K/uL    Basophils # 0.0 0.0 - 0.2 K/uL    Bands Relative 14 (H) 5 - 11 %    Macrocytes PRESENT    CBC   Result Value Ref Range    WBC 25.5 (H) 4.8 - 10.8 K/uL    RBC 3.30 (L) 4.20 - 5.40 M/uL    Hemoglobin 10.7 (L) 12.0 - 16.0 g/dL    Hematocrit 33.9 (L) 37.0 - 47.0 %    .6 (H) 82.0 - 100.0 fL    MCH 32.4 (H) 27.0 - 31.3 pg    MCHC 31.6 (L) 33.0 - 37.0 %    RDW 18.4 (H) 11.5 - 14.5 %    Platelets 837 515 - 697 K/uL   Basic Metabolic Panel w/ Reflex to MG   Result Value Ref Range    Sodium 141 135 - 144 mEq/L    Potassium reflex Magnesium 4.4 3.4 - 4.9 mEq/L    Chloride 104 95 - 107 mEq/L    CO2 24 20 - 31 mEq/L    Anion Gap 13 9 - 15 mEq/L    Glucose 140 (H) 70 - 99 mg/dL    BUN 20 8 - 23 mg/dL    CREATININE 0.51 0.50 - 0.90 mg/dL    GFR Non-African American >60.0 >60    GFR  >60.0 >60    Calcium 9.0 8.5 - 9.9 mg/dL   CBC Auto Differential   Result Value Ref Range    WBC 14.7 (H) 4.8 - 10.8 K/uL    RBC 2.76 (L) 4.20 - 5.40 M/uL    Hemoglobin 9.0 (L) 12.0 - 16.0 g/dL    Hematocrit 28.0 (L) 37.0 - 47.0 %    .3 (H) 82.0 - 100.0 fL    MCH 32.6 (H) 27.0 - 31.3 pg    MCHC 32.2 (L) 33.0 - 37.0 %    RDW 17.7 (H) 11.5 - 14.5 %    Platelets 053 622 - 375 K/uL Neutrophils % 93.0 %    Lymphocytes % 5.7 %    Monocytes % 1.3 %    Eosinophils % 0.0 %    Basophils % 0.0 %    Neutrophils # 13.7 (H) 1.4 - 6.5 K/uL    Lymphocytes # 0.8 (L) 1.0 - 4.8 K/uL    Monocytes # 0.2 0.2 - 0.8 K/uL    Eosinophils # 0.0 0.0 - 0.7 K/uL    Basophils # 0.0 0.0 - 0.2 K/uL   Basic Metabolic Panel   Result Value Ref Range    Sodium 140 135 - 144 mEq/L    Potassium 3.5 3.4 - 4.9 mEq/L    Chloride 104 95 - 107 mEq/L    CO2 26 20 - 31 mEq/L    Anion Gap 10 9 - 15 mEq/L    Glucose 129 (H) 70 - 99 mg/dL    BUN 9 8 - 23 mg/dL    CREATININE 0.38 (L) 0.50 - 0.90 mg/dL    GFR Non-African American >60.0 >60    GFR  >60.0 >60    Calcium 8.1 (L) 8.5 - 9.9 mg/dL   CBC Auto Differential   Result Value Ref Range    WBC 10.4 4.8 - 10.8 K/uL    RBC 2.83 (L) 4.20 - 5.40 M/uL    Hemoglobin 9.2 (L) 12.0 - 16.0 g/dL    Hematocrit 28.7 (L) 37.0 - 47.0 %    .5 (H) 82.0 - 100.0 fL    MCH 32.6 (H) 27.0 - 31.3 pg    MCHC 32.1 (L) 33.0 - 37.0 %    RDW 17.9 (H) 11.5 - 14.5 %    Platelets 145 054 - 962 K/uL    Neutrophils % 72.9 %    Lymphocytes % 23.1 %    Monocytes % 3.8 %    Eosinophils % 0.0 %    Basophils % 0.2 %    Neutrophils # 7.6 (H) 1.4 - 6.5 K/uL    Lymphocytes # 2.4 1.0 - 4.8 K/uL    Monocytes # 0.4 0.2 - 0.8 K/uL    Eosinophils # 0.0 0.0 - 0.7 K/uL    Basophils # 0.0 0.0 - 0.2 K/uL       Diet:  DIET GENERAL;     Activity:  Activity as tolerated (Patient may move about with assist as indicated or with supervision.)    Follow-up:  in 1 weeks with Shawn Laird DO,     Disposition: home    Condition: Stable    Time Spent: 50 minutes    Electronically signed by Edna Traylor MD on 4/6/2019 at 8:04 AM    Discharging Hospitalist

## 2019-04-06 NOTE — DISCHARGE INSTR - COC
Continuity of Care Form    Patient Name: Beryl Carbajal   :  1950  MRN:  [id number]    Admit date:  4/3/2019  Discharge date:  ***    Code Status Order: Full Code   Advance Directives:   Advance Care Flowsheet Documentation     Date/Time Healthcare Directive Type of Healthcare Directive Copy in [address] Agent's Name Healthcare Agent's Phone Number    19 1601  No, patient does not have an advance directive for healthcare treatment -- -- -- -- --          Admitting Physician:  Florina Paredes MD  PCP: Mesha Gonzalez DO    Discharging Nurse: [facility] Unit/Room#: 0226/0226-01  Discharging Unit Phone Number: ***    Emergency Contact:   Extended Emergency Contact Information  Primary Emergency Contact: Benedicto Allen  Address: [address] Phone: [phone]  Work Phone: [phone]  Mobile Phone: [phone]  Relation: Spouse    Past Surgical History:  Past Surgical History:   Procedure Laterality Date    CYSTOSCOPY Left 2016    Loyda Rico MD  PYELOGRAM & DOUBLE-J STENT PLACEMENT    GALLBLADDER SURGERY      HYSTERECTOMY      KNEE SURGERY      left    LITHOTRIPSY Left 10/12/2016    HOLMIUM LASER LITHOTRIPSY AND REMOVAL OF LEFT J STENT  performed by Sarita Olivarez MD at [address] [place] [address]  2013    DR. RHODES    URETER SURGERY Left 10/12/2016    /left ureteroscopy/ cysto/ retrogrades/pyelogram/ holmium laser lithotripsy removal left urerteral stent performed by Sarita Olivarez MD at [facility]       Immunization History:   Immunization History   Administered Date(s) Administered    Influenza, High Dose (Fluzone 65 yrs and older) 10/03/2016, 2018    PPD Test 2015       Active Problems:  Patient Active Problem List   Diagnosis Code    Anxiety F41.9    OA (osteoarthritis) M19.90    Rheumatoid arthritis ([facility] [address] M06.9    Hypercholesteremia 113 lb (51.3 kg)   LMP  (LMP Unknown)   SpO2 97%   BMI 20.02 kg/m²     Last documented pain score (0-10 scale): Pain Level: 6  Last Weight:   Wt Readings from Last 1 Encounters:   19 113 lb (51.3 kg)     Mental Status:  {IP PT MENTAL STATUS:}    IV Access:  { THEO IV ACCESS:706604256}    Nursing Mobility/ADLs:  Walking   {CHP DME UVVE:202880294}  Transfer  {CHP DME LSXK:303335543}  Bathing  {CHP DME JUGW:285345178}  Dressing  {CHP DME DJFS:968047890}  Toileting  {CHP DME VVHD:446864416}  Feeding  {CHP DME XKUH:188473765}  Med Admin  {CHP DME MZXB:158197952}  Med Delivery   { THEO MED Delivery:809129122}    Wound Care Documentation and Therapy:        Elimination:  Continence:   · Bowel: {YES / WI:12716}  · Bladder: {YES / EV:15467}  Urinary Catheter: {Urinary Catheter:569154830}   Colostomy/Ileostomy/Ileal Conduit: {YES / LA:27205}       Date of Last BM: ***    Intake/Output Summary (Last 24 hours) at 2019 0802  Last data filed at 2019 0619  Gross per 24 hour   Intake 660 ml   Output --   Net 660 ml     I/O last 3 completed shifts:   In: 65 [P.O.:560; IV Piggyback:100]  Out: -     Safety Concerns:     [employer] Safety Concerns:154068678}    Impairments/Disabilities:      [address] [employer] Impairments/Disabilities:468696947}    Nutrition Therapy:  Current Nutrition Therapy:   [employer] Diet List:832997583}    Routes of Feeding: {P DME Other Feedings:889860041}  Liquids: {Slp liquid thickness:72627}  Daily Fluid Restriction: {CHP DME Yes amt example:072529445}  Last Modified Barium Swallow with Video (Video Swallowing Test): {Done Not Done KQYK:716065859}    Treatments at the Time of Hospital Discharge:   Respiratory Treatments: ***  Oxygen Therapy:  {Therapy; copd oxygen:83895}  Ventilator:    { CC Vent KCBJ:218736184}    Rehab Therapies: {THERAPEUTIC INTERVENTION:9183878099}  Weight Bearing Status/Restrictions: { SADIQ Weight Bearin}  Other Medical Equipment (for information only, NOT a DME order):  {EQUIPMENT:849961870}  Other Treatments: ***    Patient's personal belongings (please select all that are sent with patient):  {CHP DME Belongings:557401741}    RN SIGNATURE:  {Esignature:202702234}    CASE MANAGEMENT/SOCIAL WORK SECTION    Inpatient Status Date: ***    Readmission Risk Assessment Score:  Readmission Risk              Risk of Unplanned Readmission:        18           Discharging to Facility/ Agency   · Name:   · Address:  · Phone:  · Fax:    Dialysis Facility (if applicable)   · Name:  · Address:  · Dialysis Schedule:  · Phone:  · Fax:    / signature: {Esignature:061840872}    PHYSICIAN SECTION    Prognosis: Good    Condition at Discharge: Stable    Rehab Potential (if transferring to Rehab): Good    Recommended Labs or Other Treatments After Discharge:     Physician Certification: I certify the above information and transfer of Kacy Canchola  is necessary for the continuing treatment of the diagnosis listed and that she requires Home Care for greater 30 days.      Update Admission H&P: No change in H&P    PHYSICIAN SIGNATURE:  Electronically signed by Sarita Bynum MD on 4/6/19 at 8:03 AM

## 2019-04-07 ENCOUNTER — CARE COORDINATION (OUTPATIENT)
Dept: CASE MANAGEMENT | Age: 69
End: 2019-04-07

## 2019-04-07 NOTE — CARE COORDINATION
[facility] Transitions Initial Follow Up Call    Call within 2 business days of discharge: Yes    Patient: Naseem Root Patient : 1950   MRN: <[id number]>  Reason for Admission: Back pain  Discharge Date: 19 RARS: Readmission Risk Score: 16       Spoke with: Margie Rodriguez (spouse)    [facility]: [facility]    Per Margie Rodriguez patient is \"ok\", she was asleep at the time of writer's call, she is still having pain in her lower back, no worse than when admitted. Per Benedicto patient did not eat much since being home, she is drinking fluids and voiding, he stated that she is not having chest pain, sob, abd pain, fever, chills, he reports that they do not have [facility], he stated that patient will see her doctor on 19 and has transportation, he stated that she has her medications, list not reviewed with spouse, he stated that they do not have any questions, issues, immediate needs, or concerns. He is aware of when to call patient's doctor or report to ED for worsening or severe sx, he is agreeable to continued transition calls. Non-face-to-face services provided:  Obtained and reviewed discharge summary and/or continuity of care documents    Care Transitions 24 Hour Call    Do you have any ongoing symptoms?:  No  Do you have a copy of your discharge instructions?:  Yes  Do you have all of your prescriptions and are they filled?:  Yes  Have you been contacted by a [facility] Pharmacist?:  No  Have you scheduled your follow up appointment?:  Yes  How are you going to get to your appointment?:  Car - family or friend to transport  Were you discharged with any Home Care or Post Acute Services:  No  Patient DME:  Straight cane  Do you feel like you have everything you need to keep you well at home?:  Yes  Are you an active caregiver in your home?:  No  Care Transitions Interventions  No Identified Needs         Follow Up  No future appointments.     Misty Light RN BSN  Care Transitions Coordinator

## 2019-04-15 LAB
APPEARANCE: ABNORMAL
BILIRUBIN, URINE: NEGATIVE
BLOOD, URINE: NEGATIVE
COLOR, URINE: YELLOW
GLUCOSE, URINE: NEGATIVE MG/DL
KETONES, URINE: NEGATIVE MG/DL
LEUKOCYTE ESTERASE, URINE: NEGATIVE
NITRITE, URINE: NEGATIVE
PH UA: 6 (ref 5–8)
PROTEIN UA: NEGATIVE MG/DL
SPECIFIC GRAVITY, URINE: 1.01 (ref 1–1)
UROBILINOGEN, URINE: 4 MG/DL (ref 0–1.9)

## 2019-05-15 ASSESSMENT — ENCOUNTER SYMPTOMS
EYE REDNESS: 0
SHORTNESS OF BREATH: 0
BLOOD IN STOOL: 0
DIARRHEA: 0
EYE PAIN: 0
FACIAL SWELLING: 0
CHOKING: 0
WHEEZING: 0
SORE THROAT: 0
COUGH: 0
CONSTIPATION: 0
BACK PAIN: 0
TROUBLE SWALLOWING: 0
SINUS PRESSURE: 0
CHEST TIGHTNESS: 0
ABDOMINAL PAIN: 1
EYE DISCHARGE: 0
STRIDOR: 0
VOMITING: 0
VOICE CHANGE: 0

## 2019-12-07 ENCOUNTER — HOSPITAL ENCOUNTER (EMERGENCY)
Age: 69
Discharge: HOME | End: 2019-12-07
Attending: INTERNAL MEDICINE
Payer: MEDICARE

## 2019-12-07 ENCOUNTER — APPOINTMENT (OUTPATIENT)
Dept: CT IMAGING | Age: 69
End: 2019-12-07
Payer: MEDICARE

## 2019-12-07 VITALS
BODY MASS INDEX: 19.84 KG/M2 | RESPIRATION RATE: 16 BRPM | OXYGEN SATURATION: 95 % | SYSTOLIC BLOOD PRESSURE: 157 MMHG | HEART RATE: 65 BPM | DIASTOLIC BLOOD PRESSURE: 79 MMHG | HEIGHT: 63 IN | WEIGHT: 112 LBS | TEMPERATURE: 98.5 F

## 2019-12-07 DIAGNOSIS — R10.9 ABDOMINAL PAIN, UNSPECIFIED ABDOMINAL LOCATION: Primary | ICD-10-CM

## 2019-12-07 DIAGNOSIS — R91.1 PULMONARY NODULE: ICD-10-CM

## 2019-12-07 DIAGNOSIS — E87.6 HYPOKALEMIA: ICD-10-CM

## 2019-12-07 DIAGNOSIS — I10 ESSENTIAL HYPERTENSION: ICD-10-CM

## 2019-12-07 LAB
ALBUMIN SERPL-MCNC: 3.9 G/DL (ref 3.5–4.6)
ALP BLD-CCNC: 73 U/L (ref 40–130)
ALT SERPL-CCNC: 18 U/L (ref 0–33)
AMYLASE: 54 U/L (ref 22–93)
ANION GAP SERPL CALCULATED.3IONS-SCNC: 14 MEQ/L (ref 9–15)
AST SERPL-CCNC: 20 U/L (ref 0–35)
BASOPHILS ABSOLUTE: 0.1 K/UL (ref 0–0.2)
BASOPHILS RELATIVE PERCENT: 0.5 %
BILIRUB SERPL-MCNC: <0.2 MG/DL (ref 0.2–0.7)
BILIRUBIN URINE: NEGATIVE
BLOOD, URINE: NEGATIVE
BUN BLDV-MCNC: 13 MG/DL (ref 8–23)
CALCIUM SERPL-MCNC: 9.2 MG/DL (ref 8.5–9.9)
CHLORIDE BLD-SCNC: 105 MEQ/L (ref 95–107)
CLARITY: CLEAR
CO2: 21 MEQ/L (ref 20–31)
COLOR: YELLOW
CREAT SERPL-MCNC: 0.68 MG/DL (ref 0.5–0.9)
EOSINOPHILS ABSOLUTE: 0.1 K/UL (ref 0–0.7)
EOSINOPHILS RELATIVE PERCENT: 1.4 %
GFR AFRICAN AMERICAN: >60
GFR NON-AFRICAN AMERICAN: >60
GLOBULIN: 2.7 G/DL (ref 2.3–3.5)
GLUCOSE BLD-MCNC: 105 MG/DL (ref 70–99)
GLUCOSE URINE: NEGATIVE MG/DL
HCT VFR BLD CALC: 35.1 % (ref 37–47)
HEMOGLOBIN: 11.1 G/DL (ref 12–16)
KETONES, URINE: NEGATIVE MG/DL
LACTIC ACID: 1.5 MMOL/L (ref 0.5–2.2)
LEUKOCYTE ESTERASE, URINE: NEGATIVE
LIPASE: 22 U/L (ref 12–95)
LYMPHOCYTES ABSOLUTE: 2.1 K/UL (ref 1–4.8)
LYMPHOCYTES RELATIVE PERCENT: 20.9 %
MCH RBC QN AUTO: 28.3 PG (ref 27–31.3)
MCHC RBC AUTO-ENTMCNC: 31.6 % (ref 33–37)
MCV RBC AUTO: 89.6 FL (ref 82–100)
MONOCYTES ABSOLUTE: 1 K/UL (ref 0.2–0.8)
MONOCYTES RELATIVE PERCENT: 10.1 %
NEUTROPHILS ABSOLUTE: 6.6 K/UL (ref 1.4–6.5)
NEUTROPHILS RELATIVE PERCENT: 67.1 %
NITRITE, URINE: NEGATIVE
PDW BLD-RTO: 15.1 % (ref 11.5–14.5)
PH UA: 6 (ref 5–9)
PLATELET # BLD: 370 K/UL (ref 130–400)
POTASSIUM SERPL-SCNC: 3.1 MEQ/L (ref 3.4–4.9)
PROTEIN UA: NEGATIVE MG/DL
RBC # BLD: 3.92 M/UL (ref 4.2–5.4)
SODIUM BLD-SCNC: 140 MEQ/L (ref 135–144)
SPECIFIC GRAVITY UA: 1.01 (ref 1–1.03)
TOTAL PROTEIN: 6.6 G/DL (ref 6.3–8)
URINE REFLEX TO CULTURE: NORMAL
UROBILINOGEN, URINE: 0.2 E.U./DL
WBC # BLD: 9.8 K/UL (ref 4.8–10.8)

## 2019-12-07 PROCEDURE — 83690 ASSAY OF LIPASE: CPT

## 2019-12-07 PROCEDURE — 96375 TX/PRO/DX INJ NEW DRUG ADDON: CPT

## 2019-12-07 PROCEDURE — 2580000003 HC RX 258: Performed by: INTERNAL MEDICINE

## 2019-12-07 PROCEDURE — 96361 HYDRATE IV INFUSION ADD-ON: CPT

## 2019-12-07 PROCEDURE — 85025 COMPLETE CBC W/AUTO DIFF WBC: CPT

## 2019-12-07 PROCEDURE — 74177 CT ABD & PELVIS W/CONTRAST: CPT

## 2019-12-07 PROCEDURE — 36415 COLL VENOUS BLD VENIPUNCTURE: CPT

## 2019-12-07 PROCEDURE — 6360000004 HC RX CONTRAST MEDICATION: Performed by: INTERNAL MEDICINE

## 2019-12-07 PROCEDURE — 83605 ASSAY OF LACTIC ACID: CPT

## 2019-12-07 PROCEDURE — 96376 TX/PRO/DX INJ SAME DRUG ADON: CPT

## 2019-12-07 PROCEDURE — 99284 EMERGENCY DEPT VISIT MOD MDM: CPT

## 2019-12-07 PROCEDURE — 82150 ASSAY OF AMYLASE: CPT

## 2019-12-07 PROCEDURE — 80053 COMPREHEN METABOLIC PANEL: CPT

## 2019-12-07 PROCEDURE — 81003 URINALYSIS AUTO W/O SCOPE: CPT

## 2019-12-07 PROCEDURE — 6370000000 HC RX 637 (ALT 250 FOR IP): Performed by: INTERNAL MEDICINE

## 2019-12-07 PROCEDURE — 96374 THER/PROPH/DIAG INJ IV PUSH: CPT

## 2019-12-07 PROCEDURE — 6360000002 HC RX W HCPCS: Performed by: INTERNAL MEDICINE

## 2019-12-07 RX ORDER — ONDANSETRON 2 MG/ML
4 INJECTION INTRAMUSCULAR; INTRAVENOUS ONCE
Status: COMPLETED | OUTPATIENT
Start: 2019-12-07 | End: 2019-12-07

## 2019-12-07 RX ORDER — TRAMADOL HYDROCHLORIDE 50 MG/1
50 TABLET ORAL EVERY 8 HOURS PRN
COMMUNITY
Start: 2019-12-01 | End: 2021-08-29

## 2019-12-07 RX ORDER — POTASSIUM CHLORIDE 20 MEQ/1
20 TABLET, EXTENDED RELEASE ORAL ONCE
Status: DISCONTINUED | OUTPATIENT
Start: 2019-12-07 | End: 2019-12-07

## 2019-12-07 RX ORDER — CIPROFLOXACIN 500 MG/1
500 TABLET, FILM COATED ORAL 2 TIMES DAILY
COMMUNITY
Start: 2019-11-30 | End: 2021-08-29

## 2019-12-07 RX ORDER — METRONIDAZOLE 500 MG/1
500 TABLET ORAL 3 TIMES DAILY
COMMUNITY
Start: 2019-11-30 | End: 2021-08-29

## 2019-12-07 RX ORDER — ONDANSETRON 4 MG/1
4 TABLET, ORALLY DISINTEGRATING ORAL EVERY 8 HOURS PRN
Qty: 10 TABLET | Refills: 0 | Status: SHIPPED | OUTPATIENT
Start: 2019-12-07 | End: 2021-08-29

## 2019-12-07 RX ORDER — SODIUM CHLORIDE 0.9 % (FLUSH) 0.9 %
3 SYRINGE (ML) INJECTION EVERY 8 HOURS
Status: DISCONTINUED | OUTPATIENT
Start: 2019-12-07 | End: 2019-12-07 | Stop reason: HOSPADM

## 2019-12-07 RX ORDER — MORPHINE SULFATE 2 MG/ML
2 INJECTION, SOLUTION INTRAMUSCULAR; INTRAVENOUS ONCE
Status: COMPLETED | OUTPATIENT
Start: 2019-12-07 | End: 2019-12-07

## 2019-12-07 RX ORDER — 0.9 % SODIUM CHLORIDE 0.9 %
1000 INTRAVENOUS SOLUTION INTRAVENOUS ONCE
Status: COMPLETED | OUTPATIENT
Start: 2019-12-07 | End: 2019-12-07

## 2019-12-07 RX ORDER — POTASSIUM CHLORIDE 20 MEQ/1
40 TABLET, EXTENDED RELEASE ORAL ONCE
Status: DISCONTINUED | OUTPATIENT
Start: 2019-12-07 | End: 2019-12-07 | Stop reason: HOSPADM

## 2019-12-07 RX ORDER — OXYCODONE HYDROCHLORIDE 5 MG/1
5 CAPSULE ORAL EVERY 4 HOURS PRN
COMMUNITY
Start: 2019-11-30 | End: 2021-08-29

## 2019-12-07 RX ADMIN — ONDANSETRON 4 MG: 2 INJECTION INTRAMUSCULAR; INTRAVENOUS at 05:31

## 2019-12-07 RX ADMIN — MORPHINE SULFATE 2 MG: 2 INJECTION, SOLUTION INTRAMUSCULAR; INTRAVENOUS at 06:44

## 2019-12-07 RX ADMIN — Medication 3 ML: at 06:44

## 2019-12-07 RX ADMIN — SODIUM CHLORIDE 1000 ML: 9 INJECTION, SOLUTION INTRAVENOUS at 05:31

## 2019-12-07 RX ADMIN — ONDANSETRON 4 MG: 2 INJECTION INTRAMUSCULAR; INTRAVENOUS at 06:58

## 2019-12-07 RX ADMIN — IOPAMIDOL 100 ML: 612 INJECTION, SOLUTION INTRAVENOUS at 06:03

## 2019-12-07 ASSESSMENT — PAIN SCALES - GENERAL
PAINLEVEL_OUTOF10: 8
PAINLEVEL_OUTOF10: 8

## 2019-12-07 ASSESSMENT — PAIN DESCRIPTION - PAIN TYPE: TYPE: ACUTE PAIN

## 2019-12-07 ASSESSMENT — PAIN DESCRIPTION - LOCATION: LOCATION: ABDOMEN

## 2019-12-07 ASSESSMENT — PAIN DESCRIPTION - FREQUENCY: FREQUENCY: CONTINUOUS

## 2020-07-08 ENCOUNTER — TELEPHONE (OUTPATIENT)
Dept: FAMILY MEDICINE CLINIC | Age: 70
End: 2020-07-08

## 2020-08-09 ENCOUNTER — TELEPHONE (OUTPATIENT)
Dept: FAMILY MEDICINE CLINIC | Age: 70
End: 2020-08-09

## 2020-09-21 LAB
APPEARANCE: ABNORMAL
BACTERIA, URINE: ABNORMAL /HPF
BILIRUBIN, URINE: NEGATIVE
BLOOD, URINE: NEGATIVE
COLOR, URINE: YELLOW
GLUCOSE, URINE: NEGATIVE MG/DL
HYALINE CASTS: ABNORMAL /LPF
KETONES, URINE: ABNORMAL MG/DL
LEUKOCYTE ESTERASE, URINE: ABNORMAL
MUCUS, URINE: ABNORMAL /LPF
NITRITE, URINE: NEGATIVE
PH UA: 5 (ref 5–8)
PROTEIN UA: ABNORMAL MG/DL
RBC URINE: 8 /HPF (ref 0–5)
SPECIFIC GRAVITY, URINE: 1.02 (ref 1–1)
SQUAMOUS EPITHELIAL: 1 /HPF
TRANSITIONAL EPITHELIAL: <1 /HPF
UROBILINOGEN, URINE: <2 MG/DL (ref 0–1.9)
WBC CLUMPS, URINE: ABNORMAL /HPF
WBC URINE: 89 /HPF (ref 0–5)

## 2020-10-06 LAB — SURGICAL PATHOLOGY REPORT: NORMAL

## 2021-08-13 ENCOUNTER — HOSPITAL ENCOUNTER (EMERGENCY)
Age: 71
Discharge: HOME | End: 2021-08-13
Attending: EMERGENCY MEDICINE
Payer: MEDICARE

## 2021-08-13 ENCOUNTER — APPOINTMENT (OUTPATIENT)
Dept: CT IMAGING | Age: 71
End: 2021-08-13
Payer: MEDICARE

## 2021-08-13 VITALS
OXYGEN SATURATION: 99 % | SYSTOLIC BLOOD PRESSURE: 132 MMHG | HEART RATE: 74 BPM | DIASTOLIC BLOOD PRESSURE: 71 MMHG | HEIGHT: 63 IN | BODY MASS INDEX: 25.69 KG/M2 | TEMPERATURE: 98.3 F | RESPIRATION RATE: 18 BRPM | WEIGHT: 145 LBS
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
## 2021-08-13 DIAGNOSIS — S20.229A CONTUSION OF BACK, UNSPECIFIED LATERALITY, INITIAL ENCOUNTER: ICD-10-CM

## 2021-08-13 DIAGNOSIS — S22.41XA CLOSED FRACTURE OF MULTIPLE RIBS OF RIGHT SIDE, INITIAL ENCOUNTER: ICD-10-CM

## 2021-08-13 LAB
BACTERIA: ABNORMAL /HPF
BILIRUBIN URINE: NEGATIVE
BLOOD, URINE: NORMAL
CLARITY: NORMAL
COLOR: YELLOW
EPITHELIAL CELLS, UA: ABNORMAL /HPF
GLUCOSE URINE: NEGATIVE MG/DL
KETONES, URINE: NEGATIVE MG/DL
LEUKOCYTE ESTERASE, URINE: NORMAL
NITRITE, URINE: NEGATIVE
PH UA: 7 (ref 5–9)
PROTEIN UA: NORMAL MG/DL
RBC UA: ABNORMAL /HPF (ref 0–2)
SPECIFIC GRAVITY UA: 1.02 (ref 1–1.03)
URINE REFLEX TO CULTURE: YES
UROBILINOGEN, URINE: 1 E.U./DL
WBC UA: ABNORMAL /HPF (ref 0–5)

## 2021-08-13 PROCEDURE — 81001 URINALYSIS AUTO W/SCOPE: CPT

## 2021-08-13 PROCEDURE — 72128 CT CHEST SPINE W/O DYE: CPT

## 2021-08-13 PROCEDURE — 96372 THER/PROPH/DIAG INJ SC/IM: CPT

## 2021-08-13 PROCEDURE — 99284 EMERGENCY DEPT VISIT MOD MDM: CPT

## 2021-08-13 PROCEDURE — 87086 URINE CULTURE/COLONY COUNT: CPT

## 2021-08-13 PROCEDURE — 6370000000 HC RX 637 (ALT 250 FOR IP): Performed by: EMERGENCY MEDICINE

## 2021-08-13 PROCEDURE — 6360000002 HC RX W HCPCS: Performed by: EMERGENCY MEDICINE

## 2021-08-13 PROCEDURE — 87186 SC STD MICRODIL/AGAR DIL: CPT

## 2021-08-13 PROCEDURE — 72131 CT LUMBAR SPINE W/O DYE: CPT

## 2021-08-13 PROCEDURE — 87077 CULTURE AEROBIC IDENTIFY: CPT

## 2021-08-13 RX ORDER — DIPHENHYDRAMINE HCL 25 MG
50 CAPSULE ORAL EVERY 6 HOURS PRN
COMMUNITY

## 2021-08-13 RX ORDER — MORPHINE SULFATE 4 MG/ML
4 INJECTION, SOLUTION INTRAMUSCULAR; INTRAVENOUS ONCE
Status: COMPLETED | OUTPATIENT
Start: 2021-08-13 | End: 2021-08-13

## 2021-08-13 RX ORDER — HYDROCODONE BITARTRATE AND ACETAMINOPHEN 5; 325 MG/1; MG/1
1 TABLET ORAL EVERY 6 HOURS PRN
Qty: 15 TABLET | Refills: 0 | Status: SHIPPED | OUTPATIENT
Start: 2021-08-13 | End: 2021-08-18

## 2021-08-13 RX ORDER — ACETAMINOPHEN 325 MG/1
650 TABLET ORAL EVERY 6 HOURS PRN
COMMUNITY

## 2021-08-13 RX ORDER — IBUPROFEN 800 MG/1
800 TABLET ORAL EVERY 8 HOURS PRN
COMMUNITY
End: 2021-08-29

## 2021-08-13 RX ORDER — ONDANSETRON 4 MG/1
4 TABLET, ORALLY DISINTEGRATING ORAL ONCE
Status: COMPLETED | OUTPATIENT
Start: 2021-08-13 | End: 2021-08-13

## 2021-08-13 RX ADMIN — MORPHINE SULFATE 4 MG: 4 INJECTION, SOLUTION INTRAMUSCULAR; INTRAVENOUS at 12:42

## 2021-08-13 RX ADMIN — ONDANSETRON 4 MG: 4 TABLET, ORALLY DISINTEGRATING ORAL at 12:42

## 2021-08-13 ASSESSMENT — PAIN DESCRIPTION - DESCRIPTORS: DESCRIPTORS: SHARP

## 2021-08-13 ASSESSMENT — ENCOUNTER SYMPTOMS
STRIDOR: 0
EYE DISCHARGE: 0
BLOOD IN STOOL: 0
WHEEZING: 0
VOMITING: 0
EYE REDNESS: 0
VOICE CHANGE: 0
CHOKING: 0
TROUBLE SWALLOWING: 0
SHORTNESS OF BREATH: 0
DIARRHEA: 0
FACIAL SWELLING: 0
SINUS PRESSURE: 0
EYE PAIN: 0
BACK PAIN: 1
CONSTIPATION: 0
SORE THROAT: 0
COUGH: 0
CHEST TIGHTNESS: 0
ABDOMINAL PAIN: 0

## 2021-08-13 ASSESSMENT — PAIN SCALES - GENERAL
PAINLEVEL_OUTOF10: 5
PAINLEVEL_OUTOF10: 8
PAINLEVEL_OUTOF10: 8

## 2021-08-13 ASSESSMENT — PAIN DESCRIPTION - ONSET
ONSET: ON-GOING
ONSET: ON-GOING

## 2021-08-13 ASSESSMENT — PAIN DESCRIPTION - PAIN TYPE
TYPE: ACUTE PAIN
TYPE: ACUTE PAIN

## 2021-08-13 ASSESSMENT — PAIN DESCRIPTION - FREQUENCY
FREQUENCY: CONTINUOUS
FREQUENCY: CONTINUOUS

## 2021-08-13 ASSESSMENT — PAIN - FUNCTIONAL ASSESSMENT: PAIN_FUNCTIONAL_ASSESSMENT: 0-10

## 2021-08-13 ASSESSMENT — PAIN DESCRIPTION - LOCATION
LOCATION: BACK
LOCATION: BACK

## 2021-08-13 ASSESSMENT — PAIN DESCRIPTION - PROGRESSION: CLINICAL_PROGRESSION: GRADUALLY IMPROVING

## 2021-08-13 ASSESSMENT — PAIN DESCRIPTION - ORIENTATION: ORIENTATION: LEFT

## 2021-08-13 NOTE — ED PROVIDER NOTES
in stool, constipation, diarrhea and vomiting. Endocrine: Negative for cold intolerance, polyphagia and polyuria. Genitourinary: Negative for dysuria, flank pain, frequency, genital sores and urgency. Musculoskeletal: Positive for arthralgias, back pain and myalgias. Negative for joint swelling, neck pain and neck stiffness. Skin: Negative for pallor and rash. Neurological: Negative for tremors, seizures, syncope, weakness, numbness and headaches. Hematological: Negative for adenopathy. Does not bruise/bleed easily. Psychiatric/Behavioral: Negative for agitation, behavioral problems, hallucinations and sleep disturbance. The patient is nervous/anxious. The patient is not hyperactive. All other systems reviewed and are negative. Except as noted above the remainder of the review of systems was reviewed and negative. PAST MEDICAL HISTORY     Past Medical History:   Diagnosis Date    Depression     GERD (gastroesophageal reflux disease)     History of kidney stones     hospitalized 9/2016 obstructive pyelonephritis    Hypercholesteremia     Hypothyroidism     Osteoarthritis     Osteopenia     RA (rheumatoid arthritis) ([facility] [address]          SURGICALHISTORY       Past Surgical History:   Procedure Laterality Date    CYSTOSCOPY Left 08/28/2016    Camilla Fernandez MD  PYELOGRAM & DOUBLE-J STENT PLACEMENT    GALLBLADDER SURGERY      HYSTERECTOMY      KNEE SURGERY      left    LITHOTRIPSY Left 10/12/2016    HOLMIUM LASER LITHOTRIPSY AND REMOVAL OF LEFT J STENT  performed by Diane David MD at [address]  08/09/2013    DR. RHODES    URETER SURGERY Left 10/12/2016    /left ureteroscopy/ cysto/ retrogrades/pyelogram/ holmium laser lithotripsy removal left urerteral stent performed by Diane David MD at [facility]       Discharge Medication List as of 8/13/2021  2:29 PM      CONTINUE these medications which have NOT CHANGED    Details ibuprofen (ADVIL;MOTRIN) 800 MG tablet Take 800 mg by mouth every 8 hours as needed for PainHistorical Med      diphenhydrAMINE (BENADRYL) 25 MG capsule Take 50 mg by mouth every 6 hours as needed for ItchingHistorical Med      acetaminophen (TYLENOL) 325 MG tablet Take 650 mg by mouth every 6 hours as needed for Pain Indications: this amHistorical Med      oxyCODONE 5 MG capsule Take 5 mg by mouth every 4 hours as needed. Historical Med      gabapentin (NEURONTIN) 300 MG capsule TAKE 3 CAPSULES BY MOUTH AT BEDTIME, Disp-90 capsule, R-1Normal      levothyroxine (SYNTHROID) 50 MCG tablet TAKE 1 TABLET BY MOUTH ONCE DAILY, Disp-90 tablet, R-3Please consider 90 day supplies to promote better adherenceNormal      prednisoLONE acetate (PRED FORTE) 1 % ophthalmic suspension instill 1 (ONE) DROP in left eye TWICE DAILY, R-0Historical Med      ciprofloxacin (CIPRO) 500 MG tablet Take 500 mg by mouth 2 times dailyHistorical Med      metroNIDAZOLE (FLAGYL) 500 MG tablet Take 500 mg by mouth 3 times dailyHistorical Med      traMADol (ULTRAM) 50 MG tablet Take 50 mg by mouth every 8 hours as needed. Historical Med      !! ondansetron (ZOFRAN ODT) 4 MG disintegrating tablet Take 1 tablet by mouth every 8 hours as needed for Nausea or Vomiting, Disp-10 tablet, R-0Print      naproxen (NAPROSYN) 500 MG tablet Take 1 tablet by mouth 2 times daily, Disp-60 tablet, R-0Print      !! ondansetron (ZOFRAN ODT) 4 MG disintegrating tablet Take 1 tablet by mouth every 8 hours as needed for Nausea, Disp-20 tablet, R-0Print       !! - Potential duplicate medications found. Please discuss with provider. ALLERGIES     Patient has no known allergies.     FAMILY HISTORY       Family History   Problem Relation Age of Onset    Diabetes Father     Heart Disease Father           SOCIAL HISTORY       Social History     Socioeconomic History    Marital status:      Spouse name: None    Number of children: None    Years of education: None    Highest education level: None   Occupational History    None   Tobacco Use    Smoking status: Never Smoker    Smokeless tobacco: Never Used   Vaping Use    Vaping Use: Never used   Substance and Sexual Activity    Alcohol use: No    Drug use: No    Sexual activity: Not Currently   Other Topics Concern    None   Social History Narrative    None     Social Determinants of Health     Financial Resource Strain:     Difficulty of Paying Living Expenses:    Food Insecurity:     Worried About Running Out of Food in the Last Year:     Ran Out of Food in the Last Year:    Transportation Needs:     Lack of Transportation (Medical):  Lack of Transportation (Non-Medical):    Physical Activity:     Days of Exercise per Week:     Minutes of Exercise per Session:    Stress:     Feeling of Stress :    Social Connections:     Frequency of Communication with Friends and Family:     Frequency of Social Gatherings with Friends and Family:     Attends Orthodox Services:     Active Member of Clubs or Organizations:     Attends Club or Organization Meetings:     Marital Status:    Intimate Partner Violence:     Fear of Current or Ex-Partner:     Emotionally Abused:     Physically Abused:     Sexually Abused:        SCREENINGS      @FLOW([id number])@      PHYSICAL EXAM    (up to 7 for level 4, 8 or more for level 5)     ED Triage Vitals [08/13/21 1201]   BP Temp Temp Source Pulse Resp SpO2 Height Weight   138/72 98.2 °F (36.8 °C) Temporal 75 16 98 % 5' 3\" (1.6 m) 145 lb (65.8 kg)       Physical Exam  Vitals and nursing note reviewed. Constitutional:       General: She is not in acute distress. Appearance: Normal appearance. She is well-developed. She is not ill-appearing, toxic-appearing or diaphoretic.       Comments: Active alert cooperative patient moving all extremities very well slightly uncomfortable because of the back pain worse with movement   HENT:      Head: Normocephalic and atraumatic. Right Ear: Tympanic membrane, ear canal and external ear normal.      Left Ear: Tympanic membrane, ear canal and external ear normal.      Nose: Rhinorrhea present. No congestion. Mouth/Throat:      Mouth: Mucous membranes are moist.   Eyes:      General:         Right eye: No discharge. Left eye: No discharge. Pupils: Pupils are equal, round, and reactive to light. Cardiovascular:      Rate and Rhythm: Normal rate and regular rhythm. Heart sounds: Normal heart sounds. No murmur heard. No gallop. Pulmonary:      Effort: No respiratory distress. Breath sounds: Normal breath sounds. No wheezing. Abdominal:      General: Bowel sounds are normal. There is no distension. Palpations: Abdomen is soft. There is no mass. Tenderness: There is no abdominal tenderness. There is no right CVA tenderness, guarding or rebound. Musculoskeletal:         General: Tenderness present. No deformity. Normal range of motion. Cervical back: Normal range of motion and neck supple. No rigidity or tenderness. Right lower leg: No edema. Comments: Attention come to the back patient has diffuse tenderness to the thoracic spine lumbar area spinal tenderness has no hematoma no bruise but patient has a mild cough patch slightly to the left flank and CVA area no crepitus noted skin is intact patient is straight leg raising is negative no sensory deficit to lower extremities   Lymphadenopathy:      Cervical: No cervical adenopathy. Skin:     General: Skin is warm. Capillary Refill: Capillary refill takes less than 2 seconds. Coloration: Skin is not jaundiced. Findings: No bruising, erythema, lesion or rash. Neurological:      General: No focal deficit present. Mental Status: She is alert and oriented to person, place, and time. Cranial Nerves: No cranial nerve deficit. Sensory: No sensory deficit.       Motor: No weakness or abnormal muscle tone. Coordination: Coordination normal.      Gait: Gait normal.      Deep Tendon Reflexes: Reflexes normal.   Psychiatric:         Behavior: Behavior normal.         Thought Content: Thought content normal.         DIAGNOSTIC RESULTS     EKG: All EKG's are interpreted by the Emergency Department Physician who either signs or Co-signsthis chart in the absence of a cardiologist.        RADIOLOGY:   Florecita Moon such as CT, Ultrasound and MRI are read by the radiologist. Plain radiographic images are visualized and preliminarily interpreted by the emergency physician with the below findings:        Interpretation per the Radiologist below, if available at the time ofthis note:    [place]   Final Result      No fracture. Severe degenerative change with scoliosis, lumbar spine. Sigmoid diverticulosis. Hysterectomy. Left hip arthroplasty. All CT scans at this facility use dose modulation, iterative reconstruction, and/or weight based dosing when appropriate to reduce radiation dose to as low as reasonably achievable. CT THORACIC SPINE WO CONTRAST   Final Result   1. AGE-INDETERMINATE FRACTURE OF THE LEFT POSTERIOR LAMINA OF T5.   2. HAIRLINE NONDISPLACED FRACTURE AT THE POSTERIOR MEDIAL ASPECT OF THE COSTOTRANSVERSE ANGLE OF THE LEFT EIGHTH NINTH AND 10TH RIBS   3. AGE INDETERMINATE COMPRESSION FRACTURE APPROXIMATELY 10-20% OF THE T6 VERTEBRA.   4.. NONCALCIFIED NODULE APEX LEFT LOBE.  FOLLOW-UP AS PER CRITERIA BELOW.         ____________________________________________________________      Fleischner Society 2017 Guidelines for Management of Incidentally Detected Pulmonary Nodules in Adults      A: Solid Nodules*           Single                     Low risk**                        <6 mm     No routine follow-up                                  6-8 mm     CT at 6-12 months, then consider CT at 18-24 months                    >8 mm     Consider CT at 3 months, PET/CT, or tissue sampling   Nodules <6 mm do not require routine follow-up, but certain patients at high risk with suspicious nodule morphology, upper lobe location, or both may warrant 12-month follow-up (recommendation 1A). High risk**                   <6 mm     Optional CT at 12 months                  6-8 mm     CT at 6-12 months, then CT at 18-24 months                    >8 mm     Consider CT at 3 months, PET/CT, or tissue sampling   Nodules <6 mm do not require routine follow-up, but certain patients at high risk with suspicious nodule morphology, upper lobe location, or both may warrant 12-month follow-up (recommendation 1A). Multiple                              Low risk**                   <6 mm     No routine follow-up                  6-8 mm     CT at 3-6 months, then consider CT at 18-24 months                   >8 mm     CT at 3-6 months, then  consider CT at 18-24 months   Use most suspicious nodule as guide to management. Follow-up intervals may vary according to size and risk (recommendation 2A). High risk**                   <6 mm     Optional CT at 12 months                  6-8 mm     CT at 3-6 months, then at 18-24 months                  >8 mm     CT at 3-6 months, then at 18-24 months   Use most suspicious nodule as guide to management. Follow-up intervals may vary according to size and risk (recommendation 2A). B: Subsolid Nodules*           Single                        Ground glass                   <6 mm     No routine follow-up                 >=6 mm     CT at 6-12 months to confirm persistence, then CT  every 2 years until 5 years   In certain suspicious nodules <6 mm, consider follow-up at 2 and 4 years. If solid component(s) or growth develops, consider resection. (Recommendations 3A and 4A).                 Part solid                   <6 mm     No routine follow-up                 >=6 mm     CT at 3-6 months to confirm persistence. If unchanged and solid component remains <6 mm, annual CT  should be performed for 5 years. In practice, part-solid nodules cannot be defined as such until >=6 mm, and nodules <6 mm do not usually require follow-up. Persistent part-solid nodules with solid components >=6 mm should be considered highly suspicious (recommendations 4A-4C)           Multiple                   <6 mm     CT at 3-6 months. If stable, consider CT at 2 and 4 years. >=6 mm     CT at 3-6 months. Subsequent management based  on the most suspicious nodule(s). Multiple <6 mm pure ground-glass nodules are usually benign, but consider follow-up in selected patients at high risk at 2 and 4 years (recommendation 5A). Note. --These recommendations do not apply to lung cancer screening, patients with immunosuppression, or patients with known primary cancer. * Dimensions are average of long and short axes, rounded to the nearest millimeter. ** Consider all relevant risk factors (see Risk Factors). ____________________________________________________________. All CT scans at this facility use dose modulation, iterative reconstruction, and/or weight based dosing when appropriate to reduce radiation dose to as low as reasonably achievable. ED BEDSIDE ULTRASOUND:   Performed by ED Physician - none    LABS:  Labs Reviewed   MICROSCOPIC URINALYSIS - Abnormal; Notable for the following components:       Result Value    RBC, UA 3-5 (*)     Bacteria, UA MANY (*)     All other components within normal limits   CULTURE, URINE   URINE RT REFLEX TO CULTURE       All other labs were within normal range or not returned as of this dictation.     EMERGENCY DEPARTMENT COURSE and DIFFERENTIAL DIAGNOSIS/MDM:   Vitals:    Vitals:    08/13/21 1201 08/13/21 1440   BP: 138/72 132/71   Pulse: 75 74   Resp: 16 18   Temp: 98.2 °F (36.8 °C) 98.3 °F (36.8 °C)   TempSrc: Temporal Oral   SpO2: 98% 99%   Weight: 145 lb (65.8 kg)    Height: 5' 3\" (1.6 m)            [facility]    CRITICAL CARE TIME   Total Critical Care time was minutes, excluding separately reportableprocedures. There was a high probability of clinicallysignificant/life threatening deterioration in the patient's condition which required my urgent intervention. NSULTS:  None    PROCEDURES:  Unless otherwise noted below, none     Procedures    FINAL IMPRESSION      1. Fall, initial encounter    2. Contusion of back, unspecified laterality, initial encounter    3. Closed fracture of multiple ribs of right side, initial encounter          DISPOSITION/PLAN   DISPOSITION Decision To Discharge 08/13/2021 02:42:49 PM      PATIENT REFERRED TO:  Odella Gowers, DO  [address] [id number] [phone]    In 1 week        DISCHARGE MEDICATIONS:  Discharge Medication List as of 8/13/2021  2:29 PM      START taking these medications    Details   HYDROcodone-acetaminophen (NORCO) 5-325 MG per tablet Take 1 tablet by mouth every 6 hours as needed for Pain for up to 5 days. , Disp-15 tablet, R-0Print                (Please note that portions of this note were completed with a voice recognition program.  Efforts were made to edit the dictations but occasionally words are mis-transcribed.)    Yrn George MD (electronically signed)  Attending Emergency Physician       Yrn George MD  08/13/21 6104

## 2021-08-13 NOTE — ED TRIAGE NOTES
fall x5 days ago inside home on hardwood floor. pt denies being on blood thinners, no loc, no head, or neck pain. from the fall.  Has a bruise to the left flank

## 2021-08-16 LAB
ORGANISM: ABNORMAL
URINE CULTURE, ROUTINE: ABNORMAL

## 2021-08-29 ENCOUNTER — OFFICE VISIT (OUTPATIENT)
Dept: INTERNAL MEDICINE | Age: 71
End: 2021-08-29
Payer: MEDICARE

## 2021-08-29 VITALS
RESPIRATION RATE: 18 BRPM | TEMPERATURE: 97.7 F | SYSTOLIC BLOOD PRESSURE: 130 MMHG | OXYGEN SATURATION: 96 % | HEART RATE: 66 BPM | DIASTOLIC BLOOD PRESSURE: 78 MMHG | BODY MASS INDEX: 25.69 KG/M2 | WEIGHT: 145 LBS

## 2021-08-29 DIAGNOSIS — H57.11 PAIN OF RIGHT EYE: Primary | ICD-10-CM

## 2021-08-29 DIAGNOSIS — Z91.81 AT HIGH RISK FOR FALLS: ICD-10-CM

## 2021-08-29 PROCEDURE — 99203 OFFICE O/P NEW LOW 30 MIN: CPT | Performed by: NURSE PRACTITIONER

## 2021-08-29 PROCEDURE — 96372 THER/PROPH/DIAG INJ SC/IM: CPT | Performed by: NURSE PRACTITIONER

## 2021-08-29 RX ORDER — HYDROCODONE BITARTRATE AND ACETAMINOPHEN 5; 325 MG/1; MG/1
TABLET ORAL
COMMUNITY
Start: 2021-08-13 | End: 2022-05-21

## 2021-08-29 RX ORDER — CYCLOBENZAPRINE HCL 10 MG
10 TABLET ORAL 3 TIMES DAILY PRN
COMMUNITY

## 2021-08-29 RX ORDER — PREDNISONE 1 MG/1
TABLET ORAL
COMMUNITY
Start: 2020-01-30

## 2021-08-29 RX ORDER — NAPROXEN 375 MG/1
TABLET ORAL
COMMUNITY
Start: 2021-08-19

## 2021-08-29 RX ORDER — KETOROLAC TROMETHAMINE 30 MG/ML
60 INJECTION, SOLUTION INTRAMUSCULAR; INTRAVENOUS ONCE
Status: COMPLETED | OUTPATIENT
Start: 2021-08-29 | End: 2021-08-29

## 2021-08-29 RX ORDER — KETOROLAC TROMETHAMINE 30 MG/ML
60 INJECTION, SOLUTION INTRAMUSCULAR; INTRAVENOUS ONCE
Status: DISCONTINUED | OUTPATIENT
Start: 2021-08-29 | End: 2021-08-29

## 2021-08-29 RX ADMIN — KETOROLAC TROMETHAMINE 60 MG: 30 INJECTION, SOLUTION INTRAMUSCULAR; INTRAVENOUS at 12:15

## 2021-08-29 ASSESSMENT — ENCOUNTER SYMPTOMS
SHORTNESS OF BREATH: 0
EYE PAIN: 1
PHOTOPHOBIA: 1
EYE REDNESS: 0
BLURRED VISION: 1
FOREIGN BODY SENSATION: 0
COUGH: 0
EYE DISCHARGE: 1
VOMITING: 0
DIARRHEA: 0
SINUS PRESSURE: 0
CHEST TIGHTNESS: 0
WHEEZING: 0
NAUSEA: 0
RHINORRHEA: 0

## 2021-08-29 ASSESSMENT — PATIENT HEALTH QUESTIONNAIRE - PHQ9
SUM OF ALL RESPONSES TO PHQ9 QUESTIONS 1 & 2: 1
SUM OF ALL RESPONSES TO PHQ QUESTIONS 1-9: 1
1. LITTLE INTEREST OR PLEASURE IN DOING THINGS: 0
SUM OF ALL RESPONSES TO PHQ QUESTIONS 1-9: 1
SUM OF ALL RESPONSES TO PHQ QUESTIONS 1-9: 1
2. FEELING DOWN, DEPRESSED OR HOPELESS: 1

## 2021-08-29 NOTE — PATIENT INSTRUCTIONS
Patient Education        Preventing Falls: Care Instructions  Your Care Instructions     Getting around your home safely can be a challenge if you have injuries or health problems that make it easy for you to fall. Loose rugs and furniture in walkways are among the dangers for many older people who have problems walking or who have poor eyesight. People who have conditions such as arthritis, osteoporosis, or dementia also have to be careful not to fall. You can make your home safer with a few simple measures. Follow-up care is a key part of your treatment and safety. Be sure to make and go to all appointments, and call your doctor if you are having problems. It's also a good idea to know your test results and keep a list of the medicines you take. How can you care for yourself at home? Taking care of yourself  · You may get dizzy if you do not drink enough water. To prevent dehydration, drink plenty of fluids. Choose water and other caffeine-free clear liquids. If you have kidney, heart, or liver disease and have to limit fluids, talk with your doctor before you increase the amount of fluids you drink. · Exercise regularly to improve your strength, muscle tone, and balance. Walk if you can. Swimming may be a good choice if you cannot walk easily. · Have your vision and hearing checked each year or any time you notice a change. If you have trouble seeing and hearing, you might not be able to avoid objects and could lose your balance. · Know the side effects of the medicines you take. Ask your doctor or pharmacist whether the medicines you take can affect your balance. Sleeping pills or sedatives can affect your balance. · Limit the amount of alcohol you drink. Alcohol can impair your balance and other senses. · Ask your doctor whether calluses or corns on your feet need to be removed. If you wear loose-fitting shoes because of calluses or corns, you can lose your balance and fall.   · Talk to your doctor if you have numbness in your feet. Preventing falls at home  · Remove raised doorway thresholds, throw rugs, and clutter. Repair loose carpet or raised areas in the floor. · Move furniture and electrical cords to keep them out of walking paths. · Use nonskid floor wax, and wipe up spills right away, especially on ceramic tile floors. · If you use a walker or cane, put rubber tips on it. If you use crutches, clean the bottoms of them regularly with an abrasive pad, such as steel wool. · Keep your house well lit, especially [place], and outside walkways. Use night-lights in areas such as hallways and bathrooms. Add extra light switches or use remote switches (such as switches that go on or off when you clap your hands) to make it easier to turn lights on if you have to get up during the night. · Install sturdy handrails on stairways. · Move items in your cabinets so that the things you use a lot are on the lower shelves (about waist level). · Keep a cordless phone and a flashlight with new batteries by your bed. If possible, put a phone in each of the main rooms of your house, or carry a cell phone in case you fall and cannot reach a phone. Or, you can wear a device around your neck or wrist. You push a button that sends a signal for help. · Wear low-heeled shoes that fit well and give your feet good support. Use footwear with nonskid soles. Check the heels and soles of your shoes for wear. Repair or replace worn heels or soles. · Do not wear socks without shoes on wood floors. · Walk on the grass when the sidewalks are slippery. If you live in an area that gets snow and ice in the winter, sprinkle salt on slippery steps and sidewalks. Preventing falls in the bath  · Install grab bars and nonskid mats inside and outside your shower or tub and near the toilet and sinks. · Use shower chairs and bath benches. · Use a hand-held shower head that will allow you to sit while showering.   · Get into a tub or shower by putting the weaker leg in first. Get out of a tub or shower with your strong side first.  · Repair loose toilet seats and consider installing a raised toilet seat to make getting on and off the toilet easier. · Keep your bathroom door unlocked while you are in the shower. Where can you learn more? Go to [email or web address] and sign in to your [employer] account. Enter [phone] in the [facility] box to learn more about \"Preventing Falls: Care Instructions. \"     If you do not have an account, please click on the \"Sign Up Now\" link. Current as of: December 7, 2020               Content Version: 12.9  © 2952-2271 [employer]. Care instructions adapted under license by [facility] ([facility]). If you have questions about a medical condition or this instruction, always ask your healthcare professional. [place] [address] any warranty or liability for your use of this information.

## 2021-08-29 NOTE — PROGRESS NOTES
Bárbara (:  1950) is a 66 y.o. female, New patient, here for evaluation of the following chief complaint(s):  Eye Problem (RT eye, watery and painful, x 3 weeks, given steriod eye drops and thera tears by eye dr)      Tisha Sibley:    21 1014   BP: 130/78   Pulse: 66   Resp: 18   Temp: 97.7 °F (36.5 °C)   SpO2: 96%       ASSESSMENT/PLAN:  1. Pain of right eye  -     AFL - Reyes Chong MD, Opthalmology, [facility]  -     ketorolac (TORADOL) injection 60 mg; 60 mg, IntraVENous, ONCE, On Sun 21 at 1230, For 1 doseDo not administer for more than 5 days  2. At high risk for falls      Return for follow up with Opthalmology tomorrow. SUBJECTIVE/OBJECTIVE:    Eye Problem   The right eye is affected. This is a chronic (Looking for pain relief for right eye.keratosis and retinal macular atrophy seen eye doctor in [address] [place] [address], states he can only treat symptoms. [facility]. was on steroid drop x2 with some improvement after 4 days, but now eye watery and achy) problem. The problem occurs constantly. The problem has been unchanged. There was no injury mechanism. The pain is at a severity of 9/10 (at its worse. Today at exam is 8/10). The pain is moderate. Associated symptoms include blurred vision (d/t watering), an eye discharge (watering) and photophobia. Pertinent negatives include no eye redness, fever, foreign body sensation, nausea or vomiting. She has tried an eye patch (steroid eye drop, artificial tear, Tylenol #3) for the symptoms. The treatment provided mild relief. Review of Systems   Constitutional: Negative for chills, fatigue and fever. HENT: Negative for congestion, rhinorrhea and sinus pressure. Eyes: Positive for blurred vision (d/t watering), photophobia, pain and discharge (watering). Negative for redness. Respiratory: Negative for cough, chest tightness, shortness of breath and wheezing.     Cardiovascular: Negative for chest pain and palpitations. Gastrointestinal: Negative for diarrhea, nausea and vomiting. Neurological: Positive for headaches. Negative for dizziness and light-headedness. Physical Exam  Vitals reviewed. Constitutional:       General: She is not in acute distress. Appearance: Normal appearance. Eyes:      Conjunctiva/sclera: Conjunctivae normal.      Slit lamp exam:     Right eye: Photophobia present. Comments: Blind in left eye. Right pupil reactive to light. Cardiovascular:      Rate and Rhythm: Normal rate and regular rhythm. Heart sounds: Normal heart sounds, S1 normal and S2 normal.   Pulmonary:      Effort: Pulmonary effort is normal. No respiratory distress. Breath sounds: Normal air entry. Skin:     General: Skin is warm and dry. Neurological:      Mental Status: She is alert and oriented to person, place, and time. Psychiatric:         Mood and Affect: Mood normal.         Speech: Speech normal.         Behavior: Behavior is cooperative. An electronic signature was used to authenticate this note. --MEERA Saul   On the basis of positive falls risk screening, assessment and plan is as follows: home safety tips provided, Pt uses walker.  states he is home with her 24/7 to help her.  has removed area rugs and has lighted walkway in house. He helps her ambulate in house when needed.

## 2021-11-08 ENCOUNTER — HOSPITAL ENCOUNTER (EMERGENCY)
Age: 71
Discharge: HOME | End: 2021-11-08
Attending: EMERGENCY MEDICINE
Payer: MEDICARE

## 2021-11-08 ENCOUNTER — APPOINTMENT (OUTPATIENT)
Dept: CT IMAGING | Age: 71
End: 2021-11-08
Payer: MEDICARE

## 2021-11-08 VITALS
HEART RATE: 82 BPM | OXYGEN SATURATION: 95 % | TEMPERATURE: 98 F | WEIGHT: 145 LBS | DIASTOLIC BLOOD PRESSURE: 92 MMHG | HEIGHT: 63 IN | BODY MASS INDEX: 25.69 KG/M2 | SYSTOLIC BLOOD PRESSURE: 151 MMHG | RESPIRATION RATE: 18 BRPM

## 2021-11-08 DIAGNOSIS — R51.9 ACUTE NONINTRACTABLE HEADACHE, UNSPECIFIED HEADACHE TYPE: Primary | ICD-10-CM

## 2021-11-08 LAB
ALBUMIN SERPL-MCNC: 4.3 G/DL (ref 3.5–4.6)
ALP BLD-CCNC: 96 U/L (ref 40–130)
ALT SERPL-CCNC: 16 U/L (ref 0–33)
ANION GAP SERPL CALCULATED.3IONS-SCNC: 16 MEQ/L (ref 9–15)
AST SERPL-CCNC: 22 U/L (ref 0–35)
BASOPHILS ABSOLUTE: 0 K/UL (ref 0–0.1)
BASOPHILS RELATIVE PERCENT: 0.3 % (ref 0.1–1.2)
BILIRUB SERPL-MCNC: 0.5 MG/DL (ref 0.2–0.7)
BUN BLDV-MCNC: 13 MG/DL (ref 8–23)
CALCIUM SERPL-MCNC: 9.8 MG/DL (ref 8.5–9.9)
CHLORIDE BLD-SCNC: 97 MEQ/L (ref 95–107)
CO2: 27 MEQ/L (ref 20–31)
CREAT SERPL-MCNC: 0.84 MG/DL (ref 0.5–0.9)
EOSINOPHILS ABSOLUTE: 0 K/UL (ref 0–0.4)
EOSINOPHILS RELATIVE PERCENT: 0.2 % (ref 0.7–5.8)
GFR AFRICAN AMERICAN: >60
GFR NON-AFRICAN AMERICAN: >60
GLOBULIN: 3.7 G/DL (ref 2.3–3.5)
GLUCOSE BLD-MCNC: 141 MG/DL (ref 70–99)
HCT VFR BLD CALC: 43.1 % (ref 37–47)
HEMOGLOBIN: 13.3 G/DL (ref 11.2–15.7)
IMMATURE GRANULOCYTES #: 0.1 K/UL
IMMATURE GRANULOCYTES %: 0.5 %
LYMPHOCYTES ABSOLUTE: 1.8 K/UL (ref 1.2–3.7)
LYMPHOCYTES RELATIVE PERCENT: 14.9 %
MAGNESIUM: 2.1 MG/DL (ref 1.7–2.4)
MCH RBC QN AUTO: 26.2 PG (ref 25.6–32.2)
MCHC RBC AUTO-ENTMCNC: 30.9 % (ref 32.2–35.5)
MCV RBC AUTO: 84.8 FL (ref 79.4–94.8)
MONOCYTES ABSOLUTE: 0.5 K/UL (ref 0.2–0.9)
MONOCYTES RELATIVE PERCENT: 4.6 % (ref 4.7–12.5)
NEUTROPHILS ABSOLUTE: 9.4 K/UL (ref 1.6–6.1)
NEUTROPHILS RELATIVE PERCENT: 79.5 % (ref 34–71.1)
PDW BLD-RTO: 16.4 % (ref 11.7–14.4)
PLATELET # BLD: 231 K/UL (ref 182–369)
POTASSIUM REFLEX MAGNESIUM: 3.4 MEQ/L (ref 3.4–4.9)
RBC # BLD: 5.08 M/UL (ref 3.93–5.22)
SEDIMENTATION RATE, ERYTHROCYTE: 30 MM (ref 0–30)
SODIUM BLD-SCNC: 140 MEQ/L (ref 135–144)
TOTAL PROTEIN: 8 G/DL (ref 6.3–8)
TROPONIN: <0.01 NG/ML (ref 0–0.01)
WBC # BLD: 11.8 K/UL (ref 4–10)

## 2021-11-08 PROCEDURE — 85025 COMPLETE CBC W/AUTO DIFF WBC: CPT

## 2021-11-08 PROCEDURE — 80053 COMPREHEN METABOLIC PANEL: CPT

## 2021-11-08 PROCEDURE — 85652 RBC SED RATE AUTOMATED: CPT

## 2021-11-08 PROCEDURE — 6360000002 HC RX W HCPCS: Performed by: EMERGENCY MEDICINE

## 2021-11-08 PROCEDURE — 84484 ASSAY OF TROPONIN QUANT: CPT

## 2021-11-08 PROCEDURE — 99283 EMERGENCY DEPT VISIT LOW MDM: CPT

## 2021-11-08 PROCEDURE — 83735 ASSAY OF MAGNESIUM: CPT

## 2021-11-08 PROCEDURE — 96375 TX/PRO/DX INJ NEW DRUG ADDON: CPT

## 2021-11-08 PROCEDURE — 36415 COLL VENOUS BLD VENIPUNCTURE: CPT

## 2021-11-08 PROCEDURE — 96374 THER/PROPH/DIAG INJ IV PUSH: CPT

## 2021-11-08 PROCEDURE — 2580000003 HC RX 258: Performed by: EMERGENCY MEDICINE

## 2021-11-08 RX ORDER — 0.9 % SODIUM CHLORIDE 0.9 %
500 INTRAVENOUS SOLUTION INTRAVENOUS ONCE
Status: COMPLETED | OUTPATIENT
Start: 2021-11-08 | End: 2021-11-08

## 2021-11-08 RX ORDER — KETOROLAC TROMETHAMINE 15 MG/ML
15 INJECTION, SOLUTION INTRAMUSCULAR; INTRAVENOUS ONCE
Status: COMPLETED | OUTPATIENT
Start: 2021-11-08 | End: 2021-11-08

## 2021-11-08 RX ORDER — METOCLOPRAMIDE HYDROCHLORIDE 5 MG/ML
10 INJECTION INTRAMUSCULAR; INTRAVENOUS ONCE
Status: COMPLETED | OUTPATIENT
Start: 2021-11-08 | End: 2021-11-08

## 2021-11-08 RX ORDER — DIPHENHYDRAMINE HYDROCHLORIDE 50 MG/ML
25 INJECTION INTRAMUSCULAR; INTRAVENOUS ONCE
Status: COMPLETED | OUTPATIENT
Start: 2021-11-08 | End: 2021-11-08

## 2021-11-08 RX ADMIN — DIPHENHYDRAMINE HYDROCHLORIDE 25 MG: 50 INJECTION INTRAMUSCULAR; INTRAVENOUS at 21:55

## 2021-11-08 RX ADMIN — METOCLOPRAMIDE HYDROCHLORIDE 10 MG: 5 INJECTION INTRAMUSCULAR; INTRAVENOUS at 21:55

## 2021-11-08 RX ADMIN — SODIUM CHLORIDE 500 ML: 9 INJECTION, SOLUTION INTRAVENOUS at 21:54

## 2021-11-08 RX ADMIN — KETOROLAC TROMETHAMINE 15 MG: 15 INJECTION, SOLUTION INTRAMUSCULAR; INTRAVENOUS at 21:55

## 2021-11-08 ASSESSMENT — PAIN DESCRIPTION - ORIENTATION: ORIENTATION: RIGHT;LEFT;MID

## 2021-11-08 ASSESSMENT — PAIN - FUNCTIONAL ASSESSMENT: PAIN_FUNCTIONAL_ASSESSMENT: INTOLERABLE, UNABLE TO DO ANY ACTIVE OR PASSIVE ACTIVITIES

## 2021-11-08 ASSESSMENT — PAIN DESCRIPTION - LOCATION: LOCATION: HEAD

## 2021-11-08 ASSESSMENT — PAIN DESCRIPTION - ONSET: ONSET: SUDDEN

## 2021-11-08 ASSESSMENT — PAIN DESCRIPTION - PAIN TYPE: TYPE: ACUTE PAIN

## 2021-11-08 ASSESSMENT — PAIN DESCRIPTION - PROGRESSION: CLINICAL_PROGRESSION: GRADUALLY WORSENING

## 2021-11-08 ASSESSMENT — PAIN SCALES - GENERAL: PAINLEVEL_OUTOF10: 8

## 2021-11-08 ASSESSMENT — PAIN DESCRIPTION - FREQUENCY: FREQUENCY: CONTINUOUS

## 2021-11-08 ASSESSMENT — PAIN DESCRIPTION - DESCRIPTORS: DESCRIPTORS: ACHING

## 2021-11-09 NOTE — ED NOTES
Rounded on patient, states she is feeling better and denies any needs at this time. Spouse at bedside. Call light in reach.       Christin De La Torre RN  11/08/21 0696

## 2021-11-09 NOTE — ED TRIAGE NOTES
Pt. Presents to ED with complaints of migraine.   Pt. Is legally blind and when her left eye gets red, watery, and irritated she gets a headache which turned into a migraine

## 2021-11-09 NOTE — ED NOTES
Pt. Requesting to not have blood work or CT head done. States they bowman afford it and patient gets headaches/migraines all the time.      Catia Bird RN  11/08/21 7635

## 2021-11-09 NOTE — ED PROVIDER NOTES
eMERGENCY dEPARTMENT eNCOUnter      [address] [facility]    Chief Complaint   Patient presents with    Migraine     Started this afternoon       HPI    Jean Zaidi is a 66 y.o. female with history of depression, GERD, hypercholesterolemia, hypothyroidism, osteoarthritis, osteopenia, migraine headaches who presentsto ED from home  By private car  With complaint of headache  Ons 1 day  Intensity of symptoms moderate  Location of symptoms global  Patient has gradual onset headache, moderate intensity, associate with phonophobia and photophobia since last 1 day. Patient denies any fever chills or neck pain. Patient denies any focal neurological deficits, chest pain, shortness of breath. PAST MEDICAL HISTORY    Past Medical History:   Diagnosis Date    Depression     GERD (gastroesophageal reflux disease)     History of kidney stones     hospitalized 9/2016 obstructive pyelonephritis    Hypercholesteremia     Hypothyroidism     Osteoarthritis     Osteopenia     RA (rheumatoid arthritis) ([address]        SURGICAL HISTORY    Past Surgical History:   Procedure Laterality Date    CYSTOSCOPY Left 08/28/2016    Amy Mancera MD  PYELOGRAM & DOUBLE-J STENT PLACEMENT    GALLBLADDER SURGERY      HYSTERECTOMY      KNEE SURGERY      left    LITHOTRIPSY Left 10/12/2016    HOLMIUM LASER LITHOTRIPSY AND REMOVAL OF LEFT J STENT  performed by Greg Hsu MD at [address] [address]  08/09/2013    DR. RHODES    URETER SURGERY Left 10/12/2016    /left ureteroscopy/ cysto/ retrogrades/pyelogram/ holmium laser lithotripsy removal left urerteral stent performed by Greg Hsu MD at [address] [address]    Current Outpatient Rx   Medication Sig Dispense Refill    predniSONE (DELTASONE) 5 MG tablet Take by mouth      naproxen (NAPROSYN) 375 MG tablet TAKE 1 TABLET BY MOUTH TWICE DAILY AFTER A MEAL AS NEEDED      cyclobenzaprine (FLEXERIL) 10 MG tablet Take 10 mg by mouth 3 times daily as needed      diphenhydrAMINE (BENADRYL) 25 MG capsule Take 50 mg by mouth every 6 hours as needed for Itching      acetaminophen (TYLENOL) 325 MG tablet Take 650 mg by mouth every 6 hours as needed for Pain Indications: this am      gabapentin (NEURONTIN) 300 MG capsule TAKE 3 CAPSULES BY MOUTH AT BEDTIME 90 capsule 1    levothyroxine (SYNTHROID) 50 MCG tablet TAKE 1 TABLET BY MOUTH ONCE DAILY 90 tablet 3    prednisoLONE acetate (PRED FORTE) 1 % ophthalmic suspension instill 1 (ONE) DROP in left eye TWICE DAILY  0    Inulin 1.5 g CHEW chewable tablet Take by mouth      HYDROcodone-acetaminophen (NORCO) 5-325 MG per tablet Take by mouth.  ondansetron (ZOFRAN ODT) 4 MG disintegrating tablet Take 1 tablet by mouth every 8 hours as needed for Nausea 20 tablet 0       ALLERGIES    Allergies   Allergen Reactions    Ciprofloxacin        FAMILY HISTORY    Family History   Problem Relation Age of Onset    Diabetes Father     Heart Disease Father        SOCIAL HISTORY    Social History     Socioeconomic History    Marital status:      Spouse name: None    Number of children: None    Years of education: None    Highest education level: None   Occupational History    None   Tobacco Use    Smoking status: Never Smoker    Smokeless tobacco: Never Used   Vaping Use    Vaping Use: Never used   Substance and Sexual Activity    Alcohol use: No    Drug use: No    Sexual activity: Not Currently   Other Topics Concern    None   Social History Narrative    None     Social Determinants of Health     Financial Resource Strain:     Difficulty of Paying Living Expenses: Not on file   Food Insecurity:     Worried About Running Out of Food in the Last Year: Not on file    Guillermo of Food in the Last Year: Not on file   Transportation Needs:     Lack of Transportation (Medical): Not on file    Lack of Transportation (Non-Medical):  Not on file   Physical Activity:     Days of Exercise per Week: Not on file    Minutes of Exercise per Session: Not on file   Stress:     Feeling of Stress : Not on file   Social Connections:     Frequency of Communication with Friends and Family: Not on file    Frequency of Social Gatherings with Friends and Family: Not on file    Attends [employer] Services: Not on file    Active Member of Clubs or Organizations: Not on file    Attends Club or Organization Meetings: Not on file    Marital Status: Not on file   Intimate Partner Violence:     Fear of Current or Ex-Partner: Not on file    Emotionally Abused: Not on file    Physically Abused: Not on file    Sexually Abused: Not on file   Housing Stability:     Unable to Pay for Housing in the Last Year: Not on file    Number of [place] in the Last Year: Not on file    Unstable Housing in the Last Year: Not on file       REVIEW OF SYSTEMS    Constitutional:  Denies fever, chills, weight loss or weakness   Eyes:  Denies photophobia or discharge   HENT:  Denies sore throat or ear pain   Respiratory:  Denies cough or shortness of breath   Cardiovascular:  Denies chest pain, palpitations or swelling   GI:  Denies abdominal pain, nausea, vomiting, or diarrhea   Musculoskeletal:  Denies back pain   Skin:  Denies rash   Neurologic: Complains of headache, but denies focal weakness or sensory changes   Endocrine:  Denies polyuria or polydypsia   Lymphatic:  Denies swollen glands   Psychiatric:  Denies depression, suicidal ideation or homicidal ideation   All systems negative except as marked. PHYSICAL EXAM    VITAL SIGNS: BP (!) 151/92   Pulse 82   Temp 98 °F (36.7 °C) (Oral)   Resp 18   Ht 5' 3\" (1.6 m)   Wt 145 lb (65.8 kg)   LMP  (LMP Unknown)   SpO2 95%   BMI 25.69 kg/m²    Constitutional:  Well developed, Well nourished, No acute distress, Non-toxic appearance.    HENT:  Normocephalic, Atraumatic, Bilateral external ears normal, Oropharynx moist, No oral exudates, Nose normal. Neck- Normal range of motion, No tenderness, Supple, No stridor. Eyes:  PERRL, EOMI, Conjunctiva normal, No discharge. Respiratory:  Normal breath sounds, No respiratory distress, No wheezing, No chest tenderness. Cardiovascular:  Normal heart rate, Normal rhythm, No murmurs, No rubs, No gallops. GI:  Bowel sounds normal, Soft, No tenderness, No masses, No pulsatile masses. :  No CVA tenderness. Musculoskeletal:  Intact distal pulses, No edema, No tenderness, No cyanosis, No clubbing. Good range of motion in all major joints. No tenderness to palpation or major deformities noted. Back- No tenderness. Integument:  Warm, Dry, No erythema, No rash. Lymphatic:  No lymphadenopathy noted. Neurologic:  Alert & oriented x 3, Normal motor function, Normal sensory function, No focal deficits noted. Psychiatric:  Affect normal, Judgment normal, Mood normal.         RADIOLOGY    No orders to display       REEVALUATION   Patient was updated the results of labs . Headache completely resolved.   Patient wants to go home    Labs  Labs Reviewed   CBC WITH AUTO DIFFERENTIAL - Abnormal; Notable for the following components:       Result Value    WBC 11.8 (*)     MCHC 30.9 (*)     RDW 16.4 (*)     Neutrophils % 79.5 (*)     Monocytes % 4.6 (*)     Eosinophils % 0.2 (*)     Neutrophils Absolute 9.4 (*)     All other components within normal limits   COMPREHENSIVE METABOLIC PANEL W/ REFLEX TO MG FOR LOW K - Abnormal; Notable for the following components:    Anion Gap 16 (*)     Glucose 141 (*)     Globulin 3.7 (*)     All other components within normal limits   TROPONIN   SEDIMENTATION RATE   MAGNESIUM   URINALYSIS             Summation      Patient Course:     ED Medications administered this visit:    Medications   0.9 % sodium chloride bolus (0 mLs IntraVENous Stopped 11/8/21 5027)   metoclopramide (REGLAN) injection 10 mg (10 mg IntraVENous Given 11/8/21 1645)   diphenhydrAMINE (BENADRYL) injection 25 mg (25 mg IntraVENous Given 11/8/21 2155)   ketorolac (TORADOL) injection 15 mg (15 mg IntraVENous Given 11/8/21 2155)       New Prescriptions from this visit:    Discharge Medication List as of 11/8/2021 11:09 PM          Follow-up:  DO Lai Avelar [phone]    Call in 1 day      Priti Abreu MD  [address] [place] [address] [phone]    Call in 1 day          Final Impression:   1.  Acute nonintractable headache, unspecified headache type               (Please note that portions of this note were completed with a voice recognition program.  Efforts were made to edit the dictations but occasionally words are mis-transcribed.)          Svitlana Mcclelland MD  11/17/21 3323

## 2022-05-01 ENCOUNTER — HOSPITAL ENCOUNTER (EMERGENCY)
Age: 72
Discharge: HOME | End: 2022-05-01
Attending: EMERGENCY MEDICINE
Payer: MEDICARE

## 2022-05-01 VITALS
SYSTOLIC BLOOD PRESSURE: 139 MMHG | HEART RATE: 64 BPM | OXYGEN SATURATION: 95 % | BODY MASS INDEX: 24.8 KG/M2 | DIASTOLIC BLOOD PRESSURE: 81 MMHG | HEIGHT: 63 IN | TEMPERATURE: 98 F | WEIGHT: 140 LBS | RESPIRATION RATE: 18 BRPM

## 2022-05-01 DIAGNOSIS — F32.A DEPRESSION, UNSPECIFIED DEPRESSION TYPE: Primary | ICD-10-CM

## 2022-05-01 DIAGNOSIS — G44.039 NONINTRACTABLE PAROXYSMAL HEMICRANIA, UNSPECIFIED CHRONICITY PATTERN: ICD-10-CM

## 2022-05-01 LAB
ANION GAP SERPL CALCULATED.3IONS-SCNC: 14 MEQ/L (ref 9–15)
BASOPHILS ABSOLUTE: 0.1 K/UL (ref 0–0.1)
BASOPHILS RELATIVE PERCENT: 0.8 % (ref 0.1–1.2)
BUN BLDV-MCNC: 19 MG/DL (ref 8–23)
CALCIUM SERPL-MCNC: 9.4 MG/DL (ref 8.5–9.9)
CHLORIDE BLD-SCNC: 106 MEQ/L (ref 95–107)
CO2: 22 MEQ/L (ref 20–31)
CREAT SERPL-MCNC: 0.58 MG/DL (ref 0.5–0.9)
EOSINOPHILS ABSOLUTE: 0 K/UL (ref 0–0.4)
EOSINOPHILS RELATIVE PERCENT: 0.4 % (ref 0.7–5.8)
GFR AFRICAN AMERICAN: >60
GFR NON-AFRICAN AMERICAN: >60
GLUCOSE BLD-MCNC: 102 MG/DL (ref 70–99)
HCT VFR BLD CALC: 41 % (ref 37–47)
HEMOGLOBIN: 12.4 G/DL (ref 11.2–15.7)
IMMATURE GRANULOCYTES #: 0 K/UL
IMMATURE GRANULOCYTES %: 0.4 %
LYMPHOCYTES ABSOLUTE: 3.1 K/UL (ref 1.2–3.7)
LYMPHOCYTES RELATIVE PERCENT: 33.7 %
MCH RBC QN AUTO: 26 PG (ref 25.6–32.2)
MCHC RBC AUTO-ENTMCNC: 30.2 % (ref 32.2–35.5)
MCV RBC AUTO: 86 FL (ref 79.4–94.8)
MONOCYTES ABSOLUTE: 0.5 K/UL (ref 0.2–0.9)
MONOCYTES RELATIVE PERCENT: 5.3 % (ref 4.7–12.5)
NEUTROPHILS ABSOLUTE: 5.5 K/UL (ref 1.6–6.1)
NEUTROPHILS RELATIVE PERCENT: 59.4 % (ref 34–71.1)
PDW BLD-RTO: 17 % (ref 11.7–14.4)
PLATELET # BLD: 254 K/UL (ref 182–369)
POTASSIUM SERPL-SCNC: 3.7 MEQ/L (ref 3.4–4.9)
RBC # BLD: 4.77 M/UL (ref 3.93–5.22)
SODIUM BLD-SCNC: 142 MEQ/L (ref 135–144)
WBC # BLD: 9.2 K/UL (ref 4–10)

## 2022-05-01 PROCEDURE — 96375 TX/PRO/DX INJ NEW DRUG ADDON: CPT

## 2022-05-01 PROCEDURE — 36415 COLL VENOUS BLD VENIPUNCTURE: CPT

## 2022-05-01 PROCEDURE — 96374 THER/PROPH/DIAG INJ IV PUSH: CPT

## 2022-05-01 PROCEDURE — 99284 EMERGENCY DEPT VISIT MOD MDM: CPT

## 2022-05-01 PROCEDURE — 2580000003 HC RX 258: Performed by: EMERGENCY MEDICINE

## 2022-05-01 PROCEDURE — 85025 COMPLETE CBC W/AUTO DIFF WBC: CPT

## 2022-05-01 PROCEDURE — 6360000002 HC RX W HCPCS: Performed by: EMERGENCY MEDICINE

## 2022-05-01 PROCEDURE — 80048 BASIC METABOLIC PNL TOTAL CA: CPT

## 2022-05-01 RX ORDER — DIPHENHYDRAMINE HYDROCHLORIDE 50 MG/ML
50 INJECTION INTRAMUSCULAR; INTRAVENOUS ONCE
Status: COMPLETED | OUTPATIENT
Start: 2022-05-01 | End: 2022-05-01

## 2022-05-01 RX ORDER — 0.9 % SODIUM CHLORIDE 0.9 %
500 INTRAVENOUS SOLUTION INTRAVENOUS ONCE
Status: COMPLETED | OUTPATIENT
Start: 2022-05-01 | End: 2022-05-01

## 2022-05-01 RX ORDER — ONDANSETRON 2 MG/ML
4 INJECTION INTRAMUSCULAR; INTRAVENOUS ONCE
Status: COMPLETED | OUTPATIENT
Start: 2022-05-01 | End: 2022-05-01

## 2022-05-01 RX ORDER — SODIUM CHLORIDE 0.9 % (FLUSH) 0.9 %
3 SYRINGE (ML) INJECTION EVERY 8 HOURS
Status: DISCONTINUED | OUTPATIENT
Start: 2022-05-01 | End: 2022-05-01 | Stop reason: HOSPADM

## 2022-05-01 RX ORDER — METOCLOPRAMIDE HYDROCHLORIDE 5 MG/ML
10 INJECTION INTRAMUSCULAR; INTRAVENOUS ONCE
Status: COMPLETED | OUTPATIENT
Start: 2022-05-01 | End: 2022-05-01

## 2022-05-01 RX ORDER — METHYLPREDNISOLONE SODIUM SUCCINATE 125 MG/2ML
125 INJECTION, POWDER, LYOPHILIZED, FOR SOLUTION INTRAMUSCULAR; INTRAVENOUS EVERY 6 HOURS
Status: DISCONTINUED | OUTPATIENT
Start: 2022-05-01 | End: 2022-05-01 | Stop reason: HOSPADM

## 2022-05-01 RX ADMIN — ONDANSETRON 4 MG: 2 INJECTION INTRAMUSCULAR; INTRAVENOUS at 03:37

## 2022-05-01 RX ADMIN — METHYLPREDNISOLONE SODIUM SUCCINATE 125 MG: 125 INJECTION, POWDER, FOR SOLUTION INTRAMUSCULAR; INTRAVENOUS at 03:37

## 2022-05-01 RX ADMIN — SODIUM CHLORIDE 500 ML: 9 INJECTION, SOLUTION INTRAVENOUS at 03:36

## 2022-05-01 RX ADMIN — METOCLOPRAMIDE HYDROCHLORIDE 10 MG: 5 INJECTION INTRAMUSCULAR; INTRAVENOUS at 03:36

## 2022-05-01 RX ADMIN — DIPHENHYDRAMINE HYDROCHLORIDE 50 MG: 50 INJECTION, SOLUTION INTRAMUSCULAR; INTRAVENOUS at 03:37

## 2022-05-01 ASSESSMENT — ENCOUNTER SYMPTOMS
ABDOMINAL PAIN: 0
COUGH: 0
NAUSEA: 1
EYE DISCHARGE: 0
EYE REDNESS: 0
VOMITING: 0
SORE THROAT: 0
BACK PAIN: 0
SHORTNESS OF BREATH: 0

## 2022-05-01 ASSESSMENT — PAIN - FUNCTIONAL ASSESSMENT: PAIN_FUNCTIONAL_ASSESSMENT: 0-10

## 2022-05-01 ASSESSMENT — PAIN DESCRIPTION - DESCRIPTORS: DESCRIPTORS: DULL;SHARP

## 2022-05-01 ASSESSMENT — PAIN DESCRIPTION - PAIN TYPE: TYPE: ACUTE PAIN

## 2022-05-01 ASSESSMENT — PAIN SCALES - GENERAL: PAINLEVEL_OUTOF10: 3

## 2022-05-01 ASSESSMENT — PAIN DESCRIPTION - ONSET: ONSET: GRADUAL

## 2022-05-01 ASSESSMENT — PAIN DESCRIPTION - FREQUENCY: FREQUENCY: CONTINUOUS

## 2022-05-01 ASSESSMENT — PAIN DESCRIPTION - LOCATION: LOCATION: HEAD

## 2022-05-01 NOTE — ED NOTES
Pt. Unable to void. Dr. Georgette Mcclain aware ok to discharge patient.       Negro Méndez RN  05/01/22 0142

## 2022-05-01 NOTE — ED TRIAGE NOTES
The patient arrived spouse. A and O x 3. No obvious signs/symptoms of distress. Placed in position of comfort, bed in low, side rails up. Call light within reach.

## 2022-05-01 NOTE — ED PROVIDER NOTES
2000 \[facility]\"" ED  EMERGENCY DEPARTMENT ENCOUNTER      Pt Name: Amina Mott  MRN: [id number]  [place] 1950  Date of evaluation: 5/1/2022  Provider: Nick Hunter DO        HISTORY OF PRESENT ILLNESS    Shirin Lema is a 66 y.o. female per chart review has ah/o depresion, GERD, kidney stones, OA, hypothyroidism, hyperlcholesterolemia. She presents with a headache. The history is provided by the patient. URI  Presenting symptoms: fatigue    Presenting symptoms: no cough, no ear pain, no fever and no sore throat    Severity:  Moderate  Onset quality:  Sudden  Timing:  Constant  Progression:  Unchanged  Chronicity:  Recurrent  Relieved by:  Nothing  Worsened by:  Nothing  Ineffective treatments:  None tried  Associated symptoms: headaches    Associated symptoms: no neck pain    Risk factors: recent illness and sick contacts    Risk factors: no chronic cardiac disease, no chronic kidney disease, no chronic respiratory disease and no diabetes mellitus             REVIEW OF SYSTEMS       Review of Systems   Constitutional: Positive for fatigue. Negative for chills and fever. HENT: Negative for ear pain and sore throat. Eyes: Negative for discharge and redness. Respiratory: Negative for cough and shortness of breath. Cardiovascular: Negative for chest pain and palpitations. Gastrointestinal: Positive for nausea. Negative for abdominal pain and vomiting. Genitourinary: Negative for difficulty urinating and dysuria. Musculoskeletal: Negative for back pain and neck pain. Skin: Negative for rash and wound. Neurological: Positive for headaches. Negative for dizziness and syncope. Psychiatric/Behavioral: Negative for confusion. The patient is not nervous/anxious. All other systems reviewed and are negative. Except as noted above the remainder of the review of systems was reviewed and negative.        PAST MEDICAL HISTORY     Past Medical History:   Diagnosis Date    Depression  GERD (gastroesophageal reflux disease)     History of kidney stones     hospitalized 9/2016 obstructive pyelonephritis    Hypercholesteremia     Hypothyroidism     Osteoarthritis     Osteopenia     RA (rheumatoid arthritis) ([address]          SURGICAL HISTORY       Past Surgical History:   Procedure Laterality Date    CYSTOSCOPY Left 08/28/2016    Elie Hutson MD  PYELOGRAM & DOUBLE-J STENT PLACEMENT    GALLBLADDER SURGERY      HYSTERECTOMY      KNEE SURGERY      left    LITHOTRIPSY Left 10/12/2016    HOLMIUM LASER LITHOTRIPSY AND REMOVAL OF LEFT J STENT  performed by Edmond Ivy MD at [address]  08/09/2013    DR. RHODES    URETER SURGERY Left 10/12/2016    /left ureteroscopy/ cysto/ retrogrades/pyelogram/ holmium laser lithotripsy removal left urerteral stent performed by Edmond Ivy MD at [address] [place] [address]       Discharge Medication List as of 5/1/2022  4:30 AM      CONTINUE these medications which have NOT CHANGED    Details   predniSONE (DELTASONE) 5 MG tablet Take by mouthHistorical Med      naproxen (NAPROSYN) 375 MG tablet TAKE 1 TABLET BY MOUTH TWICE DAILY AFTER A MEAL AS NEEDEDHistorical Med      Inulin 1.5 g CHEW chewable tablet Take by mouthHistorical Med      HYDROcodone-acetaminophen (NORCO) 5-325 MG per tablet Take by mouth. Historical Med      cyclobenzaprine (FLEXERIL) 10 MG tablet Take 10 mg by mouth 3 times daily as neededHistorical Med      diphenhydrAMINE (BENADRYL) 25 MG capsule Take 50 mg by mouth every 6 hours as needed for ItchingHistorical Med      acetaminophen (TYLENOL) 325 MG tablet Take 650 mg by mouth every 6 hours as needed for Pain Indications: this amHistorical Med      ondansetron (ZOFRAN ODT) 4 MG disintegrating tablet Take 1 tablet by mouth every 8 hours as needed for Nausea, Disp-20 tablet, R-0Print      gabapentin (NEURONTIN) 300 MG capsule TAKE 3 CAPSULES BY MOUTH AT BEDTIME, Disp-90 capsule, R-1Normal levothyroxine (SYNTHROID) 50 MCG tablet TAKE 1 TABLET BY MOUTH ONCE DAILY, Disp-90 tablet, R-3Please consider 90 day supplies to promote better adherenceNormal      prednisoLONE acetate (PRED FORTE) 1 % ophthalmic suspension instill 1 (ONE) DROP in left eye TWICE DAILY, R-0Historical Med             ALLERGIES     Ciprofloxacin    FAMILY HISTORY       Family History   Problem Relation Age of Onset    Diabetes Father     Heart Disease Father           SOCIAL HISTORY       Social History     Socioeconomic History    Marital status:      Spouse name: None    Number of children: None    Years of education: None    Highest education level: None   Occupational History    None   Tobacco Use    Smoking status: Never Smoker    Smokeless tobacco: Never Used   Vaping Use    Vaping Use: Never used   Substance and Sexual Activity    Alcohol use: No    Drug use: No    Sexual activity: Not Currently   Other Topics Concern    None   Social History Narrative    None     Social Determinants of Health     Financial Resource Strain:     Difficulty of Paying Living Expenses: Not on file   Food Insecurity:     Worried About Running Out of Food in the Last Year: Not on file    Guillermo of Food in the Last Year: Not on file   Transportation Needs:     Lack of Transportation (Medical): Not on file    Lack of Transportation (Non-Medical):  Not on file   Physical Activity:     Days of Exercise per Week: Not on file    Minutes of Exercise per Session: Not on file   Stress:     Feeling of Stress : Not on file   Social Connections:     Frequency of Communication with Friends and Family: Not on file    Frequency of Social Gatherings with Friends and Family: Not on file    Attends Zoroastrianism Services: Not on file    Active Member of Clubs or Organizations: Not on file    Attends Club or Organization Meetings: Not on file    Marital Status: Not on file   Intimate Partner Violence:     Fear of Current or Ex-Partner: Not on file    Emotionally Abused: Not on file    Physically Abused: Not on file    Sexually Abused: Not on file   Housing Stability:     Unable to Pay for Housing in the Last Year: Not on file    Number of Places Lived in the Last Year: Not on file    Unstable Housing in the Last Year: Not on file         PHYSICAL EXAM       ED Triage Vitals [05/01/22 0304]   BP Temp Temp Source Pulse Resp SpO2 Height Weight   (!) 164/84 98 °F (36.7 °C) Oral 61 18 97 % 5' 3\" (1.6 m) 140 lb (63.5 kg)       Physical Exam  Vitals and nursing note reviewed. Constitutional:       Appearance: Normal appearance. HENT:      Head: Normocephalic and atraumatic. Right Ear: Tympanic membrane normal.      Left Ear: Tympanic membrane normal.      Nose: Nose normal.      Mouth/Throat:      Mouth: Mucous membranes are moist.      Pharynx: Oropharynx is clear. Eyes:      General: Lids are normal.      Extraocular Movements: Extraocular movements intact. Conjunctiva/sclera: Conjunctivae normal.      Pupils: Pupils are equal, round, and reactive to light. Cardiovascular:      Rate and Rhythm: Normal rate and regular rhythm. Pulses: Normal pulses. Heart sounds: Normal heart sounds. Pulmonary:      Effort: Pulmonary effort is normal.      Breath sounds: Normal breath sounds. Abdominal:      General: Abdomen is flat. Bowel sounds are normal.      Palpations: Abdomen is soft. Musculoskeletal:         General: Normal range of motion. Cervical back: Full passive range of motion without pain, normal range of motion and neck supple. Skin:     General: Skin is warm. Capillary Refill: Capillary refill takes less than 2 seconds. Neurological:      General: No focal deficit present. Mental Status: She is alert and oriented to person, place, and time. Deep Tendon Reflexes: Reflexes are normal and symmetric.    Psychiatric:         Attention and Perception: Attention and perception normal. Mood and Affect: Mood normal.         Behavior: Behavior normal. Behavior is cooperative. LABS:  Labs Reviewed   BASIC METABOLIC PANEL - Abnormal; Notable for the following components:       Result Value    Glucose 102 (*)     All other components within normal limits   CBC WITH AUTO DIFFERENTIAL - Abnormal; Notable for the following components:    MCHC 30.2 (*)     RDW 17.0 (*)     Eosinophils % 0.4 (*)     All other components within normal limits   URINALYSIS WITH REFLEX TO CULTURE         MDM:   Vitals:    Vitals:    05/01/22 0304 05/01/22 0400 05/01/22 0430   BP: (!) 164/84 (!) 146/83 139/81   Pulse: 61 62 64   Resp: 18 18 18   Temp: 98 °F (36.7 °C)     TempSrc: Oral     SpO2: 97% 97% 95%   Weight: 140 lb (63.5 kg)     Height: 5' 3\" (1.6 m)         MDM  Number of Diagnoses or Management Options  Depression, unspecified depression type  Nonintractable paroxysmal hemicrania, unspecified chronicity pattern  Diagnosis management comments: Patient presents with a headache. Labs, IVF and medication ordered. She was given benadryl 50mg IV, reglan 10mg IV, zofran 4mg IV. The patient feels much better. She will be discharged home. She will follow up in 2 days with her primary care doctor. No orders to display         Clarisa Mon, DO     The lab results, radiology and test results were reviewed with the patient and family. The patient will follow up in 2 days with their primary care doctor. If their symptoms change or get worse they will return to the ER. CRITICAL CARE TIME   Total CriticalCare time was 0 minutes, excluding separately reportable procedures. There was a high probability of clinically significant/life threatening deterioration in the patient's condition which required my urgent intervention. PROCEDURES:  Unlessotherwise noted below, none     Procedures      FINAL IMPRESSION      1. Depression, unspecified depression type    2.  Nonintractable paroxysmal hemicrania, unspecified chronicity pattern          DISPOSITION/PLAN   DISPOSITION Decision To Discharge 05/01/2022 04:28:01 AM          Carie Damico DO (electronically signed)  Attending Emergency Physician          Reno Arce DO  05/04/22 4117

## 2022-05-21 ENCOUNTER — HOSPITAL ENCOUNTER (EMERGENCY)
Age: 72
Discharge: HOME | End: 2022-05-22
Attending: EMERGENCY MEDICINE
Payer: MEDICARE

## 2022-05-21 DIAGNOSIS — G43.009 MIGRAINE WITHOUT AURA AND WITHOUT STATUS MIGRAINOSUS, NOT INTRACTABLE: Primary | ICD-10-CM

## 2022-05-21 PROCEDURE — 96375 TX/PRO/DX INJ NEW DRUG ADDON: CPT

## 2022-05-21 PROCEDURE — 96374 THER/PROPH/DIAG INJ IV PUSH: CPT

## 2022-05-21 PROCEDURE — 85025 COMPLETE CBC W/AUTO DIFF WBC: CPT

## 2022-05-21 PROCEDURE — 80053 COMPREHEN METABOLIC PANEL: CPT

## 2022-05-21 PROCEDURE — 99284 EMERGENCY DEPT VISIT MOD MDM: CPT

## 2022-05-21 RX ORDER — METRONIDAZOLE 250 MG/1
250 TABLET ORAL 2 TIMES DAILY
COMMUNITY

## 2022-05-21 RX ORDER — 0.9 % SODIUM CHLORIDE 0.9 %
1000 INTRAVENOUS SOLUTION INTRAVENOUS ONCE
Status: COMPLETED | OUTPATIENT
Start: 2022-05-21 | End: 2022-05-22

## 2022-05-21 RX ORDER — KETOROLAC TROMETHAMINE 30 MG/ML
30 INJECTION, SOLUTION INTRAMUSCULAR; INTRAVENOUS ONCE
Status: COMPLETED | OUTPATIENT
Start: 2022-05-21 | End: 2022-05-22

## 2022-05-21 RX ORDER — DIPHENHYDRAMINE HYDROCHLORIDE 50 MG/ML
25 INJECTION INTRAMUSCULAR; INTRAVENOUS ONCE
Status: COMPLETED | OUTPATIENT
Start: 2022-05-21 | End: 2022-05-22

## 2022-05-21 RX ORDER — AMOXICILLIN AND CLAVULANATE POTASSIUM 875; 125 MG/1; MG/1
1 TABLET, FILM COATED ORAL 2 TIMES DAILY
COMMUNITY
Start: 2022-05-19

## 2022-05-21 RX ORDER — TRAMADOL HYDROCHLORIDE 50 MG/1
50 TABLET ORAL EVERY 6 HOURS PRN
COMMUNITY
Start: 2022-05-17

## 2022-05-21 ASSESSMENT — PAIN DESCRIPTION - PAIN TYPE: TYPE: ACUTE PAIN

## 2022-05-21 ASSESSMENT — PAIN SCALES - GENERAL: PAINLEVEL_OUTOF10: 8

## 2022-05-21 ASSESSMENT — PAIN DESCRIPTION - FREQUENCY: FREQUENCY: CONTINUOUS

## 2022-05-21 ASSESSMENT — PAIN DESCRIPTION - DESCRIPTORS: DESCRIPTORS: ACHING

## 2022-05-21 ASSESSMENT — PAIN DESCRIPTION - LOCATION: LOCATION: HEAD

## 2022-05-21 ASSESSMENT — PAIN DESCRIPTION - ONSET: ONSET: PROGRESSIVE

## 2022-05-22 VITALS
TEMPERATURE: 98.7 F | BODY MASS INDEX: 20.38 KG/M2 | DIASTOLIC BLOOD PRESSURE: 86 MMHG | HEIGHT: 63 IN | OXYGEN SATURATION: 98 % | RESPIRATION RATE: 18 BRPM | WEIGHT: 115 LBS | HEART RATE: 66 BPM | SYSTOLIC BLOOD PRESSURE: 157 MMHG

## 2022-05-22 LAB
ALBUMIN SERPL-MCNC: 4.4 G/DL (ref 3.5–4.6)
ALP BLD-CCNC: 63 U/L (ref 40–130)
ALT SERPL-CCNC: 16 U/L (ref 0–33)
ANION GAP SERPL CALCULATED.3IONS-SCNC: 15 MEQ/L (ref 9–15)
AST SERPL-CCNC: 24 U/L (ref 0–35)
BACTERIA: NEGATIVE /HPF
BASOPHILS ABSOLUTE: 0 K/UL (ref 0–0.1)
BASOPHILS RELATIVE PERCENT: 0.3 % (ref 0.1–1.2)
BILIRUB SERPL-MCNC: 0.4 MG/DL (ref 0.2–0.7)
BILIRUBIN URINE: NEGATIVE
BLOOD, URINE: NORMAL
BUN BLDV-MCNC: 19 MG/DL (ref 8–23)
CALCIUM SERPL-MCNC: 9.5 MG/DL (ref 8.5–9.9)
CHLORIDE BLD-SCNC: 104 MEQ/L (ref 95–107)
CLARITY: CLEAR
CO2: 21 MEQ/L (ref 20–31)
COLOR: YELLOW
CREAT SERPL-MCNC: 0.83 MG/DL (ref 0.5–0.9)
EOSINOPHILS ABSOLUTE: 0 K/UL (ref 0–0.4)
EOSINOPHILS RELATIVE PERCENT: 0.3 % (ref 0.7–5.8)
EPITHELIAL CELLS, UA: ABNORMAL /HPF
GFR AFRICAN AMERICAN: >60
GFR NON-AFRICAN AMERICAN: >60
GLOBULIN: 2.9 G/DL (ref 2.3–3.5)
GLUCOSE BLD-MCNC: 107 MG/DL (ref 70–99)
GLUCOSE URINE: NEGATIVE MG/DL
HCT VFR BLD CALC: 43.3 % (ref 37–47)
HEMOGLOBIN: 12.9 G/DL (ref 11.2–15.7)
IMMATURE GRANULOCYTES #: 0 K/UL
IMMATURE GRANULOCYTES %: 0.3 %
KETONES, URINE: NORMAL MG/DL
LEUKOCYTE ESTERASE, URINE: NORMAL
LYMPHOCYTES ABSOLUTE: 1.8 K/UL (ref 1.2–3.7)
LYMPHOCYTES RELATIVE PERCENT: 22.4 %
MCH RBC QN AUTO: 26.4 PG (ref 25.6–32.2)
MCHC RBC AUTO-ENTMCNC: 29.8 % (ref 32.2–35.5)
MCV RBC AUTO: 88.5 FL (ref 79.4–94.8)
MONOCYTES ABSOLUTE: 0.6 K/UL (ref 0.2–0.9)
MONOCYTES RELATIVE PERCENT: 7.4 % (ref 4.7–12.5)
NEUTROPHILS ABSOLUTE: 5.4 K/UL (ref 1.6–6.1)
NEUTROPHILS RELATIVE PERCENT: 69.3 % (ref 34–71.1)
NITRITE, URINE: NEGATIVE
PDW BLD-RTO: 19 % (ref 11.7–14.4)
PH UA: 7 (ref 5–9)
PLATELET # BLD: 221 K/UL (ref 182–369)
POTASSIUM SERPL-SCNC: 3.4 MEQ/L (ref 3.4–4.9)
PROTEIN UA: NEGATIVE MG/DL
RBC # BLD: 4.89 M/UL (ref 3.93–5.22)
RBC UA: ABNORMAL /HPF (ref 0–2)
SODIUM BLD-SCNC: 140 MEQ/L (ref 135–144)
SPECIFIC GRAVITY UA: 1.02 (ref 1–1.03)
TOTAL PROTEIN: 7.3 G/DL (ref 6.3–8)
URINE REFLEX TO CULTURE: YES
UROBILINOGEN, URINE: 0.2 E.U./DL
WBC # BLD: 7.8 K/UL (ref 4–10)
WBC UA: ABNORMAL /HPF (ref 0–5)

## 2022-05-22 PROCEDURE — 6360000002 HC RX W HCPCS: Performed by: EMERGENCY MEDICINE

## 2022-05-22 PROCEDURE — 87086 URINE CULTURE/COLONY COUNT: CPT

## 2022-05-22 PROCEDURE — 2580000003 HC RX 258: Performed by: EMERGENCY MEDICINE

## 2022-05-22 PROCEDURE — 81001 URINALYSIS AUTO W/SCOPE: CPT

## 2022-05-22 RX ORDER — KETOROLAC TROMETHAMINE 10 MG/1
10 TABLET, FILM COATED ORAL EVERY 6 HOURS PRN
Qty: 20 TABLET | Refills: 0 | Status: SHIPPED | OUTPATIENT
Start: 2022-05-22

## 2022-05-22 RX ADMIN — SODIUM CHLORIDE 1000 ML: 9 INJECTION, SOLUTION INTRAVENOUS at 00:01

## 2022-05-22 RX ADMIN — KETOROLAC TROMETHAMINE 30 MG: 30 INJECTION, SOLUTION INTRAMUSCULAR at 00:01

## 2022-05-22 RX ADMIN — DIPHENHYDRAMINE HYDROCHLORIDE 25 MG: 50 INJECTION INTRAMUSCULAR; INTRAVENOUS at 00:00

## 2022-05-22 ASSESSMENT — ENCOUNTER SYMPTOMS
DIARRHEA: 0
WHEEZING: 0
ABDOMINAL DISTENTION: 0
PHOTOPHOBIA: 0
EYE PAIN: 0
RHINORRHEA: 0
VOMITING: 0
SHORTNESS OF BREATH: 0
APNEA: 0
COLOR CHANGE: 0
ABDOMINAL PAIN: 0
SINUS PRESSURE: 0
NAUSEA: 0
SORE THROAT: 0
COUGH: 0
CONSTIPATION: 0
BACK PAIN: 0

## 2022-05-22 ASSESSMENT — PAIN DESCRIPTION - DESCRIPTORS: DESCRIPTORS: ACHING;DULL

## 2022-05-22 ASSESSMENT — PAIN DESCRIPTION - LOCATION: LOCATION: HEAD

## 2022-05-22 ASSESSMENT — PAIN SCALES - GENERAL: PAINLEVEL_OUTOF10: 6

## 2022-05-22 NOTE — ED PROVIDER NOTES
2000 [facility] ED  eMERGENCY dEPARTMENT eNCOUnter      Pt Name: Ricki Cruz  MRN: [id number]  [place] 1950  Date of evaluation: 5/21/2022  Provider: Corey Scott MD    CHIEF COMPLAINT       Chief Complaint   Patient presents with    Migraine         HISTORY OF PRESENT ILLNESS   (Location/Symptom, Timing/Onset,Context/Setting, Quality, Duration, Modifying Factors, Severity)  Note limiting factors. Shirin eaton is a 66 y.o. female who presents to the emergency department with complaint of usual migraine headaches since this morning. Describes mostly frontal headaches that is throbbing. No fever or chills. Pain is 8 on a scale of 1-10. Pain does not radiate. No nausea or vomiting. Patient is on Augmentin for UTI. No other systemic symptoms.  gives a history of short-term memory over the last 1 year. HPI    Nursing Notes were reviewed. REVIEW OF SYSTEMS    (2-9 systems for level 4, 10 or more for level 5)     Review of Systems   Constitutional: Negative. Negative for activity change, appetite change, chills, fatigue and fever. HENT: Negative for congestion, ear discharge, ear pain, hearing loss, rhinorrhea, sinus pressure and sore throat. Eyes: Negative for photophobia, pain and visual disturbance. Respiratory: Negative for apnea, cough, shortness of breath and wheezing. Cardiovascular: Negative for chest pain, palpitations and leg swelling. Gastrointestinal: Negative for abdominal distention, abdominal pain, constipation, diarrhea, nausea and vomiting. Endocrine: Negative for cold intolerance, heat intolerance and polyuria. Genitourinary: Negative for dysuria, flank pain, frequency and urgency. Musculoskeletal: Negative for arthralgias, back pain, gait problem, myalgias and neck stiffness. Skin: Negative for color change, pallor and rash. Allergic/Immunologic: Negative for food allergies and immunocompromised state.    Neurological: Positive for headaches. Negative for dizziness, tremors, syncope, weakness and light-headedness. Psychiatric/Behavioral: Negative for agitation, confusion and hallucinations. All other systems reviewed and are negative. Except as noted above the remainder of the review of systems was reviewed and negative. PAST MEDICAL HISTORY     Past Medical History:   Diagnosis Date    Depression     GERD (gastroesophageal reflux disease)     Headache     History of kidney stones     hospitalized 9/2016 obstructive pyelonephritis    Hypercholesteremia     Hypothyroidism     Osteoarthritis     Osteopenia     RA (rheumatoid arthritis) ([address]          SURGICAL HISTORY       Past Surgical History:   Procedure Laterality Date    CYSTOSCOPY Left 08/28/2016    Matthias Valentin MD  PYELOGRAM & DOUBLE-J STENT PLACEMENT    GALLBLADDER SURGERY      HYSTERECTOMY      KNEE SURGERY      left    LITHOTRIPSY Left 10/12/2016    HOLMIUM LASER LITHOTRIPSY AND REMOVAL OF LEFT J STENT  performed by Stacey San MD at [place] [address]  08/09/2013    DR. RHODES    URETER SURGERY Left 10/12/2016    /left ureteroscopy/ cysto/ retrogrades/pyelogram/ holmium laser lithotripsy removal left urerteral stent performed by Stacey San MD at [address] [place] [address]       Previous Medications    ACETAMINOPHEN (TYLENOL) 325 MG TABLET    Take 650 mg by mouth every 6 hours as needed for Pain Indications: this am    AMOXICILLIN-CLAVULANATE (AUGMENTIN) 875-125 MG PER TABLET    Take 1 tablet by mouth 2 times daily    CYCLOBENZAPRINE (FLEXERIL) 10 MG TABLET    Take 10 mg by mouth 3 times daily as needed    DIPHENHYDRAMINE (BENADRYL) 25 MG CAPSULE    Take 50 mg by mouth every 6 hours as needed for Itching    GABAPENTIN (NEURONTIN) 300 MG CAPSULE    TAKE 3 CAPSULES BY MOUTH AT BEDTIME    INULIN 1.5 G CHEW CHEWABLE TABLET    Take by mouth    LEVOTHYROXINE (SYNTHROID) 50 MCG TABLET    TAKE 1 TABLET BY MOUTH ONCE DAILY    METRONIDAZOLE (FLAGYL) 250 MG TABLET    Take 250 mg by mouth in the morning and at bedtime    NAPROXEN (NAPROSYN) 375 MG TABLET    TAKE 1 TABLET BY MOUTH TWICE DAILY AFTER A MEAL AS NEEDED    ONDANSETRON (ZOFRAN ODT) 4 MG DISINTEGRATING TABLET    Take 1 tablet by mouth every 8 hours as needed for Nausea    PREDNISOLONE ACETATE (PRED FORTE) 1 % OPHTHALMIC SUSPENSION    instill 1 (ONE) DROP in left eye TWICE DAILY    PREDNISONE (DELTASONE) 5 MG TABLET    Take by mouth     TRAMADOL (ULTRAM) 50 MG TABLET    Take 50 mg by mouth every 6 hours as needed for Pain. ALLERGIES     Ciprofloxacin    FAMILY HISTORY       Family History   Problem Relation Age of Onset    Diabetes Father     Heart Disease Father           SOCIAL HISTORY       Social History     Socioeconomic History    Marital status:      Spouse name: None    Number of children: None    Years of education: None    Highest education level: None   Occupational History    None   Tobacco Use    Smoking status: Never Smoker    Smokeless tobacco: Never Used   Vaping Use    Vaping Use: Never used   Substance and Sexual Activity    Alcohol use: No    Drug use: No    Sexual activity: Not Currently   Other Topics Concern    None   Social History Narrative    None     Social Determinants of Health     Financial Resource Strain:     Difficulty of Paying Living Expenses: Not on file   Food Insecurity:     Worried About Running Out of Food in the Last Year: Not on file    Guillermo of Food in the Last Year: Not on file   Transportation Needs:     Lack of Transportation (Medical): Not on file    Lack of Transportation (Non-Medical):  Not on file   Physical Activity:     Days of Exercise per Week: Not on file    Minutes of Exercise per Session: Not on file   Stress:     Feeling of Stress : Not on file   Social Connections:     Frequency of Communication with Friends and Family: Not on file    Frequency of Social Gatherings with Friends and Family: Not on file    Attends Mandaeism Services: Not on file    Active Member of Clubs or Organizations: Not on file    Attends Club or Organization Meetings: Not on file    Marital Status: Not on file   Intimate Partner Violence:     Fear of Current or Ex-Partner: Not on file    Emotionally Abused: Not on file    Physically Abused: Not on file    Sexually Abused: Not on file   Housing Stability:     Unable to Pay for Housing in the Last Year: Not on file    Number of [place] in the Last Year: Not on file    Unstable Housing in the Last Year: Not on file       SCREENINGS    Artie Coma Scale  Eye Opening: Spontaneous  Best Verbal Response: Oriented  Best Motor Response: Obeys commands  Artie Coma Scale Score: 15        PHYSICAL EXAM    (up to 7 for level 4, 8 or more for level 5)     ED Triage Vitals [05/21/22 2325]   BP Temp Temp Source Pulse Resp SpO2 Height Weight   (!) 169/88 98.7 °F (37.1 °C) Oral 63 -- 96 % 5' 3\" (1.6 m) 115 lb (52.2 kg)       Physical Exam  Vitals and nursing note reviewed. Constitutional:       General: She is not in acute distress. Appearance: Normal appearance. She is well-developed and normal weight. She is not ill-appearing, toxic-appearing or diaphoretic. HENT:      Head: Normocephalic and atraumatic. Nose: Nose normal. No congestion or rhinorrhea. Mouth/Throat:      Mouth: Mucous membranes are moist.      Pharynx: Oropharynx is clear. No oropharyngeal exudate or posterior oropharyngeal erythema. Eyes:      General: No scleral icterus. Right eye: No discharge. Left eye: No discharge. Extraocular Movements: Extraocular movements intact. Conjunctiva/sclera: Conjunctivae normal.      Pupils: Pupils are equal, round, and reactive to light. Neck:      Thyroid: No thyromegaly. Vascular: No carotid bruit or JVD. Trachea: No tracheal deviation.    Cardiovascular:      Rate and Rhythm: Normal rate and regular rhythm. Pulses: Normal pulses. Heart sounds: Normal heart sounds. No murmur heard. No friction rub. No gallop. Pulmonary:      Effort: Pulmonary effort is normal. No respiratory distress. Breath sounds: Normal breath sounds. No stridor. No wheezing, rhonchi or rales. Chest:      Chest wall: No tenderness. Abdominal:      General: Abdomen is flat. Bowel sounds are normal. There is no distension. Palpations: Abdomen is soft. There is no mass. Tenderness: There is no abdominal tenderness. There is no right CVA tenderness, left CVA tenderness, guarding or rebound. Hernia: No hernia is present. Musculoskeletal:         General: No swelling, tenderness, deformity or signs of injury. Normal range of motion. Cervical back: Normal range of motion and neck supple. No rigidity or tenderness. Right lower leg: No edema. Left lower leg: No edema. Lymphadenopathy:      Cervical: No cervical adenopathy. Skin:     General: Skin is warm and dry. Capillary Refill: Capillary refill takes less than 2 seconds. Coloration: Skin is not jaundiced or pale. Findings: No bruising, erythema, lesion or rash. Neurological:      General: No focal deficit present. Mental Status: She is alert and oriented to person, place, and time. Mental status is at baseline. Cranial Nerves: No cranial nerve deficit. Sensory: No sensory deficit. Motor: No weakness or abnormal muscle tone. Coordination: Coordination normal.      Gait: Gait normal.      Deep Tendon Reflexes: Reflexes are normal and symmetric. Reflexes normal.   Psychiatric:         Mood and Affect: Mood normal.         Behavior: Behavior normal.         Thought Content:  Thought content normal.         Judgment: Judgment normal.         DIAGNOSTIC RESULTS     EKG: All EKG's are interpreted by the Emergency Department Physician who either signs or Co-signs this chart in the absence of a cardiologist.        RADIOLOGY:   Non-plain film images such as CT, Ultrasound and MRI are read by the radiologist. Lisandro Shaw radiographicimages are visualized and preliminarily interpreted by the emergency physician with the below findings:        Interpretation per the Radiologist below, if available at the time of this note:    No orders to display         ED BEDSIDE ULTRASOUND:   Performed by ED Physician - none    LABS:  Labs Reviewed   COMPREHENSIVE METABOLIC PANEL - Abnormal; Notable for the following components:       Result Value    Glucose 107 (*)     All other components within normal limits   CBC WITH AUTO DIFFERENTIAL - Abnormal; Notable for the following components:    MCHC 29.8 (*)     RDW 19.0 (*)     Eosinophils % 0.3 (*)     All other components within normal limits   URINALYSIS WITH REFLEX TO CULTURE       All other labs were within normal range or not returned as of this dictation. EMERGENCY DEPARTMENT COURSE and DIFFERENTIALDIAGNOSIS/MDM:   Vitals:    Vitals:    05/21/22 2325   BP: (!) 169/88   Pulse: 63   Temp: 98.7 °F (37.1 °C)   TempSrc: Oral   SpO2: 96%   Weight: 115 lb (52.2 kg)   Height: 5' 3\" (1.6 m)           MDM  Number of Diagnoses or Management Options     Amount and/or Complexity of Data Reviewed  Clinical lab tests: reviewed and ordered    Risk of Complications, Morbidity, and/or Mortality  Presenting problems: moderate  Diagnostic procedures: moderate  Management options: moderate    Patient Progress  Patient progress: improved      CRITICAL CARE TIME   Total Critical Care time was  minutes, excluding separately reportable procedures. There was a high probability of clinically significant/life threatening deterioration in the patient's condition which required my urgentintervention. CONSULTS:  None    PROCEDURES:  Unless otherwise noted below, none     Procedures    FINAL IMPRESSION      1.  Migraine without aura and without status migrainosus, not intractable DISPOSITION/PLAN   DISPOSITION Discharge - Pending Orders Complete 05/22/2022 12:33:24 AM      PATIENT REFERRED TO:  Alonzo Huitron DO  [address] [place], [place] [id number] [phone]    In 3 days        DISCHARGE MEDICATIONS:  New Prescriptions    KETOROLAC (TORADOL) 10 MG TABLET    Take 1 tablet by mouth every 6 hours as needed for Pain          (Please note that portions of this note were completed with a voice recognitionprogram.  Efforts were made to edit the dictations but occasionally words are mis-transcribed.)    Brandyn Calles MD (electronically signed)  Attending Emergency Physician          Brandyn Calles MD  05/22/22 9240

## 2022-05-22 NOTE — ED TRIAGE NOTES
Pt. Is repeat visit. She has history for nightly migraines, being followed by PCP, has not been seen by neurology as yet. She has had significant outpatient testing, is on meds at home and continues to have headaches. Tonight she reports pain is in her eyes, has flashes of lights in bilateral eyes, and has photophobia. She denies N/V, dizziness. 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 She has baseline eye disease and blindness.

## 2022-05-23 LAB — URINE CULTURE, ROUTINE: NORMAL

## 2022-08-30 ENCOUNTER — HOSPITAL ENCOUNTER (EMERGENCY)
Age: 72
Discharge: HOME | End: 2022-08-30
Attending: EMERGENCY MEDICINE
Payer: MEDICARE

## 2022-08-30 VITALS
WEIGHT: 120 LBS | HEART RATE: 72 BPM | BODY MASS INDEX: 21.26 KG/M2 | SYSTOLIC BLOOD PRESSURE: 148 MMHG | HEIGHT: 63 IN | RESPIRATION RATE: 18 BRPM | OXYGEN SATURATION: 99 % | DIASTOLIC BLOOD PRESSURE: 76 MMHG | TEMPERATURE: 97.8 F

## 2022-08-30 DIAGNOSIS — R51.9 ACUTE NONINTRACTABLE HEADACHE, UNSPECIFIED HEADACHE TYPE: Primary | ICD-10-CM

## 2022-08-30 PROCEDURE — 96375 TX/PRO/DX INJ NEW DRUG ADDON: CPT

## 2022-08-30 PROCEDURE — 99284 EMERGENCY DEPT VISIT MOD MDM: CPT

## 2022-08-30 PROCEDURE — 2580000003 HC RX 258: Performed by: EMERGENCY MEDICINE

## 2022-08-30 PROCEDURE — 96374 THER/PROPH/DIAG INJ IV PUSH: CPT

## 2022-08-30 PROCEDURE — 6360000002 HC RX W HCPCS: Performed by: EMERGENCY MEDICINE

## 2022-08-30 RX ORDER — METOCLOPRAMIDE HYDROCHLORIDE 5 MG/ML
10 INJECTION INTRAMUSCULAR; INTRAVENOUS ONCE
Status: COMPLETED | OUTPATIENT
Start: 2022-08-30 | End: 2022-08-30

## 2022-08-30 RX ORDER — KETOROLAC TROMETHAMINE 15 MG/ML
15 INJECTION, SOLUTION INTRAMUSCULAR; INTRAVENOUS ONCE
Status: COMPLETED | OUTPATIENT
Start: 2022-08-30 | End: 2022-08-30

## 2022-08-30 RX ORDER — 0.9 % SODIUM CHLORIDE 0.9 %
1000 INTRAVENOUS SOLUTION INTRAVENOUS ONCE
Status: COMPLETED | OUTPATIENT
Start: 2022-08-30 | End: 2022-08-30

## 2022-08-30 RX ORDER — DIPHENHYDRAMINE HYDROCHLORIDE 50 MG/ML
25 INJECTION INTRAMUSCULAR; INTRAVENOUS ONCE
Status: COMPLETED | OUTPATIENT
Start: 2022-08-30 | End: 2022-08-30

## 2022-08-30 RX ADMIN — KETOROLAC TROMETHAMINE 15 MG: 15 INJECTION, SOLUTION INTRAMUSCULAR; INTRAVENOUS at 22:10

## 2022-08-30 RX ADMIN — DIPHENHYDRAMINE HYDROCHLORIDE 25 MG: 50 INJECTION, SOLUTION INTRAMUSCULAR; INTRAVENOUS at 22:10

## 2022-08-30 RX ADMIN — SODIUM CHLORIDE 1000 ML: 9 INJECTION, SOLUTION INTRAVENOUS at 22:08

## 2022-08-30 RX ADMIN — METOCLOPRAMIDE HYDROCHLORIDE 10 MG: 5 INJECTION INTRAMUSCULAR; INTRAVENOUS at 22:09

## 2022-08-30 ASSESSMENT — PAIN DESCRIPTION - FREQUENCY
FREQUENCY: CONTINUOUS
FREQUENCY: CONTINUOUS

## 2022-08-30 ASSESSMENT — PAIN DESCRIPTION - DESCRIPTORS
DESCRIPTORS: POUNDING;CRUSHING
DESCRIPTORS: POUNDING;CRUSHING

## 2022-08-30 ASSESSMENT — PAIN - FUNCTIONAL ASSESSMENT
PAIN_FUNCTIONAL_ASSESSMENT: 0-10
PAIN_FUNCTIONAL_ASSESSMENT: NONE - DENIES PAIN

## 2022-08-30 ASSESSMENT — PAIN SCALES - GENERAL
PAINLEVEL_OUTOF10: 9
PAINLEVEL_OUTOF10: 9

## 2022-08-30 ASSESSMENT — PAIN DESCRIPTION - LOCATION
LOCATION: HEAD
LOCATION: HEAD

## 2022-08-30 ASSESSMENT — PAIN DESCRIPTION - PAIN TYPE
TYPE: ACUTE PAIN
TYPE: ACUTE PAIN

## 2022-08-31 NOTE — DISCHARGE INSTRUCTIONS
Return to the Emergency Department immediately if you develop worsening symptoms, or you have any other concerns. Please follow up with your family doctor and neurologist  in 1-2 days.

## 2022-08-31 NOTE — ED NOTES
Explained discharge instructions to patient. Went over discharge diagnosis and pertinent educational material with patient. Patient stated understanding of discharge diagnosis, instructions. Patient denies any questions at this time, all concerns addressed. No signs or symptoms of pain noted at this time. A/0 x3, ambulatory, resps even and unlabored on room air. Follow up instructions and reasons to return to ER reviewed. Patient denies needs at time of discharge.         Ju Gudino RN  08/30/22 6426

## 2022-09-02 NOTE — ED PROVIDER NOTES
eMERGENCY dEPARTMENT eNCOUnter      [facility]    Chief Complaint   Patient presents with    Eye Pain    Headache     Out of medication       HPI    Aleida Nguyen is a 67 y.o. female pression, GERD, chronic headaches for which patient takes Ultram and she is out of it, chronic vision issues, rheumatoid arthritis, osteoarthritis, hypothyroidism, hypercholesterolemia who presentsto ED from home with /caretaker  By private car  With complaint of headache  Onset couple hours  Intensity of symptoms moderate associated with phonophobia and photophobia similar to her prior migraine headaches. She denies any fever, chills, neck rigidity. Patient denies any focal neurologic deficit. She denies any chest pain or shortness of breath. PAST MEDICAL HISTORY    Past Medical History:   Diagnosis Date    Depression     GERD (gastroesophageal reflux disease)     Headache     History of kidney stones     hospitalized 9/2016 obstructive pyelonephritis    Hypercholesteremia     Hypothyroidism     Osteoarthritis     Osteopenia     RA (rheumatoid arthritis) ([facility] [address]        SURGICAL HISTORY    Past Surgical History:   Procedure Laterality Date    CYSTOSCOPY Left 08/28/2016    John Campo MD  [address] (CERVIX STATUS UNKNOWN)      KNEE SURGERY      left    LITHOTRIPSY Left 10/12/2016    HOLMIUM LASER LITHOTRIPSY AND REMOVAL OF LEFT J STENT  performed by Carlos Maza MD at [facility].  08/09/2013    DR. RHODES    URETER SURGERY Left 10/12/2016    /left ureteroscopy/ cysto/ retrogrades/pyelogram/ holmium laser lithotripsy removal left urerteral stent performed by Carlos Maza MD at [address] [place] [address]    Current Outpatient Rx   Medication Sig Dispense Refill    ketorolac (TORADOL) 10 MG tablet Take 1 tablet by mouth every 6 hours as needed for Pain 20 tablet 0    amoxicillin-clavulanate (AUGMENTIN) 875-125 MG per tablet Take 1 tablet by mouth 2 times daily      traMADol (ULTRAM) 50 MG tablet Take 50 mg by mouth every 6 hours as needed for Pain.       metroNIDAZOLE (FLAGYL) 250 MG tablet Take 250 mg by mouth in the morning and at bedtime      predniSONE (DELTASONE) 5 MG tablet Take by mouth       naproxen (NAPROSYN) 375 MG tablet TAKE 1 TABLET BY MOUTH TWICE DAILY AFTER A MEAL AS NEEDED (Patient not taking: Reported on 5/1/2022)      Inulin 1.5 g CHEW chewable tablet Take by mouth (Patient not taking: Reported on 5/1/2022)      cyclobenzaprine (FLEXERIL) 10 MG tablet Take 10 mg by mouth 3 times daily as needed (Patient not taking: Reported on 5/1/2022)      diphenhydrAMINE (BENADRYL) 25 MG capsule Take 50 mg by mouth every 6 hours as needed for Itching      acetaminophen (TYLENOL) 325 MG tablet Take 650 mg by mouth every 6 hours as needed for Pain Indications: this am      ondansetron (ZOFRAN ODT) 4 MG disintegrating tablet Take 1 tablet by mouth every 8 hours as needed for Nausea (Patient not taking: Reported on 5/1/2022) 20 tablet 0    gabapentin (NEURONTIN) 300 MG capsule TAKE 3 CAPSULES BY MOUTH AT BEDTIME 90 capsule 1    levothyroxine (SYNTHROID) 50 MCG tablet TAKE 1 TABLET BY MOUTH ONCE DAILY 90 tablet 3    prednisoLONE acetate (PRED FORTE) 1 % ophthalmic suspension instill 1 (ONE) DROP in left eye TWICE DAILY  0       ALLERGIES    Allergies   Allergen Reactions    Ciprofloxacin        FAMILY HISTORY    Family History   Problem Relation Age of Onset    Diabetes Father     Heart Disease Father        SOCIAL HISTORY    Social History     Socioeconomic History    Marital status:      Spouse name: None    Number of children: None    Years of education: None    Highest education level: None   Tobacco Use    Smoking status: Never    Smokeless tobacco: Never   Vaping Use    Vaping Use: Never used   Substance and Sexual Activity    Alcohol use: No    Drug use: No    Sexual activity: Not Currently REVIEW OF SYSTEMS    Constitutional:  Denies fever, chills, weight loss or weakness   Eyes:  Denies photophobia or discharge   HENT:  Denies sore throat or ear pain   Respiratory:  Denies cough or shortness of breath   Cardiovascular:  Denies chest pain, palpitations or swelling   GI:  Denies abdominal pain, nausea, vomiting, or diarrhea   Musculoskeletal:  Denies back pain   Skin:  Denies rash   Neurologic: Complains headache, but denies focal weakness or sensory changes   Endocrine:  Denies polyuria or polydypsia   Lymphatic:  Denies swollen glands   Psychiatric:  Denies depression, suicidal ideation or homicidal ideation   All systems negative except as marked. PHYSICAL EXAM    VITAL SIGNS: BP (!) 148/76   Pulse 72   Temp 97.8 °F (36.6 °C)   Resp 18   Ht 5' 3\" (1.6 m)   Wt 120 lb (54.4 kg)   LMP  (LMP Unknown)   SpO2 99%   BMI 21.26 kg/m²    Constitutional:  Well developed, Well nourished, moderate acute distress, Non-toxic appearance. HENT:  Normocephalic, Atraumatic, Bilateral external ears normal, Oropharynx moist, No oral exudates, Nose normal. Neck- Normal range of motion, No tenderness, Supple, No stridor. Eyes:  PERRL, EOMI, Conjunctiva normal, No discharge. Respiratory:  Normal breath sounds, No respiratory distress, No wheezing, No chest tenderness. Cardiovascular:  Normal heart rate, Normal rhythm, No murmurs, No rubs, No gallops. GI:  Bowel sounds normal, Soft, No tenderness, No masses, No pulsatile masses. : No CVA tenderness. Musculoskeletal:  Intact distal pulses, No edema, No tenderness, No cyanosis, No clubbing. Good range of motion in all major joints. No tenderness to palpation or major deformities noted. No meningeal signs noticed. Back- No tenderness. Integument:  Warm, Dry, No erythema, No rash. Lymphatic:  No lymphadenopathy noted. Neurologic: NIH equals 0 alert & oriented x 3, Normal motor function, Normal sensory function, No focal deficits noted. Psychiatric:  Affect normal, Judgment normal, Mood normal.         REEVALUATION   Patient feels markedly better after the migraine cocktail. Tolerating p.o. Patient wants to go home. Summation      Patient Course:     ED Medications administered this visit:    Medications   0.9 % sodium chloride bolus (0 mLs IntraVENous Stopped 22)   metoclopramide (REGLAN) injection 10 mg (10 mg IntraVENous Given 22)   diphenhydrAMINE (BENADRYL) injection 25 mg (25 mg IntraVENous Given 22)   ketorolac (TORADOL) injection 15 mg (15 mg IntraVENous Given 22)       New Prescriptions from this visit:    Discharge Medication List as of 2022 10:43 PM          Follow-up:  Wagner Gonzalez DO  [address] [id number] [id number]    Call in 1 day      Rosemarie Finn MD  [address]  [address]  [phone]    Call in 1 day        Final Impression:   1.  Acute nonintractable headache, unspecified headache type               (Please note that portions of this note were completed with a voice recognition program.  Efforts were made to edit the dictations but occasionally words are mis-transcribed.)            Makayla Flower MD  22 [phone]

## 2023-03-08 ENCOUNTER — TELEPHONE (OUTPATIENT)
Dept: PRIMARY CARE | Facility: CLINIC | Age: 73
End: 2023-03-08
Payer: MEDICARE

## 2023-03-08 ENCOUNTER — APPOINTMENT (OUTPATIENT)
Dept: CT IMAGING | Age: 73
End: 2023-03-08
Payer: MEDICARE

## 2023-03-08 ENCOUNTER — HOSPITAL ENCOUNTER (EMERGENCY)
Age: 73
Discharge: HOME | End: 2023-03-08
Attending: EMERGENCY MEDICINE
Payer: MEDICARE

## 2023-03-08 VITALS
SYSTOLIC BLOOD PRESSURE: 130 MMHG | HEIGHT: 63 IN | TEMPERATURE: 98.5 F | HEART RATE: 80 BPM | DIASTOLIC BLOOD PRESSURE: 70 MMHG | WEIGHT: 120 LBS | BODY MASS INDEX: 21.26 KG/M2 | OXYGEN SATURATION: 98 % | RESPIRATION RATE: 16 BRPM

## 2023-03-08 DIAGNOSIS — N39.0 URINARY TRACT INFECTION WITH HEMATURIA, SITE UNSPECIFIED: ICD-10-CM

## 2023-03-08 DIAGNOSIS — N30.00 ACUTE CYSTITIS WITHOUT HEMATURIA: Primary | ICD-10-CM

## 2023-03-08 DIAGNOSIS — F32.A DEPRESSION, UNSPECIFIED DEPRESSION TYPE: Primary | ICD-10-CM

## 2023-03-08 DIAGNOSIS — R31.9 URINARY TRACT INFECTION WITH HEMATURIA, SITE UNSPECIFIED: ICD-10-CM

## 2023-03-08 LAB
AMORPHOUS: ABNORMAL
ANION GAP SERPL CALCULATED.3IONS-SCNC: 13 MEQ/L (ref 9–15)
BACTERIA: ABNORMAL /HPF
BASOPHILS ABSOLUTE: 0 K/UL (ref 0–0.1)
BASOPHILS RELATIVE PERCENT: 0.1 % (ref 0.1–1.2)
BILIRUBIN URINE: NEGATIVE
BLOOD, URINE: ABNORMAL
BUN BLDV-MCNC: 15 MG/DL (ref 8–23)
CALCIUM SERPL-MCNC: 9.9 MG/DL (ref 8.5–9.9)
CHLORIDE BLD-SCNC: 104 MEQ/L (ref 95–107)
CLARITY: ABNORMAL
CO2: 26 MEQ/L (ref 20–31)
COLOR: YELLOW
CREAT SERPL-MCNC: 0.59 MG/DL (ref 0.5–0.9)
CRYSTALS, UA: ABNORMAL /HPF
EOSINOPHILS ABSOLUTE: 0 K/UL (ref 0–0.4)
EOSINOPHILS RELATIVE PERCENT: 0.1 % (ref 0.7–5.8)
EPITHELIAL CELLS, UA: ABNORMAL /HPF
GFR SERPL CREATININE-BSD FRML MDRD: >60 ML/MIN/{1.73_M2}
GLUCOSE BLD-MCNC: 124 MG/DL (ref 70–99)
GLUCOSE URINE: NEGATIVE MG/DL
HCT VFR BLD CALC: 40.2 % (ref 37–47)
HEMOGLOBIN: 11.9 G/DL (ref 11.2–15.7)
IMMATURE GRANULOCYTES #: 0 K/UL
IMMATURE GRANULOCYTES %: 0.2 %
KETONES, URINE: NEGATIVE MG/DL
LEUKOCYTE ESTERASE, URINE: ABNORMAL
LYMPHOCYTES ABSOLUTE: 1.2 K/UL (ref 1.2–3.7)
LYMPHOCYTES RELATIVE PERCENT: 11.9 %
MCH RBC QN AUTO: 25.8 PG (ref 25.6–32.2)
MCHC RBC AUTO-ENTMCNC: 29.6 % (ref 32.2–35.5)
MCV RBC AUTO: 87.2 FL (ref 79.4–94.8)
MONOCYTES ABSOLUTE: 0.3 K/UL (ref 0.2–0.9)
MONOCYTES RELATIVE PERCENT: 3.4 % (ref 4.7–12.5)
NEUTROPHILS ABSOLUTE: 8.2 K/UL (ref 1.6–6.1)
NEUTROPHILS RELATIVE PERCENT: 84.3 % (ref 34–71.1)
NITRITE, URINE: NEGATIVE
PDW BLD-RTO: 16.5 % (ref 11.7–14.4)
PH UA: 7.5 (ref 5–9)
PLATELET # BLD: 210 K/UL (ref 182–369)
POTASSIUM SERPL-SCNC: 4.3 MEQ/L (ref 3.4–4.9)
PROTEIN UA: 30 MG/DL
RBC # BLD: 4.61 M/UL (ref 3.93–5.22)
RBC UA: ABNORMAL /HPF (ref 0–2)
RENAL EPITHELIAL, UA: ABNORMAL /HPF
SODIUM BLD-SCNC: 143 MEQ/L (ref 135–144)
SPECIFIC GRAVITY UA: 1.02 (ref 1–1.03)
URINE REFLEX TO CULTURE: YES
UROBILINOGEN, URINE: 0.2 E.U./DL
WBC # BLD: 9.8 K/UL (ref 4–10)
WBC UA: ABNORMAL /HPF (ref 0–5)

## 2023-03-08 PROCEDURE — 85025 COMPLETE CBC W/AUTO DIFF WBC: CPT

## 2023-03-08 PROCEDURE — 6370000000 HC RX 637 (ALT 250 FOR IP): Performed by: EMERGENCY MEDICINE

## 2023-03-08 PROCEDURE — 74177 CT ABD & PELVIS W/CONTRAST: CPT

## 2023-03-08 PROCEDURE — 80048 BASIC METABOLIC PNL TOTAL CA: CPT

## 2023-03-08 PROCEDURE — 99285 EMERGENCY DEPT VISIT HI MDM: CPT

## 2023-03-08 PROCEDURE — 51701 INSERT BLADDER CATHETER: CPT

## 2023-03-08 PROCEDURE — 36415 COLL VENOUS BLD VENIPUNCTURE: CPT

## 2023-03-08 PROCEDURE — 87186 SC STD MICRODIL/AGAR DIL: CPT

## 2023-03-08 PROCEDURE — 6360000004 HC RX CONTRAST MEDICATION: Performed by: EMERGENCY MEDICINE

## 2023-03-08 PROCEDURE — 87077 CULTURE AEROBIC IDENTIFY: CPT

## 2023-03-08 PROCEDURE — 87086 URINE CULTURE/COLONY COUNT: CPT

## 2023-03-08 PROCEDURE — 81001 URINALYSIS AUTO W/SCOPE: CPT

## 2023-03-08 RX ORDER — SULFAMETHOXAZOLE AND TRIMETHOPRIM 800; 160 MG/1; MG/1
1 TABLET ORAL 2 TIMES DAILY
Qty: 14 TABLET | Refills: 0 | Status: SHIPPED | OUTPATIENT
Start: 2023-03-08 | End: 2023-03-15

## 2023-03-08 RX ORDER — NITROFURANTOIN 25; 75 MG/1; MG/1
100 CAPSULE ORAL
Qty: 14 CAPSULE | Refills: 0 | Status: SHIPPED | OUTPATIENT
Start: 2023-03-08 | End: 2023-03-20

## 2023-03-08 RX ORDER — SULFAMETHOXAZOLE AND TRIMETHOPRIM 800; 160 MG/1; MG/1
1 TABLET ORAL ONCE
Status: COMPLETED | OUTPATIENT
Start: 2023-03-08 | End: 2023-03-08

## 2023-03-08 RX ADMIN — SULFAMETHOXAZOLE AND TRIMETHOPRIM 1 TABLET: 800; 160 TABLET ORAL at 22:48

## 2023-03-08 RX ADMIN — IOPAMIDOL 75 ML: 755 INJECTION, SOLUTION INTRAVENOUS at 21:01

## 2023-03-08 ASSESSMENT — PAIN DESCRIPTION - DESCRIPTORS: DESCRIPTORS: DISCOMFORT

## 2023-03-08 ASSESSMENT — PAIN DESCRIPTION - LOCATION: LOCATION: VAGINA

## 2023-03-08 ASSESSMENT — PAIN DESCRIPTION - FREQUENCY: FREQUENCY: CONTINUOUS

## 2023-03-08 ASSESSMENT — PAIN SCALES - GENERAL: PAINLEVEL_OUTOF10: 6

## 2023-03-08 ASSESSMENT — PAIN - FUNCTIONAL ASSESSMENT: PAIN_FUNCTIONAL_ASSESSMENT: 0-10

## 2023-03-08 NOTE — TELEPHONE ENCOUNTER
It is okay to split the medications. Please cue them up and I will sign off. The patient's  has phoned to inform that she again has blood in her urine.  It is requested that a medication be sent to the pharmacy to treat UTI. He states that she had this happen previously, and it turned out to be a UTI.    [facility]/ [address]    Please advise.  Thank you.

## 2023-03-08 NOTE — ED NOTES
Pt arrives to ER with . Pt complaining of hematuria.  states he saw small amounts of blood while helping his wife whipe after urinating.  concerned that the bleeding wouldn't stop after wiping and brought pt to ER. Pt denies any pain or discomfort. No bleeding at this time. Pt unable to void at this time. Denies abd tenderness on palpation.       Florencio Duncan RN  03/08/23 0257

## 2023-03-09 NOTE — ED PROVIDER NOTES
[address] \[facility]\"" ED  EMERGENCY DEPARTMENT ENCOUNTER      Pt Name: Cony Vivas  MRN: [id number]  [place] 1950  Date of evaluation: 3/8/2023  Provider: Darryl Lancaster DO        HISTORY OF PRESENT ILLNESS    Shirin Barry is a 67 y.o. female per chart review has ah/o depression, GERD, HA, OA, RA. The history is provided by the patient. Dysuria   This is a new problem. The current episode started 12 to 24 hours ago. The problem occurs every urination. The problem has not changed since onset. The quality of the pain is described as burning. The pain is at a severity of 6/10. The pain is moderate. There has been no fever. Pertinent negatives include no chills, no nausea and no vomiting. She has tried nothing for the symptoms. Her past medical history is significant for recurrent UTIs. REVIEW OF SYSTEMS       Review of Systems   Constitutional:  Negative for chills and fever. HENT:  Negative for ear pain and sore throat. Eyes:  Negative for discharge and redness. Respiratory:  Negative for cough and shortness of breath. Cardiovascular:  Negative for chest pain and palpitations. Gastrointestinal:  Negative for abdominal pain, nausea and vomiting. Genitourinary:  Positive for dysuria. Negative for difficulty urinating. Musculoskeletal:  Negative for back pain and neck pain. Skin:  Negative for rash and wound. Neurological:  Negative for dizziness and syncope. Psychiatric/Behavioral:  Negative for confusion. The patient is not nervous/anxious. All other systems reviewed and are negative. Except as noted above the remainder of the review of systems was reviewed and negative.        PAST MEDICAL HISTORY     Past Medical History:   Diagnosis Date    Depression     GERD (gastroesophageal reflux disease)     Headache     History of kidney stones     hospitalized 9/2016 obstructive pyelonephritis    Hypercholesteremia     Hypothyroidism     Osteoarthritis     Osteopenia     RA (rheumatoid arthritis) ([facility])          SURGICAL HISTORY       Past Surgical History:   Procedure Laterality Date    CYSTOSCOPY Left 08/28/2016    PHILIP GRIGSBY MD  PYELOGRAM & DOUBLE-J STENT PLACEMENT    GALLBLADDER SURGERY      HYSTERECTOMY (CERVIX STATUS UNKNOWN)      KNEE SURGERY      left    LITHOTRIPSY Left 10/12/2016    HOLMIUM LASER LITHOTRIPSY AND REMOVAL OF LEFT J STENT  performed by Wilfred Grigsby MD at [facility] OR    UPPER GASTROINTESTINAL ENDOSCOPY  08/09/2013    DR. RHODES    URETER SURGERY Left 10/12/2016    /left ureteroscopy/ cysto/ retrogrades/pyelogram/ holmium laser lithotripsy removal left urerteral stent performed by Wilfred Grigsby MD at [facility]         CURRENT MEDICATIONS       Discharge Medication List as of 3/8/2023 10:52 PM        CONTINUE these medications which have NOT CHANGED    Details   ketorolac (TORADOL) 10 MG tablet Take 1 tablet by mouth every 6 hours as needed for Pain, Disp-20 tablet, R-0Normal      amoxicillin-clavulanate (AUGMENTIN) 875-125 MG per tablet Take 1 tablet by mouth 2 times dailyHistorical Med      traMADol (ULTRAM) 50 MG tablet Take 50 mg by mouth every 6 hours as needed for Pain.Historical Med      metroNIDAZOLE (FLAGYL) 250 MG tablet Take 250 mg by mouth in the morning and at bedtimeHistorical Med      predniSONE (DELTASONE) 5 MG tablet Take by mouth Historical Med      naproxen (NAPROSYN) 375 MG tablet TAKE 1 TABLET BY MOUTH TWICE DAILY AFTER A MEAL AS NEEDEDHistorical Med      Inulin 1.5 g CHEW chewable tablet Take by mouthHistorical Med      cyclobenzaprine (FLEXERIL) 10 MG tablet Take 10 mg by mouth 3 times daily as neededHistorical Med      diphenhydrAMINE (BENADRYL) 25 MG capsule Take 50 mg by mouth every 6 hours as needed for ItchingHistorical Med      acetaminophen (TYLENOL) 325 MG tablet Take 650 mg by mouth every 6 hours as needed for Pain Indications: this amHistorical Med      ondansetron (ZOFRAN ODT) 4 MG disintegrating tablet Take 1 tablet by mouth every  8 hours as needed for Nausea, Disp-20 tablet, R-0Print      gabapentin (NEURONTIN) 300 MG capsule TAKE 3 CAPSULES BY MOUTH AT BEDTIME, Disp-90 capsule, R-1Normal      levothyroxine (SYNTHROID) 50 MCG tablet TAKE 1 TABLET BY MOUTH ONCE DAILY, Disp-90 tablet, R-3Please consider 90 day supplies to promote better adherenceNormal      prednisoLONE acetate (PRED FORTE) 1 % ophthalmic suspension instill 1 (ONE) DROP in left eye TWICE DAILY, R-0Historical Med             ALLERGIES     Ciprofloxacin    FAMILY HISTORY       Family History   Problem Relation Age of Onset    Diabetes Father     Heart Disease Father           SOCIAL HISTORY       Social History     Socioeconomic History    Marital status:      Spouse name: None    Number of children: None    Years of education: None    Highest education level: None   Tobacco Use    Smoking status: Never    Smokeless tobacco: Never   Vaping Use    Vaping Use: Never used   Substance and Sexual Activity    Alcohol use: No    Drug use: No    Sexual activity: Not Currently         PHYSICAL EXAM       ED Triage Vitals [03/08/23 1829]   BP Temp Temp Source Heart Rate Resp SpO2 Height Weight   (!) 144/115 98.5 °F (36.9 °C) Tympanic 72 16 98 % 5' 3\" (1.6 m) 120 lb (54.4 kg)       Physical Exam  Vitals and nursing note reviewed. Constitutional:       Appearance: Normal appearance. HENT:      Head: Normocephalic and atraumatic. Right Ear: Tympanic membrane normal.      Left Ear: Tympanic membrane normal.      Nose: Nose normal.      Mouth/Throat:      Mouth: Mucous membranes are moist.      Pharynx: Oropharynx is clear. Eyes:      General: Lids are normal.      Extraocular Movements: Extraocular movements intact. Conjunctiva/sclera: Conjunctivae normal.      Pupils: Pupils are equal, round, and reactive to light. Cardiovascular:      Rate and Rhythm: Normal rate and regular rhythm. Pulses: Normal pulses. Heart sounds: Normal heart sounds.    Pulmonary: Effort: Pulmonary effort is normal.      Breath sounds: Normal breath sounds. Abdominal:      General: Abdomen is flat. Bowel sounds are normal.      Palpations: Abdomen is soft. Musculoskeletal:         General: Normal range of motion. Cervical back: Full passive range of motion without pain, normal range of motion and neck supple. Skin:     General: Skin is warm. Capillary Refill: Capillary refill takes less than 2 seconds. Neurological:      General: No focal deficit present. Mental Status: She is alert and oriented to person, place, and time. Deep Tendon Reflexes: Reflexes are normal and symmetric. Psychiatric:         Attention and Perception: Attention and perception normal.         Mood and Affect: Mood normal.         Behavior: Behavior normal. Behavior is cooperative.          LABS:  Labs Reviewed   URINALYSIS WITH REFLEX TO CULTURE - Abnormal; Notable for the following components:       Result Value    Protein, UA 30 (*)     All other components within normal limits   CBC WITH AUTO DIFFERENTIAL - Abnormal; Notable for the following components:    MCHC 29.6 (*)     RDW 16.5 (*)     Neutrophils % 84.3 (*)     Monocytes % 3.4 (*)     Eosinophils % 0.1 (*)     Neutrophils Absolute 8.2 (*)     All other components within normal limits   BASIC METABOLIC PANEL - Abnormal; Notable for the following components:    Glucose 124 (*)     All other components within normal limits   MICROSCOPIC URINALYSIS - Abnormal; Notable for the following components:    WBC, UA 10-20 (*)     RBC, UA 3-5 (*)     Bacteria, UA MANY (*)     Crystals, UA Few Triple Phos (*)     All other components within normal limits   CULTURE, URINE    Narrative:     ORDER#: [id number]                          ORDERED BY: Dipak Carbajal  SOURCE: Urine Clean Catch                  COLLECTED:  03/08/23 21:32  ANTIBIOTICS AT BRIGITTE.:                      RECEIVED :  03/08/23 21:32         MDM:   Vitals:    Vitals:    03/08/23 1829 03/08/23 2013 03/08/23 2309   BP: (!) 144/115 136/69 130/70   Pulse: 72 78 80   Resp: 16 16 16   Temp: 98.5 °F (36.9 °C)     TempSrc: Tympanic     SpO2: 98% 97% 98%   Weight: 120 lb (54.4 kg)     Height: 5' 3\" (1.6 m)         MDM  Number of Diagnoses or Management Options  Depression, unspecified depression type  Urinary tract infection with hematuria, site unspecified  Diagnosis management comments: Patient presents with dysuria. Labs, IV, CT ab/pelvis ordered. Ct ab/pelvis: 1-2 mm non obstructing stone  Her UA shows a mild UTI. She will be discharged home with Rx for antibiotics. She will follow up in 2 days with her primary care doctor. Amount and/or Complexity of Data Reviewed  Clinical lab tests: ordered and reviewed  Tests in the radiology section of CPT®: ordered and reviewed         CT ABDOMEN PELVIS W IV CONTRAST Additional Contrast? None   Final Result   1-2 mm nonobstructing stone in the right kidney. The remainder of the CT   scan of the abdomen and pelvis reveals no acute intra-abdominal or pelvic   abnormality. EKG Interpreta    MENA LOBATO DO     The lab results, radiology and test results were reviewed with the patient and family. The patient will follow up in 2 days with their primary care doctor. If their symptoms change or get worse they will return to the ER. CRITICAL CARE TIME   Total CriticalCare time was 0 minutes, excluding separately reportable procedures. There was a high probability of clinically significant/life threatening deterioration in the patient's condition which required my urgent intervention. PROCEDURES:  Unlessotherwise noted below, none     Procedures      FINAL IMPRESSION      1. Depression, unspecified depression type    2.  Urinary tract infection with hematuria, site unspecified          DISPOSITION/PLAN   DISPOSITION Decision To Discharge 03/08/2023 10:40:56 PM          Darryl Lancaster DO (electronically signed)  Attending Emergency Physician          Sav Osuna DO  03/10/23 1723

## 2023-03-09 NOTE — ED NOTES
Pt. Attends saturated with urine and a scant amount of dark red blood. Britt-care completed and new attends placed. No breakdown noted. Pt. Assisted with repositioning for comfort. Warm blankets applied. Spouse at bedside & updated on pending test results and current plan of care. Provided TV remote. Both parties declined further needs at this time. ABC's stable. Side rails up x 2 and call light within reach. Her primary nurse, Pamela Garcia, was updated on her condition.      Shayy Sharma RN  03/08/23 2132       Shayy Sharma RN  03/08/23 2132

## 2023-03-09 NOTE — ED NOTES
Straight cath performed. Sterile procedure used. Pt tolerated well.      Bri Eye, RN  03/08/23 Anh Henao

## 2023-03-09 NOTE — ED NOTES
Pt discharged per physician order, Pt denies questions at this time. Pt wheelchaired to lobby and assisted into vehicle. . No distress noted. Follow up appointment, and prescriptions discussed with patient.        Shubham Carias RN  03/08/23 9514

## 2023-03-10 ENCOUNTER — TELEPHONE (OUTPATIENT)
Dept: PRIMARY CARE | Facility: CLINIC | Age: 73
End: 2023-03-10
Payer: MEDICARE

## 2023-03-10 ASSESSMENT — ENCOUNTER SYMPTOMS
ABDOMINAL PAIN: 0
NAUSEA: 0
EYE DISCHARGE: 0
EYE REDNESS: 0
BACK PAIN: 0
COUGH: 0
SORE THROAT: 0
SHORTNESS OF BREATH: 0
VOMITING: 0

## 2023-03-10 NOTE — TELEPHONE ENCOUNTER
CALLED ROLYING JORGE MADERA TO CALL HIM BACK D/T WIFE STILL BLEEDING VAGINALLY .  HOSPITAL DID TELL HIM IT COULD TAKE 72 HOURS FOR BLEEDING TO STOP AND HE TOOK HER TO [facility] 3/8/2023 EVENING.  SHE IS TAKING BACTRIM [phone]  BID.    PLEASE ADVISE

## 2023-03-10 NOTE — TELEPHONE ENCOUNTER
Called patient's spouse, patient's hematuria has stopped and is now experiencing more bloating. Patient's spouse said he has dicyclomine on hand and was wondering if patient can take that for the cramping. I advised she could take that for the cramping. Patient's spouse also scheduled appointment on 03/20/2023 for further evaluation. CAMILO.

## 2023-03-11 LAB
ORGANISM: ABNORMAL
URINE CULTURE, ROUTINE: ABNORMAL
URINE CULTURE, ROUTINE: ABNORMAL

## 2023-03-13 NOTE — TELEPHONE ENCOUNTER
"Per JWC \"  Concur with advice that she can take Bentyl for cramping if the UTI symptoms resolved and she can finish taking her current antibiotic which is I believe Bactrim DS   \"  "

## 2023-03-15 ENCOUNTER — TELEPHONE (OUTPATIENT)
Dept: PRIMARY CARE | Facility: CLINIC | Age: 73
End: 2023-03-15

## 2023-03-15 DIAGNOSIS — M54.16 LUMBAR RADICULOPATHY, CHRONIC: Primary | ICD-10-CM

## 2023-03-15 RX ORDER — GABAPENTIN 300 MG/1
CAPSULE ORAL
Qty: 60 CAPSULE | Refills: 0 | Status: SHIPPED | OUTPATIENT
Start: 2023-03-15 | End: 2023-03-20 | Stop reason: SDUPTHER

## 2023-03-15 RX ORDER — GABAPENTIN 300 MG/1
600 CAPSULE ORAL NIGHTLY
COMMUNITY
End: 2023-03-16 | Stop reason: SDUPTHER

## 2023-03-16 PROBLEM — R53.81 PHYSICAL DECONDITIONING: Status: ACTIVE | Noted: 2023-03-16

## 2023-03-16 PROBLEM — K21.9 GERD (GASTROESOPHAGEAL REFLUX DISEASE): Status: ACTIVE | Noted: 2023-03-16

## 2023-03-16 PROBLEM — R68.89 FORGETFULNESS: Status: ACTIVE | Noted: 2023-03-16

## 2023-03-16 PROBLEM — G44.1 INTRACTABLE VASCULAR HEADACHE: Status: ACTIVE | Noted: 2023-03-16

## 2023-03-16 PROBLEM — D50.9 IRON DEFICIENCY ANEMIA: Status: ACTIVE | Noted: 2023-03-16

## 2023-03-16 PROBLEM — H90.3 BILATERAL SENSORINEURAL HEARING LOSS: Status: ACTIVE | Noted: 2023-03-16

## 2023-03-16 PROBLEM — N39.0 RECURRENT UTI: Status: ACTIVE | Noted: 2023-03-16

## 2023-03-16 PROBLEM — F33.0 MILD EPISODE OF RECURRENT MAJOR DEPRESSIVE DISORDER (CMS-HCC): Status: ACTIVE | Noted: 2023-03-16

## 2023-03-16 PROBLEM — M06.9 RHEUMATOID ARTHRITIS (MULTI): Status: ACTIVE | Noted: 2023-03-16

## 2023-03-16 PROBLEM — K52.9 CHRONIC NONSPECIFIC COLITIS: Status: ACTIVE | Noted: 2023-03-16

## 2023-03-16 PROBLEM — K59.00 CONSTIPATION IN FEMALE: Status: ACTIVE | Noted: 2023-03-16

## 2023-03-16 PROBLEM — G43.001 MIGRAINE WITHOUT AURA AND WITH STATUS MIGRAINOSUS, NOT INTRACTABLE: Status: ACTIVE | Noted: 2023-03-16

## 2023-03-16 PROBLEM — R53.81: Status: ACTIVE | Noted: 2023-03-16

## 2023-03-16 PROBLEM — G62.9 PERIPHERAL NEUROPATHY: Status: ACTIVE | Noted: 2023-03-16

## 2023-03-16 PROBLEM — E03.9 HYPOTHYROIDISM: Status: ACTIVE | Noted: 2023-03-16

## 2023-03-16 RX ORDER — NAPROXEN 375 MG/1
375 TABLET ORAL 2 TIMES DAILY
COMMUNITY
Start: 2021-08-18 | End: 2023-08-23

## 2023-03-16 RX ORDER — TRAMADOL HYDROCHLORIDE 50 MG/1
50 TABLET ORAL DAILY
COMMUNITY
Start: 2022-05-17 | End: 2023-03-20 | Stop reason: SDUPTHER

## 2023-03-16 RX ORDER — POLYETHYLENE GLYCOL 3350 17 G/17G
17 POWDER, FOR SOLUTION ORAL
COMMUNITY
Start: 2022-05-25

## 2023-03-16 RX ORDER — ACETAMINOPHEN 500 MG
1 TABLET ORAL EVERY 6 HOURS PRN
COMMUNITY
End: 2024-05-04

## 2023-03-16 RX ORDER — BENZONATATE 200 MG/1
200 CAPSULE ORAL 3 TIMES DAILY PRN
Status: ON HOLD | COMMUNITY
End: 2023-10-08 | Stop reason: ALTCHOICE

## 2023-03-16 RX ORDER — DICYCLOMINE HYDROCHLORIDE 20 MG/1
0.5 TABLET ORAL EVERY 6 HOURS PRN
COMMUNITY
Start: 2022-06-01 | End: 2023-11-22 | Stop reason: WASHOUT

## 2023-03-16 RX ORDER — PREDNISOLONE SODIUM PHOSPHATE 10 MG/ML
1 SOLUTION/ DROPS OPHTHALMIC 2 TIMES DAILY
COMMUNITY
Start: 2022-09-07 | End: 2023-11-22 | Stop reason: WASHOUT

## 2023-03-16 RX ORDER — PREDNISONE 5 MG/1
5 TABLET ORAL DAILY
COMMUNITY
End: 2023-03-20 | Stop reason: SDUPTHER

## 2023-03-16 RX ORDER — LEVOTHYROXINE SODIUM 50 UG/1
50 TABLET ORAL DAILY
COMMUNITY
End: 2023-11-01 | Stop reason: SDUPTHER

## 2023-03-16 RX ORDER — CYCLOBENZAPRINE HCL 10 MG
1 TABLET ORAL 3 TIMES DAILY PRN
COMMUNITY
Start: 2022-09-07

## 2023-03-16 RX ORDER — DOCUSATE SODIUM 100 MG/1
1 CAPSULE, LIQUID FILLED ORAL 2 TIMES DAILY
COMMUNITY
Start: 2022-05-25

## 2023-03-16 RX ORDER — DEXAMETHASONE 4 MG/1
1 TABLET ORAL DAILY
COMMUNITY
End: 2023-03-23 | Stop reason: ALTCHOICE

## 2023-03-20 ENCOUNTER — OFFICE VISIT (OUTPATIENT)
Dept: PRIMARY CARE | Facility: CLINIC | Age: 73
End: 2023-03-20
Payer: MEDICARE

## 2023-03-20 VITALS
RESPIRATION RATE: 14 BRPM | SYSTOLIC BLOOD PRESSURE: 126 MMHG | DIASTOLIC BLOOD PRESSURE: 74 MMHG | HEART RATE: 73 BPM | OXYGEN SATURATION: 98 % | HEIGHT: 63 IN | BODY MASS INDEX: 22.67 KG/M2 | TEMPERATURE: 97.8 F

## 2023-03-20 DIAGNOSIS — E03.9 ACQUIRED HYPOTHYROIDISM: ICD-10-CM

## 2023-03-20 DIAGNOSIS — M06.9 RHEUMATOID ARTHRITIS, INVOLVING UNSPECIFIED SITE, UNSPECIFIED WHETHER RHEUMATOID FACTOR PRESENT (MULTI): ICD-10-CM

## 2023-03-20 DIAGNOSIS — K52.9 CHRONIC NONSPECIFIC COLITIS: ICD-10-CM

## 2023-03-20 DIAGNOSIS — G43.001 MIGRAINE WITHOUT AURA AND WITH STATUS MIGRAINOSUS, NOT INTRACTABLE: ICD-10-CM

## 2023-03-20 DIAGNOSIS — N39.0 RECURRENT UTI: Primary | ICD-10-CM

## 2023-03-20 DIAGNOSIS — M54.16 LUMBAR RADICULOPATHY, CHRONIC: ICD-10-CM

## 2023-03-20 PROCEDURE — 1159F MED LIST DOCD IN RCRD: CPT | Performed by: FAMILY MEDICINE

## 2023-03-20 PROCEDURE — 1036F TOBACCO NON-USER: CPT | Performed by: FAMILY MEDICINE

## 2023-03-20 PROCEDURE — 99214 OFFICE O/P EST MOD 30 MIN: CPT | Performed by: FAMILY MEDICINE

## 2023-03-20 RX ORDER — PREDNISONE 5 MG/1
5 TABLET ORAL DAILY
Qty: 90 TABLET | Refills: 3 | Status: SHIPPED | OUTPATIENT
Start: 2023-03-20 | End: 2023-10-16 | Stop reason: ALTCHOICE

## 2023-03-20 RX ORDER — GABAPENTIN 300 MG/1
600 CAPSULE ORAL NIGHTLY
Qty: 180 CAPSULE | Refills: 3 | Status: SHIPPED | OUTPATIENT
Start: 2023-03-20 | End: 2024-04-16

## 2023-03-20 RX ORDER — TRAMADOL HYDROCHLORIDE 50 MG/1
50 TABLET ORAL DAILY
Qty: 30 TABLET | Refills: 0 | Status: CANCELLED | OUTPATIENT
Start: 2023-03-20

## 2023-03-20 RX ORDER — NITROFURANTOIN MACROCRYSTALS 50 MG/1
50 CAPSULE ORAL DAILY
Qty: 30 CAPSULE | Refills: 0 | Status: SHIPPED | OUTPATIENT
Start: 2023-03-20 | End: 2023-04-17 | Stop reason: SDUPTHER

## 2023-03-20 RX ORDER — NALOXONE HYDROCHLORIDE 1 MG/ML
0.4 INJECTION INTRAMUSCULAR; INTRAVENOUS; SUBCUTANEOUS AS NEEDED
Qty: 2 ML | Refills: 2 | Status: SHIPPED | OUTPATIENT
Start: 2023-03-20 | End: 2023-11-22 | Stop reason: WASHOUT

## 2023-03-20 RX ORDER — TRAMADOL HYDROCHLORIDE 50 MG/1
50 TABLET ORAL DAILY
Qty: 30 TABLET | Refills: 0 | Status: ON HOLD | OUTPATIENT
Start: 2023-03-20 | End: 2023-10-10 | Stop reason: SDUPTHER

## 2023-03-20 ASSESSMENT — ENCOUNTER SYMPTOMS
DEPRESSION: 0
LOSS OF SENSATION IN FEET: 0
OCCASIONAL FEELINGS OF UNSTEADINESS: 0

## 2023-03-20 NOTE — PROGRESS NOTES
Subjective   Patient ID: Alisa Fregoso is a 72 y.o. female who presents for UTI (Follow up ).  HPI  Pleasant 72-year-old is here with her  to recheck abdominal pain her recent CT of the abdomen showed normal bladder normal kidney except for small kidney stone on the right and otherwise only small umbilical hernia there is no left-sided colon problems and only diverticular disease chronically without evidence of adenopathy abscess or acute colon problems stomach pancreas and remaining abdomen is unremarkable.  The patient off her Macrodantin does get returning symptoms and discussed with the phosphate crystals to acidify urine and to take suppression dose of low-dose Macrodantin at 50 mg daily to reduce recurrent UTIs this is agreed upon and discussed with with both spouse and patient  Patient does take intermittent vascular headache abdominal pain rheumatoid pain which is definitely improve the quality of her life OARRS was performed template for opioids completed safety issues reviewed  Patient denies any recent chest pain shortness breath palpitations or new orthopedic problems takes low-dose prednisone 5 mg for rheumatoid arthritis.  Weakness pretty much uses walker at home and wheelchair away from home.  Above chronic medicines reviewed with both patient and spouse today.  Urine culture essentially unremarkable  Gabapentin for peripheral neuropathy from her lumbar arthritis.  Is on Synthroid.  Also takes Naprosyn low-dose intermittently pain despite taking her 5 mg of prednisone in the morning.  OARRS:  Alvarado Forde DO on 3/23/2023  3:30 PM  I believe that it is clinically appropriate for Alisa Fregoso to be prescribed this medication    Is the patient prescribed a combination of a benzodiazepine and opioid?  No    Last Urine Drug Screen / ordered today: Yes  Recent Results (from the past 54827 hour(s))   OPIATE/OPIOID/BENZO PRESCRIPTION COMPLIANCE    Collection Time: 03/21/23  2:43 PM   Result Value  Ref Range    DRUG SCREEN COMMENT URINE SEE BELOW     Creatine, Urine 115.2 mg/dL    Amphetamine Screen, Urine PRESUMPTIVE NEGATIVE NEGATIVE    Barbiturate Screen, Urine PRESUMPTIVE NEGATIVE NEGATIVE    Cannabinoid Screen, Urine PRESUMPTIVE NEGATIVE NEGATIVE    Cocaine Screen, Urine PRESUMPTIVE NEGATIVE NEGATIVE    PCP Screen, Urine PRESUMPTIVE NEGATIVE NEGATIVE     Results are as expected.     Controlled Substance Agreement:  Date of the Last Agreement: 03 20 2023  Reviewed Controlled Substance Agreement including but not limited to the benefits, risks, and alternatives to treatment with a Controlled Substance medication(s).    Opioids:  What is the patient's goal of therapy?  Rheumatoid pain as well as a vascular headache and abdominal pain.  The benefits always a risk safety issues reviewed with patient as well as spouse and Oarrs abuse side effects or divergence  Is this being achieved with current treatment? yes    I have calculated the patient's Morphine Dose Equivalent (MED):   I have considered referral to Pain Management and/or a specialist, and do not feel it is necessary at this time.    I feel that it is clinically indicated to continue this current medication regimen after consideration of alternative therapies, and other non-opioid treatment.    Opioid Risk Screening:  No data recorded    Pain Assessment:  No data recorded  Review of Systems   Constitutional:  Positive for fatigue. Negative for fever.   HENT:  Positive for rhinorrhea and sinus pressure. Negative for voice change.    Eyes:  Positive for visual disturbance.   Respiratory:  Negative for cough, chest tightness and shortness of breath.    Cardiovascular:  Negative for chest pain, palpitations and leg swelling.   Gastrointestinal:  Positive for abdominal pain and constipation. Negative for abdominal distention, blood in stool, nausea and vomiting.   Genitourinary:  Positive for dysuria, frequency and urgency. Negative for flank pain and  "hematuria.   Musculoskeletal:  Positive for arthralgias, back pain and gait problem.   Skin:  Negative for rash.   Neurological:  Positive for weakness, light-headedness and headaches.   Psychiatric/Behavioral:  Negative for sleep disturbance and suicidal ideas.        Objective   /74 (BP Location: Left arm, Patient Position: Sitting, BP Cuff Size: Adult)   Pulse 73   Temp 36.6 °C (97.8 °F) (Temporal)   Resp 14   Ht 1.6 m (5' 3\")   SpO2 98%   BMI 22.67 kg/m²   th Auto Differential  Order: [id number]   Ref Range & Units 12 d ago   WBC 4.0 - 10.0 K/uL 9.8   RBC 3.93 - 5.22 M/uL 4.61   Hemoglobin 11.2 - 15.7 g/dL 11.9   Hematocrit 37.0 - 47.0 % 40.2   MCV 79.4 - 94.8 fL 87.2   MCH 25.6 - 32.2 pg 25.8   MCHC 32.2 - 35.5 % 29.6 Low    RDW 11.7 - 14.4 % 16.5 High    Platelets 182 - 369 K/uL 210   Neutrophils % 34.0 - 71.1 % 84.3 High    Immature Granulocytes % % 0.2   Lymphocytes % % 11.9   Monocytes % 4.7 - 12.5 % 3.4 Low    Eosinophils % 0.7 - 5.8 % 0.1 Low    Basophils % 0.1 - 1.2 % 0.1   Neutrophils Absolute 1.6 - 6.1 K/uL 8.2 High    Immature Granulocytes # K/uL 0.0   Lymphocytes Absolute 1.2 - 3.7 K/uL 1.2   Monocytes Absolute 0.2 - 0.9 K/uL 0.3   Eosinophils Absolute 0.0 - 0.4 K/uL 0.0   Basophils Absolute 0.0 - 0.1 K/uL 0.0   Resulting Agency  [facility]   Specimen Collected: 03/08/23 19:19 Last Resulted: 03/08/23 19:25   Received From: [facility]          Information displayed in this report may not trend or trigger automated decision support.      Contains abnormal data BMP  Order: [id number]   Ref Range & Units 12 d ago   Sodium 135 - 144 mEq/L 143   Potassium 3.4 - 4.9 mEq/L 4.3   Chloride 95 - 107 mEq/L 104   CO2 20 - 31 mEq/L 26   Anion Gap 9 - 15 mEq/L 13   Glucose 70 - 99 mg/dL 124 High    BUN 8 - 23 mg/dL 15   Creatinine 0.50 - 0.90 mg/dL 0.59   Est, Glom Filt Rate >60 >60.0   Comment: Pediatric calculator " link  https://www.kidney.org/professionals/kdoqi/gfr_calculatorped  Effective Oct 3, 2022  These results are not intended for use in patients  <18 years of age.  eGFR results are calculated without  a race factor using the 2021 CKD-EPI equation.  Careful  clinical correlation is recommended, particularly when  comparing to results calculated using previous equations.  The CKD-EPI equation is less accurate in patients with  extremes of muscle mass, extra-renal metabolism of  creatinine, excessive creatinine ingestion, or following  therapy that affects renal tubular secretion.   Calcium 8.5 - 9.9 mg/dL 9.9   Resulting [facility]   Specimen Collected: 03/08/23 19:19     Order: [id number]   Ref Range & Units 12 d ago   WBC, UA 0 - 5 /HPF 10-20 Abnormal    RBC, UA 0 - 2 /HPF 3-5 Abnormal    Epithelial Cells, UA /HPF 0-2   Renal Epithelial, UA /HPF 0-2   Bacteria, UA Negative /HPF MANY Abnormal    Amorphous, UA  3+   Crystals, UA None Seen /HPF Few Triple Phos Abnormal    Resulting [facility]   Specimen Collected: 03/08/23 19:27 Last Resulted: 03/08/23 19:28   Received From: [facility]      n displayed in this report may not trend or trigger automated decision support.     CT ABDOMEN PELVIS W IV CONTRAST Additional Contrast? None  Order: [id number]  Impression    1-2 mm nonobstructing stone in the right kidney.  The remainder of the CT  scan of the abdomen and pelvis reveals no acute intra-abdominal or pelvic  abnormality.  Narrative    EXAMINATION:  CT OF THE ABDOMEN AND PELVIS WITH CONTRAST 3/8/2023 8:53 pm    TECHNIQUE:  CT of the abdomen and pelvis was performed with the administration of  intravenous contrast. Multiplanar reformatted images are provided for review.  Automated exposure control, iterative reconstruction, and/or weight based  adjustment of the mA/kV was utilized to reduce the radiation dose to as low  as reasonably  achievable.    COMPARISON:  None.    HISTORY:  ORDERING SYSTEM PROVIDED HISTORY: vaginal bleeding  TECHNOLOGIST PROVIDED HISTORY:  Additional Contrast?->None  Reason for exam:->vaginal bleeding  Decision Support Exception - unselect if not a suspected or confirmed  emergency medical condition->Emergency Medical Condition (MA)  What reading provider will be dictating this exam?->CRC    FINDINGS:  Lower Chest: The lung bases are grossly clear.    Organs: The liver is homogeneous in appearance.  Gallbladder is been  surgically removed.  No intrahepatic or extrahepatic biliary ductal  dilatation.  Spleen is unremarkable.  Both adrenal glands are within normal  limits.  Symmetric enhancement identified of the renal parenchyma.  Small  cyst identified of the kidneys bilaterally.  No obstruction identified of  either of the renal collecting systems or ureters.  The tiny 1-2 mm  calcification identified on the right kidney.    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  Multiple fluid-filled loops identified of distal small bowel with no  significant wall thickening to suggest a mild enteritis.  There is stool seen  scattered diffusely throughout the colon.  Diverticulosis with no evidence of  diverticulitis.    Pelvis: The bladder is grossly unremarkable.  No wall thickening.  The uterus  has been surgically removed.    Peritoneum/Retroperitoneum: No abdominal retroperitoneal lymphadenopathy.  No  free fluid or free air.  No abnormal mass or fluid collections identified.  Atherosclerotic disease identified minimally throughout the abdominal aorta  and iliac vessels.    Bones/Soft Tissues: Bony structures reveal hardware identified within the  left hip.  Multiple healed pelvic fractures identified.  Multilevel  degenerative changes seen within the spine.  There is no ventral abdominal  wall mass with small umbilical hernia.  Mild diastasis of the rectus muscles  with laxity of the peritoneal  lining.  Exam End: 03/08/23 21:07    Specimen Collected: 03/09/23 05:25 Last Resulted: 03/09/23 05:33   [facility]  Result Received: 03/20/23 13:39     his report may not trend or trigger automated decision support.      Contains abnormal data Urinalysis with Reflex to Culture  Order: [id number]   Ref Range & Units 12 d ago   Color, UA Straw/Yellow Yellow   Clarity, UA Clear Cloudy   Glucose, Ur Negative mg/dL Negative   Bilirubin Urine Negative Negative   Ketones, Urine Negative mg/dL Negative   Specific Gravity, UA 1.005 - 1.030 1.020   Blood, Urine Negative Trace-intact   pH, UA 5.0 - 9.0 7.5   Protein, UA Negative mg/dL 30 Abnormal    Urobilinogen, Urine <2.0 E.U./dL 0.2   Nitrite, Urine Negative Negative   Leukocyte Esterase, Urine Negative Small   Urine Reflex to Culture  Yes   Resulting Agency  [facility]   Specimen Collected: 03/08/23 19:27    uto Differential  Order: [id number]   Ref Range & Units 2 wk ago   WBC 4.0 - 10.0 K/uL 9.8   RBC 3.93 - 5.22 M/uL 4.61   Hemoglobin 11.2 - 15.7 g/dL 11.9   Hematocrit 37.0 - 47.0 % 40.2   MCV 79.4 - 94.8 fL 87.2   MCH 25.6 - 32.2 pg 25.8   MCHC 32.2 - 35.5 % 29.6 Low    RDW 11.7 - 14.4 % 16.5 High    Platelets 182 - 369 K/uL 210   Neutrophils % 34.0 - 71.1 % 84.3 High    Immature Granulocytes % % 0.2   Lymphocytes % % 11.9   Monocytes % 4.7 - 12.5 % 3.4 Low    Eosinophils % 0.7 - 5.8 % 0.1 Low    Basophils % 0.1 - 1.2 % 0.1   Neutrophils Absolute 1.6 - 6.1 K/uL 8.2 High    Immature Granulocytes # K/uL 0.0   Lymphocytes Absolute 1.2 - 3.7 K/uL 1.2   Monocytes Absolute 0.2 - 0.9 K/uL 0.3   Eosinophils Absolute 0.0 - 0.4 K/uL 0.0   Basophils Absolute 0.0 - 0.1 K/uL 0.0   Resulting Agency  [facility]   Specimen Collected: 03/08/23 19:19 Last Resulted: 03/08/23 19:25   Received From: [facility]  Result Received: 03/20/23 13:39    View Encounter        Received  Information  Result Report    Physical Exam  Vital signs reviewed and normal  General inspection of the well-developed well-nourished individual in no acute distress patient in wheelchair.  Neck neck is all without masses adenopathy bruits or rigidity thyroid is normal  Chest mildly diminished breath sounds at bases otherwise no wheezing rales or rhonchi  Cardiovascular-RSR without murmurs gallop or ectopy  Abdominal minimal tenderness left lower quadrant otherwise no rebound tenderness umbilical hernia is under remarkable there is no right or left subcostal tenderness no high upper abdominal tenderness no rebound bowel sounds are normal no CVA tenderness no suprapubic tenderness.  Peripheral vascular no symmetric or asymmetric edema distal pulses are intact.  Musculoskeletal mild tenderness of the lower SI joints medially as well as midline L3-L5.  Minimal tenderness the lateral hips tenderness of the wrist dorsally.  Otherwise no acute synovitis of the upper and lower extremities  Skin no rash petechiae or jaundice  Mood mood is a quite reserved but otherwise no significant depressive anxiety or cognitive issues  Reviewed earlier urine and ID of urinary suppression to prevent recurrent UTIs  Reviewed CT with spouse as well as patient without active colon problems.  Renal stone 1 to 2 mm in the right kidney without obstruction        Assessment/Plan   Problem List Items Addressed This Visit          Other    Migraine without aura and with status migrainosus, not intractable    Rheumatoid arthritis (CMS/HCC)     Other Visit Diagnoses       Lumbar radiculopathy, chronic            Question of urinary suppression both patient and spouse agree to 50 mg of Macrodantin daily and will also acidify urine with vitamin C as well as small glass of cranberry juice.  Continue the buffed Synthroid as well as chronic medicines  Continue on tramadol if needed basis OARRS performed template for opioids completed  Med agreement and  urine tox screen performed  Follow-up in 2 months call if interval worsening of any urinary symptoms.  Increasing liquids and to reduce colonization of bacteria perineum mandatory 3 showers a week.  UTI prevention reviewed with family as well as spouse  Continue above medicines safety issues reviewed follow-up any interval problems to previous abdominal pain may take Bentyl on an as-needed basis to keep her bowels regular.  Reviewed negative CT of the colon with spouse and patient.  @discharge  The above diagnosis and treatment plan was discussed with the patient patient will continue appropriate diet and exercise as reviewed  Patient will recheck earlier if any interval problems of significance or clinical worsening of the above problems.  Agrees above surveillance.  All question were addressed regarding above meds

## 2023-03-21 PROCEDURE — 80358 DRUG SCREENING METHADONE: CPT

## 2023-03-21 PROCEDURE — 80373 DRUG SCREENING TRAMADOL: CPT

## 2023-03-21 PROCEDURE — 80361 OPIATES 1 OR MORE: CPT

## 2023-03-21 PROCEDURE — 80307 DRUG TEST PRSMV CHEM ANLYZR: CPT

## 2023-03-21 PROCEDURE — 80368 SEDATIVE HYPNOTICS: CPT

## 2023-03-21 PROCEDURE — 80365 DRUG SCREENING OXYCODONE: CPT

## 2023-03-21 PROCEDURE — 80354 DRUG SCREENING FENTANYL: CPT

## 2023-03-21 PROCEDURE — 80346 BENZODIAZEPINES1-12: CPT

## 2023-03-23 LAB
6-ACETYLMORPHINE: <25 NG/ML
7-AMINOCLONAZEPAM: <25 NG/ML
ALPHA-HYDROXYALPRAZOLAM: <25 NG/ML
ALPHA-HYDROXYMIDAZOLAM: <25 NG/ML
ALPRAZOLAM: <25 NG/ML
AMPHETAMINE (PRESENCE) IN URINE BY SCREEN METHOD: ABNORMAL
BARBITURATES PRESENCE IN URINE BY SCREEN METHOD: ABNORMAL
CANNABINOIDS IN URINE BY SCREEN METHOD: ABNORMAL
CHLORDIAZEPOXIDE: <25 NG/ML
CLONAZEPAM: <25 NG/ML
COCAINE (PRESENCE) IN URINE BY SCREEN METHOD: ABNORMAL
CODEINE: <50 NG/ML
CREATINE, URINE FOR DRUG: 115.2 MG/DL
DIAZEPAM: <25 NG/ML
DRUG SCREEN COMMENT URINE: ABNORMAL
EDDP: <25 NG/ML
FENTANYL CONFIRMATION, URINE: <2.5 NG/ML
HYDROCODONE: <25 NG/ML
HYDROMORPHONE: <25 NG/ML
LORAZEPAM: <25 NG/ML
METHADONE CONFIRMATION,URINE: <25 NG/ML
MIDAZOLAM: <25 NG/ML
MORPHINE URINE: <50 NG/ML
NORDIAZEPAM: <25 NG/ML
NORFENTANYL: <2.5 NG/ML
NORHYDROCODONE: <25 NG/ML
NOROXYCODONE: <25 NG/ML
O-DESMETHYLTRAMADOL: >1000 NG/ML
OXAZEPAM: <25 NG/ML
OXYCODONE: <25 NG/ML
OXYMORPHONE: <25 NG/ML
PHENCYCLIDINE (PRESENCE) IN URINE BY SCREEN METHOD: ABNORMAL
TEMAZEPAM: <25 NG/ML
TRAMADOL: >1000 NG/ML
ZOLPIDEM METABOLITE (ZCA): <25 NG/ML
ZOLPIDEM: <25 NG/ML

## 2023-03-23 ASSESSMENT — ENCOUNTER SYMPTOMS
DYSURIA: 1
VOICE CHANGE: 0
ABDOMINAL DISTENTION: 0
SHORTNESS OF BREATH: 0
SINUS PRESSURE: 1
WEAKNESS: 1
VOMITING: 0
BLOOD IN STOOL: 0
NAUSEA: 0
ARTHRALGIAS: 1
BACK PAIN: 1
ABDOMINAL PAIN: 1
COUGH: 0
LIGHT-HEADEDNESS: 1
RHINORRHEA: 1
HEADACHES: 1
HEMATURIA: 0
FREQUENCY: 1
FATIGUE: 1
SLEEP DISTURBANCE: 0
CONSTIPATION: 1
PALPITATIONS: 0
FEVER: 0
FLANK PAIN: 0
CHEST TIGHTNESS: 0

## 2023-04-07 ENCOUNTER — TELEPHONE (OUTPATIENT)
Dept: PRIMARY CARE | Facility: CLINIC | Age: 73
End: 2023-04-07
Payer: MEDICARE

## 2023-04-07 DIAGNOSIS — M06.9 RHEUMATOID ARTHRITIS, INVOLVING UNSPECIFIED SITE, UNSPECIFIED WHETHER RHEUMATOID FACTOR PRESENT (MULTI): ICD-10-CM

## 2023-04-07 DIAGNOSIS — G43.001 MIGRAINE WITHOUT AURA AND WITH STATUS MIGRAINOSUS, NOT INTRACTABLE: ICD-10-CM

## 2023-04-07 RX ORDER — NALOXONE HYDROCHLORIDE 4 MG/.1ML
4 SPRAY NASAL ONCE
Status: DISCONTINUED | OUTPATIENT
Start: 2023-04-07 | End: 2023-05-10 | Stop reason: ENTERED-IN-ERROR

## 2023-04-07 NOTE — TELEPHONE ENCOUNTER
Jessy from [facility] pharmacy in [place] called to verify if the narcan injection script  for Alisa was correct, stated that they normally prescribe the nasal spray to their patients.       Please advise.

## 2023-04-17 ENCOUNTER — APPOINTMENT (OUTPATIENT)
Dept: PRIMARY CARE | Facility: CLINIC | Age: 73
End: 2023-04-17
Payer: MEDICARE

## 2023-04-17 DIAGNOSIS — N39.0 RECURRENT UTI: ICD-10-CM

## 2023-04-17 RX ORDER — NITROFURANTOIN MACROCRYSTALS 50 MG/1
50 CAPSULE ORAL DAILY
Qty: 30 CAPSULE | Refills: 0 | Status: SHIPPED | OUTPATIENT
Start: 2023-04-17 | End: 2023-05-10 | Stop reason: SDUPTHER

## 2023-04-17 NOTE — TELEPHONE ENCOUNTER
Rx Refill Request Telephone Encounter    Name:  Alisa Fregoso  :  [id number]  Medication Name:  MACROBID    Specific Pharmacy location:  [facility]   Date of last appointment:  3/20/23  Date of next appointment:  N/A  Best number to reach patient:  [phone]

## 2023-05-10 ENCOUNTER — OFFICE VISIT (OUTPATIENT)
Dept: PRIMARY CARE | Facility: CLINIC | Age: 73
End: 2023-05-10
Payer: MEDICARE

## 2023-05-10 VITALS
BODY MASS INDEX: 22.67 KG/M2 | SYSTOLIC BLOOD PRESSURE: 112 MMHG | HEART RATE: 73 BPM | RESPIRATION RATE: 16 BRPM | HEIGHT: 63 IN | TEMPERATURE: 97.1 F | OXYGEN SATURATION: 97 % | DIASTOLIC BLOOD PRESSURE: 70 MMHG

## 2023-05-10 DIAGNOSIS — Z00.00 MEDICARE ANNUAL WELLNESS VISIT, SUBSEQUENT: ICD-10-CM

## 2023-05-10 DIAGNOSIS — Z23 ENCOUNTER FOR IMMUNIZATION: ICD-10-CM

## 2023-05-10 DIAGNOSIS — Z00.00 ROUTINE GENERAL MEDICAL EXAMINATION AT HEALTH CARE FACILITY: Primary | ICD-10-CM

## 2023-05-10 DIAGNOSIS — G61.9 INFLAMMATORY NEUROPATHY (MULTI): ICD-10-CM

## 2023-05-10 DIAGNOSIS — M06.9 RHEUMATOID ARTHRITIS, INVOLVING UNSPECIFIED SITE, UNSPECIFIED WHETHER RHEUMATOID FACTOR PRESENT (MULTI): ICD-10-CM

## 2023-05-10 DIAGNOSIS — N39.0 RECURRENT UTI: ICD-10-CM

## 2023-05-10 DIAGNOSIS — G43.001 MIGRAINE WITHOUT AURA AND WITH STATUS MIGRAINOSUS, NOT INTRACTABLE: ICD-10-CM

## 2023-05-10 DIAGNOSIS — K52.9 CHRONIC NONSPECIFIC COLITIS: ICD-10-CM

## 2023-05-10 DIAGNOSIS — E03.9 ACQUIRED HYPOTHYROIDISM: ICD-10-CM

## 2023-05-10 PROCEDURE — 1159F MED LIST DOCD IN RCRD: CPT | Performed by: FAMILY MEDICINE

## 2023-05-10 PROCEDURE — 1160F RVW MEDS BY RX/DR IN RCRD: CPT | Performed by: FAMILY MEDICINE

## 2023-05-10 PROCEDURE — 90677 PCV20 VACCINE IM: CPT | Performed by: FAMILY MEDICINE

## 2023-05-10 PROCEDURE — 1170F FXNL STATUS ASSESSED: CPT | Performed by: FAMILY MEDICINE

## 2023-05-10 PROCEDURE — G0439 PPPS, SUBSEQ VISIT: HCPCS | Performed by: FAMILY MEDICINE

## 2023-05-10 PROCEDURE — 1036F TOBACCO NON-USER: CPT | Performed by: FAMILY MEDICINE

## 2023-05-10 PROCEDURE — G0009 ADMIN PNEUMOCOCCAL VACCINE: HCPCS | Performed by: FAMILY MEDICINE

## 2023-05-10 PROCEDURE — 99212 OFFICE O/P EST SF 10 MIN: CPT | Performed by: FAMILY MEDICINE

## 2023-05-10 RX ORDER — METHYLPREDNISOLONE 4 MG/1
TABLET ORAL
Qty: 21 TABLET | Refills: 0 | Status: SHIPPED | OUTPATIENT
Start: 2023-05-10 | End: 2023-05-17

## 2023-05-10 RX ORDER — NITROFURANTOIN MACROCRYSTALS 50 MG/1
50 CAPSULE ORAL DAILY
Qty: 30 CAPSULE | Refills: 2 | Status: SHIPPED | OUTPATIENT
Start: 2023-05-10 | End: 2023-06-09

## 2023-05-10 ASSESSMENT — ACTIVITIES OF DAILY LIVING (ADL)
BATHING: INDEPENDENT
DRESSING: INDEPENDENT
GROCERY_SHOPPING: TOTAL CARE
TAKING_MEDICATION: TOTAL CARE
MANAGING_FINANCES: TOTAL CARE
DOING_HOUSEWORK: NEEDS ASSISTANCE

## 2023-05-10 ASSESSMENT — PATIENT HEALTH QUESTIONNAIRE - PHQ9
2. FEELING DOWN, DEPRESSED OR HOPELESS: NOT AT ALL
SUM OF ALL RESPONSES TO PHQ9 QUESTIONS 1 AND 2: 0
1. LITTLE INTEREST OR PLEASURE IN DOING THINGS: NOT AT ALL

## 2023-05-10 ASSESSMENT — ENCOUNTER SYMPTOMS
LOSS OF SENSATION IN FEET: 1
OCCASIONAL FEELINGS OF UNSTEADINESS: 1
DEPRESSION: 0

## 2023-05-10 NOTE — PROGRESS NOTES
Subjective   Reason for Visit: Alisa Fregoso is an 72 y.o. female here for a Medicare Wellness visit.               HPI  72-year-old female with a history of chronic polyneuropathy severe vision impairment from macular degeneration as well as chronic UTIs hypothyroidism here for Medicare annual wellness review --did review medicines with both spouse as well as patient.  Her urine is has been infected intermittently and was admitted last year for pyelonephritis with the best antibiotic being Macrodantin 100 mg twice a day for  rescue treatment, I suggested taking suppression medicine with a history of recurrent UTIs since January in the form of 50 mg of Macrodantin daily use and side effects reviewed last urine culture was negative  Patient does take 5 mg of prednisone daily for RA as well as Naprosyn if needed  Also takes Synthroid 50 mcg daily recent TSH is x2-3 have been normal  Pretty much wheelchair confined with peripheral neuropathy weakness.  Does have occasional migraine headaches vascular headaches and does take Ultram for her headaches but also low back pain discomfort and peripheral neuropathy from her rheumatoid.  Benefit still outweighs the risk  Template for opioids completed  Safety is reviewed  Old opioid OARRS was completed  Chronic meds reviewed.  No reported side effects.  OARRS:  No data recorded  I have personally reviewed the OARRS report for Alisa Fregoso. I have considered the risks of abuse, dependence, addiction and diversion    Is the patient prescribed a combination of a benzodiazepine and opioid?  No    Last Urine Drug Screen / ordered today: Yes  Recent Results (from the past [id number] hour(s))   OPIATE/OPIOID/BENZO PRESCRIPTION COMPLIANCE    Collection Time: 03/21/23  2:43 PM   Result Value Ref Range    DRUG SCREEN COMMENT URINE SEE BELOW     Creatine, Urine 115.2 mg/dL    Amphetamine Screen, Urine PRESUMPTIVE NEGATIVE NEGATIVE    Barbiturate Screen, Urine PRESUMPTIVE NEGATIVE NEGATIVE     Cannabinoid Screen, Urine PRESUMPTIVE NEGATIVE NEGATIVE    Cocaine Screen, Urine PRESUMPTIVE NEGATIVE NEGATIVE    PCP Screen, Urine PRESUMPTIVE NEGATIVE NEGATIVE    7-Aminoclonazepam <25 Cutoff <25 ng/mL    Alpha-Hydroxyalprazolam <25 Cutoff <25 ng/mL    Alpha-Hydroxymidazolam <25 Cutoff <25 ng/mL    Alprazolam <25 Cutoff <25 ng/mL    Chlordiazepoxide <25 Cutoff <25 ng/mL    Clonazepam <25 Cutoff <25 ng/mL    Diazepam <25 Cutoff <25 ng/mL    Lorazepam <25 Cutoff <25 ng/mL    Midazolam <25 Cutoff <25 ng/mL    Nordiazepam <25 Cutoff <25 ng/mL    Oxazepam <25 Cutoff <25 ng/mL    Temazepam <25 Cutoff <25 ng/mL    Zolpidem <25 Cutoff <25 ng/mL    Zolpidem Metabolite (ZCA) <25 Cutoff <25 ng/mL    6-Acetylmorphine <25 Cutoff <25 ng/mL    Codeine <50 Cutoff <50 ng/mL    Hydrocodone <25 Cutoff <25 ng/mL    Hydromorphone <25 Cutoff <25 ng/mL    Morphine Urine <50 Cutoff <50 ng/mL    Norhydrocodone <25 Cutoff <25 ng/mL    Noroxycodone <25 Cutoff <25 ng/mL    Oxycodone <25 Cutoff <25 ng/mL    Oxymorphone <25 Cutoff <25 ng/mL    Tramadol >1000 (A) Cutoff <50 ng/mL    O-Desmethyltramadol >1000 (A) Cutoff <50 ng/mL    Fentanyl <2.5 Cutoff<2.5 ng/mL    Norfentanyl <2.5 Cutoff<2.5 ng/mL    METHADONE CONFIRMATION,URINE <25 Cutoff <25 ng/mL    EDDP <25 Cutoff <25 ng/mL     Results are as expected.     Controlled Substance Agreement:  Date of the Last Agreement: 3-  Reviewed Controlled Substance Agreement including but not limited to the benefits, risks, and alternatives to treatment with a Controlled Substance medication(s).    Opioids:  What is the patient's goal of therapy?  Tension headache neck ache and is certainly for rheumatoid of the wrist low back for pain benefit still outweighs the risk and Oarrs abuse side effects or divergence  Is this being achieved with current treatment? yes    I have calculated the patient's Morphine Dose Equivalent (MED):   I have considered referral to Pain Management and/or a specialist,  "and do not feel it is necessary at this time.    I feel that it is clinically indicated to continue this current medication regimen after consideration of alternative therapies, and other non-opioid treatment.    Opioid Risk Screening:  No data recorded    Pain Assessment:  No data recorded  Patient Care Team:  Alvarado Forde DO as PCP - General  Alvarado Forde DO as PCP - [employer] Medicare Advantage PCP     Review of Systems   Gastrointestinal:  Positive for abdominal pain (Patient denies CT of the abdomen in March showed no evidence of any abnormalities or pathology left lower quadrant history of IBS chronic constipation but otherwise stable with comparison with 1 year ago) and constipation. Negative for blood in stool, nausea and vomiting.   Genitourinary:  Positive for frequency and urgency. Negative for flank pain, hematuria and pelvic pain.   Musculoskeletal:  Positive for arthralgias, back pain and gait problem.   Skin:  Negative for rash.   Neurological:  Positive for weakness, light-headedness and headaches.   Psychiatric/Behavioral:  Positive for dysphoric mood. Negative for sleep disturbance and suicidal ideas.        Objective   Vitals:  /70 (BP Location: Right arm, Patient Position: Sitting, BP Cuff Size: Adult)   Pulse 73   Temp 36.2 °C (97.1 °F) (Temporal)   Resp 16   Ht 1.6 m (5' 3\")   SpO2 97%   BMI 22.67 kg/m²        suggestion  Information displayed in this report may not trend or trigger automated decision support.     CT ABDOMEN PELVIS W IV CONTRAST Additional Contrast? None  Order: [id number]  Impression    1-2 mm nonobstructing stone in the right kidney.  The remainder of the CT  scan of the abdomen and pelvis reveals no acute intra-abdominal or pelvic  abnormality.  Narrative    EXAMINATION:  CT OF THE ABDOMEN AND PELVIS WITH CONTRAST 3/8/2023 8:53 pm    TECHNIQUE:  CT of the abdomen and pelvis was performed with the administration of  intravenous contrast. Multiplanar reformatted " images are provided for review.  Automated exposure control, iterative reconstruction, and/or weight based  adjustment of the mA/kV was utilized to reduce the radiation dose to as low  as reasonably achievable.    COMPARISON:  None.    HISTORY:  ORDERING SYSTEM PROVIDED HISTORY: vaginal bleeding  TECHNOLOGIST PROVIDED HISTORY:  Additional Contrast?->None  Reason for exam:->vaginal bleeding  Decision Support Exception - unselect if not a suspected or confirmed  emergency medical condition->Emergency Medical Condition (MA)  What reading provider will be dictating this exam?->CRC    FINDINGS:  Lower Chest: The lung bases are grossly clear.    Organs: The liver is homogeneous in appearance.  Gallbladder is been  surgically removed.  No intrahepatic or extrahepatic biliary ductal  dilatation.  Spleen is unremarkable.  Both adrenal glands are within normal  limits.  Symmetric enhancement identified of the renal parenchyma.  Small  cyst identified of the kidneys bilaterally.  No obstruction identified of  either of the renal collecting systems or ureters.  The tiny 1-2 mm  calcification identified on the right kidney.    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  Multiple fluid-filled loops identified of distal small bowel with no  significant wall thickening to suggest a mild enteritis.  There is stool seen  scattered diffusely throughout the colon.  Diverticulosis with no evidence of  diverticulitis.    Pelvis: The bladder is grossly unremarkable.  No wall thickening.  The uterus  has been surgically removed.    Peritoneum/Retroperitoneum: No abdominal retroperitoneal lymphadenopathy.  No  free fluid or free air.  No abnormal mass or fluid collections identified.  Atherosclerotic disease identified minimally throughout the abdominal aorta  and iliac vessels.    Bones/Soft Tissues: Bony structures reveal hardware identified within the  left hip.  Multiple healed pelvic fractures identified.   Multilevel  degenerative changes seen within the spine.  There is no ventral abdominal  wall mass with small umbilical hernia.  Mild diastasis of the rectus muscles  with laxity of the peritoneal lining.  Exam End: 03/08/23 21:07    Specimen Collected: 03/09/23 05:25 Last Resulted: 03/09/23 05:33   Received From: [facility]  Result Received: 03/20/23 13:39   [facility]  Outside Information      suggestion  Information displayed in this report may not trend or trigger automated decision support.      Contains abnormal data Urinalysis with Reflex to Culture  Order: [id number]   Ref Range & Units 2 mo ago   Color, UA Straw/Yellow Yellow   Clarity, UA Clear Cloudy   Glucose, Ur Negative mg/dL Negative   Bilirubin Urine Negative Negative   Ketones, Urine Negative mg/dL Negative   Specific Gravity, UA 1.005 - 1.030 1.020   Blood, Urine Negative Trace-intact   pH, UA 5.0 - 9.0 7.5   Protein, UA Negative mg/dL 30 Abnormal    Urobilinogen, Urine <2.0 E.U./dL 0.2   Nitrite, Urine Negative Negative   Leukocyte Esterase, Urine Negative Small   Urine Reflex to Culture  Yes   Resulting Agency  [facility]   Specimen Collected: 03/08/23 19:27   Basic Metabolic Panel  Order: [id number]  Status: Final result       Visible to patient: No (inaccessible in [facility])    0 Result Notes               Component Ref Range & Units 5 mo ago  (11/22/22) 11 mo ago  (5/28/22) 11 mo ago  (5/24/22) 11 mo ago  (5/23/22) 11 mo ago  (5/22/22) 11 mo ago  (5/22/22) 2 yr ago  (10/5/20)   Glucose 74 - 99 mg/dL 90 110 High  78 72 Low   82 115 High    Sodium 136 - 145 mmol/L 140 139 144 143  141 142   Potassium 3.5 - 5.3 mmol/L 3.9 3.8 3.9 3.3 Low  3.2 Low  3.5 CM 3.5   Chloride 98 - 107 mmol/L 104 103 112 High  108 High   111 High  107   Bicarbonate 21 - 32 mmol/L 26 27 26 26  23 29   Anion Gap 10 - 20 mmol/L 14 13 10 12  11 10   Urea Nitrogen 6 - 23 mg/dL 14 16 7 10  13 3 Low    Creatinine 0.50 - 1.05 mg/dL 0.64  0.57 0.62 0.71  0.60 0.62   GFR Female >90 mL/min/1.73m2 >90 >90 CM >90 CM 90 CM  >90 CM    Comment:  CALCULATIONS OF ESTIMATED GFR ARE PERFORMED   USING THE 2021 CKD-EPI STUDY REFIT EQUATION   WITHOUT THE RACE VARIABLE FOR THE IDMS-TRACEABLE   CREATININE METHODS.    https://jasn.asnjournals.org/content/early/2021/09/22/ASN.6734225773   Calcium 8.6 - 10.3 mg/dL 9.7 10.0 8.0 Low  8.5 Low   7.4 Low  9.1   Resulting Agency               [facility]  Order: [id number]  Status: Final result       Visible to patient: No (inaccessible in [facility])    0 Result Notes             Component Ref Range & Units 5 mo ago  (11/22/22) 11 mo ago  (5/28/22) 11 mo ago  (5/23/22) 11 mo ago  (5/22/22) 2 yr ago  (10/1/20) 2 yr ago  (9/22/20) 2 yr ago  (9/19/20)   Albumin 3.4 - 5.0 g/dL 4.1 4.1 3.8 3.4 3.5 3.2 Low  3.9   Total Bilirubin 0.0 - 1.2 mg/dL 0.5 0.5 0.5 0.4 0.5 0.4 0.9   Bilirubin, Direct 0.0 - 0.3 mg/dL 0.1     0.1    Alkaline Phosphatase 33 - 136 U/L 54 55 43 39 68 96 187 High    ALT (SGPT) 7 - 45 U/L 7 10 CM 13 CM 12 CM 11 CM 4 Low   High  CM   Comment:  Patients treated with Sulfasalazine may generate   falsely decreased results for ALT.   AST 9 - 39 U/L 14 17 21 24 CM 21 27 360 High    Total Protein 6.4 - 8.2 g/dL 7.3              Physical Exam  Signs reviewed and normal  Neck shows no masses adenopathy or bruits  Pulmonary-chest clear anteriorly and posteriorly  Cardiovascular-RSR without ectopy or murmurs  Assessment/Plan   Problem List Items Addressed This Visit          Other    Rheumatoid arthritis (CMS/HCC)     Other Visit Diagnoses       Encounter for immunization            Complains of the vaccine but does except her second pneumococcal vaccine also declines mammography after lengthy review also declines colonoscopy and does did have the fit home test but did not do it discussed  try to find if he could let me know next visit.  Template for all TRAM was performed safety issues reviewed OARRS performed opioid  template completed  Continue the above frequent small meals prednisone 5 mg 7-1/2 and can take Medrol which I given prescription of for flareup at home  Regarding chronic cystitis substance the last 5 months we will place on 50 mg of Macrodantin daily we will follow-up in 2 months review earlier CT which is negative and will continue Bentyl if needed for lower abdominal cramping anticonstipation maneuvers reviewed increasing liquid strongly urged safety issues regarding her gait at home with her walker  Notify me if interval problems despite of Macrodantin and recheck urine and patient in 2 to 3 months call if interval problems  @discharge  The above diagnosis and treatment plan was discussed with the patient patient will continue appropriate diet and exercise as reviewed  Patient will recheck earlier if any interval problems of significance or clinical worsening of the above problems.  Agrees above surveillance.  All question were addressed regarding above meds

## 2023-05-14 PROBLEM — Z23 ENCOUNTER FOR IMMUNIZATION: Status: ACTIVE | Noted: 2023-05-14

## 2023-05-14 PROBLEM — Z00.00 MEDICARE ANNUAL WELLNESS VISIT, SUBSEQUENT: Status: ACTIVE | Noted: 2023-05-14

## 2023-05-14 ASSESSMENT — ENCOUNTER SYMPTOMS
LIGHT-HEADEDNESS: 1
NAUSEA: 0
FREQUENCY: 1
SLEEP DISTURBANCE: 0
HEADACHES: 1
ARTHRALGIAS: 1
WEAKNESS: 1
FLANK PAIN: 0
CONSTIPATION: 1
ABDOMINAL PAIN: 1
HEMATURIA: 0
DYSPHORIC MOOD: 1
BACK PAIN: 1
BLOOD IN STOOL: 0
VOMITING: 0

## 2023-06-30 ENCOUNTER — TELEPHONE (OUTPATIENT)
Dept: PRIMARY CARE | Facility: CLINIC | Age: 73
End: 2023-06-30
Payer: MEDICARE

## 2023-06-30 DIAGNOSIS — M06.9 RHEUMATOID ARTHRITIS, INVOLVING UNSPECIFIED SITE, UNSPECIFIED WHETHER RHEUMATOID FACTOR PRESENT (MULTI): Primary | ICD-10-CM

## 2023-06-30 PROBLEM — M21.611 BUNION OF RIGHT FOOT: Status: ACTIVE | Noted: 2017-06-28

## 2023-06-30 PROBLEM — R26.2: Status: ACTIVE | Noted: 2017-06-28

## 2023-06-30 PROBLEM — N10 ACUTE PYELONEPHRITIS: Status: ACTIVE | Noted: 2019-03-27

## 2023-06-30 PROBLEM — R10.84 GENERALIZED ABDOMINAL PAIN: Status: ACTIVE | Noted: 2019-04-04

## 2023-06-30 PROBLEM — F43.21 ADJUSTMENT REACTION WITH PROLONGED DEPRESSIVE REACTION: Status: ACTIVE | Noted: 2018-06-06

## 2023-06-30 PROBLEM — R10.9 FLANK PAIN: Status: ACTIVE | Noted: 2019-04-04

## 2023-06-30 RX ORDER — ERGOCALCIFEROL 1.25 MG/1
CAPSULE ORAL
COMMUNITY
Start: 2015-12-09 | End: 2023-11-22 | Stop reason: WASHOUT

## 2023-06-30 RX ORDER — KETOROLAC TROMETHAMINE 10 MG/1
1 TABLET, FILM COATED ORAL EVERY 6 HOURS PRN
Status: ON HOLD | COMMUNITY
Start: 2022-05-22 | End: 2023-10-08 | Stop reason: ALTCHOICE

## 2023-06-30 RX ORDER — AMOXICILLIN 500 MG/1
CAPSULE ORAL
Status: ON HOLD | COMMUNITY
Start: 2023-01-06 | End: 2023-10-08 | Stop reason: ALTCHOICE

## 2023-06-30 RX ORDER — ALENDRONATE SODIUM 70 MG/1
70 TABLET ORAL WEEKLY
Status: ON HOLD | COMMUNITY
Start: 2015-09-11 | End: 2023-10-08

## 2023-06-30 RX ORDER — DIPHENHYDRAMINE HCL 25 MG
2 CAPSULE ORAL
COMMUNITY
End: 2023-11-22 | Stop reason: WASHOUT

## 2023-06-30 RX ORDER — ERYTHROMYCIN 5 MG/G
OINTMENT OPHTHALMIC
COMMUNITY
Start: 2023-03-14 | End: 2023-11-22 | Stop reason: WASHOUT

## 2023-06-30 RX ORDER — METHYLPREDNISOLONE 4 MG/1
TABLET ORAL
Qty: 21 TABLET | Refills: 0 | Status: SHIPPED | OUTPATIENT
Start: 2023-06-30 | End: 2023-07-07

## 2023-06-30 RX ORDER — METHOTREXATE 25 MG/ML
INJECTION, SOLUTION INTRA-ARTERIAL; INTRAMUSCULAR; INTRAVENOUS
Status: ON HOLD | COMMUNITY
Start: 2015-03-11 | End: 2023-10-08 | Stop reason: ALTCHOICE

## 2023-06-30 RX ORDER — FOLIC ACID 1 MG/1
1 TABLET ORAL
Status: ON HOLD | COMMUNITY
Start: 2016-02-29 | End: 2023-10-08 | Stop reason: ALTCHOICE

## 2023-06-30 RX ORDER — AMOXICILLIN AND CLAVULANATE POTASSIUM 875; 125 MG/1; MG/1
1 TABLET, FILM COATED ORAL 2 TIMES DAILY
Status: ON HOLD | COMMUNITY
Start: 2022-05-19 | End: 2023-10-08 | Stop reason: ALTCHOICE

## 2023-06-30 RX ORDER — FLUTICASONE PROPIONATE 50 MCG
1 SPRAY, SUSPENSION (ML) NASAL
Status: ON HOLD | COMMUNITY
Start: 2015-12-28 | End: 2023-10-08 | Stop reason: ALTCHOICE

## 2023-06-30 RX ORDER — PREDNISOLONE ACETATE 10 MG/ML
SUSPENSION/ DROPS OPHTHALMIC
COMMUNITY
End: 2023-11-22 | Stop reason: WASHOUT

## 2023-06-30 RX ORDER — DICLOFENAC SODIUM 75 MG/1
TABLET, DELAYED RELEASE ORAL
Status: ON HOLD | COMMUNITY
Start: 2016-02-29 | End: 2023-10-08 | Stop reason: ALTCHOICE

## 2023-06-30 NOTE — TELEPHONE ENCOUNTER
PATIENTS  CALLED REQUESTING A CORTISONE PATCH FOR PATIENT, HE STATED DR. MENG HAS PRESCRIBED THEM TO HER BEFORE FOR HER ARTHRITIS    SEND TO [facility]

## 2023-08-21 DIAGNOSIS — N39.0 RECURRENT UTI: ICD-10-CM

## 2023-08-21 RX ORDER — NITROFURANTOIN MACROCRYSTALS 50 MG/1
50 CAPSULE ORAL DAILY
Qty: 30 CAPSULE | Refills: 0 | Status: SHIPPED | OUTPATIENT
Start: 2023-08-21 | End: 2023-08-23

## 2023-08-23 DIAGNOSIS — M05.761 RHEUMATOID ARTHRITIS INVOLVING BOTH KNEES WITH POSITIVE RHEUMATOID FACTOR (MULTI): ICD-10-CM

## 2023-08-23 DIAGNOSIS — N39.0 RECURRENT UTI: ICD-10-CM

## 2023-08-23 DIAGNOSIS — M05.762 RHEUMATOID ARTHRITIS INVOLVING BOTH KNEES WITH POSITIVE RHEUMATOID FACTOR (MULTI): ICD-10-CM

## 2023-08-23 RX ORDER — NAPROXEN 375 MG/1
TABLET ORAL
Qty: 60 TABLET | Refills: 0 | Status: SHIPPED | OUTPATIENT
Start: 2023-08-23 | End: 2023-11-05 | Stop reason: SINTOL

## 2023-08-23 RX ORDER — NITROFURANTOIN MACROCRYSTALS 50 MG/1
50 CAPSULE ORAL DAILY
Qty: 30 CAPSULE | Refills: 0 | Status: SHIPPED | OUTPATIENT
Start: 2023-08-23 | End: 2023-10-10 | Stop reason: HOSPADM

## 2023-09-20 ENCOUNTER — TELEPHONE (OUTPATIENT)
Dept: PRIMARY CARE | Facility: CLINIC | Age: 73
End: 2023-09-20
Payer: COMMERCIAL

## 2023-09-20 DIAGNOSIS — M77.8 SHOULDER TENDONITIS, RIGHT: Primary | ICD-10-CM

## 2023-09-20 RX ORDER — METHYLPREDNISOLONE 4 MG/1
TABLET ORAL
Qty: 21 TABLET | Refills: 0 | Status: SHIPPED | OUTPATIENT
Start: 2023-09-20 | End: 2023-09-27

## 2023-09-20 NOTE — TELEPHONE ENCOUNTER
Patients  called and stated that the arthritis in patients shoulder is really hurting and that the Dr at the hospital told her not to take her Naproxen that she normally takes for her arthritis pain because it will make her diverticulitis flare up. Please advise!

## 2023-09-21 ENCOUNTER — PATIENT OUTREACH (OUTPATIENT)
Dept: PRIMARY CARE | Facility: CLINIC | Age: 73
End: 2023-09-21
Payer: COMMERCIAL
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
## 2023-09-22 RX ORDER — SUCRALFATE 1 G/10ML
10 SUSPENSION ORAL 4 TIMES DAILY
Status: ON HOLD | COMMUNITY
Start: 2023-09-20 | End: 2023-10-08

## 2023-09-22 RX ORDER — PANTOPRAZOLE SODIUM 40 MG/1
1 TABLET, DELAYED RELEASE ORAL DAILY
COMMUNITY
Start: 2023-09-20 | End: 2023-12-06 | Stop reason: SDUPTHER

## 2023-09-22 RX ORDER — METOPROLOL TARTRATE 25 MG/1
TABLET, FILM COATED ORAL 2 TIMES DAILY
COMMUNITY
Start: 2023-09-20 | End: 2023-11-22 | Stop reason: WASHOUT

## 2023-09-22 RX ORDER — CIPROFLOXACIN HYDROCHLORIDE 500 MG/1
TABLET, FILM COATED ORAL EVERY 12 HOURS
COMMUNITY
Start: 2023-09-20 | End: 2023-09-24

## 2023-09-22 RX ORDER — FERROUS GLUCONATE 324(38)MG
TABLET ORAL 2 TIMES DAILY
COMMUNITY
Start: 2023-09-20 | End: 2023-10-19

## 2023-09-22 RX ORDER — METRONIDAZOLE 500 MG/1
TABLET ORAL
Status: ON HOLD | COMMUNITY
Start: 2023-09-20 | End: 2023-10-08 | Stop reason: ALTCHOICE

## 2023-10-02 ENCOUNTER — TELEPHONE (OUTPATIENT)
Dept: PRIMARY CARE | Facility: CLINIC | Age: 73
End: 2023-10-02
Payer: COMMERCIAL

## 2023-10-02 ENCOUNTER — HOSPITAL ENCOUNTER (EMERGENCY)
Facility: HOSPITAL | Age: 73
Discharge: HOME | End: 2023-10-02
Payer: COMMERCIAL

## 2023-10-02 VITALS
TEMPERATURE: 97.3 F | SYSTOLIC BLOOD PRESSURE: 167 MMHG | RESPIRATION RATE: 18 BRPM | HEIGHT: 63 IN | HEART RATE: 74 BPM | BODY MASS INDEX: 21.26 KG/M2 | OXYGEN SATURATION: 93 % | WEIGHT: 120 LBS | DIASTOLIC BLOOD PRESSURE: 85 MMHG

## 2023-10-02 DIAGNOSIS — K59.00 CONSTIPATION IN FEMALE: ICD-10-CM

## 2023-10-02 DIAGNOSIS — K52.9 CHRONIC NONSPECIFIC COLITIS: Primary | ICD-10-CM

## 2023-10-02 DIAGNOSIS — R10.84 GENERALIZED ABDOMINAL PAIN: Primary | ICD-10-CM

## 2023-10-02 DIAGNOSIS — K62.5 RECTAL BLEEDING: ICD-10-CM

## 2023-10-02 LAB
ABO GROUP (TYPE) IN BLOOD: NORMAL
ALBUMIN SERPL BCP-MCNC: 4.4 G/DL (ref 3.4–5)
ALP SERPL-CCNC: 38 U/L (ref 33–136)
ALT SERPL W P-5'-P-CCNC: 9 U/L (ref 7–45)
ANION GAP SERPL CALC-SCNC: 14 MMOL/L (ref 10–20)
ANTIBODY SCREEN: NORMAL
APPEARANCE UR: CLEAR
AST SERPL W P-5'-P-CCNC: 17 U/L (ref 9–39)
BACTERIA #/AREA URNS AUTO: ABNORMAL /HPF
BASOPHILS # BLD AUTO: 0.01 X10*3/UL (ref 0–0.1)
BASOPHILS NFR BLD AUTO: 0.1 %
BILIRUB SERPL-MCNC: 0.6 MG/DL (ref 0–1.2)
BILIRUB UR STRIP.AUTO-MCNC: NEGATIVE MG/DL
BUN SERPL-MCNC: 14 MG/DL (ref 6–23)
CALCIUM SERPL-MCNC: 9.4 MG/DL (ref 8.6–10.3)
CHLORIDE SERPL-SCNC: 103 MMOL/L (ref 98–107)
CO2 SERPL-SCNC: 25 MMOL/L (ref 21–32)
COLOR UR: ABNORMAL
CREAT SERPL-MCNC: 0.58 MG/DL (ref 0.5–1.05)
EOSINOPHIL # BLD AUTO: 0 X10*3/UL (ref 0–0.4)
EOSINOPHIL NFR BLD AUTO: 0 %
ERYTHROCYTE [DISTWIDTH] IN BLOOD BY AUTOMATED COUNT: 23 % (ref 11.5–14.5)
GFR SERPL CREATININE-BSD FRML MDRD: >90 ML/MIN/1.73M*2
GLUCOSE SERPL-MCNC: 116 MG/DL (ref 74–99)
GLUCOSE UR STRIP.AUTO-MCNC: NEGATIVE MG/DL
HCT VFR BLD AUTO: 37.5 % (ref 36–46)
HGB BLD-MCNC: 10.1 G/DL (ref 12–16)
HYPOCHROMIA BLD QL SMEAR: NORMAL
IMM GRANULOCYTES # BLD AUTO: 0.04 X10*3/UL (ref 0–0.5)
IMM GRANULOCYTES NFR BLD AUTO: 0.4 % (ref 0–0.9)
INR PPP: 1 (ref 0.9–1.1)
KETONES UR STRIP.AUTO-MCNC: NEGATIVE MG/DL
LEUKOCYTE ESTERASE UR QL STRIP.AUTO: NEGATIVE
LYMPHOCYTES # BLD AUTO: 0.78 X10*3/UL (ref 0.8–3)
LYMPHOCYTES NFR BLD AUTO: 8.4 %
MCH RBC QN AUTO: 21.6 PG (ref 26–34)
MCHC RBC AUTO-ENTMCNC: 26.9 G/DL (ref 32–36)
MCV RBC AUTO: 80 FL (ref 80–100)
MONOCYTES # BLD AUTO: 0.08 X10*3/UL (ref 0.05–0.8)
MONOCYTES NFR BLD AUTO: 0.9 %
NEUTROPHILS # BLD AUTO: 8.39 X10*3/UL (ref 1.6–5.5)
NEUTROPHILS NFR BLD AUTO: 90.2 %
NITRITE UR QL STRIP.AUTO: NEGATIVE
NRBC BLD-RTO: 0 /100 WBCS (ref 0–0)
PH UR STRIP.AUTO: 5 [PH]
PLATELET # BLD AUTO: 386 X10*3/UL (ref 150–450)
PMV BLD AUTO: 10.4 FL (ref 7.5–11.5)
POLYCHROMASIA BLD QL SMEAR: NORMAL
POTASSIUM SERPL-SCNC: 3.8 MMOL/L (ref 3.5–5.3)
PROT SERPL-MCNC: 7.4 G/DL (ref 6.4–8.2)
PROT UR STRIP.AUTO-MCNC: ABNORMAL MG/DL
PROTHROMBIN TIME: 11.2 SECONDS (ref 9.8–12.8)
RBC # BLD AUTO: 4.68 X10*6/UL (ref 4–5.2)
RBC # UR STRIP.AUTO: ABNORMAL /UL
RBC #/AREA URNS AUTO: ABNORMAL /HPF
RBC MORPH BLD: NORMAL
RH FACTOR (ANTIGEN D): NORMAL
SODIUM SERPL-SCNC: 138 MMOL/L (ref 136–145)
SP GR UR STRIP.AUTO: 1.03
SQUAMOUS #/AREA URNS AUTO: ABNORMAL /HPF
UROBILINOGEN UR STRIP.AUTO-MCNC: <2 MG/DL
WBC # BLD AUTO: 9.3 X10*3/UL (ref 4.4–11.3)
WBC #/AREA URNS AUTO: ABNORMAL /HPF

## 2023-10-02 PROCEDURE — 80053 COMPREHEN METABOLIC PANEL: CPT | Performed by: REGISTERED NURSE

## 2023-10-02 PROCEDURE — 86901 BLOOD TYPING SEROLOGIC RH(D): CPT | Performed by: REGISTERED NURSE

## 2023-10-02 PROCEDURE — 81001 URINALYSIS AUTO W/SCOPE: CPT | Performed by: REGISTERED NURSE

## 2023-10-02 PROCEDURE — 36415 COLL VENOUS BLD VENIPUNCTURE: CPT | Performed by: REGISTERED NURSE

## 2023-10-02 PROCEDURE — 86900 BLOOD TYPING SEROLOGIC ABO: CPT | Performed by: REGISTERED NURSE

## 2023-10-02 PROCEDURE — 85025 COMPLETE CBC W/AUTO DIFF WBC: CPT | Performed by: REGISTERED NURSE

## 2023-10-02 PROCEDURE — 99284 EMERGENCY DEPT VISIT MOD MDM: CPT

## 2023-10-02 PROCEDURE — 99283 EMERGENCY DEPT VISIT LOW MDM: CPT

## 2023-10-02 PROCEDURE — 85610 PROTHROMBIN TIME: CPT | Performed by: REGISTERED NURSE

## 2023-10-02 RX ORDER — POLYETHYLENE GLYCOL 3350, SODIUM CHLORIDE, SODIUM BICARBONATE, POTASSIUM CHLORIDE 420; 11.2; 5.72; 1.48 G/4L; G/4L; G/4L; G/4L
4000 POWDER, FOR SOLUTION ORAL ONCE
Status: DISCONTINUED | OUTPATIENT
Start: 2023-10-02 | End: 2023-10-10 | Stop reason: HOSPADM

## 2023-10-02 RX ORDER — TRAMADOL HYDROCHLORIDE 50 MG/1
50 TABLET ORAL 2 TIMES DAILY PRN
Qty: 10 TABLET | Refills: 0 | Status: SHIPPED | OUTPATIENT
Start: 2023-10-02 | End: 2023-10-07

## 2023-10-02 ASSESSMENT — COLUMBIA-SUICIDE SEVERITY RATING SCALE - C-SSRS
6. HAVE YOU EVER DONE ANYTHING, STARTED TO DO ANYTHING, OR PREPARED TO DO ANYTHING TO END YOUR LIFE?: NO
1. IN THE PAST MONTH, HAVE YOU WISHED YOU WERE DEAD OR WISHED YOU COULD GO TO SLEEP AND NOT WAKE UP?: NO
2. HAVE YOU ACTUALLY HAD ANY THOUGHTS OF KILLING YOURSELF?: NO

## 2023-10-02 ASSESSMENT — PAIN - FUNCTIONAL ASSESSMENT: PAIN_FUNCTIONAL_ASSESSMENT: 0-10

## 2023-10-02 ASSESSMENT — PAIN SCALES - GENERAL: PAINLEVEL_OUTOF10: 0 - NO PAIN

## 2023-10-02 NOTE — ED PROVIDER NOTES
HPI   Chief Complaint   Patient presents with    Blood in Urine     Pt states she has had bladder pain and blood in urine that started this morning.       73-year-old female with past medical history significant for hypothyroid, dementia, and arthritis presents emergency department for chief complaint of lower abdominal pain that started this morning that has subsequently resolved.  Patient  was assisting in HPI as patient does have dementia.  He reports that she went to go wipe in the bathroom this morning and she was having blood on the tissue paper.   reports that initially he thought maybe she was having blood in her urine but also that she could be having blood in her rectum.  Patient's  reports that she was admitted and subsequently discharged on September 19 for having dark stools.  At that time  reports that patient had 1 large red stool which caused him to come to the emergency department however the subsequent stools were dark.  Patient was discharged home with plan for follow-up with GI to have a colonoscopy.   states that he did give patient 1 oxycodone that he had left and this has resolved her pain.  Patient denies any abdominal pain at this time.  Additionally patient denies any chest pain, shortness of breath, numbness or tingling, fever chills, dizziness or lightheadedness, headache, weakness.      History provided by:  Patient and caregiver  History limited by:  Dementia   used: No                        No data recorded                Patient History   Past Medical History:   Diagnosis Date    Acute cystitis with hematuria 06/05/2022    Acute cystitis with hematuria    Calculus of kidney 10/01/2020    Bilateral nephrolithiasis    Diverticulitis of large intestine without perforation or abscess without bleeding 10/01/2020    Sigmoid diverticulitis    Multiple fractures of ribs, left side, subsequent encounter for fracture with nonunion  08/18/2021    Closed fracture of multiple ribs of left side with nonunion, subsequent encounter    Other specified disorders of eye and adnexa     Itchy, watery, and red eye    Personal history of (healed) traumatic fracture 10/18/2020    Status post fracture of left hip    Personal history of diseases of the blood and blood-forming organs and certain disorders involving the immune mechanism 10/27/2020    History of bleeding disorder    Personal history of other diseases of the digestive system 10/18/2020    History of diverticulitis of colon    Personal history of other diseases of the musculoskeletal system and connective tissue 10/27/2020    History of arthritis    Personal history of other specified conditions 10/18/2020    History of cachexia    Personal history of other specified conditions     History of abdominal pain    Personal history of other specified conditions 10/27/2020    History of balance disorder    Unspecified visual loss 10/27/2020    Vision problems     Past Surgical History:   Procedure Laterality Date    CT ABDOMEN PELVIS ANGIOGRAM W AND/OR WO IV CONTRAST  05/28/2022    CT ABDOMEN PELVIS ANGIOGRAM W AND/OR WO IV CONTRAST 5/28/2022 [facility]    HIP SURGERY  09/25/2020    OTHER SURGICAL HISTORY  10/01/2020    Hip surgery     Family History   Problem Relation Name Age of Onset    No Known Problems Mother      No Known Problems Father      No Known Problems Sister      No Known Problems Brother       Social History     Tobacco Use    Smoking status: Never     Passive exposure: Past    Smokeless tobacco: Never   Substance Use Topics    Alcohol use: Not Currently    Drug use: Never       Physical Exam   ED Triage Vitals [10/02/23 1722]   Temp Heart Rate Resp BP   36.3 °C (97.3 °F) 70 16 --      SpO2 Temp Source Heart Rate Source Patient Position   94 % Temporal Monitor Sitting      BP Location FiO2 (%)     Right arm --       Physical Exam  HENT:      Head: Normocephalic.    Cardiovascular:      Rate and Rhythm: Normal rate and regular rhythm.   Pulmonary:      Effort: Pulmonary effort is normal.      Breath sounds: Normal breath sounds.   Abdominal:      General: Abdomen is flat.      Palpations: Abdomen is soft.      Tenderness: There is no abdominal tenderness. There is no guarding.   Skin:     General: Skin is warm and dry.      Capillary Refill: Capillary refill takes less than 2 seconds.   Neurological:      Mental Status: She is alert. Mental status is at baseline.         ED Course & MDM   Diagnoses as of 10/02/23 2135   Generalized abdominal pain   Rectal bleeding       Medical Decision Making  73-year-old female with past medical history significant for hypothyroid, dementia, and arthritis presents emergency department for chief complaint of lower abdominal pain that started this morning that has subsequently resolved.  Patient  was assisting in HPI as patient does have dementia.  He reports that she went to go wipe in the bathroom this morning and she was having blood on the tissue paper.   reports that initially he thought maybe she was having blood in her urine but also that she could be having blood in her rectum.  Patient's  reports that she was admitted and subsequently discharged on September 19 for having dark stools.  At that time  reports that patient had 1 large red stool which caused him to come to the emergency department however the subsequent stools were dark.  Patient was discharged home with plan for follow-up with GI to have a colonoscopy.   states that he did give patient 1 oxycodone that he had left and this has resolved her pain.  Patient denies any abdominal pain at this time.  Additionally patient denies any chest pain, shortness of breath, numbness or tingling, fever chills, dizziness or lightheadedness, headache, weakness.    Patient seen and examined initially in the waiting room.  Exam limited as we are not able  to lay patient down for rectal exam.  We will proceed with ordering CBC, CMP, type and cross and lactate as well as a urinalysis to evaluate patient's hydration and hemodynamic stability.    Once patient was in the room I was able to perform a rectal exam.  The skin is without erythema or induration.  Sphincter tone normal.  There are no masses palpated on digital exam. No evidence of internal or external hemorrhoids or fissures.  No fecal impaction.  No gross bleeding.  Hemoccult faintly positive.  He has no longer having abdominal time.  Patient's diagnostics have resulted we will evaluate the need for any imaging.  Patient's H&H is consistent with previous trends and I do not see the need to pursue any imaging at this time.  Patient's  is in agreement with this medical plan and assessment.  Urinalysis without sign of infection however there is a mild amount of blood.  Looking at patient's trend patient often has blood in her urine  states this is likely secondary to previous kidney stones.  CBC and CMP unremarkable on my review, there is no signs of hydration and patient's H&H is actually improved since her discharge on 9/18 of this year.  After discussion of lab results with patient's .  We will not pursue CT imaging at this time.  Additionally plan is to discharge patient home with follow-up with her primary care physician.  Patient does have a colonoscopy scheduled and I did encourage him to keep this appointment.  Patient's  is requesting to have pain medication at home in case patient starts to develop abdominal pain.  I did notice in patient's chart that Dr. Forde did write a prescription for Ultram yesterday.  I did update  on this.,  And states that he will call the pharmacy tomorrow to pick this up.  Has been encouraged to return to the emergency department today symptoms worsen quality or nature.  Patient's  agrees to do so.  Patient discharged home in stable  condition under the care of her .      Labs Reviewed   URINALYSIS WITH REFLEX MICROSCOPIC - Abnormal       Result Value    Color, Urine Haylee (*)     Appearance, Urine Clear      Specific Gravity, Urine 1.027      pH, Urine 5.0      Protein, Urine 30 (1+) (*)     Glucose, Urine NEGATIVE      Blood, Urine MODERATE (2+) (*)     Ketones, Urine NEGATIVE      Bilirubin, Urine NEGATIVE      Urobilinogen, Urine <2.0      Nitrite, Urine NEGATIVE      Leukocyte Esterase, Urine NEGATIVE     CBC WITH AUTO DIFFERENTIAL - Abnormal    WBC 9.3      nRBC 0.0      RBC 4.68      Hemoglobin 10.1 (*)     Hematocrit 37.5      MCV 80      MCH 21.6 (*)     MCHC 26.9 (*)     RDW 23.0 (*)     Platelets 386      MPV 10.4      Neutrophils % 90.2      Immature Granulocytes %, Automated 0.4      Lymphocytes % 8.4      Monocytes % 0.9      Eosinophils % 0.0      Basophils % 0.1      Neutrophils Absolute 8.39 (*)     Immature Granulocytes Absolute, Automated 0.04      Lymphocytes Absolute 0.78 (*)     Monocytes Absolute 0.08      Eosinophils Absolute 0.00      Basophils Absolute 0.01     COMPREHENSIVE METABOLIC PANEL - Abnormal    Glucose 116 (*)     Sodium 138      Potassium 3.8      Chloride 103      Bicarbonate 25      Anion Gap 14      Urea Nitrogen 14      Creatinine 0.58      eGFR >90      Calcium 9.4      Albumin 4.4      Alkaline Phosphatase 38      Total Protein 7.4      AST 17      Bilirubin, Total 0.6      ALT 9     URINALYSIS MICROSCOPIC ONLY - Abnormal    WBC, Urine 1-5      RBC, Urine 1-2      Squamous Epithelial Cells, Urine NONE      Bacteria, Urine 1+ (*)    PROTIME-INR - Normal    Protime 11.2      INR 1.0     TYPE AND SCREEN    ABO TYPE O      Rh TYPE POS      ANTIBODY SCREEN NEG     MORPHOLOGY    RBC Morphology See Below      Polychromasia Mild      Hypochromia Mild         No orders to display         Procedure  Procedures     Kimberly Mabry, MARK-CNP  10/02/23 1954       MARK Glass-CNP  10/02/23  2135

## 2023-10-02 NOTE — TELEPHONE ENCOUNTER
Patients  called stated symone is having pains in her stomach she was to have a colonoscopy last week and couldn't have it due to her having stool still there. She isn't having any bleeding right now and they are wanting something for pain. Please Advise

## 2023-10-05 DIAGNOSIS — Z12.11 COLON CANCER SCREENING: Primary | ICD-10-CM

## 2023-10-05 RX ORDER — POLYETHYLENE GLYCOL 3350, SODIUM SULFATE ANHYDROUS, SODIUM BICARBONATE, SODIUM CHLORIDE, POTASSIUM CHLORIDE 236; 22.74; 6.74; 5.86; 2.97 G/4L; G/4L; G/4L; G/4L; G/4L
4000 POWDER, FOR SOLUTION ORAL ONCE
Qty: 4000 ML | Refills: 0 | Status: SHIPPED | OUTPATIENT
Start: 2023-10-05 | End: 2023-10-05

## 2023-10-08 ENCOUNTER — APPOINTMENT (OUTPATIENT)
Dept: RADIOLOGY | Facility: HOSPITAL | Age: 73
End: 2023-10-08
Payer: COMMERCIAL

## 2023-10-08 ENCOUNTER — HOSPITAL ENCOUNTER (OUTPATIENT)
Facility: HOSPITAL | Age: 73
Setting detail: OBSERVATION
Discharge: HOME | End: 2023-10-10
Attending: STUDENT IN AN ORGANIZED HEALTH CARE EDUCATION/TRAINING PROGRAM | Admitting: HOSPITALIST
Payer: COMMERCIAL

## 2023-10-08 DIAGNOSIS — K92.1 MELENA: ICD-10-CM

## 2023-10-08 DIAGNOSIS — G43.809 OTHER MIGRAINE WITHOUT STATUS MIGRAINOSUS, NOT INTRACTABLE: ICD-10-CM

## 2023-10-08 DIAGNOSIS — R10.84 GENERALIZED ABDOMINAL PAIN: Primary | ICD-10-CM

## 2023-10-08 DIAGNOSIS — M06.9 RHEUMATOID ARTHRITIS, INVOLVING UNSPECIFIED SITE, UNSPECIFIED WHETHER RHEUMATOID FACTOR PRESENT (MULTI): ICD-10-CM

## 2023-10-08 DIAGNOSIS — M54.16 LUMBAR RADICULOPATHY, CHRONIC: ICD-10-CM

## 2023-10-08 DIAGNOSIS — G43.001 MIGRAINE WITHOUT AURA AND WITH STATUS MIGRAINOSUS, NOT INTRACTABLE: ICD-10-CM

## 2023-10-08 DIAGNOSIS — R53.1 WEAKNESS: ICD-10-CM

## 2023-10-08 PROBLEM — G43.909 MIGRAINE HEADACHE: Status: ACTIVE | Noted: 2023-10-08

## 2023-10-08 PROBLEM — R10.9 ABDOMINAL PAIN: Status: ACTIVE | Noted: 2023-10-08

## 2023-10-08 PROBLEM — K21.9 GASTROESOPHAGEAL REFLUX DISEASE WITHOUT ESOPHAGITIS: Status: ACTIVE | Noted: 2023-09-15

## 2023-10-08 PROBLEM — K22.70 BARRETT'S ESOPHAGUS WITHOUT DYSPLASIA: Status: ACTIVE | Noted: 2023-09-15

## 2023-10-08 LAB
ALBUMIN SERPL BCP-MCNC: 4.6 G/DL (ref 3.4–5)
ALP SERPL-CCNC: 41 U/L (ref 33–136)
ALT SERPL W P-5'-P-CCNC: 9 U/L (ref 7–45)
ANION GAP SERPL CALC-SCNC: 15 MMOL/L
APPEARANCE UR: ABNORMAL
AST SERPL W P-5'-P-CCNC: 16 U/L (ref 9–39)
BACTERIA #/AREA URNS AUTO: ABNORMAL /HPF
BASOPHILS # BLD AUTO: 0.07 X10*3/UL (ref 0–0.1)
BASOPHILS NFR BLD AUTO: 0.7 %
BILIRUB SERPL-MCNC: 0.8 MG/DL (ref 0–1.2)
BILIRUB UR STRIP.AUTO-MCNC: NEGATIVE MG/DL
BUN SERPL-MCNC: 13 MG/DL (ref 6–23)
CALCIUM SERPL-MCNC: 9.6 MG/DL (ref 8.6–10.3)
CARDIAC TROPONIN I PNL SERPL HS: 7 NG/L (ref 0–13)
CARDIAC TROPONIN I PNL SERPL HS: 8 NG/L (ref 0–13)
CHLORIDE SERPL-SCNC: 103 MMOL/L (ref 98–107)
CO2 SERPL-SCNC: 25 MMOL/L (ref 21–32)
COLOR UR: YELLOW
CREAT SERPL-MCNC: 0.57 MG/DL (ref 0.5–1.05)
EOSINOPHIL # BLD AUTO: 0.09 X10*3/UL (ref 0–0.4)
EOSINOPHIL NFR BLD AUTO: 0.9 %
ERYTHROCYTE [DISTWIDTH] IN BLOOD BY AUTOMATED COUNT: 23.5 % (ref 11.5–14.5)
GFR SERPL CREATININE-BSD FRML MDRD: >90 ML/MIN/1.73M*2
GLUCOSE SERPL-MCNC: 94 MG/DL (ref 74–99)
GLUCOSE UR STRIP.AUTO-MCNC: NEGATIVE MG/DL
HCT VFR BLD AUTO: 41.2 % (ref 36–46)
HGB BLD-MCNC: 11 G/DL (ref 12–16)
IMM GRANULOCYTES # BLD AUTO: 0.02 X10*3/UL (ref 0–0.5)
IMM GRANULOCYTES NFR BLD AUTO: 0.2 % (ref 0–0.9)
INR PPP: 1.1 (ref 0.9–1.1)
KETONES UR STRIP.AUTO-MCNC: ABNORMAL MG/DL
LACTATE SERPL-SCNC: 1.7 MMOL/L (ref 0.4–2)
LEUKOCYTE ESTERASE UR QL STRIP.AUTO: ABNORMAL
LIPASE SERPL-CCNC: 7 U/L (ref 9–82)
LYMPHOCYTES # BLD AUTO: 2.22 X10*3/UL (ref 0.8–3)
LYMPHOCYTES NFR BLD AUTO: 23.3 %
MAGNESIUM SERPL-MCNC: 1.61 MG/DL (ref 1.6–2.4)
MCH RBC QN AUTO: 21.8 PG (ref 26–34)
MCHC RBC AUTO-ENTMCNC: 26.7 G/DL (ref 32–36)
MCV RBC AUTO: 82 FL (ref 80–100)
MONOCYTES # BLD AUTO: 0.68 X10*3/UL (ref 0.05–0.8)
MONOCYTES NFR BLD AUTO: 7.2 %
NEUTROPHILS # BLD AUTO: 6.43 X10*3/UL (ref 1.6–5.5)
NEUTROPHILS NFR BLD AUTO: 67.7 %
NITRITE UR QL STRIP.AUTO: NEGATIVE
NRBC BLD-RTO: 0 /100 WBCS (ref 0–0)
PH UR STRIP.AUTO: 5 [PH]
PLATELET # BLD AUTO: 390 X10*3/UL (ref 150–450)
PMV BLD AUTO: 9.9 FL (ref 7.5–11.5)
POTASSIUM SERPL-SCNC: 3.1 MMOL/L (ref 3.5–5.3)
PROT SERPL-MCNC: 7.8 G/DL (ref 6.4–8.2)
PROT UR STRIP.AUTO-MCNC: NEGATIVE MG/DL
PROTHROMBIN TIME: 11.9 SECONDS (ref 9.8–12.8)
RBC # BLD AUTO: 5.05 X10*6/UL (ref 4–5.2)
RBC # UR STRIP.AUTO: NEGATIVE /UL
RBC #/AREA URNS AUTO: ABNORMAL /HPF
SODIUM SERPL-SCNC: 140 MMOL/L (ref 136–145)
SP GR UR STRIP.AUTO: 1.01
SQUAMOUS #/AREA URNS AUTO: ABNORMAL /HPF
UROBILINOGEN UR STRIP.AUTO-MCNC: <2 MG/DL
WBC # BLD AUTO: 9.5 X10*3/UL (ref 4.4–11.3)
WBC #/AREA URNS AUTO: ABNORMAL /HPF

## 2023-10-08 PROCEDURE — 2500000001 HC RX 250 WO HCPCS SELF ADMINISTERED DRUGS (ALT 637 FOR MEDICARE OP): Performed by: STUDENT IN AN ORGANIZED HEALTH CARE EDUCATION/TRAINING PROGRAM

## 2023-10-08 PROCEDURE — 2580000001 HC RX 258 IV SOLUTIONS: Performed by: HOSPITALIST

## 2023-10-08 PROCEDURE — 2500000004 HC RX 250 GENERAL PHARMACY W/ HCPCS (ALT 636 FOR OP/ED): Performed by: EMERGENCY MEDICINE

## 2023-10-08 PROCEDURE — 85025 COMPLETE CBC W/AUTO DIFF WBC: CPT | Performed by: STUDENT IN AN ORGANIZED HEALTH CARE EDUCATION/TRAINING PROGRAM

## 2023-10-08 PROCEDURE — 2500000001 HC RX 250 WO HCPCS SELF ADMINISTERED DRUGS (ALT 637 FOR MEDICARE OP): Performed by: INTERNAL MEDICINE

## 2023-10-08 PROCEDURE — 70450 CT HEAD/BRAIN W/O DYE: CPT | Performed by: SURGERY

## 2023-10-08 PROCEDURE — 74176 CT ABD & PELVIS W/O CONTRAST: CPT

## 2023-10-08 PROCEDURE — 85610 PROTHROMBIN TIME: CPT | Performed by: STUDENT IN AN ORGANIZED HEALTH CARE EDUCATION/TRAINING PROGRAM

## 2023-10-08 PROCEDURE — 83735 ASSAY OF MAGNESIUM: CPT | Performed by: HOSPITALIST

## 2023-10-08 PROCEDURE — G0378 HOSPITAL OBSERVATION PER HR: HCPCS

## 2023-10-08 PROCEDURE — 2500000004 HC RX 250 GENERAL PHARMACY W/ HCPCS (ALT 636 FOR OP/ED): Performed by: STUDENT IN AN ORGANIZED HEALTH CARE EDUCATION/TRAINING PROGRAM

## 2023-10-08 PROCEDURE — 80053 COMPREHEN METABOLIC PANEL: CPT | Performed by: STUDENT IN AN ORGANIZED HEALTH CARE EDUCATION/TRAINING PROGRAM

## 2023-10-08 PROCEDURE — 36415 COLL VENOUS BLD VENIPUNCTURE: CPT | Performed by: STUDENT IN AN ORGANIZED HEALTH CARE EDUCATION/TRAINING PROGRAM

## 2023-10-08 PROCEDURE — 84484 ASSAY OF TROPONIN QUANT: CPT | Mod: 91 | Performed by: STUDENT IN AN ORGANIZED HEALTH CARE EDUCATION/TRAINING PROGRAM

## 2023-10-08 PROCEDURE — 99223 1ST HOSP IP/OBS HIGH 75: CPT | Performed by: HOSPITALIST

## 2023-10-08 PROCEDURE — 74176 CT ABD & PELVIS W/O CONTRAST: CPT | Performed by: RADIOLOGY

## 2023-10-08 PROCEDURE — 84484 ASSAY OF TROPONIN QUANT: CPT | Performed by: STUDENT IN AN ORGANIZED HEALTH CARE EDUCATION/TRAINING PROGRAM

## 2023-10-08 PROCEDURE — 81001 URINALYSIS AUTO W/SCOPE: CPT | Performed by: STUDENT IN AN ORGANIZED HEALTH CARE EDUCATION/TRAINING PROGRAM

## 2023-10-08 PROCEDURE — 83690 ASSAY OF LIPASE: CPT | Performed by: STUDENT IN AN ORGANIZED HEALTH CARE EDUCATION/TRAINING PROGRAM

## 2023-10-08 PROCEDURE — 83605 ASSAY OF LACTIC ACID: CPT | Performed by: STUDENT IN AN ORGANIZED HEALTH CARE EDUCATION/TRAINING PROGRAM

## 2023-10-08 PROCEDURE — 99285 EMERGENCY DEPT VISIT HI MDM: CPT | Mod: 25 | Performed by: STUDENT IN AN ORGANIZED HEALTH CARE EDUCATION/TRAINING PROGRAM

## 2023-10-08 PROCEDURE — 2500000001 HC RX 250 WO HCPCS SELF ADMINISTERED DRUGS (ALT 637 FOR MEDICARE OP): Performed by: HOSPITALIST

## 2023-10-08 PROCEDURE — 2500000004 HC RX 250 GENERAL PHARMACY W/ HCPCS (ALT 636 FOR OP/ED): Performed by: HOSPITALIST

## 2023-10-08 PROCEDURE — 70450 CT HEAD/BRAIN W/O DYE: CPT | Mod: MG

## 2023-10-08 RX ORDER — METOPROLOL TARTRATE 25 MG/1
25 TABLET, FILM COATED ORAL 2 TIMES DAILY
Status: DISCONTINUED | OUTPATIENT
Start: 2023-10-08 | End: 2023-10-10 | Stop reason: HOSPADM

## 2023-10-08 RX ORDER — ONDANSETRON HYDROCHLORIDE 2 MG/ML
4 INJECTION, SOLUTION INTRAVENOUS EVERY 8 HOURS PRN
Status: DISCONTINUED | OUTPATIENT
Start: 2023-10-08 | End: 2023-10-10 | Stop reason: HOSPADM

## 2023-10-08 RX ORDER — PREDNISOLONE ACETATE 10 MG/ML
1 SUSPENSION/ DROPS OPHTHALMIC 2 TIMES DAILY
Status: DISCONTINUED | OUTPATIENT
Start: 2023-10-08 | End: 2023-10-10 | Stop reason: HOSPADM

## 2023-10-08 RX ORDER — POLYETHYLENE GLYCOL 3350 17 G/17G
17 POWDER, FOR SOLUTION ORAL DAILY
Status: DISCONTINUED | OUTPATIENT
Start: 2023-10-08 | End: 2023-10-09

## 2023-10-08 RX ORDER — BUTALBITAL, ACETAMINOPHEN AND CAFFEINE 50; 325; 40 MG/1; MG/1; MG/1
1 TABLET ORAL ONCE
Status: COMPLETED | OUTPATIENT
Start: 2023-10-08 | End: 2023-10-08

## 2023-10-08 RX ORDER — SODIUM CHLORIDE, SODIUM LACTATE, POTASSIUM CHLORIDE, CALCIUM CHLORIDE 600; 310; 30; 20 MG/100ML; MG/100ML; MG/100ML; MG/100ML
75 INJECTION, SOLUTION INTRAVENOUS CONTINUOUS
Status: DISCONTINUED | OUTPATIENT
Start: 2023-10-08 | End: 2023-10-10

## 2023-10-08 RX ORDER — ERYTHROMYCIN 5 MG/G
1 OINTMENT OPHTHALMIC EVERY 8 HOURS SCHEDULED
Status: DISCONTINUED | OUTPATIENT
Start: 2023-10-08 | End: 2023-10-10 | Stop reason: HOSPADM

## 2023-10-08 RX ORDER — ONDANSETRON 4 MG/1
4 TABLET, ORALLY DISINTEGRATING ORAL EVERY 8 HOURS PRN
Status: DISCONTINUED | OUTPATIENT
Start: 2023-10-08 | End: 2023-10-10 | Stop reason: HOSPADM

## 2023-10-08 RX ORDER — ACETAMINOPHEN 650 MG/1
650 SUPPOSITORY RECTAL EVERY 4 HOURS PRN
Status: DISCONTINUED | OUTPATIENT
Start: 2023-10-08 | End: 2023-10-10 | Stop reason: HOSPADM

## 2023-10-08 RX ORDER — GABAPENTIN 300 MG/1
600 CAPSULE ORAL NIGHTLY
Status: DISCONTINUED | OUTPATIENT
Start: 2023-10-08 | End: 2023-10-10 | Stop reason: HOSPADM

## 2023-10-08 RX ORDER — POTASSIUM CHLORIDE 14.9 MG/ML
20 INJECTION INTRAVENOUS ONCE
Status: COMPLETED | OUTPATIENT
Start: 2023-10-08 | End: 2023-10-09

## 2023-10-08 RX ORDER — MORPHINE SULFATE 2 MG/ML
2 INJECTION, SOLUTION INTRAMUSCULAR; INTRAVENOUS EVERY 4 HOURS PRN
Status: DISCONTINUED | OUTPATIENT
Start: 2023-10-08 | End: 2023-10-10 | Stop reason: HOSPADM

## 2023-10-08 RX ORDER — BISACODYL 5 MG
10 TABLET, DELAYED RELEASE (ENTERIC COATED) ORAL DAILY PRN
Status: DISCONTINUED | OUTPATIENT
Start: 2023-10-08 | End: 2023-10-10 | Stop reason: HOSPADM

## 2023-10-08 RX ORDER — METOCLOPRAMIDE HYDROCHLORIDE 5 MG/ML
5 INJECTION INTRAMUSCULAR; INTRAVENOUS ONCE
Status: COMPLETED | OUTPATIENT
Start: 2023-10-08 | End: 2023-10-08

## 2023-10-08 RX ORDER — FERROUS GLUCONATE 325 MG
38 TABLET ORAL
Status: DISCONTINUED | OUTPATIENT
Start: 2023-10-09 | End: 2023-10-10 | Stop reason: HOSPADM

## 2023-10-08 RX ORDER — SUMATRIPTAN SUCCINATE 25 MG/1
50 TABLET ORAL EVERY 8 HOURS PRN
Status: DISCONTINUED | OUTPATIENT
Start: 2023-10-08 | End: 2023-10-10 | Stop reason: HOSPADM

## 2023-10-08 RX ORDER — DIPHENHYDRAMINE HYDROCHLORIDE 50 MG/ML
12.5 INJECTION INTRAMUSCULAR; INTRAVENOUS ONCE
Status: COMPLETED | OUTPATIENT
Start: 2023-10-08 | End: 2023-10-08

## 2023-10-08 RX ORDER — PANTOPRAZOLE SODIUM 40 MG/1
40 TABLET, DELAYED RELEASE ORAL DAILY
Status: DISCONTINUED | OUTPATIENT
Start: 2023-10-08 | End: 2023-10-10 | Stop reason: HOSPADM

## 2023-10-08 RX ORDER — ACETAMINOPHEN 325 MG/1
650 TABLET ORAL EVERY 4 HOURS PRN
Status: DISCONTINUED | OUTPATIENT
Start: 2023-10-08 | End: 2023-10-10 | Stop reason: HOSPADM

## 2023-10-08 RX ORDER — SUMATRIPTAN 6 MG/.5ML
6 INJECTION, SOLUTION SUBCUTANEOUS ONCE
Status: COMPLETED | OUTPATIENT
Start: 2023-10-08 | End: 2023-10-08

## 2023-10-08 RX ORDER — TRAMADOL HYDROCHLORIDE 50 MG/1
50 TABLET ORAL EVERY 8 HOURS PRN
Status: DISCONTINUED | OUTPATIENT
Start: 2023-10-08 | End: 2023-10-10 | Stop reason: HOSPADM

## 2023-10-08 RX ORDER — ACETAMINOPHEN 160 MG/5ML
650 SOLUTION ORAL EVERY 4 HOURS PRN
Status: DISCONTINUED | OUTPATIENT
Start: 2023-10-08 | End: 2023-10-10 | Stop reason: HOSPADM

## 2023-10-08 RX ORDER — PREDNISONE 5 MG/1
5 TABLET ORAL DAILY
Status: DISCONTINUED | OUTPATIENT
Start: 2023-10-08 | End: 2023-10-10 | Stop reason: HOSPADM

## 2023-10-08 RX ORDER — POTASSIUM CHLORIDE 14.9 MG/ML
20 INJECTION INTRAVENOUS
Status: DISPENSED | OUTPATIENT
Start: 2023-10-08 | End: 2023-10-08

## 2023-10-08 RX ORDER — PREDNISOLONE SODIUM PHOSPHATE 10 MG/ML
1 SOLUTION/ DROPS OPHTHALMIC 2 TIMES DAILY
Status: DISCONTINUED | OUTPATIENT
Start: 2023-10-08 | End: 2023-10-08 | Stop reason: SDUPTHER

## 2023-10-08 RX ORDER — LEVOTHYROXINE SODIUM 50 UG/1
50 TABLET ORAL DAILY
Status: DISCONTINUED | OUTPATIENT
Start: 2023-10-09 | End: 2023-10-10 | Stop reason: HOSPADM

## 2023-10-08 RX ADMIN — SUMATRIPTAN 6 MG: 6 INJECTION, SOLUTION SUBCUTANEOUS at 12:01

## 2023-10-08 RX ADMIN — SODIUM CHLORIDE 500 ML: 9 INJECTION, SOLUTION INTRAVENOUS at 05:36

## 2023-10-08 RX ADMIN — SUMATRIPTAN SUCCINATE 50 MG: 25 TABLET ORAL at 23:56

## 2023-10-08 RX ADMIN — PREDNISONE 5 MG: 5 TABLET ORAL at 15:53

## 2023-10-08 RX ADMIN — TRAMADOL HYDROCHLORIDE 50 MG: 50 TABLET, COATED ORAL at 21:09

## 2023-10-08 RX ADMIN — POTASSIUM CHLORIDE 20 MEQ: 14.9 INJECTION, SOLUTION INTRAVENOUS at 17:59

## 2023-10-08 RX ADMIN — BUTALBITAL, ACETAMINOPHEN, AND CAFFEINE 1 TABLET: 50; 325; 40 TABLET ORAL at 05:22

## 2023-10-08 RX ADMIN — SODIUM CHLORIDE, POTASSIUM CHLORIDE, SODIUM LACTATE AND CALCIUM CHLORIDE 75 ML/HR: 600; 310; 30; 20 INJECTION, SOLUTION INTRAVENOUS at 17:51

## 2023-10-08 RX ADMIN — DIPHENHYDRAMINE HYDROCHLORIDE 12.5 MG: 50 INJECTION, SOLUTION INTRAMUSCULAR; INTRAVENOUS at 05:21

## 2023-10-08 RX ADMIN — PANTOPRAZOLE SODIUM 40 MG: 40 TABLET, DELAYED RELEASE ORAL at 15:53

## 2023-10-08 RX ADMIN — METOCLOPRAMIDE HYDROCHLORIDE 5 MG: 5 INJECTION INTRAMUSCULAR; INTRAVENOUS at 05:22

## 2023-10-08 SDOH — SOCIAL STABILITY: SOCIAL INSECURITY: ARE THERE ANY APPARENT SIGNS OF INJURIES/BEHAVIORS THAT COULD BE RELATED TO ABUSE/NEGLECT?: NO

## 2023-10-08 SDOH — SOCIAL STABILITY: SOCIAL INSECURITY: ABUSE: ADULT

## 2023-10-08 SDOH — SOCIAL STABILITY: SOCIAL INSECURITY: DO YOU FEEL UNSAFE GOING BACK TO THE PLACE WHERE YOU ARE LIVING?: NO

## 2023-10-08 SDOH — SOCIAL STABILITY: SOCIAL INSECURITY: HAVE YOU HAD THOUGHTS OF HARMING ANYONE ELSE?: NO

## 2023-10-08 SDOH — SOCIAL STABILITY: SOCIAL INSECURITY: DO YOU FEEL ANYONE HAS EXPLOITED OR TAKEN ADVANTAGE OF YOU FINANCIALLY OR OF YOUR PERSONAL PROPERTY?: NO

## 2023-10-08 SDOH — SOCIAL STABILITY: SOCIAL INSECURITY: HAS ANYONE EVER THREATENED TO HURT YOUR FAMILY OR YOUR PETS?: NO

## 2023-10-08 SDOH — SOCIAL STABILITY: SOCIAL INSECURITY: DOES ANYONE TRY TO KEEP YOU FROM HAVING/CONTACTING OTHER FRIENDS OR DOING THINGS OUTSIDE YOUR HOME?: NO

## 2023-10-08 SDOH — SOCIAL STABILITY: SOCIAL INSECURITY: ARE YOU OR HAVE YOU BEEN THREATENED OR ABUSED PHYSICALLY, EMOTIONALLY, OR SEXUALLY BY ANYONE?: NO

## 2023-10-08 ASSESSMENT — COGNITIVE AND FUNCTIONAL STATUS - GENERAL
PERSONAL GROOMING: A LITTLE
MOVING TO AND FROM BED TO CHAIR: A LOT
DRESSING REGULAR UPPER BODY CLOTHING: A LOT
TURNING FROM BACK TO SIDE WHILE IN FLAT BAD: A LOT
DRESSING REGULAR LOWER BODY CLOTHING: A LOT
MOVING TO AND FROM BED TO CHAIR: A LOT
MOVING FROM LYING ON BACK TO SITTING ON SIDE OF FLAT BED WITH BEDRAILS: A LOT
MOBILITY SCORE: 12
EATING MEALS: A LITTLE
DRESSING REGULAR UPPER BODY CLOTHING: A LOT
WALKING IN HOSPITAL ROOM: A LOT
PERSONAL GROOMING: A LOT
TOILETING: A LOT
STANDING UP FROM CHAIR USING ARMS: A LOT
CLIMB 3 TO 5 STEPS WITH RAILING: A LOT
PATIENT BASELINE BEDBOUND: NO
TURNING FROM BACK TO SIDE WHILE IN FLAT BAD: A LOT
HELP NEEDED FOR BATHING: A LOT
DRESSING REGULAR LOWER BODY CLOTHING: A LOT
MOVING FROM LYING ON BACK TO SITTING ON SIDE OF FLAT BED WITH BEDRAILS: A LOT
DAILY ACTIVITIY SCORE: 14
TOILETING: A LOT
HELP NEEDED FOR BATHING: A LOT
STANDING UP FROM CHAIR USING ARMS: A LOT
MOBILITY SCORE: 12
DAILY ACTIVITIY SCORE: 14
CLIMB 3 TO 5 STEPS WITH RAILING: A LOT
WALKING IN HOSPITAL ROOM: A LOT

## 2023-10-08 ASSESSMENT — LIFESTYLE VARIABLES
PRESCIPTION_ABUSE_PAST_12_MONTHS: NO
HOW OFTEN DO YOU HAVE A DRINK CONTAINING ALCOHOL: NEVER
SUBSTANCE_ABUSE_PAST_12_MONTHS: NO
AUDIT-C TOTAL SCORE: 0
EVER HAD A DRINK FIRST THING IN THE MORNING TO STEADY YOUR NERVES TO GET RID OF A HANGOVER: NO
HOW MANY STANDARD DRINKS CONTAINING ALCOHOL DO YOU HAVE ON A TYPICAL DAY: PATIENT DOES NOT DRINK
AUDIT-C TOTAL SCORE: 0
SKIP TO QUESTIONS 9-10: 1
HAVE PEOPLE ANNOYED YOU BY CRITICIZING YOUR DRINKING: NO
HAVE YOU EVER FELT YOU SHOULD CUT DOWN ON YOUR DRINKING: NO
EVER FELT BAD OR GUILTY ABOUT YOUR DRINKING: NO
HOW OFTEN DO YOU HAVE 6 OR MORE DRINKS ON ONE OCCASION: NEVER

## 2023-10-08 ASSESSMENT — ACTIVITIES OF DAILY LIVING (ADL)
ADEQUATE_TO_COMPLETE_ADL: YES
FEEDING YOURSELF: UNABLE TO ASSESS
JUDGMENT_ADEQUATE_SAFELY_COMPLETE_DAILY_ACTIVITIES: YES
WALKS IN HOME: UNABLE TO ASSESS
JUDGMENT_ADEQUATE_SAFELY_COMPLETE_DAILY_ACTIVITIES: YES
TOILETING: UNABLE TO ASSESS
PATIENT'S MEMORY ADEQUATE TO SAFELY COMPLETE DAILY ACTIVITIES?: YES
ADEQUATE_TO_COMPLETE_ADL: YES
DRESSING YOURSELF: UNABLE TO ASSESS
HEARING - LEFT EAR: UNABLE TO ASSESS
PATIENT'S MEMORY ADEQUATE TO SAFELY COMPLETE DAILY ACTIVITIES?: YES
WALKS IN HOME: UNABLE TO ASSESS
HEARING - LEFT EAR: FUNCTIONAL
TOILETING: UNABLE TO ASSESS
GROOMING: UNABLE TO ASSESS
JUDGMENT_ADEQUATE_SAFELY_COMPLETE_DAILY_ACTIVITIES: YES
ADEQUATE_TO_COMPLETE_ADL: YES
HEARING - RIGHT EAR: UNABLE TO ASSESS
PATIENT'S MEMORY ADEQUATE TO SAFELY COMPLETE DAILY ACTIVITIES?: YES
ASSISTIVE_DEVICE: WALKER
DRESSING YOURSELF: UNABLE TO ASSESS
BATHING: UNABLE TO ASSESS
ADEQUATE_TO_COMPLETE_ADL: YES
GROOMING: UNABLE TO ASSESS
HEARING - RIGHT EAR: FUNCTIONAL
BATHING: UNABLE TO ASSESS
FEEDING YOURSELF: UNABLE TO ASSESS

## 2023-10-08 ASSESSMENT — PAIN SCALES - GENERAL
PAINLEVEL_OUTOF10: 6
PAINLEVEL_OUTOF10: 3
PAINLEVEL_OUTOF10: 6

## 2023-10-08 ASSESSMENT — COLUMBIA-SUICIDE SEVERITY RATING SCALE - C-SSRS
2. HAVE YOU ACTUALLY HAD ANY THOUGHTS OF KILLING YOURSELF?: NO
1. IN THE PAST MONTH, HAVE YOU WISHED YOU WERE DEAD OR WISHED YOU COULD GO TO SLEEP AND NOT WAKE UP?: NO
6. HAVE YOU EVER DONE ANYTHING, STARTED TO DO ANYTHING, OR PREPARED TO DO ANYTHING TO END YOUR LIFE?: NO

## 2023-10-08 ASSESSMENT — PATIENT HEALTH QUESTIONNAIRE - PHQ9
SUM OF ALL RESPONSES TO PHQ9 QUESTIONS 1 & 2: 0
2. FEELING DOWN, DEPRESSED OR HOPELESS: NOT AT ALL
1. LITTLE INTEREST OR PLEASURE IN DOING THINGS: NOT AT ALL

## 2023-10-08 ASSESSMENT — PAIN - FUNCTIONAL ASSESSMENT
PAIN_FUNCTIONAL_ASSESSMENT: 0-10
PAIN_FUNCTIONAL_ASSESSMENT: 0-10

## 2023-10-08 ASSESSMENT — PAIN DESCRIPTION - DESCRIPTORS: DESCRIPTORS: CRAMPING

## 2023-10-08 NOTE — PROGRESS NOTES
"Alisa Fregoso is a 73 y.o. female on day 0 of admission presenting with No Principal Problem: There is no principal problem currently on the Problem List. Please update the Problem List and refresh..    Subjective   Patient was endorsed over to me pending results of the CT scan.  CT scan is unremarkable except for some diverticulosis.  Patient is been having GI bleeding with melena for about a month.  Patient was supposed to be doing a prep for a colonoscopy tomorrow however she states that she is too weak and she has not been able to do the prep.  She was on a blood thinner but that was stopped.  The  states that they were trying to do a prep for colonoscopy tomorrow by Dr. Richmond and the patient became very weak and dizzy and could not do the prep.  CT scan here did not show any obvious sign of bowel obstruction.  It was read as some diverticulosis but no diverticulitis.  Hemoglobin is 11 which is actually improved from previous.  Patient  states that she had been having some bright red blood per rectum as well but he stated that that has not been noted for several days.  He was concerned about her being too weak to do the prep at home.  Therefore I did speak to medicine and the patient will be admitted.       Objective     Physical Exam  HENT:      Head: Normocephalic.   Cardiovascular:      Rate and Rhythm: Normal rate and regular rhythm.   Abdominal:      Palpations: Abdomen is soft.      Tenderness: There is abdominal tenderness in the left lower quadrant.   Skin:     General: Skin is warm and dry.         Last Recorded Vitals  Blood pressure 163/87, pulse 92, temperature 36.5 °C (97.7 °F), temperature source Temporal, resp. rate 18, height 1.6 m (5' 3\"), weight 54.4 kg (120 lb), SpO2 100 %.  Intake/Output last 3 Shifts:  No intake/output data recorded.    Relevant Results  Scheduled medications  polyethylene glycol-electrolytes, 4,000 mL, oral, Once      Continuous medications     PRN " medications  Results for orders placed or performed during the hospital encounter of 10/08/23 (from the past 24 hour(s))   CBC and Auto Differential   Result Value Ref Range    WBC 9.5 4.4 - 11.3 x10*3/uL    nRBC 0.0 0.0 - 0.0 /100 WBCs    RBC 5.05 4.00 - 5.20 x10*6/uL    Hemoglobin 11.0 (L) 12.0 - 16.0 g/dL    Hematocrit 41.2 36.0 - 46.0 %    MCV 82 80 - 100 fL    MCH 21.8 (L) 26.0 - 34.0 pg    MCHC 26.7 (L) 32.0 - 36.0 g/dL    RDW 23.5 (H) 11.5 - 14.5 %    Platelets 390 150 - 450 x10*3/uL    MPV 9.9 7.5 - 11.5 fL    Neutrophils % 67.7 40.0 - 80.0 %    Immature Granulocytes %, Automated 0.2 0.0 - 0.9 %    Lymphocytes % 23.3 13.0 - 44.0 %    Monocytes % 7.2 2.0 - 10.0 %    Eosinophils % 0.9 0.0 - 6.0 %    Basophils % 0.7 0.0 - 2.0 %    Neutrophils Absolute 6.43 (H) 1.60 - 5.50 x10*3/uL    Immature Granulocytes Absolute, Automated 0.02 0.00 - 0.50 x10*3/uL    Lymphocytes Absolute 2.22 0.80 - 3.00 x10*3/uL    Monocytes Absolute 0.68 0.05 - 0.80 x10*3/uL    Eosinophils Absolute 0.09 0.00 - 0.40 x10*3/uL    Basophils Absolute 0.07 0.00 - 0.10 x10*3/uL   Protime-INR   Result Value Ref Range    Protime 11.9 9.8 - 12.8 seconds    INR 1.1 0.9 - 1.1   Comprehensive metabolic panel   Result Value Ref Range    Glucose 94 74 - 99 mg/dL    Sodium 140 136 - 145 mmol/L    Potassium 3.1 (L) 3.5 - 5.3 mmol/L    Chloride 103 98 - 107 mmol/L    Bicarbonate 25 21 - 32 mmol/L    Anion Gap 15 mmol/L    Urea Nitrogen 13 6 - 23 mg/dL    Creatinine 0.57 0.50 - 1.05 mg/dL    eGFR >90 >60 mL/min/1.73m*2    Calcium 9.6 8.6 - 10.3 mg/dL    Albumin 4.6 3.4 - 5.0 g/dL    Alkaline Phosphatase 41 33 - 136 U/L    Total Protein 7.8 6.4 - 8.2 g/dL    AST 16 9 - 39 U/L    Bilirubin, Total 0.8 0.0 - 1.2 mg/dL    ALT 9 7 - 45 U/L   Lactate   Result Value Ref Range    Lactate 1.7 0.4 - 2.0 mmol/L   Lipase   Result Value Ref Range    Lipase 7 (L) 9 - 82 U/L   Troponin I, High Sensitivity, Initial   Result Value Ref Range    Troponin I, High Sensitivity 7 0  - 13 ng/L   Troponin, High Sensitivity, 1 Hour   Result Value Ref Range    Troponin I, High Sensitivity 8 0 - 13 ng/L    CT abdomen pelvis wo IV contrast    Result Date: 10/8/2023  Interpreted By:  Rose Decker, STUDY: CT ABDOMEN PELVIS WO IV CONTRAST;  10/8/2023 9:21 am   INDICATION: Signs/Symptoms:abd pain.   COMPARISON: 09/15/2023   ACCESSION NUMBER(S): [id number]   ORDERING CLINICIAN: YUMIKO ALFARO   TECHNIQUE: CT of the abdomen and pelvis was performed. Sagittal and coronal reconstructions were generated.    No intravenous contrast given for the exam.   FINDINGS: Solid organ and vessel evaluation limited without IV contrast. Artifact related to upper extremities overlying the upper abdomen also limits evaluation.   ABDOMINAL ORGANS:   LIVER: No definite focal mass lesion within limits of the exam   GALL BLADDER AND BILIARY TREE: Presumed cholecystectomy clips near the liver hilum. Probable mild fullness of the central biliary tree which is likely similar to the prior exam.   SPLEEN: No definite focal lesion within limits of the exam   PANCREAS: No definite focal lesion within limits of the exam   ADRENALS: No obvious adrenal mass   KIDNEYS AND URETERS: Bilateral renal lobulation/scarring. No focal renal mass within limits of unenhanced exam. No hydronephrosis.   BOWEL: No abnormally dilated large or small bowel loops. Dense surgical material in the anterior mid abdomen. Extensive distal colon diverticulosis. No definite associated inflammatory changes.   PERITONEUM, RETROPERITONEUM, NODES: Cul-de-sac is partially obscured. No significant free fluid as visualized. No free air. No gross retroperitoneal adenopathy within limits of unenhanced exam.   VESSELS:  Lack of IV contrast precludes vascular luminal assessment. No abdominal aortic aneurysm. Multifocal atherosclerotic calcifications.   PELVIS: Artifact from left hip prosthesis limits evaluation. Distended urinary bladder. Presumed surgical absence of the  uterus.   ABDOMINAL WALL: Rectus diastasis. Mild stranding/postsurgical change superficial to left hip prosthesis   BONES: Again left hip prosthesis noted. Scoliosis and degenerative changes of the lumbar spine. Moderate anterior wedging of L1 similar to the prior exam.   LOWER CHEST: Linear and dependent densities in each lung base. No significant pleural effusion in the included areas. Dense probable surgical clips near the GE junction similar to the previous exam.       No definite CT evidence of acute inflammatory process in the abdomen or pelvis within limits of unenhanced exam.   Extensive distal colon diverticulosis. No CT evidence of diverticulitis.   Distended urinary bladder.   Other findings as described above.   MACRO: None.   Signed by: Rose Decker 10/8/2023 9:37 AM Dictation workstation:   [id number]    CT head wo IV contrast    Result Date: 10/8/2023  Interpreted By:  Andrew Boykin, STUDY: CT HEAD WO IV CONTRAST;  10/8/2023 6:06 am   INDICATION: Signs/Symptoms:lighthead, dizzy, near syncope.   COMPARISON: None.   ACCESSION NUMBER(S): [id number]   ORDERING CLINICIAN: BEAU MARTIN   TECHNIQUE: Noncontrast axial CT scan of head was performed. Angled reformats in brain and bone windows were generated. The images were reviewed in bone, brain, blood and soft tissue windows.   FINDINGS: CSF Spaces: No definite hydrocephalus. No abnormal extra-axial collection. There is no extraaxial fluid collection.   Parenchyma: Advanced volume loss.  There is periventricular and subcortical white matter hypoattenuation, most in keeping with chronic microvascular ischemic change. The grey-white differentiation is intact. There is no mass effect or midline shift.  There is no intracranial hemorrhage.   Calvarium: The calvarium is unremarkable.   Paranasal sinuses and mastoids: Visualized paranasal sinuses and mastoids are clear.   Impacted cerumen noted in the left external auditory canal.       No evidence of acute  cortical infarct or intracranial hemorrhage.   MACRO: None   Signed by: Andrew Boykin 10/8/2023 7:05 AM Dictation workstation:   [id number]    Electrocardiogram 12 Lead    Result Date: 9/20/2023  Sinus rhythm with frequent Premature ventricular complexes in a pattern of bigeminy Nonspecific T wave abnormality Abnormal ECG When compared with ECG of 17-SEP-2023 17:46, Nonspecific T wave abnormality, improved in Lateral leads Confirmed by Jalen Hinds ([id number]) on 9/20/2023 11:17:16 AM    Echocardiogram    Result Date: 9/19/2023  [place] [address] Tel [phone] Fax [phone] TRANSTHORACIC ECHOCARDIOGRAM REPORT Patient Name:     GERONIMO Burrell Physician:  [id number] Raj Morel DO Study Date:       9/19/2023          Referring           CRISTY HILL Physician: MRN/PID:          [id number]           PCP: Accession/Order#: [id number]          [facility] Echo Location:           Lab YOB: 1950          Fellow: Gender:           F                  Nurse: Admit Date:       9/15/2023          Sonographer:        Arleth Ames [facility] Admission Status: Inpatient -        Additional Staff: Routine Height:           160.00 cm          CC Report to: Weight:           54.00 kg           Study Type:         Echocardiogram BSA:              1.55 m2 Blood Pressure: 103 /53 mmHg Diagnosis/ICD: I49.9-Cardiac arrhythmia, unspecified Indication:    Arrhythmia Procedure/CPT: Echo Complete w Full [id number] Patient History: Diabetes: Yes Study Detail: The following Echo studies were performed: 2D, M-Mode, Doppler and color flow. Technically challenging study due to patient lying in supine position. PHYSICIAN INTERPRETATION: Left Ventricle: Left ventricular systolic function is normal, with an estimated ejection fraction of 55-60%. There are no regional wall motion abnormalities. The left ventricular cavity size is normal. The left ventricular  septal wall thickness is mildly increased. Spectral Doppler shows an impaired relaxation pattern of left ventricular diastolic filling. LV Wall Scoring: All segments are normal. Left Atrium: The left atrium is normal in size. Right Ventricle: The right ventricle is normal in size. There is normal right ventricular global systolic function. Right Atrium: The right atrium is normal in size. Aortic Valve: The aortic valve appears structurally normal. The aortic valve appears tricuspid. There is no evidence of aortic valve stenosis. There is no evidence of aortic valve regurgitation. The peak instantaneous gradient of the aortic valve is 5.6 mmHg. The mean gradient of the aortic valve is 4.0 mmHg. Mitral Valve: The mitral valve is normal in structure. There is no evidence of mitral valve stenosis. There is normal mitral valve leaflet mobility. There is no evidence of mitral valve regurgitation. Tricuspid Valve: The tricuspid valve is structurally normal. There is normal tricuspid valve leaflet mobility. There is trace to mild tricuspid regurgitation. Pulmonic Valve: The pulmonic valve is structurally normal. There is no indication of pulmonic valve regurgitation. Pericardium: There is no pericardial effusion noted. Aorta: The aortic root is normal. Pulmonary Artery: The main pulmonary artery is normal in size, and position, with normal bifurcation into the left and right pulmonary arteries. The tricuspid regurgitant velocity is 2.60 m/s, and with an estimated right atrial pressure of 3 mmHg, the estimated pulmonary artery pressure is mildly elevated with the RVSP at 30.0 mmHg. Systemic Veins: The inferior vena cava appears to be of normal size. In comparison to the previous echocardiogram(s): Prior examinations are available and were reviewed for comparison purposes. CONCLUSIONS: 1. Left ventricular systolic function is normal with a 55-60% estimated ejection fraction. 2. Spectral Doppler shows an impaired relaxation  pattern of left ventricular diastolic filling. 3. There is no evidence of mitral valve stenosis. 4. No evidence of mitral valve regurgitation. 5. Trace to mild tricuspid regurgitation. 6. Aortic valve stenosis is not present. 7. The main pulmonary artery is normal in size, and position, with normal bifurcation into the left and right pulmonary arteries. RECOMMENDATIONS: Technically suboptimal and limited study, therefore accuracy of above interpretation could be substantially diminished. Clinical correlation is advised. Consider additional imaging modalities if clinically indicated. QUANTITATIVE DATA SUMMARY: 2D MEASUREMENTS: Normal Ranges: Ao Root d:     2.40 cm   (2.0-3.7cm) LAs:           3.30 cm   (2.7-4.0cm) IVSd:          0.92 cm   (0.6-1.1cm) LVPWd:         0.93 cm   (0.6-1.1cm) LVIDd:         4.50 cm   (3.9-5.9cm) LVIDs:         3.24 cm LV Mass Index: 89.4 g/m2 LV % FS        28.0 % LA VOLUME: Normal Ranges: LA Vol A4C:        38.6 ml    (22+/-6mL/m2) LA Vol A2C:        37.0 ml LA Vol BP:         38.6 ml LA Vol Index A4C:  24.9ml/m2 LA Vol Index A2C:  23.8 ml/m2 LA Vol Index BP:   24.9 ml/m2 LA Area A4C:       14.6 cm2 LA Area A2C:       14.6 cm2 LA Major Axis A4C: 4.7 cm LA Major Axis A2C: 4.9 cm LA Volume Index:   23.5 ml/m2 RA VOLUME BY A/L METHOD: Normal Ranges: RA Vol A4C:        15.2 ml   (8.3-19.5ml) RA Vol Index A4C:  9.8 ml/m2 RA Area A4C:       7.9 cm2 RA Major Axis A4C: 3.5 cm AORTA MEASUREMENTS: Normal Ranges: Asc Ao, d: 2.60 cm (2.1-3.4cm) LV SYSTOLIC FUNCTION BY 2D PLANIMETRY (MOD): Normal Ranges: EF-A4C View: 61.8 % (>=55%) EF-A2C View: 57.6 % EF-Biplane:  59.5 % LV DIASTOLIC FUNCTION: Normal Ranges: MV Peak E:        0.58 m/s    (0.7-1.2 m/s) MV Peak A:        0.65 m/s    (0.42-0.7 m/s) E/A Ratio:        0.90        (1.0-2.2) MV e'             0.10 m/s    (>8.0) MV lateral e'     0.10 m/s MV medial e'      0.05 m/s E/e' Ratio:       5.65        (<8.0) PulmV Sys Leonardo:    82.10 cm/s PulmV Aj  Leonardo:   27.40 cm/s PulmV S/D Leonardo:    3.00 PulmV A Revs Leonardo: 27.40 cm/s PulmV A Revs Dur: 108.00 msec MITRAL VALVE: Normal Ranges: MV DT: 253 msec (150-240msec) AORTIC VALVE: Normal Ranges: AoV Vmax:                1.18 m/s (<=1.7m/s) AoV Peak P.6 mmHg (<20mmHg) AoV Mean P.0 mmHg (1.7-11.5mmHg) LVOT Max Leonardo:            0.94 m/s (<=1.1m/s) AoV VTI:                 26.90 cm (18-25cm) LVOT VTI:                21.30 cm LVOT Diameter:           2.00 cm  (1.8-2.4cm) AoV Area, VTI:           2.49 cm2 (2.5-5.5cm2) AoV Area,Vmax:           2.52 cm2 (2.5-4.5cm2) AoV Dimensionless Index: 0.79 RIGHT VENTRICLE: RV Basal 2.17 cm RV Mid   2.48 cm RV Major 5.6 cm TAPSE:   12.5 mm RV s'    0.10 m/s TRICUSPID VALVE/RVSP: Normal Ranges: Peak TR Velocity: 2.60 m/s RV Syst Pressure: 30.0 mmHg (< 30mmHg) PULMONIC VALVE: Normal Ranges: PV Accel Time: 74 msec  (>120ms) PV Max Leonardo:    0.7 m/s  (0.6-0.9m/s) PV Max P.8 mmHg Pulmonary Veins: PulmV A Revs Dur: 108.00 msec PulmV A Revs Leonardo: 27.40 cm/s PulmV Aj Leonardo:   27.40 cm/s PulmV S/D Leonardo:    3.00 PulmV Sys Leonardo:    82.10 cm/s [id number] Raj oMrel DO Electronically signed on 2023 at 10:45:39 AM Wall Scoring  Final      EGD    Result Date: 2023  Patient Name: Alisa Fregoso Procedure Date: 2023 4:01 PM MRN: [id number] Account Number: [id number] YOB: 1950 Admit Type: Inpatient Site: [facility] Ethnicity: Not  or  Race: White Attending MD: Jd Argueta MD, [id number] Procedure:             Upper GI endoscopy Indications:           Epigastric abdominal pain Providers:             Jd Argueta MD (Doctor), Mallorie Kumar RN                        (Nurse), Kristin Hewitt, MINOO Referring:             Medicines:             See the Anesthesia note for documentation of the                        administered medications Complications:         No immediate complications. Estimated blood loss: None. Procedure:              Pre-Anesthesia Assessment:                        - Prior to the procedure, a History and Physical was                        performed, and patient medications and allergies were                        reviewed. The patient's tolerance of previous                        anesthesia was also reviewed. The risks and benefits                        of the procedure and the sedation options and risks                        were discussed with the patient. All questions were                        answered, and informed consent was obtained. Prior                        Anticoagulants: The patient has taken no anticoagulant                        or antiplatelet agents. ASA Grade Assessment: III - A                        patient with severe systemic disease. After reviewing                        the risks and benefits, the patient was deemed in                        satisfactory condition to undergo the procedure.                        - Prior Aspirin/ NSAID therapy: The patient has taken                        no aspirin or NSAID medications.                        After obtaining informed consent, the endoscope was                        passed under direct vision. Throughout the procedure,                        the patient's blood pressure, pulse, and oxygen                        saturations were monitored continuously. The Endoscope                        was introduced through the mouth, and advanced to the                        jejunum. The upper GI endoscopy was accomplished                        without difficulty. The patient tolerated the                        procedure well. Findings:      There were esophageal mucosal changes suspicious for long-segment      Gutierrez's esophagus, classified as Gutierrez's stage C2-M3 per [place]      criteria present in the lower third of the esophagus. Mucosa was      biopsied with a cold forceps for histology in 4 quadrants at intervals      of 1 cm in the lower  third of the esophagus. One specimen bottle was      sent to pathology. Imaging was performed using narrow band imaging to      visualize the mucosa.      Evidence of a Billroth II anastomosis was found in the stomach. This was      characterized by edema, erosion and erythema. This was biopsied with a      cold forceps for histology.      The examined jejunum was normal. Estimated Blood Loss:      Estimated blood loss: none. Impression:            - Esophageal mucosal changes suspicious for                        long-segment Gutierrez's esophagus, classified as                        Gutierrez's stage C2-M3 per [place] criteria. Biopsied.                        - A Billroth II anastomosis was found, characterized                        by edema, erosion and erythema. Biopsied.                        - Normal examined jejunum. Recommendation:        - Discharge patient to home.                        - Await pathology results.                        - Repeat upper endoscopy in 3 years for surveillance                        of Gutierrez's esophagus.                        - Patient has a contact number available for                        emergencies. The signs and symptoms of potential                        delayed complications were discussed with the patient.                        Return to normal activities tomorrow. Written                        discharge instructions were provided to the patient.                        - Resume previous diet. Procedure Code(s):     --- Professional ---                        91891, Esophagogastroduodenoscopy, flexible,                        transoral; with biopsy, single or multiple Diagnosis Code(s):     --- Professional ---                        R10.13, Epigastric pain                        Z98.0, Intestinal bypass and anastomosis status                        K22.70, Gutierrez's esophagus without dysplasia CPT copyright 2021 [employer] Association. All rights reserved.  The codes documented in this report are preliminary and upon  review may be revised to meet current compliance requirements. Attending Participation:      I personally performed the entire procedure. MD Jd Harper MD 9/18/2023 4:39:04 PM This report has been signed electronically. Number of Addenda: 0 Note Initiated On: 9/18/2023 4:01 PM    Colonoscopy    Result Date: 9/18/2023  Patient Name: Alisa Fregoso Procedure Date: 9/18/2023 4:00 PM MRN: [id number] Account Number: [id number] YOB: 1950 Admit Type: Inpatient Site: [facility] Ethnicity: Not  or  Race: White Attending MD: Jd Argueta MD, [id number] Procedure:             Colonoscopy Indications:           Hematochezia Providers:             Jd Argueta MD (Doctor), Mallorie Kumar RN                        (Nurse), Kristin Hewitt, MINOO Referring:             Medicines:             See the Anesthesia note for documentation of the                        administered medications Complications:         No immediate complications. Estimated blood loss: None. Procedure:             Pre-Anesthesia Assessment:                        - Prior to the procedure, a History and Physical was                        performed, and patient medications and allergies were                        reviewed. The patient's tolerance of previous                        anesthesia was also reviewed. The risks and benefits                        of the procedure and the sedation options and risks                        were discussed with the patient. All questions were                        answered, and informed consent was obtained. Prior                        Anticoagulants: The patient has taken no anticoagulant                        or antiplatelet agents. ASA Grade Assessment: III - A                        patient with severe systemic disease. After reviewing                        the risks and benefits, the patient was  deemed in                        satisfactory condition to undergo the procedure.                        After I obtained informed consent, the scope was                        passed under direct vision. Throughout the procedure,                        the patient's blood pressure, pulse, and oxygen                        saturations were monitored continuously. The                        Colonoscope was introduced through the anus with the                        intention of advancing to the cecum. The scope was                        advanced to the rectum before the procedure was                        aborted. Medications were given. The colonoscopy was                        performed without difficulty. The patient tolerated                        the procedure well. The quality of the bowel                        preparation was unsatisfactory. Anatomical landmarks                        were photographed. The colonoscopy was aborted due to                        inadequate bowel prep. Findings:      Copious quantities of semi-solid solid stool was found in the rectum and      in the sigmoid colon, precluding visualization.      Scattered small and large-mouthed diverticula were found in the      recto-sigmoid colon. Estimated Blood Loss:      Estimated blood loss: none. Impression:            - Preparation of the colon was unsatisfactory.                        - The procedure was aborted due to inadequate bowel                        prep.                        - Stool in the rectum and in the sigmoid colon.                        - Diverticulosis in the recto-sigmoid colon.                        - No specimens collected. Recommendation:        - Return patient to hospital [facility] for [facility] care.                        - Repeat colonoscopy in 2 weeks because the                        examination was incomplete. If patient not having                        active GI bleed, otherwise need to be done as                         inpatient with 2 days prep if patient has active GI                        bleed                        - Patient has a contact number available for                        emergencies. The signs and symptoms of potential                        delayed complications were discussed with the patient.                        Return to normal activities tomorrow. Written                        discharge instructions were provided to the patient.                        - Resume previous diet. Procedure Code(s):     --- Professional ---                        20485, 53, Colonoscopy, flexible; diagnostic,                        including collection of specimen(s) by brushing or                        washing, when performed (separate procedure) Diagnosis Code(s):     --- Professional ---                        K57.30, Diverticulosis of large intestine without                        perforation or abscess without bleeding                        K92.1, Melena (includes Hematochezia) CPT copyright 2021 [employer]. All rights reserved. The codes documented in this report are preliminary and upon  review may be revised to meet current compliance requirements. Attending Participation:      I personally performed the entire procedure. MD Jd Harper MD 9/18/2023 4:47:16 PM This report has been signed electronically. Number of Addenda: 0 Note Initiated On: 9/18/2023 4:00 PM Total Procedure Duration Time 0 hours 1 minute 22 seconds     Electrocardiogram 12 Lead    Result Date: 9/18/2023  Normal sinus rhythm with frequent Premature ventricular complexes in a pattern of bigeminy Nonspecific ST and T wave abnormality Abnormal ECG No previous ECGs available Confirmed by Raj Mcgee ([id number]) on 9/18/2023 4:45:09 PM    XR chest 2 view    Result Date: 9/18/2023  Interpreted By:  FIONA CASTILLO MD MRN: [id number] Patient Name: GERONIMO HNEAO  STUDY: TH CHEST 2 VIEW PA AND LAT;   9/17/2023 1:32 pm  INDICATION: Poor O2 perfusion .  COMPARISON: 10/01/2020.  ACCESSION NUMBER(S): [id number]  ORDERING CLINICIAN: SCOTT ALLEN  FINDINGS: CARDIOMEDIASTINAL SILHOUETTE: Cardiomediastinal silhouette is normal in size and configuration.  LUNGS: Elevation of the right hemidiaphragm again seen. Right midlung linear opacity may represent pleural thickening/scar versus atelectasis. Additional mild bibasilar atelectasis is seen. No pleural effusion or pneumothorax is seen.  ABDOMEN: Upper abdominal surgical clips are present.  BONES: Mild multilevel disc space narrowing and endplate spurring present in the spine. Degenerative changes of the shoulders are partially visualized.      1.  Right midlung linear pleural thickening/scar versus atelectasis. 2. Mild bibasilar atelectasis.      CT abdomen pelvis w IV contrast    Result Date: 9/15/2023  Interpreted By:  ROSEANN NATHAN MD STUDY: CT Abdomen and Pelvis with IV Contrast; 9/15/2023, 11:45AM.  INDICATION: LLQ abdominal pain.  Rectal bleeding.  History of gastric surgery.  COMPARISON: CT AP 11/22/2022.  ACCESSION NUMBER(S): [id number]  ORDERING CLINICIAN: SHANTELLE PENDLETON DO  TECHNIQUE: CT of the abdomen and pelvis was performed.  Contiguous axial images were obtained at 3 mm slice thickness through the abdomen and pelvis. Coronal and sagittal reconstructions at 3 mm slice thickness were performed.  Omnipaque 350 75 mL was administered intravenously.   FINDINGS:  LOWER CHEST: No cardiomegaly.  No pericardial effusion.  Lung bases are clear.  ABDOMEN:  LIVER: No hepatomegaly.  Smooth surface contour.  Normal attenuation.  BILE DUCTS: No intrahepatic or extrahepatic biliary ductal dilatation.  GALLBLADDER: The gallbladder is absent.  STOMACH: No abnormalities identified.  PANCREAS: No masses or ductal dilatation.  SPLEEN: No splenomegaly or focal splenic lesion.  ADRENAL GLANDS: No thickening or nodules.  KIDNEYS AND URETERS: Kidneys are normal in size and location.   No renal or ureteral calculi.  PELVIS:  BLADDER: The bladder is distended.  REPRODUCTIVE ORGANS: No abnormalities identified.  BOWEL: There is diverticulosis. There is thickening of the distal descending and sigmoid colons however, there is no definite inflammation this is most likely related to old diverticulitis.  VESSELS: No abnormalities identified.  Abdominal aorta is normal in caliber.  PERITONEUM/RETROPERITONEUM/LYMPH NODES: No free fluid.  No pneumoperitoneum.  No lymphadenopathy.  ABDOMINAL WALL: No abnormalities identified.  SOFT TISSUES: No abnormalities identified.  BONES: The patient status post left total hip arthroplasty. There is wedging of the L1 vertebral body unchanged from November 2022. No acute fracture or aggressive osseous lesion.      Diverticulosis with findings consistent with old diverticulitis.  Otherwise unremarkable CT scan the abdomen and pelvis.   Signed by Zack Goins MD                               Assessment/Plan   Active Problems:  There are no active Hospital Problems.    Abdominal pain       I spent 30 minutes in the professional and overall care of this patient.      Anitra Martinez MD

## 2023-10-08 NOTE — ED PROVIDER NOTES
"HPI   Chief Complaint   Patient presents with    Abdominal Pain     Headache, constipation, abd pain       HPI       Patient presents to the ER for evaluation of headache, dizziness, generalized weakness and abdominal pain.  She has a known GI bleed that was diagnosed approximately 1 month ago.  She was taken off of her blood thinner.  She is due to have a colonoscopy tomorrow.  The  states \"were having a hard time even doing her prep.  Is soon as she starts drinking a little bit she starts complaining of belly pain and she got so dizzy and she cannot even move or walk.  I am having a hard time just taking care of her.  I was hoping we could just admit her and do her prep tonight so we can do the colonoscopy tomorrow because she is still bleeding \".  Patient states that she feels lightheaded and dizzy.  She denies a sensation that the room is spinning around her.  Instead, she states that she feels off-center herself.  She has had falls recently but none overnight or yesterday.             No data recorded                Patient History   Past Medical History:   Diagnosis Date    Acute cystitis with hematuria 06/05/2022    Acute cystitis with hematuria    Calculus of kidney 10/01/2020    Bilateral nephrolithiasis    Diverticulitis of large intestine without perforation or abscess without bleeding 10/01/2020    Sigmoid diverticulitis    Multiple fractures of ribs, left side, subsequent encounter for fracture with nonunion 08/18/2021    Closed fracture of multiple ribs of left side with nonunion, subsequent encounter    Other specified disorders of eye and adnexa     Itchy, watery, and red eye    Personal history of (healed) traumatic fracture 10/18/2020    Status post fracture of left hip    Personal history of diseases of the blood and blood-forming organs and certain disorders involving the immune mechanism 10/27/2020    History of bleeding disorder    Personal history of other diseases of the digestive " system 10/18/2020    History of diverticulitis of colon    Personal history of other diseases of the musculoskeletal system and connective tissue 10/27/2020    History of arthritis    Personal history of other specified conditions 10/18/2020    History of cachexia    Personal history of other specified conditions     History of abdominal pain    Personal history of other specified conditions 10/27/2020    History of balance disorder    Unspecified visual loss 10/27/2020    Vision problems    Uses walker     Wears glasses      Past Surgical History:   Procedure Laterality Date    CHOLECYSTECTOMY      CORNEAL TRANSPLANT Left     CT ABDOMEN PELVIS ANGIOGRAM W AND/OR WO IV CONTRAST  05/28/2022    CT ABDOMEN PELVIS ANGIOGRAM W AND/OR WO IV CONTRAST 5/28/2022 [facility]    HIP SURGERY  09/25/2020    HYSTERECTOMY      OTHER SURGICAL HISTORY  10/01/2020    Hip surgery    PARTIAL KNEE ARTHROPLASTY Left     Partial Replacement    TOTAL HIP ARTHROPLASTY Left     Total Hip Replacement     Family History   Problem Relation Name Age of Onset    No Known Problems Mother      No Known Problems Father      No Known Problems Sister      No Known Problems Brother       Social History     Tobacco Use    Smoking status: Never     Passive exposure: Past    Smokeless tobacco: Never   Substance Use Topics    Alcohol use: Not Currently    Drug use: Never       Physical Exam   ED Triage Vitals [10/08/23 0452]   Temp Heart Rate Resp BP   36.5 °C (97.7 °F) 77 16 (!) 154/92      SpO2 Temp Source Heart Rate Source Patient Position   95 % Temporal -- Lying      BP Location FiO2 (%)     Right arm 21 %       Physical Exam  Vitals and nursing note reviewed.   Constitutional:       General: She is not in acute distress.     Appearance: She is well-developed.   HENT:      Head: Normocephalic and atraumatic.      Right Ear: External ear normal.      Left Ear: External ear normal.      Mouth/Throat:      Mouth: Mucous membranes are dry.    Eyes:      Extraocular Movements: Extraocular movements intact.      Conjunctiva/sclera: Conjunctivae normal.   Cardiovascular:      Rate and Rhythm: Normal rate and regular rhythm.      Heart sounds: No murmur heard.  Pulmonary:      Effort: Pulmonary effort is normal. No respiratory distress.      Breath sounds: Normal breath sounds.   Abdominal:      Palpations: Abdomen is soft.      Tenderness: There is abdominal tenderness (gen tenderness with palpation that is worse in the suprapubic region). There is no right CVA tenderness, left CVA tenderness, guarding or rebound.   Musculoskeletal:         General: No swelling.      Cervical back: Neck supple.   Skin:     General: Skin is warm and dry.      Capillary Refill: Capillary refill takes less than 2 seconds.   Neurological:      General: No focal deficit present.      Mental Status: She is alert and oriented to person, place, and time.      Cranial Nerves: No cranial nerve deficit.      Sensory: No sensory deficit.      Motor: Weakness (gen weakness in bl upper and lower extremities.) present.      Comments: Nonfocal exam with gen weakness in upper and lower extremities   Psychiatric:         Mood and Affect: Mood normal.         ED Course & MDM   Diagnoses as of 10/08/23 0702   Generalized abdominal pain   Melena     EKG interpreted by myself shows sinus rhythm with rate 76,.  126, QRS 84 QTc 436.  Shows left axis deviation.  No STEMI criteria noted.  From a neurological point of view, patient has generalized weakness in the bilateral upper and lower extremities but is nonfocal.    Pt had EGD 2 weeks ago that was unremarkable. Her colonoscopy showed poor prep and unable to visualize most structures. She was due to do the prep later tonight for the procedure tomorrow     I obtained lab work for the patient and determined that her hemoglobin today is 11 and he has not had a significant drop over the last 2 weeks from when she was initially diagnosed with a GI  bleed.  Her cardiac enzymes are unremarkable.  Her BMP and LFTs do not show any acute derangements of concern.  Lactic acid is 1.7 and INR is 1.1.  I treated her headache with Fioricet, Reglan and Benadryl and started IV fluids 500 cc as she clinically appeared dehydrated with dry mucous membranes.  Urinalysis still pending for the patient.  She is due to do her colonoscopy prep later tonight for the procedure tomorrow.  However, the patient states that she is so weak and she is unable to care for herself and the  is having a hard time caring for her as well.  We will discuss the case with the morning hospitalist to deem if it is possible to admit the patient for the generalized weakness and continue her bowel prep in the hospital to do the procedure tomorrow.  I did obtain a CT of the head in the meantime and will sign the patient out to my morning colleague to follow-up with these results prior to disposing the patient.    Medical Decision Making      Procedure  Procedures     Michaelle Benavidez MD  10/08/23 0702

## 2023-10-08 NOTE — H&P
HISTORY AND PHYSICAL EXAMINATION    Alisa Fregoso  1950  73 y.o.  [id number]    10/08/23  11:22 AM    CHIEF COMPLAINT: Weakness, headache, abdominal pain.    HISTORY OF PRESENT ILLNESS:  This is a 73 years old female patient presented to the emergency room because of multiple complaints.  Patient's  was at the bedside and he provided most of the history.  She was admitted around 3 weeks ago to this facility for GI bleed, had upper EGD that showed no active GI bleed and also had colonoscopy with bad preparation.  She is scheduled to go for repeat colonoscopy tomorrow morning.  She has been preparing for the colonoscopy but according to the , she has been complaining of lower abdominal pain since he started the preparation.  The patient mentioned that the pain is in the lower abdomen, around midline, terrible pain according to her, intermittent, no associated symptoms and no aggravating or relieving factors.  She denied nausea or vomiting.  She denied constipation or diarrhea.  Denies fever or chills.  She denies urinary symptoms.  Patient complaint of headache, frontal headache, throbbing, has been going on since she was in the hospital last time without improvement.  She was given prescription for tramadol and Tylenol by her PCP which was not effective.  She was evaluated in the ED 5 days ago for the same complaint, had blood work done and she was discharged home.  She came back today with similar complaints.  In the emergency department, she was afebrile, heart rate stable, blood pressure slightly elevated.  Her routine CBC and CMP was remarkable for potassium of 3.1, hemoglobin is 11 g/dL, otherwise unremarkable.  Urinalysis revealed jamel urine, negative for nitrite, negative for leukocyte esterase,  there was 1-5 WBCs and 1-2 RBCs.  CT scan abdomen and pelvis without contrast showed no definite CT evidence of acute fracture or changes, extensive distal colonic diverticulosis without diverticulitis, distended urinary bladder.  CT scan brain showed no acute findings.  Chest x-ray showed no acute findings, right atelectasis.      Past Medical History:   Diagnosis Date    Calculus of kidney 10/01/2020    Bilateral nephrolithiasis    Diverticulitis of large intestine without perforation or abscess without bleeding 10/01/2020    Sigmoid diverticulitis    Other specified disorders of eye and adnexa     Itchy, watery, and red eye    Personal history of (healed) traumatic fracture 10/18/2020    Status post fracture of left hip    Personal history of diseases of the blood and blood-forming organs and certain disorders involving the immune mechanism 10/27/2020    History of bleeding disorder    Personal history of other diseases of the musculoskeletal system and connective tissue 10/27/2020    History of arthritis    Personal history of other specified conditions 10/18/2020    History of cachexia    Personal history of other specified conditions     History of abdominal pain    Personal history of other specified conditions 10/27/2020    History of balance disorder    Unspecified visual loss 10/27/2020    Vision problems    Uses walker     Wears glasses      Past Surgical History:   Procedure Laterality Date    CHOLECYSTECTOMY      CORNEAL TRANSPLANT Left     CT ABDOMEN PELVIS ANGIOGRAM W AND/OR WO IV CONTRAST  05/28/2022    CT ABDOMEN PELVIS ANGIOGRAM W AND/OR WO IV CONTRAST 5/28/2022 [facility]    HIP SURGERY  09/25/2020    HYSTERECTOMY      OTHER SURGICAL HISTORY  10/01/2020    Hip surgery    PARTIAL KNEE ARTHROPLASTY Left     Partial Replacement    TOTAL HIP ARTHROPLASTY Left     Total Hip Replacement     Family History   Problem Relation Name Age of Onset    No Known Problems Mother      No Known Problems  Father      No Known Problems Sister      No Known Problems Brother       Social History     Socioeconomic History    Marital status:      Spouse name: Not on file    Number of children: Not on file    Years of education: Not on file    Highest education level: Not on file   Occupational History    Not on file   Tobacco Use    Smoking status: Never     Passive exposure: Past    Smokeless tobacco: Never   Substance and Sexual Activity    Alcohol use: Not Currently    Drug use: Never    Sexual activity: Not on file   Other Topics Concern    Not on file   Social History Narrative    Not on file     Social Determinants of Health     Financial Resource Strain: Not on file   Food Insecurity: Not on file   Transportation Needs: Not on file   Physical Activity: Not on file   Stress: Not on file   Social Connections: Not on file   Intimate Partner Violence: Not on file   Housing Stability: Not on file     Review of systems:  Constitutional:  Negative for chills, diaphoresis, fatigue and fever.   HENT: Positive for headache.  Negative for congestion, ear discharge, ear pain, hearing loss, rhinorrhea and sneezing.    Eyes:  Negative for pain, discharge and itching.   Respiratory: Negative for cough, chest tightness, shortness of breath. Negative for apnea, choking, wheezing and stridor.    Cardiovascular:  Negative for chest pain, palpitations and leg swelling.   Gastrointestinal: Positive for abdominal pain.  Negative for abdominal distention, constipation, diarrhea and nausea.   Endocrine: Negative for cold intolerance, heat intolerance and polydipsia.   Genitourinary:  Negative for difficulty urinating, dysuria, flank pain, frequency and hematuria.   Musculoskeletal: Positive for weakness.  Negative for arthralgias, back pain and gait problem.   Neurological:  Negative for dizziness, seizures, syncope, facial asymmetry, speech difficulty, focal weakness and headaches.   Psychiatric/Behavioral:  Negative for  behavioral problems, confusion and hallucinations.     Vital signs:  Visit Vitals  /87   Pulse 92   Temp 36.5 °C (97.7 °F) (Temporal)   Resp 18     Physical examination:  General: Awake, alert, oriented x3, no distress, cooperative.  HEENT: EOM intact, PERRLA, blind.  Neck: Supple, no JVD, no masses, no lymphadenopathy.  Chest: Diminished breath sounds bilateral, otherwise clear, no wheezes, no crackles, no rhonchi.  Heart: irregular rate and rhythm, S1-S2 normal, no murmur, no gallops.  Abdomen: Soft, minimal lower abdominal tenderness, no guarding or rigidity, no organomegaly, no ascites, no guarding or rigidity.  Neurological: Blind, alert and oriented x3, cranial nerves are intact, normal power and tone of 4 limbs.  Skin: No lesions, no skin rash.  Warm and dry.  Musculoskeletal: Normal, atraumatic, no obvious deformities.  Legs: No leg edema, no clubbing or cyanosis.  Psych: Appropriate mood and behavior.    LABS:  Lab Results   Component Value Date    WBC 9.5 10/08/2023    HGB 11.0 (L) 10/08/2023    HCT 41.2 10/08/2023    MCV 82 10/08/2023     10/08/2023     Lab Results   Component Value Date    GLUCOSE 94 10/08/2023    CALCIUM 9.6 10/08/2023     10/08/2023    K 3.1 (L) 10/08/2023    CO2 25 10/08/2023     10/08/2023    BUN 13 10/08/2023    CREATININE 0.57 10/08/2023     Lab Results   Component Value Date    ALT 9 10/08/2023    AST 16 10/08/2023    ALKPHOS 41 10/08/2023    BILITOT 0.8 10/08/2023     Imaging:  Procedure Component Value Units Date/Time   CT abdomen pelvis wo IV contrast [[id number]] Collected: 10/08/23 0938   Order Status: Completed Updated: 10/08/23 0938   Narrative:     Interpreted By:  Rose Decker,  STUDY:  CT ABDOMEN PELVIS WO IV CONTRAST;  10/8/2023 9:21 am      INDICATION:  Signs/Symptoms:abd pain.      COMPARISON:  09/15/2023      ACCESSION NUMBER(S):  [id number]      ORDERING CLINICIAN:  YUMIKO ALFARO      TECHNIQUE:  CT of the abdomen and pelvis was  performed. Sagittal and coronal  reconstructions were generated.    No intravenous contrast given for  the exam.      FINDINGS:  Solid organ and vessel evaluation limited without IV contrast.  Artifact related to upper extremities overlying the upper abdomen  also limits evaluation.      ABDOMINAL ORGANS:      LIVER: No definite focal mass lesion within limits of the exam      GALL BLADDER AND BILIARY TREE: Presumed cholecystectomy clips near  the liver hilum. Probable mild fullness of the central biliary tree  which is likely similar to the prior exam.      SPLEEN: No definite focal lesion within limits of the exam      PANCREAS: No definite focal lesion within limits of the exam      ADRENALS: No obvious adrenal mass      KIDNEYS AND URETERS: Bilateral renal lobulation/scarring. No focal  renal mass within limits of unenhanced exam. No hydronephrosis.      BOWEL: No abnormally dilated large or small bowel loops. Dense  surgical material in the anterior mid abdomen. Extensive distal colon  diverticulosis. No definite associated inflammatory changes.      PERITONEUM, RETROPERITONEUM, NODES: Cul-de-sac is partially obscured.  No significant free fluid as visualized. No free air. No gross  retroperitoneal adenopathy within limits of unenhanced exam.      VESSELS:  Lack of IV contrast precludes vascular luminal assessment.  No abdominal aortic aneurysm. Multifocal atherosclerotic  calcifications.      PELVIS: Artifact from left hip prosthesis limits evaluation.  Distended urinary bladder. Presumed surgical absence of the uterus.      ABDOMINAL WALL: Rectus diastasis. Mild stranding/postsurgical change  superficial to left hip prosthesis      BONES: Again left hip prosthesis noted. Scoliosis and degenerative  changes of the lumbar spine. Moderate anterior wedging of L1 similar  to the prior exam.      LOWER CHEST: Linear and dependent densities in each lung base. No  significant pleural effusion in the included areas.  Dense probable  surgical clips near the GE junction similar to the previous exam.       Impression:     No definite CT evidence of acute inflammatory process in the abdomen  or pelvis within limits of unenhanced exam.      Extensive distal colon diverticulosis. No CT evidence of  diverticulitis.      Distended urinary bladder.      Other findings as described above.      MACRO:  None.      Signed by: Rose Decker 10/8/2023 9:37 AM  Dictation workstation:   [id number]   CT head wo IV contrast [[id number]] Collected: 10/08/23 0707   Order Status: Completed Updated: 10/08/23 0707   Narrative:     Interpreted By:  Andrew Boykin,  STUDY:  CT HEAD WO IV CONTRAST;  10/8/2023 6:06 am      INDICATION:  Signs/Symptoms:lighthead, dizzy, near syncope.      COMPARISON:  None.      ACCESSION NUMBER(S):  [id number]      ORDERING CLINICIAN:  BEAU MARTIN      TECHNIQUE:  Noncontrast axial CT scan of head was performed. Angled reformats in  brain and bone windows were generated. The images were reviewed in  bone, brain, blood and soft tissue windows.      FINDINGS:  CSF Spaces: No definite hydrocephalus. No abnormal extra-axial  collection. There is no extraaxial fluid collection.      Parenchyma: Advanced volume loss.  There is periventricular and  subcortical white matter hypoattenuation, most in keeping with  chronic microvascular ischemic change. The grey-white differentiation  is intact. There is no mass effect or midline shift.  There is no  intracranial hemorrhage.      Calvarium: The calvarium is unremarkable.      Paranasal sinuses and mastoids: Visualized paranasal sinuses and  mastoids are clear.      Impacted cerumen noted in the left external auditory canal.       Impression:     No evidence of acute cortical infarct or intracranial hemorrhage.      MACRO:  None      Signed by: Andrew Boykin 10/8/2023 7:05 AM  Dictation workstation:   [id number]   XR chest 2 view [[id number]] Resulted: 09/18/23 0819   Order Status:  Completed Updated: 09/18/23 0822   Narrative:     Interpreted By:  FIONA CASTILLO MD  MRN: [id number]  Patient Name: GERONIMO HENAO     STUDY:  TH CHEST 2 VIEW PA AND LAT;  9/17/2023 1:32 pm     INDICATION:  Poor O2 perfusion .     COMPARISON:  10/01/2020.     ACCESSION NUMBER(S):  [id number]     ORDERING CLINICIAN:  SCOTT ALLEN     FINDINGS:  CARDIOMEDIASTINAL SILHOUETTE:  Cardiomediastinal silhouette is normal in size and configuration.     LUNGS:  Elevation of the right hemidiaphragm again seen. Right midlung linear  opacity may represent pleural thickening/scar versus atelectasis.  Additional mild bibasilar atelectasis is seen. No pleural effusion or  pneumothorax is seen.     ABDOMEN:  Upper abdominal surgical clips are present.     BONES:  Mild multilevel disc space narrowing and endplate spurring present in  the spine. Degenerative changes of the shoulders are partially  visualized.      Impression:     1.  Right midlung linear pleural thickening/scar versus atelectasis.  2. Mild bibasilar atelectasis.     Assessment/Plan     #1 persistent abdominal pain: In the setting of recent GI bleed, status post EGD and colonoscopy.  EGD was unremarkable for active GI bleed, colonoscopy with unsatisfactory preparation.  CT scan abdomen and pelvis reviewed as above, no acute findings.  LFT was unremarkable.  Admit to floor for observation, clear liquids, IV morphine as needed, gentle IV fluids for hydration, Tylenol as needed, Zofran as needed, GI consult.    #2 headache: Seems to be due to migraine headaches.  Improved after received 1 dose of Imitrex injection.  CT scan brain showed no acute findings.  Plan to start sumatriptan as needed for headaches.    #3 weakness/debility/functional decline: With frequent visits to ED, patient was in the ED couple days ago, was discharged home and came back because of weakness and difficulty ambulating.  Plan for PT OT evaluation and treatment, may need placement to skilled  nursing facility.    #4 recent history of GI bleed: Status post upper EGD that showed no active bleed, colonoscopy was unsatisfactory because of bed preparation.  She is supposed to go for repeat colonoscopy tomorrow which will be canceled.  GI consulted as above.    #5 hypokalemia: Replace potassium with IV potassium chloride, repeat BMP tomorrow morning.  EKG reviewed, reviewed normal sinus rhythm, no evidence of cardiac arrhythmia or acute schema changes.    #6 rheumatoid arthritis: Continue long-term prednisone 5 mg daily, avoid NSAIDs.  Can use Tylenol as needed.    #7 hypothyroidism: Continue levothyroxine.    #8 iron deficiency anemia: Hemoglobin and hematocrit are stable, no active bleeding.  Plan to monitor.    #9 GERD: Continue PPI.    #10 DVT prophylaxis: SCDs, no chemical prophylaxis because of recent GI bleed.    #11 CODE STATUS: DNR CCA, discussed with the patient and her  was at the bedside.  Patient clearly mentioned that she does not want CPR, intubation or mechanical ventilation.    Aniyah Lara MD  10/08/23  3:08 PM

## 2023-10-09 ENCOUNTER — ANESTHESIA (OUTPATIENT)
Dept: GASTROENTEROLOGY | Facility: HOSPITAL | Age: 73
End: 2023-10-09
Payer: COMMERCIAL

## 2023-10-09 ENCOUNTER — ANESTHESIA EVENT (OUTPATIENT)
Dept: GASTROENTEROLOGY | Facility: HOSPITAL | Age: 73
End: 2023-10-09

## 2023-10-09 LAB
ANION GAP SERPL CALC-SCNC: 15 MMOL/L
BASOPHILS # BLD AUTO: 0.02 X10*3/UL (ref 0–0.1)
BASOPHILS NFR BLD AUTO: 0.2 %
BUN SERPL-MCNC: 5 MG/DL (ref 6–23)
CALCIUM SERPL-MCNC: 8.7 MG/DL (ref 8.6–10.3)
CHLORIDE SERPL-SCNC: 105 MMOL/L (ref 98–107)
CO2 SERPL-SCNC: 25 MMOL/L (ref 21–32)
CREAT SERPL-MCNC: 0.4 MG/DL (ref 0.5–1.05)
EOSINOPHIL # BLD AUTO: 0.06 X10*3/UL (ref 0–0.4)
EOSINOPHIL NFR BLD AUTO: 0.7 %
ERYTHROCYTE [DISTWIDTH] IN BLOOD BY AUTOMATED COUNT: 23.2 % (ref 11.5–14.5)
GFR SERPL CREATININE-BSD FRML MDRD: >90 ML/MIN/1.73M*2
GLUCOSE SERPL-MCNC: 82 MG/DL (ref 74–99)
HCT VFR BLD AUTO: 33.9 % (ref 36–46)
HGB BLD-MCNC: 9.2 G/DL (ref 12–16)
IMM GRANULOCYTES # BLD AUTO: 0.03 X10*3/UL (ref 0–0.5)
IMM GRANULOCYTES NFR BLD AUTO: 0.3 % (ref 0–0.9)
LYMPHOCYTES # BLD AUTO: 1.93 X10*3/UL (ref 0.8–3)
LYMPHOCYTES NFR BLD AUTO: 22 %
MCH RBC QN AUTO: 21.6 PG (ref 26–34)
MCHC RBC AUTO-ENTMCNC: 27.1 G/DL (ref 32–36)
MCV RBC AUTO: 80 FL (ref 80–100)
MONOCYTES # BLD AUTO: 0.59 X10*3/UL (ref 0.05–0.8)
MONOCYTES NFR BLD AUTO: 6.7 %
NEUTROPHILS # BLD AUTO: 6.14 X10*3/UL (ref 1.6–5.5)
NEUTROPHILS NFR BLD AUTO: 70.1 %
NRBC BLD-RTO: 0 /100 WBCS (ref 0–0)
PLATELET # BLD AUTO: 306 X10*3/UL (ref 150–450)
PMV BLD AUTO: 9.9 FL (ref 7.5–11.5)
POTASSIUM SERPL-SCNC: 3.4 MMOL/L (ref 3.5–5.3)
RBC # BLD AUTO: 4.26 X10*6/UL (ref 4–5.2)
SODIUM SERPL-SCNC: 142 MMOL/L (ref 136–145)
WBC # BLD AUTO: 8.8 X10*3/UL (ref 4.4–11.3)

## 2023-10-09 PROCEDURE — 36415 COLL VENOUS BLD VENIPUNCTURE: CPT | Performed by: HOSPITALIST

## 2023-10-09 PROCEDURE — 2500000001 HC RX 250 WO HCPCS SELF ADMINISTERED DRUGS (ALT 637 FOR MEDICARE OP): Performed by: HOSPITALIST

## 2023-10-09 PROCEDURE — 2500000004 HC RX 250 GENERAL PHARMACY W/ HCPCS (ALT 636 FOR OP/ED): Mod: MUE | Performed by: HOSPITALIST

## 2023-10-09 PROCEDURE — 80048 BASIC METABOLIC PNL TOTAL CA: CPT | Performed by: HOSPITALIST

## 2023-10-09 PROCEDURE — 2500000004 HC RX 250 GENERAL PHARMACY W/ HCPCS (ALT 636 FOR OP/ED): Performed by: INTERNAL MEDICINE

## 2023-10-09 PROCEDURE — 85025 COMPLETE CBC W/AUTO DIFF WBC: CPT | Performed by: HOSPITALIST

## 2023-10-09 PROCEDURE — 97161 PT EVAL LOW COMPLEX 20 MIN: CPT | Mod: GP

## 2023-10-09 PROCEDURE — 97165 OT EVAL LOW COMPLEX 30 MIN: CPT | Mod: GO

## 2023-10-09 PROCEDURE — 2500000001 HC RX 250 WO HCPCS SELF ADMINISTERED DRUGS (ALT 637 FOR MEDICARE OP): Performed by: INTERNAL MEDICINE

## 2023-10-09 PROCEDURE — G0378 HOSPITAL OBSERVATION PER HR: HCPCS

## 2023-10-09 PROCEDURE — 99232 SBSQ HOSP IP/OBS MODERATE 35: CPT | Performed by: INTERNAL MEDICINE

## 2023-10-09 RX ORDER — POTASSIUM CHLORIDE 20 MEQ/1
40 TABLET, EXTENDED RELEASE ORAL ONCE
Status: COMPLETED | OUTPATIENT
Start: 2023-10-09 | End: 2023-10-09

## 2023-10-09 RX ORDER — POLYETHYLENE GLYCOL 3350 17 G/17G
17 POWDER, FOR SOLUTION ORAL 2 TIMES DAILY
Status: DISCONTINUED | OUTPATIENT
Start: 2023-10-09 | End: 2023-10-10 | Stop reason: HOSPADM

## 2023-10-09 RX ORDER — AMOXICILLIN 250 MG
1 CAPSULE ORAL 2 TIMES DAILY
Status: DISCONTINUED | OUTPATIENT
Start: 2023-10-09 | End: 2023-10-10 | Stop reason: HOSPADM

## 2023-10-09 RX ADMIN — POTASSIUM CHLORIDE 40 MEQ: 1500 TABLET, EXTENDED RELEASE ORAL at 09:30

## 2023-10-09 RX ADMIN — SUMATRIPTAN SUCCINATE 50 MG: 25 TABLET ORAL at 12:35

## 2023-10-09 RX ADMIN — POLYETHYLENE GLYCOL 3350 17 G: 17 POWDER, FOR SOLUTION ORAL at 21:38

## 2023-10-09 RX ADMIN — DOCUSATE SODIUM 50MG AND SENNOSIDES 8.6MG 1 TABLET: 8.6; 5 TABLET, FILM COATED ORAL at 21:37

## 2023-10-09 RX ADMIN — TRAMADOL HYDROCHLORIDE 50 MG: 50 TABLET, COATED ORAL at 12:34

## 2023-10-09 RX ADMIN — BISACODYL 10 MG: 5 TABLET ORAL at 09:30

## 2023-10-09 RX ADMIN — PREDNISOLONE ACETATE 1 DROP: 10 SUSPENSION/ DROPS OPHTHALMIC at 21:37

## 2023-10-09 RX ADMIN — GABAPENTIN 600 MG: 300 CAPSULE ORAL at 21:37

## 2023-10-09 RX ADMIN — POLYETHYLENE GLYCOL 3350 17 G: 17 POWDER, FOR SOLUTION ORAL at 09:30

## 2023-10-09 RX ADMIN — LEVOTHYROXINE SODIUM 50 MCG: 50 TABLET ORAL at 05:06

## 2023-10-09 RX ADMIN — PANTOPRAZOLE SODIUM 40 MG: 40 TABLET, DELAYED RELEASE ORAL at 05:06

## 2023-10-09 RX ADMIN — POTASSIUM CHLORIDE 20 MEQ: 14.9 INJECTION, SOLUTION INTRAVENOUS at 02:49

## 2023-10-09 RX ADMIN — TRAMADOL HYDROCHLORIDE 50 MG: 50 TABLET, COATED ORAL at 05:09

## 2023-10-09 RX ADMIN — METOPROLOL TARTRATE 25 MG: 25 TABLET, FILM COATED ORAL at 09:30

## 2023-10-09 RX ADMIN — ACETAMINOPHEN 650 MG: 325 TABLET ORAL at 09:30

## 2023-10-09 RX ADMIN — ERYTHROMYCIN 1 CM: 5 OINTMENT OPHTHALMIC at 21:38

## 2023-10-09 RX ADMIN — METOPROLOL TARTRATE 25 MG: 25 TABLET, FILM COATED ORAL at 21:37

## 2023-10-09 RX ADMIN — ERYTHROMYCIN 1 CM: 5 OINTMENT OPHTHALMIC at 16:53

## 2023-10-09 RX ADMIN — PREDNISONE 5 MG: 5 TABLET ORAL at 09:30

## 2023-10-09 RX ADMIN — TRAMADOL HYDROCHLORIDE 50 MG: 50 TABLET, COATED ORAL at 21:37

## 2023-10-09 RX ADMIN — FERROUS GLUCONATE 38 MG OF IRON: 324 TABLET ORAL at 09:31

## 2023-10-09 SDOH — HEALTH STABILITY: MENTAL HEALTH: CURRENT SMOKER: 0

## 2023-10-09 ASSESSMENT — COGNITIVE AND FUNCTIONAL STATUS - GENERAL
CLIMB 3 TO 5 STEPS WITH RAILING: TOTAL
TURNING FROM BACK TO SIDE WHILE IN FLAT BAD: A LOT
STANDING UP FROM CHAIR USING ARMS: A LITTLE
PERSONAL GROOMING: A LITTLE
TURNING FROM BACK TO SIDE WHILE IN FLAT BAD: A LITTLE
DRESSING REGULAR LOWER BODY CLOTHING: A LOT
TOILETING: A LOT
MOBILITY SCORE: 16
TURNING FROM BACK TO SIDE WHILE IN FLAT BAD: A LITTLE
DRESSING REGULAR LOWER BODY CLOTHING: A LOT
MOVING FROM LYING ON BACK TO SITTING ON SIDE OF FLAT BED WITH BEDRAILS: A LITTLE
MOBILITY SCORE: 11
MOVING TO AND FROM BED TO CHAIR: A LITTLE
TOILETING: A LITTLE
HELP NEEDED FOR BATHING: A LITTLE
DAILY ACTIVITIY SCORE: 19
MOVING FROM LYING ON BACK TO SITTING ON SIDE OF FLAT BED WITH BEDRAILS: A LOT
PERSONAL GROOMING: A LITTLE
MOVING FROM LYING ON BACK TO SITTING ON SIDE OF FLAT BED WITH BEDRAILS: A LITTLE
MOVING TO AND FROM BED TO CHAIR: A LITTLE
EATING MEALS: A LITTLE
WALKING IN HOSPITAL ROOM: A LITTLE
MOVING TO AND FROM BED TO CHAIR: A LOT
HELP NEEDED FOR BATHING: A LOT
DRESSING REGULAR UPPER BODY CLOTHING: A LOT
CLIMB 3 TO 5 STEPS WITH RAILING: TOTAL
STANDING UP FROM CHAIR USING ARMS: A LOT
WALKING IN HOSPITAL ROOM: A LITTLE
MOBILITY SCORE: 16
DAILY ACTIVITIY SCORE: 14
WALKING IN HOSPITAL ROOM: A LOT
STANDING UP FROM CHAIR USING ARMS: A LITTLE
CLIMB 3 TO 5 STEPS WITH RAILING: TOTAL

## 2023-10-09 ASSESSMENT — PAIN SCALES - GENERAL
PAINLEVEL_OUTOF10: 10 - WORST POSSIBLE PAIN
PAINLEVEL_OUTOF10: 10 - WORST POSSIBLE PAIN
PAINLEVEL_OUTOF10: 7
PAINLEVEL_OUTOF10: 5 - MODERATE PAIN
PAINLEVEL_OUTOF10: 5 - MODERATE PAIN

## 2023-10-09 ASSESSMENT — PAIN - FUNCTIONAL ASSESSMENT
PAIN_FUNCTIONAL_ASSESSMENT: 0-10

## 2023-10-09 ASSESSMENT — ACTIVITIES OF DAILY LIVING (ADL)
BATHING_ASSISTANCE: MINIMAL
ADL_ASSISTANCE: INDEPENDENT
ADL_ASSISTANCE: INDEPENDENT

## 2023-10-09 NOTE — ANESTHESIA PREPROCEDURE EVALUATION
Patient: Alisa Fregoso    Procedure Information       Date/Time: 10/09/23 1330    Scheduled providers: Jd Argueta MD; Arsen Tirado MD    Procedure: COLONOSCOPY    Location: [facility]            Relevant Problems   Cardiovascular   (+) Hypercholesteremia      Endocrine   (+) Acquired hypothyroidism   (+) Hypothyroidism      GI   (+) GERD (gastroesophageal reflux disease)   (+) Gastroesophageal reflux disease without esophagitis      /Renal   (+) Acute pyelonephritis   (+) Obstructive pyelonephritis   (+) Recurrent UTI      Neuro/Psych   (+) Anxiety   (+) Mild episode of recurrent major depressive disorder (CMS/HCC)   (+) Peripheral neuropathy      Hematology   (+) Iron deficiency anemia      Musculoskeletal   (+) OA (osteoarthritis)   (+) Rheumatoid arthritis (CMS/HCC)   (+) Rheumatoid arthritis involving both knees with positive rheumatoid factor (CMS/HCC)   (+) Rheumatoid arthritis of multiple sites without organ or system involvement with positive rheumatoid factor (CMS/HCC)      Eyes, Ears, Nose, and Throat   (+) Bilateral sensorineural hearing loss      Infectious Disease   (+) Acute pyelonephritis   (+) Obstructive pyelonephritis   (+) Recurrent UTI      Other   (+) Rheumatoid arthritis (CMS/HCC)   (+) Rheumatoid arthritis involving both knees with positive rheumatoid factor (CMS/HCC)   (+) Rheumatoid arthritis of multiple sites without organ or system involvement with positive rheumatoid factor (CMS/HCC)       Clinical information reviewed:                   NPO Detail:  No data recorded     Physical Exam    Airway  Mallampati: II  TM distance: >3 FB  Neck ROM: full     Cardiovascular    Dental    Pulmonary    Abdominal        Anesthesia Plan    ASA 3     MAC     The patient is not a current smoker.    intravenous induction   Anesthetic plan and risks discussed with patient.    Plan discussed with CRNA.

## 2023-10-09 NOTE — PROGRESS NOTES
ASSESSMENT & PLAN:     #1 persistent abdominal pain: In the setting of recent GI bleed, status post EGD and colonoscopy.  EGD was unremarkable for active GI bleed, colonoscopy with unsatisfactory preparation.  CT scan abdomen and pelvis reviewed as above, no acute findings.  LFT was unremarkable.  Admit to floor for observation, clear liquids, pain control, gentle IV fluids for hydration, Tylenol as needed, Zofran as needed, GI consulted.     #2 headache: Seems to be due to migraine headaches.  Improved after received 1 dose of Imitrex injection.  CT scan brain showed no acute findings.  cont sumatriptan as needed for headaches.     #3 weakness/debility/functional decline: With frequent visits to ED, patient was in the ED couple days ago, was discharged home and came back because of weakness and difficulty ambulating.  Plan for PT OT evaluation and treatment, may need placement to skilled nursing facility.     #4 recent history of GI bleed: Status post upper EGD that showed no active bleed, colonoscopy was unsatisfactory because of bed preparation.  She is supposed to go for repeat colonoscopy tomorrow which will be canceled.  GI consulted as above.     #5 hypokalemia: Replete PRN     #6 rheumatoid arthritis: Continue long-term prednisone 5 mg daily, avoid NSAIDs.  Can use Tylenol as needed.     #7 hypothyroidism: Continue levothyroxine.     #8 iron deficiency anemia: Hemoglobin and hematocrit are stable, no active bleeding.  Plan to monitor.     #9 GERD: Continue PPI.     #10 DVT prophylaxis: SCDs, no chemical prophylaxis because of recent GI bleed.     #11 CODE STATUS: DNR CCA, discussed with the patient and her  was at the bedside.  Patient clearly mentioned that she does not want CPR, intubation or mechanical ventilation.    10/9/23  - awaiting GI recommendations  - hypokalemia K+ 3.4, repleted  - Hgb 9.2, no overt bleeding, dilutional component for acute drop, appears to be near recent baseline  - dispo  pending PT/OT eval  - rest as above    Sergio Cagle MD    SUBJECTIVE     JEBON. Has not had BM since being admitted she says. Still having LLQ abd pain, but improved since ED.       OBJECTIVE:       Last Recorded Vitals:  Vitals:    10/08/23 1431 10/1950 10/09/23 0001 10/09/23 0810   BP: 174/79 147/70 (!) 159/95 116/72   BP Location:       Patient Position:    Lying   Pulse: 71 75 70 86   Resp: 18 16 18 14   Temp: 36.6 °C (97.9 °F) 36.7 °C (98.1 °F) 36.7 °C (98.1 °F) 36.6 °C (97.9 °F)   TempSrc:    Temporal   SpO2: 93% 96% 94% 93%   Weight:       Height:           Last I/O:  I/O last 3 completed shifts:  In: 200 (3.7 mL/kg) [IV Piggyback:200]  Out: 1801 (33.1 mL/kg) [Urine:1800 (0.9 mL/kg/hr); Stool:1]  Weight: 54.4 kg     Physical Exam:  General: Awake, alert, oriented x3, no distress, cooperative.  HEENT: blind.  Neck: Supple, no JVD, no masses, no lymphadenopathy.  Chest: Diminished breath sounds bilateral, otherwise clear, no wheezes, no crackles, no rhonchi.  Heart: irregular rate and rhythm, S1-S2 normal, no murmur, no gallops.  Abdomen: Soft, minimal lower abdominal tenderness worse in the llq, no guarding or rigidity, no organomegaly, no ascites, no guarding or rigidity.  Neurological: Blind, alert and oriented x3, cranial nerves are intact, normal power and tone of 4 limbs.  Skin: No lesions, no skin rash.  Warm and dry.  Musculoskeletal: Normal, atraumatic, no obvious deformities.  Legs: No leg edema, no clubbing or cyanosis.  Psych: Appropriate mood and behavior.    Inpatient Medications:  erythromycin, 1 cm, Both Eyes, q8h RERE  ferrous gluconate, 38 mg of iron, oral, Daily with breakfast  gabapentin, 600 mg, oral, Nightly  levothyroxine, 50 mcg, oral, Daily  metoprolol tartrate, 25 mg, oral, BID  pantoprazole, 40 mg, oral, Daily  polyethylene glycol, 17 g, oral, Daily  prednisoLONE acetate, 1 drop, Left Eye, BID  predniSONE, 5 mg, oral, Daily      PRN Medications  PRN medications: acetaminophen  **OR** acetaminophen **OR** acetaminophen, bisacodyl, morphine, ondansetron ODT **OR** ondansetron, SUMAtriptan, traMADol    Continuous Medications:  lactated Ringer's, 75 mL/hr, Last Rate: 75 mL/hr (10/08/23 1751)        LABS AND IMAGING:     Labs:  Results for orders placed or performed during the hospital encounter of 10/08/23 (from the past 24 hour(s))   CBC and Auto Differential   Result Value Ref Range    WBC 8.8 4.4 - 11.3 x10*3/uL    nRBC 0.0 0.0 - 0.0 /100 WBCs    RBC 4.26 4.00 - 5.20 x10*6/uL    Hemoglobin 9.2 (L) 12.0 - 16.0 g/dL    Hematocrit 33.9 (L) 36.0 - 46.0 %    MCV 80 80 - 100 fL    MCH 21.6 (L) 26.0 - 34.0 pg    MCHC 27.1 (L) 32.0 - 36.0 g/dL    RDW 23.2 (H) 11.5 - 14.5 %    Platelets 306 150 - 450 x10*3/uL    MPV 9.9 7.5 - 11.5 fL    Neutrophils % 70.1 40.0 - 80.0 %    Immature Granulocytes %, Automated 0.3 0.0 - 0.9 %    Lymphocytes % 22.0 13.0 - 44.0 %    Monocytes % 6.7 2.0 - 10.0 %    Eosinophils % 0.7 0.0 - 6.0 %    Basophils % 0.2 0.0 - 2.0 %    Neutrophils Absolute 6.14 (H) 1.60 - 5.50 x10*3/uL    Immature Granulocytes Absolute, Automated 0.03 0.00 - 0.50 x10*3/uL    Lymphocytes Absolute 1.93 0.80 - 3.00 x10*3/uL    Monocytes Absolute 0.59 0.05 - 0.80 x10*3/uL    Eosinophils Absolute 0.06 0.00 - 0.40 x10*3/uL    Basophils Absolute 0.02 0.00 - 0.10 x10*3/uL   Basic metabolic panel   Result Value Ref Range    Glucose 82 74 - 99 mg/dL    Sodium 142 136 - 145 mmol/L    Potassium 3.4 (L) 3.5 - 5.3 mmol/L    Chloride 105 98 - 107 mmol/L    Bicarbonate 25 21 - 32 mmol/L    Anion Gap 15 mmol/L    Urea Nitrogen 5 (L) 6 - 23 mg/dL    Creatinine 0.40 (L) 0.50 - 1.05 mg/dL    eGFR >90 >60 mL/min/1.73m*2    Calcium 8.7 8.6 - 10.3 mg/dL        Imaging:  CT abdomen pelvis wo IV contrast  Narrative: Interpreted By:  Rose Decker,   STUDY:  CT ABDOMEN PELVIS WO IV CONTRAST;  10/8/2023 9:21 am      INDICATION:  Signs/Symptoms:abd pain.      COMPARISON:  09/15/2023      ACCESSION NUMBER(S):  [id number]       ORDERING CLINICIAN:  YUMIKO ALFARO      TECHNIQUE:  CT of the abdomen and pelvis was performed. Sagittal and coronal  reconstructions were generated.    No intravenous contrast given for  the exam.      FINDINGS:  Solid organ and vessel evaluation limited without IV contrast.  Artifact related to upper extremities overlying the upper abdomen  also limits evaluation.      ABDOMINAL ORGANS:      LIVER: No definite focal mass lesion within limits of the exam      GALL BLADDER AND BILIARY TREE: Presumed cholecystectomy clips near  the liver hilum. Probable mild fullness of the central biliary tree  which is likely similar to the prior exam.      SPLEEN: No definite focal lesion within limits of the exam      PANCREAS: No definite focal lesion within limits of the exam      ADRENALS: No obvious adrenal mass      KIDNEYS AND URETERS: Bilateral renal lobulation/scarring. No focal  renal mass within limits of unenhanced exam. No hydronephrosis.      BOWEL: No abnormally dilated large or small bowel loops. Dense  surgical material in the anterior mid abdomen. Extensive distal colon  diverticulosis. No definite associated inflammatory changes.      PERITONEUM, RETROPERITONEUM, NODES: Cul-de-sac is partially obscured.  No significant free fluid as visualized. No free air. No gross  retroperitoneal adenopathy within limits of unenhanced exam.      VESSELS:  Lack of IV contrast precludes vascular luminal assessment.  No abdominal aortic aneurysm. Multifocal atherosclerotic  calcifications.      PELVIS: Artifact from left hip prosthesis limits evaluation.  Distended urinary bladder. Presumed surgical absence of the uterus.      ABDOMINAL WALL: Rectus diastasis. Mild stranding/postsurgical change  superficial to left hip prosthesis      BONES: Again left hip prosthesis noted. Scoliosis and degenerative  changes of the lumbar spine. Moderate anterior wedging of L1 similar  to the prior exam.      LOWER CHEST: Linear and  dependent densities in each lung base. No  significant pleural effusion in the included areas. Dense probable  surgical clips near the GE junction similar to the previous exam.      Impression: No definite CT evidence of acute inflammatory process in the abdomen  or pelvis within limits of unenhanced exam.      Extensive distal colon diverticulosis. No CT evidence of  diverticulitis.      Distended urinary bladder.      Other findings as described above.      MACRO:  None.      Signed by: Rose Decker 10/8/2023 9:37 AM  Dictation workstation:   [id number]  CT head wo IV contrast  Narrative: Interpreted By:  Andrew Boykin,   STUDY:  CT HEAD WO IV CONTRAST;  10/8/2023 6:06 am      INDICATION:  Signs/Symptoms:lighthead, dizzy, near syncope.      COMPARISON:  None.      ACCESSION NUMBER(S):  [id number]      ORDERING CLINICIAN:  BEAU MARTIN      TECHNIQUE:  Noncontrast axial CT scan of head was performed. Angled reformats in  brain and bone windows were generated. The images were reviewed in  bone, brain, blood and soft tissue windows.      FINDINGS:  CSF Spaces: No definite hydrocephalus. No abnormal extra-axial  collection. There is no extraaxial fluid collection.      Parenchyma: Advanced volume loss.  There is periventricular and  subcortical white matter hypoattenuation, most in keeping with  chronic microvascular ischemic change. The grey-white differentiation  is intact. There is no mass effect or midline shift.  There is no  intracranial hemorrhage.      Calvarium: The calvarium is unremarkable.      Paranasal sinuses and mastoids: Visualized paranasal sinuses and  mastoids are clear.      Impacted cerumen noted in the left external auditory canal.      Impression: No evidence of acute cortical infarct or intracranial hemorrhage.      MACRO:  None      Signed by: Andrew Boykin 10/8/2023 7:05 AM  Dictation workstation:   [id number]

## 2023-10-09 NOTE — PROGRESS NOTES
Physical Therapy    Physical Therapy Evaluation    Patient Name: Alisa Fregoso  MRN: [id number]  Today's Date: 10/9/2023   Time Calculation  Start Time: 0940  Stop Time: 0958  Time Calculation (min): 18 min    Assessment/Plan   PT Assessment  PT Assessment Results: Decreased strength, Decreased endurance, Impaired balance, Decreased mobility, Pain  Rehab Prognosis: Fair  Evaluation/Treatment Tolerance: Patient limited by fatigue, Patient limited by pain  End of Session Communication: Bedside nurse  Assessment Comment: pt with decreased mobility/ gait , impaired strength, balance , endurance . pt to benefit from skilled PT to address deficits and improve functional mobilty .  End of Session Patient Position: Up in chair, Alarm on  IP OR SWING BED PT PLAN  Inpatient or Swing Bed: Inpatient  PT Plan  Treatment/Interventions: Bed mobility, Transfer training, Gait training, Stair training, Balance training, Strengthening, Endurance training, Therapeutic exercise, Therapeutic activity, Home exercise program  PT Plan: Skilled PT  PT Frequency: 3 times per week  PT Discharge Recommendations: Low intensity level of continued care  PT Recommended Transfer Status: Assist x1    Subjective     Current Problem:  1. Generalized abdominal pain        2. Melena          Past Medical History:   Diagnosis Date    Calculus of kidney 10/01/2020    Bilateral nephrolithiasis    Diverticulitis of large intestine without perforation or abscess without bleeding 10/01/2020    Sigmoid diverticulitis    Other specified disorders of eye and adnexa     Itchy, watery, and red eye    Personal history of (healed) traumatic fracture 10/18/2020    Status post fracture of left hip    Personal history of diseases of the blood and blood-forming organs and certain disorders involving the immune mechanism 10/27/2020    History of bleeding disorder    Personal history of other diseases of the musculoskeletal system and connective tissue 10/27/2020    History  of arthritis    Personal history of other specified conditions 10/18/2020    History of cachexia    Personal history of other specified conditions     History of abdominal pain    Personal history of other specified conditions 10/27/2020    History of balance disorder    Unspecified visual loss 10/27/2020    Vision problems    Uses walker     Wears glasses      Past Surgical History:   Procedure Laterality Date    CHOLECYSTECTOMY      CORNEAL TRANSPLANT Left     CT ABDOMEN PELVIS ANGIOGRAM W AND/OR WO IV CONTRAST  05/28/2022    CT ABDOMEN PELVIS ANGIOGRAM W AND/OR WO IV CONTRAST 5/28/2022 [facility]    HIP SURGERY  09/25/2020    HYSTERECTOMY      OTHER SURGICAL HISTORY  10/01/2020    Hip surgery    PARTIAL KNEE ARTHROPLASTY Left     Partial Replacement    TOTAL HIP ARTHROPLASTY Left     Total Hip Replacement       General Visit Information:  General  Reason for Referral: impaired mobility weakness  Referred By: Dr. Lara OT/PT , 10/8  Past Medical History Relevant to Rehab: L hip fx, RA, OA, sigmoid diverticulitis, GERD, anemia, hypothyroidism, anxiety, depression, arthritis,Neuropathy  Prior to Session Communication: Bedside nurse  Patient Position Received: Bed, 3 rail up, Alarm off, not on at start of session  General Comment: Pt. is 72 y/o feamle to ED with headache, dizziness, weakness, and abominal pain. Pt. was admitted ~1 month ago with GI bleed; was scheduled for colonoscopy , but when started prep at home symptoms began. CT head (-), CT abd: extensive distal colon diverticulitis, distended urinary bladder, hgb 9.2, Trop (-)    Home Living:  Home Living  Type of Home: House  Lives With: Spouse  Home Adaptive Equipment: Walker rolling or standard  Home Layout: Two level  Home Access: Stairs to enter with rails  Entrance Stairs-Number of Steps: 2  Bathroom Shower/Tub: Tub/shower unit  Home Living Comments:  (Pt. bed and bath on main floor of house; ocasional use of WW per pt. report. Denies  falls. 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, wears sunglasses d.t light sensitivity, fall precautions.)    Objective     Pain:  Pain Assessment  Pain Assessment: 0-10  Pain Score: 5 - Moderate pain  Pain Type: Acute pain  Pain Location: Abdomen    Cognition:  Cognition  Overall Cognitive Status: Impaired  Orientation Level:  (AxO= 2)    General Assessments:    Strength  Strength Comments: BLE 4-/5    Dynamic Sitting Balance  Dynamic Sitting-Comments: fair -  Dynamic Standing Balance  Dynamic Standing-Comments: fair -    Functional Assessments:     Bed Mobility  Bed Mobility: Yes  Bed Mobility 1  Bed Mobility 1: Supine to sitting, Scooting  Level of Assistance 1: Minimum assistance  Transfers  Transfer: Yes  Transfer 1  Technique 1: Stand to sit, Sit to stand  Transfer Device 1: Walker (FWW)  Transfer Level of Assistance 1: Minimum assistance  Transfers 2  Technique 2: Stand pivot  Transfer Device 2: Walker (FWW)  Transfer Level of Assistance 2: Minimum assistance  Ambulation/Gait Training  Ambulation/Gait Training Performed: Yes  Ambulation/Gait Training 1  Surface 1: Level tile  Device 1: Rolling walker  Assistance 1: Minimum assistance  Quality of Gait 1: Inconsistent stride length  Comments/Distance (ft) 1: 10 ft x 2 with cues for direction    Extremity/Trunk Assessments:  RLE   RLE : Within Functional Limits (ROM)  LLE   LLE : Within Functional Limits (ROM)    Outcome Measures:     [facility] Basic Mobility  Turning from your back to your side while in a flat bed without using bedrails: A little  Moving from lying on your back to sitting on the side of  a flat bed without using bedrails: A little  Moving to and from bed to chair (including a wheelchair): A little  Standing up from a chair using your arms (e.g. wheelchair or bedside chair): A little  To walk in hospital room: A little  Climbing 3-5 steps with railing: Total  Basic Mobility - Total Score: 16  Encounter Problems       Encounter Problems (Active)       impaired mobility        Pt will demonstrate mod I with bed mobility to edge of bed.   (Progressing)       Start:  10/09/23    Expected End:  10/23/23            Pt will demonstrate mod I  with sit to stand/chair transfers with FWW.   (Progressing)       Start:  10/09/23    Expected End:  10/23/23            Pt will ambulate 50 feet with FWW Supervision .   (Progressing)       Start:  10/09/23    Expected End:  10/23/23            Pt to demo improved BLE strength by being able to complete supine/seated thera ex 2x20 BLEs with 4 or less rest breaks .   (Progressing)       Start:  10/09/23    Expected End:  10/23/23                 Education Documentation  Mobility Training, taught by Devaughn Miranda, PT at 10/9/2023  1:24 PM.  Learner: Patient  Readiness: Eager  Method: Demonstration, Explanation  Response: Verbalizes Understanding    Education Comments  No comments found.

## 2023-10-09 NOTE — CARE PLAN
Problem: OT Goals  Goal: good dyn standing balance during ADLs and transfers   Outcome: Progressing  Goal: Mod I for all functional transfers   Outcome: Progressing  Goal: Mod I for LB dressing   Outcome: Progressing  Goal: Mod I grooming tasks standing sink side; good dyn standing balance   Outcome: Progressing

## 2023-10-09 NOTE — PROGRESS NOTES
10/09/23 1811   Discharge Planning   Living Arrangements Spouse/significant other   Support Systems Spouse/significant other   Assistance Needed active [facility]   Type of Residence Private residence   Number of Stairs to Enter Residence 2   Number of Stairs Within Residence 0   Who is requesting discharge planning? Provider   Home or Post Acute Services In home services  (active [facility])   Type of Home Care Services Home PT;Home OT;Home nursing visits   Patient expects to be discharged to: home w/ [facility]   Does the patient need discharge transport arranged? No     Pt active [facility] pta would like services to resume. Dr inman and marisa zarco were updated.

## 2023-10-09 NOTE — PROGRESS NOTES
Occupational Therapy    Evaluation    Patient Name: Alisa Fregoso  MRN: [id number]  Today's Date: 10/9/2023  Time Calculation  Start Time: 0939  Stop Time: 0959  Time Calculation (min): 20 min        Assessment:     OT Assessment Results: Decreased ADL status, Decreased endurance  Plan:  Treatment Interventions: ADL retraining, Functional transfer training, Endurance training  OT Frequency: 2 times per week  OT Discharge Recommendations: Low intensity level of continued care (Assist from  as needed)  Treatment Interventions: ADL retraining, Functional transfer training, Endurance training    Subjective   Current Problem:  1. Generalized abdominal pain        2. Melena          General:  General  Reason for Referral: ADL impairment  Referred By: Dr. Lara OT, 10/8  Past Medical History Relevant to Rehab: L hip fx, RA, OA, sigmoid diverticulitis, GERD, anemia, hypothyroidism, anxiety, depression, arthritis,Neuropathy  Prior to Session Communication: Bedside nurse  Patient Position Received:  (Supine in bed, no alarm at beginning of session. up in chair, alarm on at end of session)  General Comment: Pt. is 74 y/o feamle to ED with headache, dizziness, weakness, and abominal pain. Pt. was admitted ~1 month ago with GI bleed; was scheduled for colonoscopy , but when started prep at home symptoms began. 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       Pain:  Pain Assessment  Pain Assessment: 0-10  Pain Score: 5 - Moderate pain  Pain Location: Abdomen    Objective   Cognition:  Overall Cognitive Status: Impaired  Orientation Level:  (Ax2; unable to recall month or year)           Home Living:  Type of Home: House  Lives With: Spouse  Home Adaptive Equipment: Walker rolling or standard  Home Layout: Two level  Home Access:  "Stairs to enter with rails  Entrance Stairs-Number of Steps: 2  Bathroom Shower/Tub: Tub/shower unit  Home Living Comments: Pt. bed and bath on main floor of house; ocasional use of WW per pt. report. Denies falls. Laundry on the main floor.  Prior Function:  Level of [place]: Independent with ADLs and functional transfers, Independent with homemaking with ambulation  ADL Assistance: Independent  IADL History:  Mode of Transportation:  (does not drive)  IADL Comments:  and pt. share IADLs.  drives  ADL:  Eating Assistance: Independent  Grooming Assistance: Minimal  Bathing Assistance: Minimal  UE Dressing Assistance: Independent  LE Dressing Assistance: Moderate  Toileting Assistance with Device: Minimal       Bed Mobility/Transfers: Bed Mobility  Bed Mobility: Yes  Bed Mobility 1  Bed Mobility 1: Sitting to supine  Level of Assistance 1: Minimum assistance  Bed Mobility Comments 1: Assist for trunk and scooting to edge of bed   and Transfers  Transfer: Yes  Transfer 1  Technique 1: Sit to stand  Transfer Device 1: Walker  Transfer Level of Assistance 1: Minimum assistance  Trials/Comments 1: Assist to lift and steady  Transfers 2  Technique 2: Stand pivot  Transfer Device 2: Walker  Transfer Level of Assistance 2: Minimum assistance  Trials/Comments 2: VCs to orient walker and turning as pt. continues to state \"you know I have poor eyesight, right?\" (Min A with WW for functional mobility  ~10 feet 2x in room.)        Vision:Vision - Basic Assessment  Current Vision: Wears glasses all the time  Visual History:  (wears sunglassess due to light sensitivity; reports L eye blindness)     Balance:   Fair dyn standing balance during functional mobility and transfers  Fair dyn sitting balance    Strength:  Strength Comments: Grossly 3+/5 MMT BUEs       Extremities: RUE   RUE :  (WFL shoulder, WNL elbow/wrist hand) and IZABELAE   LUE:  (WFL shoulder, WNL elbow/wrist hand)      Outcome Measures:[facility] Daily " Activity  Putting on and taking off regular lower body clothing: A lot  Bathing (including washing, rinsing, drying): A little  Putting on and taking off regular upper body clothing: None  Toileting, which includes using toilet, bedpan or urinal: A little  Taking care of personal grooming such as brushing teeth: A little  Eating Meals: None  Daily Activity - Total Score: 19        Education Documentation  ADL Training, taught by Joan Torres, OT at 10/9/2023 12:58 PM.  Learner: Patient  Readiness: Acceptance  Method: Explanation  Response: Verbalizes Understanding, Needs Reinforcement    Education Comments  No comments found.        OP EDUCATION:  Education  Individual(s) Educated: Patient  Plan of Care Discussed and Agreed Upon: yes    Goals:  Encounter Problems       Encounter Problems (Active)       OT Goals       good dyn standing balance during ADLs and transfers  (Progressing)       Start:  10/09/23    Expected End:  10/23/23            Mod I for all functional transfers  (Progressing)       Start:  10/09/23    Expected End:  10/23/23            Mod I for LB dressing  (Progressing)       Start:  10/09/23    Expected End:  10/23/23            Mod I grooming tasks standing sink side; good dyn standing balance  (Progressing)       Start:  10/09/23    Expected End:  10/23/23

## 2023-10-09 NOTE — CARE PLAN
Problem: impaired mobility   Goal: Pt will demonstrate mod I with bed mobility to edge of bed.    Outcome: Progressing  Goal: Pt will demonstrate mod I  with sit to stand/chair transfers with FWW.    Outcome: Progressing  Goal: Pt will ambulate 50 feet with FWW Supervision .    Outcome: Progressing  Goal: Pt to demo improved BLE strength by being able to complete supine/seated thera ex 2x20 BLEs with 4 or less rest breaks .    Outcome: Progressing

## 2023-10-09 NOTE — CONSULTS
"Department of Internal Medicine  Gastroenterology  Consult note      Reason for Consult: Persistent lower abdominal pain, recent EGD and colonoscopy for GIB    Chief Complaint: Reported persistent rectal bleeding    History Obtained from: Prior medical records, medical staff and limited history from the patient.  The patient is overall a poor informant    History of Present Illness      The patient is a 73 y.o. female with signficant past medical history of Billroth II gastrectomy, GERD, RA, possible dementia history of RACHELLE on iron who presents for weakness, abdominal pain, rectal bleeding.   Patient alert and oriented x2 at time of visit, more concerned about her \"migraine\" than answering questions.      Per prior notes, patient was apparently scheduled for outpatient colonoscopy today with Dr. Argueta.  Reportedly per prior notes, patient started on bowel prep at home PTA and became very weak, dizzy and noted to have lower abdominal pain, therefore prep was stopped.  Patient reported she has continued to have bright red blood per rectum at home, but none noted for the past several days per notes and patient. Bedside RN reports no bloody stools that she has seen as well.  At time of visit today, patient tolerating clear liquid diet with no abdominal pain, no nausea/vomiting.  No current chest pain or shortness of breath.  Patient is only complaining of a migraine at time of visit today.  Denies fever/chills.  Has denied constipation or diarrhea.    I discussed repeating a colonoscopy with the patient and she reports she would prefer to hold off at this time.    Hemoglobin dropped from yesterday 11-> 9.2 today, baseline hemoglobin 12  CT scan abdomen and pelvis without contrast showed no definite CT evidence of acute fracture or changes, extensive distal colonic diverticulosis without diverticulitis.    EGD 9/18/23 suspicious for long segment Gutierrez's Esophagus -biopsied.  Evidence of Billroth II anastomosis " characterized by edema, erosion and erythema-biopsied.  Biopsy results negative for H. Pylori, acute and chronic inflammation of the esophagus negative for intestinal metaplasia and dysplasia.     Colonoscopy 9/18/23-scope only advanced to the rectum before aborted secondary to inadequate bowel prep.  Diverticulosis noted in the rectosigmoid colon.       Allergies  Ciprofloxacin    Current Medication    Current Facility-Administered Medications:     acetaminophen (Tylenol) tablet 650 mg, 650 mg, oral, q4h PRN, 650 mg at 10/09/23 0930 **OR** acetaminophen (Tylenol) oral liquid 650 mg, 650 mg, oral, q4h PRN **OR** acetaminophen (Tylenol) suppository 650 mg, 650 mg, rectal, q4h PRN, Aniyah Lara MD    bisacodyl (Dulcolax) EC tablet 10 mg, 10 mg, oral, Daily PRN, Aniyah Lara MD, 10 mg at 10/09/23 0930    erythromycin (Romycin) 5 mg/gram (0.5 %) ophthalmic ointment 1 cm, 1 cm, Both Eyes, q8h RERE, Aniyah Lara MD    ferrous gluconate (Fergon) 324 (38 Fe) mg tablet 38 mg of iron, 38 mg of iron, oral, Daily with breakfast, Aniyah Lara MD, 38 mg of iron at 10/09/23 0931    gabapentin (Neurontin) capsule 600 mg, 600 mg, oral, Nightly, Aniyah Lara MD    lactated Ringer's infusion, 75 mL/hr, intravenous, Continuous, Aniyah Lara MD, Last Rate: 75 mL/hr at 10/08/23 1751, 75 mL/hr at 10/08/23 1751    levothyroxine (Synthroid, Levoxyl) tablet 50 mcg, 50 mcg, oral, Daily, Aniyah Lara MD, 50 mcg at 10/09/23 0506    metoprolol tartrate (Lopressor) tablet 25 mg, 25 mg, oral, BID, Aniyah Lara MD, 25 mg at 10/09/23 0930    morphine injection 2 mg, 2 mg, intravenous, q4h PRN, Aniyah Lara MD    ondansetron ODT (Zofran-ODT) disintegrating tablet 4 mg, 4 mg, oral, q8h PRN **OR** ondansetron (Zofran) injection 4 mg, 4 mg, intravenous, q8h PRN, Aniyah Lara MD    pantoprazole (ProtoNix) EC tablet 40 mg, 40 mg, oral, Daily, Aniyah Lara MD, 40 mg at 10/09/23 0507     polyethylene glycol (Glycolax, Miralax) packet 17 g, 17 g, oral, Daily, Aniyah Lara MD, 17 g at 10/09/23 0930    prednisoLONE acetate (Pred-Forte) 1 % ophthalmic suspension 1 drop, 1 drop, Left Eye, BID, Aniyah Lara MD    predniSONE (Deltasone) tablet 5 mg, 5 mg, oral, Daily, Aniyah Lara MD, 5 mg at 10/09/23 0930    SUMAtriptan (Imitrex) tablet 50 mg, 50 mg, oral, q8h PRN, Aniyah Lara MD, 50 mg at 10/08/23 7386    traMADol (Ultram) tablet 50 mg, 50 mg, oral, q8h PRN, José Miguel Sharma MD, 50 mg at 10/09/23 0509    Past Medical History  Active Ambulatory Problems     Diagnosis Date Noted    Bilateral sensorineural hearing loss 03/16/2023    Chronic nonspecific colitis 03/16/2023    Constipation in female 03/16/2023    Forgetfulness 03/16/2023    General physical deterioration 03/16/2023    GERD (gastroesophageal reflux disease) 03/16/2023    Hypothyroidism 03/16/2023    Intractable vascular headache 03/16/2023    Iron deficiency anemia 03/16/2023    Migraine without aura and with status migrainosus, not intractable 03/16/2023    Mild episode of recurrent major depressive disorder (CMS/HCC) 03/16/2023    Peripheral neuropathy 03/16/2023    Physical deconditioning 03/16/2023    Recurrent UTI 03/16/2023    Rheumatoid arthritis (CMS/HCC) 03/16/2023    Encounter for immunization 05/14/2023    Medicare annual wellness visit, subsequent 05/14/2023    Adjustment reaction with prolonged depressive reaction 06/06/2018    Anxiety 10/17/2011    Back pain 03/06/2014    Flank pain 04/04/2019    Bunion of right foot 06/28/2017    Cornea guttata 03/06/2016    Difficulty walking involving foot 06/28/2017    Edema 10/24/2013    Elevated C-reactive protein (CRP) 11/11/2013    Elevated sed rate 11/11/2013    Fatigue 10/21/2013    Foot pain 02/03/2015    Generalized abdominal pain 04/04/2019    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 10/19/2004    Hypercholesteremia 10/17/2011     Hypokalemia 03/05/2014    Joint swelling 10/21/2013    Knee pain, left 12/12/2013    Multiple joint pain 10/21/2013    Myalgia 10/21/2013    OA (osteoarthritis) 10/17/2011    Acute pyelonephritis 03/27/2019    Obstructive pyelonephritis 09/19/2016    Pain in both feet 02/03/2015    Pain in extremity 10/24/2013    Pelvic fracture (CMS/[facility]) 06/18/2014    Pseudophakia, left eye 03/06/2016    Rash 03/05/2014    Purpura (CMS/[facility]) 04/03/2015    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/06/2016    Rheumatoid arthritis involving both knees with positive rheumatoid factor (CMS/[facility]) 11/20/2016    Gutierrez's esophagus without dysplasia 09/15/2023    Gastroesophageal reflux disease without esophagitis 09/15/2023    Acquired hypothyroidism 10/17/2011    Rheumatoid arthritis of multiple sites without organ or system involvement with positive rheumatoid factor (CMS/[facility]) 03/05/2014     Resolved Ambulatory Problems     Diagnosis Date Noted    No Resolved Ambulatory Problems     Past Medical History:   Diagnosis Date    Calculus of kidney 10/01/2020    Diverticulitis of large intestine without perforation or abscess without bleeding 10/01/2020    Other specified disorders of eye and adnexa     Personal history of (healed) traumatic fracture 10/18/2020    Personal history of diseases of the blood and blood-forming organs and certain disorders involving the immune mechanism 10/27/2020    Personal history of other diseases of the musculoskeletal system and connective tissue 10/27/2020    Personal history of other specified conditions 10/18/2020    Personal history of other specified conditions     Personal history of other specified conditions 10/27/2020    Unspecified visual loss 10/27/2020    Uses walker     Wears glasses        Past Surgical History  Past Surgical History:   Procedure Laterality Date    CHOLECYSTECTOMY      CORNEAL TRANSPLANT Left     CT ABDOMEN PELVIS ANGIOGRAM W AND/OR WO IV CONTRAST  05/28/2022    CT ABDOMEN PELVIS ANGIOGRAM W  "AND/OR WO IV CONTRAST 5/28/2022 [facility]    HIP SURGERY  09/25/2020    HYSTERECTOMY      OTHER SURGICAL HISTORY  10/01/2020    Hip surgery    PARTIAL KNEE ARTHROPLASTY Left     Partial Replacement    TOTAL HIP ARTHROPLASTY Left     Total Hip Replacement       Family History  Family History   Problem Relation Name Age of Onset    No Known Problems Mother      No Known Problems Father      No Known Problems Sister      No Known Problems Brother       No family history of stomach or colon cancer    Social History  TOBACCO:  reports that she has never smoked. She has been exposed to tobacco smoke. She has never used smokeless tobacco.  ETOH:  reports that she does not currently use alcohol.  DRUGS:  reports no history of drug use.  MARITAL STATUS:   OCCUPATION:    Review of Systems  Review of Systems    All 10 systems reviewed with the patient and negative except for what is mentioned in HPI    PHYSICAL EXAM  VS: /72 (Patient Position: Lying)   Pulse 86   Temp 36.6 °C (97.9 °F) (Temporal)   Resp 14   Ht 1.6 m (5' 3\")   Wt 54.4 kg (120 lb)   SpO2 93%   BMI 21.26 kg/m²  Body mass index is 21.26 kg/m².  Physical Exam  Vitals reviewed.   Constitutional:       General: She is not in acute distress.     Appearance: Normal appearance.   HENT:      Head: Normocephalic.      Nose: Nose normal.      Mouth/Throat:      Mouth: Mucous membranes are moist.      Pharynx: Oropharynx is clear.   Eyes:      Extraocular Movements: Extraocular movements intact.      Conjunctiva/sclera: Conjunctivae normal.      Pupils: Pupils are equal, round, and reactive to light.   Cardiovascular:      Rate and Rhythm: Normal rate and regular rhythm.      Pulses: Normal pulses.      Heart sounds: Normal heart sounds.   Pulmonary:      Effort: Pulmonary effort is normal.      Breath sounds: Normal breath sounds.   Abdominal:      General: Abdomen is flat. Bowel sounds are normal. There is no distension.      Palpations: Abdomen " is soft.      Tenderness: There is no abdominal tenderness. There is no guarding or rebound.   Musculoskeletal:         General: Normal range of motion.      Cervical back: Normal range of motion.   Skin:     General: Skin is warm and dry.   Neurological:      Mental Status: She is alert.   Psychiatric:         Mood and Affect: Mood normal.         Behavior: Behavior normal.     Oriented x2, unsure of the year         DATA  Recent blood work and relevant radiology and endoscopic studies were reviewed and discussed with the patient   Results from last 7 days   Lab Units 10/09/23  0646   WBC AUTO x10*3/uL 8.8   RBC AUTO x10*6/uL 4.26   HEMOGLOBIN g/dL 9.2*   HEMATOCRIT % 33.9*   MCV fL 80   MCHC g/dL 27.1*   RDW % 23.2*   PLATELETS AUTO x10*3/uL 306   MPV fL 9.9       Results from last 72 hours   Lab Units 10/09/23  0646 10/08/23  0518   SODIUM mmol/L 142 140   POTASSIUM mmol/L 3.4* 3.1*   CHLORIDE mmol/L 105 103   CO2 mmol/L 25 25   BUN mg/dL 5* 13   CREATININE mg/dL 0.40* 0.57   CALCIUM mg/dL 8.7 9.6   PROTEIN TOTAL g/dL  --  7.8   BILIRUBIN TOTAL mg/dL  --  0.8   ALK PHOS U/L  --  41   AST U/L  --  16   ALT U/L  --  9       Results from last 72 hours   Lab Units 10/08/23  0518   INR  1.1       Results from last 72 hours   Lab Units 10/08/23  0518   LIPASE U/L 7*           IMPRESSION/RECOMMENDATIONS    The patient is a 73 y.o. female with signficant past medical history of Billroth II gastrectomy, GERD, RA, possible dementia history of RACHELLE on iron who presents for weakness, abdominal pain, rectal bleeding.  The patient is a poor informant     Per prior notes, patient was apparently scheduled for outpatient colonoscopy today (10/9/23) with Dr. Argueta.  Reportedly per prior notes, patient started on bowel prep at home PTA and became very weak, dizzy and noted to have lower abdominal pain, therefore prep was stopped.  Patient reported she has continued to have bright red blood per rectum at home, but none noted for the  past several days per notes and patient. Bedside RN reports no bloody stools that she has seen as well.  At time of visit today, patient tolerating clear liquid diet with no abdominal pain, no nausea/vomiting.  No current chest pain or shortness of breath.  Patient is only complaining of a migraine at time of visit today.  Denies fever/chills.  Has denied constipation or diarrhea. per prior notes, reportedly on anticoagulant that has since been discontinued.  Patient reports she is currently not on a blood thinner.     CT scan abdomen and pelvis without contrast showed no definite CT evidence of acute fracture or changes, extensive distal colonic diverticulosis without diverticulitis.      EGD 9/18/23 suspicious for long segment Gutierrez's Esophagus -biopsied.  Evidence of Billroth II anastomosis characterized by edema, erosion and erythema-biopsied.  Biopsy results negative for H. Pylori, acute and chronic inflammation of the esophagus negative for intestinal metaplasia and dysplasia.     Colonoscopy 9/18/23-scope only advanced to the rectum before aborted secondary to inadequate bowel prep.  Diverticulosis noted in the rectosigmoid colon.              Assessment  #history of GIB (hematochezia), but no current signs of active GI bleeding with no bloody bowel movements reported for the past several days.  Patient had recent EGD last month and colonoscopy aborted due to inadequate prep. VSS  #Normocytic anemia-history of RACHELLE on iron - Hemoglobin dropped from yesterday (10/8) 11-> 9.2 today, baseline hemoglobin 12  #abdominal pain - appears to have subsided. Tolerating CLD      Plan  -Patient needs repeat colonoscopy at some point with 2 days of bowel prep.  However, will hold off at this time since no signs of current active GI bleeding and patient also prefers to hold off on a colonoscopy at this time.  -Continue pantoprazole 40 mg daily  -trend hgb and hct  -monitor consistency and color of stool. Monitor for signs  of overt GI bleeding  -tranfuse for hgb < 7.0  -avoid NSAIDs  -Continue supportive care per primary team      Discussed patient case with Dr. Stevenson.  GI will continue to follow  Total of 60 minutes spent on visit and overall professional capacity of caring for the patient      (Electronically signed byANGEL Rey on 10/9/2023 at 10:32 AM)   Inpatient consult to Gastroenterology  Consult performed by: ANGEL Rey  Consult ordered by: Aniyah Lara MD

## 2023-10-10 ENCOUNTER — HOME HEALTH ADMISSION (OUTPATIENT)
Dept: HOME HEALTH SERVICES | Facility: HOME HEALTH | Age: 73
End: 2023-10-10
Payer: COMMERCIAL

## 2023-10-10 VITALS
HEIGHT: 63 IN | SYSTOLIC BLOOD PRESSURE: 147 MMHG | HEART RATE: 71 BPM | TEMPERATURE: 97.5 F | DIASTOLIC BLOOD PRESSURE: 66 MMHG | BODY MASS INDEX: 21.26 KG/M2 | OXYGEN SATURATION: 95 % | WEIGHT: 120 LBS | RESPIRATION RATE: 18 BRPM

## 2023-10-10 LAB
ANION GAP SERPL CALC-SCNC: 13 MMOL/L (ref 10–20)
BUN SERPL-MCNC: 6 MG/DL (ref 6–23)
CALCIUM SERPL-MCNC: 9.2 MG/DL (ref 8.6–10.3)
CHLORIDE SERPL-SCNC: 105 MMOL/L (ref 98–107)
CO2 SERPL-SCNC: 25 MMOL/L (ref 21–32)
CREAT SERPL-MCNC: 0.51 MG/DL (ref 0.5–1.05)
ERYTHROCYTE [DISTWIDTH] IN BLOOD BY AUTOMATED COUNT: 23.5 % (ref 11.5–14.5)
GFR SERPL CREATININE-BSD FRML MDRD: >90 ML/MIN/1.73M*2
GLUCOSE SERPL-MCNC: 103 MG/DL (ref 74–99)
HCT VFR BLD AUTO: 36 % (ref 36–46)
HGB BLD-MCNC: 9.7 G/DL (ref 12–16)
MAGNESIUM SERPL-MCNC: 1.4 MG/DL (ref 1.6–2.4)
MCH RBC QN AUTO: 21.8 PG (ref 26–34)
MCHC RBC AUTO-ENTMCNC: 26.9 G/DL (ref 32–36)
MCV RBC AUTO: 81 FL (ref 80–100)
NRBC BLD-RTO: 0 /100 WBCS (ref 0–0)
PLATELET # BLD AUTO: 375 X10*3/UL (ref 150–450)
PMV BLD AUTO: 10 FL (ref 7.5–11.5)
POTASSIUM SERPL-SCNC: 3.4 MMOL/L (ref 3.5–5.3)
RBC # BLD AUTO: 4.44 X10*6/UL (ref 4–5.2)
SODIUM SERPL-SCNC: 140 MMOL/L (ref 136–145)
WBC # BLD AUTO: 8.1 X10*3/UL (ref 4.4–11.3)

## 2023-10-10 PROCEDURE — 2500000001 HC RX 250 WO HCPCS SELF ADMINISTERED DRUGS (ALT 637 FOR MEDICARE OP): Performed by: HOSPITALIST

## 2023-10-10 PROCEDURE — 96375 TX/PRO/DX INJ NEW DRUG ADDON: CPT

## 2023-10-10 PROCEDURE — 96365 THER/PROPH/DIAG IV INF INIT: CPT

## 2023-10-10 PROCEDURE — 2500000004 HC RX 250 GENERAL PHARMACY W/ HCPCS (ALT 636 FOR OP/ED): Performed by: INTERNAL MEDICINE

## 2023-10-10 PROCEDURE — 83735 ASSAY OF MAGNESIUM: CPT | Performed by: INTERNAL MEDICINE

## 2023-10-10 PROCEDURE — 36415 COLL VENOUS BLD VENIPUNCTURE: CPT | Performed by: INTERNAL MEDICINE

## 2023-10-10 PROCEDURE — 85027 COMPLETE CBC AUTOMATED: CPT | Performed by: INTERNAL MEDICINE

## 2023-10-10 PROCEDURE — 96366 THER/PROPH/DIAG IV INF ADDON: CPT

## 2023-10-10 PROCEDURE — 97530 THERAPEUTIC ACTIVITIES: CPT | Mod: GP

## 2023-10-10 PROCEDURE — 99239 HOSP IP/OBS DSCHRG MGMT >30: CPT | Performed by: INTERNAL MEDICINE

## 2023-10-10 PROCEDURE — 96367 TX/PROPH/DG ADDL SEQ IV INF: CPT

## 2023-10-10 PROCEDURE — 36415 COLL VENOUS BLD VENIPUNCTURE: CPT | Mod: 59 | Performed by: INTERNAL MEDICINE

## 2023-10-10 PROCEDURE — 2500000001 HC RX 250 WO HCPCS SELF ADMINISTERED DRUGS (ALT 637 FOR MEDICARE OP): Performed by: INTERNAL MEDICINE

## 2023-10-10 PROCEDURE — 2500000004 HC RX 250 GENERAL PHARMACY W/ HCPCS (ALT 636 FOR OP/ED): Performed by: HOSPITALIST

## 2023-10-10 PROCEDURE — G0378 HOSPITAL OBSERVATION PER HR: HCPCS

## 2023-10-10 PROCEDURE — 80048 BASIC METABOLIC PNL TOTAL CA: CPT | Performed by: INTERNAL MEDICINE

## 2023-10-10 RX ORDER — TRAMADOL HYDROCHLORIDE 50 MG/1
50 TABLET ORAL DAILY
Start: 2023-10-10 | End: 2023-11-22 | Stop reason: WASHOUT

## 2023-10-10 RX ORDER — MAGNESIUM SULFATE HEPTAHYDRATE 40 MG/ML
2 INJECTION, SOLUTION INTRAVENOUS ONCE
Status: COMPLETED | OUTPATIENT
Start: 2023-10-10 | End: 2023-10-10

## 2023-10-10 RX ORDER — SUMATRIPTAN 50 MG/1
50 TABLET, FILM COATED ORAL EVERY 8 HOURS PRN
Qty: 30 TABLET | Refills: 0 | Status: SHIPPED | OUTPATIENT
Start: 2023-10-10 | End: 2023-11-22 | Stop reason: WASHOUT

## 2023-10-10 RX ORDER — POTASSIUM CHLORIDE 20 MEQ/1
40 TABLET, EXTENDED RELEASE ORAL ONCE
Status: COMPLETED | OUTPATIENT
Start: 2023-10-10 | End: 2023-10-10

## 2023-10-10 RX ADMIN — ACETAMINOPHEN 650 MG: 325 TABLET ORAL at 00:09

## 2023-10-10 RX ADMIN — SUMATRIPTAN SUCCINATE 50 MG: 25 TABLET ORAL at 09:36

## 2023-10-10 RX ADMIN — DOCUSATE SODIUM 50MG AND SENNOSIDES 8.6MG 1 TABLET: 8.6; 5 TABLET, FILM COATED ORAL at 09:33

## 2023-10-10 RX ADMIN — PREDNISOLONE ACETATE 1 DROP: 10 SUSPENSION/ DROPS OPHTHALMIC at 09:00

## 2023-10-10 RX ADMIN — METOPROLOL TARTRATE 25 MG: 25 TABLET, FILM COATED ORAL at 09:34

## 2023-10-10 RX ADMIN — FERROUS GLUCONATE 38 MG OF IRON: 324 TABLET ORAL at 09:40

## 2023-10-10 RX ADMIN — POLYETHYLENE GLYCOL 3350 17 G: 17 POWDER, FOR SOLUTION ORAL at 09:35

## 2023-10-10 RX ADMIN — LEVOTHYROXINE SODIUM 50 MCG: 50 TABLET ORAL at 06:14

## 2023-10-10 RX ADMIN — MAGNESIUM SULFATE HEPTAHYDRATE 2 G: 40 INJECTION, SOLUTION INTRAVENOUS at 09:35

## 2023-10-10 RX ADMIN — TRAMADOL HYDROCHLORIDE 50 MG: 50 TABLET, COATED ORAL at 09:36

## 2023-10-10 RX ADMIN — PREDNISONE 5 MG: 5 TABLET ORAL at 09:34

## 2023-10-10 RX ADMIN — PANTOPRAZOLE SODIUM 40 MG: 40 TABLET, DELAYED RELEASE ORAL at 06:14

## 2023-10-10 RX ADMIN — POTASSIUM CHLORIDE 40 MEQ: 1500 TABLET, EXTENDED RELEASE ORAL at 09:34

## 2023-10-10 ASSESSMENT — COGNITIVE AND FUNCTIONAL STATUS - GENERAL
DRESSING REGULAR UPPER BODY CLOTHING: A LITTLE
TOILETING: A LOT
MOBILITY SCORE: 13
WALKING IN HOSPITAL ROOM: A LOT
WALKING IN HOSPITAL ROOM: A LOT
DAILY ACTIVITIY SCORE: 15
TURNING FROM BACK TO SIDE WHILE IN FLAT BAD: A LOT
CLIMB 3 TO 5 STEPS WITH RAILING: A LOT
CLIMB 3 TO 5 STEPS WITH RAILING: TOTAL
MOVING FROM LYING ON BACK TO SITTING ON SIDE OF FLAT BED WITH BEDRAILS: A LITTLE
PERSONAL GROOMING: A LOT
MOVING TO AND FROM BED TO CHAIR: A LOT
EATING MEALS: A LITTLE
STANDING UP FROM CHAIR USING ARMS: A LOT
MOVING FROM LYING ON BACK TO SITTING ON SIDE OF FLAT BED WITH BEDRAILS: A LITTLE
MOBILITY SCORE: 13
DRESSING REGULAR LOWER BODY CLOTHING: A LITTLE
STANDING UP FROM CHAIR USING ARMS: A LOT
HELP NEEDED FOR BATHING: A LOT
MOVING TO AND FROM BED TO CHAIR: A LOT
TURNING FROM BACK TO SIDE WHILE IN FLAT BAD: A LITTLE

## 2023-10-10 ASSESSMENT — PAIN SCALES - GENERAL
PAINLEVEL_OUTOF10: 10 - WORST POSSIBLE PAIN
PAINLEVEL_OUTOF10: 10 - WORST POSSIBLE PAIN

## 2023-10-10 NOTE — PROGRESS NOTES
Physical Therapy    Physical Therapy Treatment    Patient Name: Alisa Fregoso  MRN: [id number]  Today's Date: 10/10/2023  Time Calculation  Start Time: 1136  Stop Time: 1150  Time Calculation (min): 14 min       Assessment/Plan   PT Assessment  PT Assessment Results: Decreased strength, Decreased endurance, Impaired balance, Decreased mobility, Pain  Rehab Prognosis: Fair  Evaluation/Treatment Tolerance: Patient limited by fatigue, Patient limited by pain  End of Session Communication: Bedside nurse  Assessment Comment: Pt requiring increased assistance for bed mobility this date with heavy cues and assist for trunk control.  End of Session Patient Position: Bed, 2 rail up, Alarm off, not on at start of session     PT Plan  Treatment/Interventions: Bed mobility, Transfer training, Gait training, Stair training, Balance training, Strengthening, Endurance training, Therapeutic exercise, Therapeutic activity, Home exercise program  PT Plan: Skilled PT  PT Frequency: 3 times per week  PT Discharge Recommendations: Low intensity level of continued care  PT Recommended Transfer Status: Assist x1      General Visit Information:   PT  Visit  PT Received On: 10/10/23  General  Family/Caregiver Present: Yes  Caregiver Feedback:   Patient Position Received: Bed, 3 rail up, Alarm off, not on at start of session  General Comment: Pt comfortable in bed,  present, agreeable to PT treatment. States she is discharging later today, declines OOB however agreeable to scooting up in bed for lunch.    Subjective   Precautions:  Precautions  Precautions Comment:  (Pt. 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  Extremity/Trunk Assessments:    Activity  Tolerance:     Treatments:  Bed Mobility  Bed Mobility: Yes  Bed Mobility 1  Bed Mobility Comments 1: sup > sit; mod A, heavy cues for hand placement, poor trunk control requiring therapist assist  Bed Mobility 2  Bed Mobility Comments 2: sit > sup, mod A, assist to bring BLE's into bed and to scoot towards HOB    Outcome Measures:  [facility] Basic Mobility  Turning from your back to your side while in a flat bed without using bedrails: A little  Moving from lying on your back to sitting on the side of a flat bed without using bedrails: A lot  Moving to and from bed to chair (including a wheelchair): A lot  Standing up from a chair using your arms (e.g. wheelchair or bedside chair): A lot  To walk in hospital room: A lot  Climbing 3-5 steps with railing: A lot  Basic Mobility - Total Score: 13    Education Documentation  Mobility Training, taught by Arleth Ta, PT at 10/10/2023 12:29 PM.  Learner: Patient  Readiness: Acceptance  Method: Explanation  Response: Verbalizes Understanding    Education Comments  No comments found.        OP EDUCATION:  Education  Individual(s) Educated: Spouse, Patient  Education Provided: Body Mechanics, Fall Risk  Patient Response to Education: Patient/Caregiver Verbalized Understanding of Information    Encounter Problems       Encounter Problems (Active)       impaired mobility        Pt will demonstrate mod I with bed mobility to edge of bed.   (Progressing)       Start:  10/09/23    Expected End:  10/23/23            Pt will demonstrate mod I  with sit to stand/chair transfers with FWW.   (Progressing)       Start:  10/09/23    Expected End:  10/23/23            Pt will ambulate 50 feet with FWW Supervision .   (Progressing)       Start:  10/09/23    Expected End:  10/23/23            Pt to demo improved BLE strength by being able to complete supine/seated thera ex 2x20 BLEs with 4 or less rest breaks .   (Progressing)       Start:  10/09/23    Expected End:  10/23/23

## 2023-10-10 NOTE — DISCHARGE SUMMARY
DISCHARGE DIAGNOSIS     Abdominal pain  Constipation  Migraine HA  Generalized weakness  Recent hx of GIB  Hypokalemia  Hypomagnesemia  Hypothyroidism  RACHELLE  RA  GERD    HOSPITAL COURSE AND DETAILS     73F who presented with abd pain and HA. She was admitted around 3 weeks ago to this facility for GI bleed, had upper EGD that showed no active GI bleed and also had colonoscopy with bad preparation. She was prepping for outpt colonoscopy, but was having issues with lower abd pain and HA.     #1 persistent abdominal pain: In the setting of recent GI bleed, status post EGD and colonoscopy.  EGD was unremarkable for active GI bleed, colonoscopy with unsatisfactory preparation.  CT scan abdomen and pelvis reviewed as above, no acute findings.  LFT was unremarkable.  She improved with IVF hydration and bowel regimen. Her pain may have been 2/2 constipation. She already has bowel regimen at home, instructed to continue.      #2 migraines: CT scan brain showed no acute findings.  Improved with Imitrex, will send with Rx for this for PRN use at home.      #3 weakness/debility/functional decline: With frequent visits to ED, patient was in the ED couple days ago, was discharged home and came back because of weakness and difficulty ambulating.  Seen by PT/OT, rec for resuming [facility] services, will resume [facility] PT/OT.      #4 recent history of GI bleed: Status post upper EGD that showed no active bleed, colonoscopy was unsatisfactory because of bed preparation. She was seen by GI here, who recommended no inpt scope, but will reschedule     #5 hypokalemia: Replete PRN     #6 rheumatoid arthritis: Continue long-term prednisone 5 mg daily, avoid NSAIDs.  Can use Tylenol as needed.     #7 hypothyroidism: Continue levothyroxine.     #8 iron deficiency anemia: Hemoglobin and hematocrit are stable, no active bleeding.  Plan to monitor. Getting outpt c-scope.      #9 GERD: Continue PPI.    #10 Asymptomatic bacteruria: UA weakly suggestive of  UTI, however no urinary symptoms, no fever, no leukocytosis, will not treat.      #11 CODE STATUS: DNR CCA, discussed with the patient and her  was at the bedside.  Patient clearly mentioned that she does not want CPR, intubation or mechanical ventilation.    #12 Hypokalemia: repleted    #13 Hypomagnesemia: repleted    Stable for discharge to home. Will resume [facility] services. Has close fu with PCP tomorrow. Total time spent on discharge services 34 minutes.      Sergio Cagle MD        DISCHARGE PHYSICAL EXAM     Last Recorded Vitals:  Vitals:    10/09/23 1410 10/09/23 1940 10/10/23 0037 10/10/23 0715   BP: 147/68 155/74 162/77 147/66   BP Location:    Left arm   Patient Position: Lying   Lying   Pulse: 65 69 62 71   Resp: 16 17 17 18   Temp: 36.3 °C (97.3 °F) 36.1 °C (97 °F) 36.1 °C (97 °F) 36.4 °C (97.5 °F)   TempSrc: Temporal   Temporal   SpO2: 94% 95% 95% 95%   Weight:       Height:           Physical Exam:  General: Awake, alert, oriented x3, no distress, cooperative.  HEENT: blind.  Neck: Supple, no JVD, no masses, no lymphadenopathy.  Chest: Diminished breath sounds bilateral, otherwise clear, no wheezes, no crackles, no rhonchi.  Heart: irregular rate and rhythm, S1-S2 normal, no murmur, no gallops.  Abdomen: soft, NT today  Neurological: Blind, alert and oriented x3, cranial nerves are intact, normal power and tone of 4 limbs.  Skin: No lesions, no skin rash.  Warm and dry.  Musculoskeletal: Normal, atraumatic, no obvious deformities.  Legs: No leg edema, no clubbing or cyanosis.  Psych: Appropriate mood and behavior.      DISCHARGE MEDICATIONS        Your medication list        START taking these medications        Instructions Last Dose Given Next Dose Due   SUMAtriptan 50 mg tablet  Commonly known as: Imitrex      Take 1 tablet (50 mg) by mouth every 8 hours if needed for migraine. May repeat dose once in 2 hours if no relief. Do not exceed 200 mg in 24 hours.              CONTINUE taking these  medications        Instructions Last Dose Given Next Dose Due   acetaminophen 500 mg tablet  Commonly known as: Tylenol           ACIDOPHILUS PROBIOTIC ORAL           cyclobenzaprine 10 mg tablet  Commonly known as: Flexeril           dicyclomine 20 mg tablet  Commonly known as: Bentyl           diphenhydrAMINE 25 mg capsule  Commonly known as: BENADryl           docusate sodium 100 mg capsule  Commonly known as: Colace           ergocalciferol 1.25 MG (65853 UT) capsule  Commonly known as: Vitamin D-2           erythromycin 5 mg/gram (0.5 %) ophthalmic ointment  Commonly known as: Romycin           ferrous gluconate 324 (38 Fe) mg tablet  Commonly known as: Fergon           gabapentin 300 mg capsule  Commonly known as: Neurontin      Take 2 capsules (600 mg) by mouth once daily at bedtime.       levothyroxine 50 mcg tablet  Commonly known as: Synthroid, Levoxyl           metoprolol tartrate 25 mg tablet  Commonly known as: Lopressor           naloxone 1 mg/mL injection  Commonly known as: Narcan      Inject 0.4 mL (0.4 mg) into the shoulder, thigh, or buttocks if needed for opioid reversal or respiratory depression. May repeat every 2-3 minutes as needed until medical assistance available.       naproxen 375 mg tablet  Commonly known as: Naprosyn      TAKE 1 TABLET BY MOUTH TWICE DAILY AFTER A MEAL AS NEEDED       pantoprazole 40 mg EC tablet  Commonly known as: ProtoNix           polyethylene glycol 17 gram/dose powder  Commonly known as: Glycolax, Miralax           prednisoLONE acetate 1 % ophthalmic suspension  Commonly known as: Pred-Forte           prednisoLONE sodium phosphate 1 % ophthalmic solution  Commonly known as: Inflamase Forte           predniSONE 5 mg tablet  Commonly known as: Deltasone      Take 1 tablet (5 mg) by mouth once daily.       traMADol 50 mg tablet  Commonly known as: Ultram      Take 1 tablet (50 mg) by mouth once daily.              STOP taking these medications      nitrofurantoin 50  mg capsule  Commonly known as: Macrodantin        polyethylene glycol-electrolytes 420 gram solution  Commonly known as: Nulytely                  Where to Get Your Medications        Information about where to get these medications is not yet available    Ask your nurse or doctor about these medications  SUMAtriptan 50 mg tablet  traMADol 50 mg tablet           OUTPATIENT FOLLOW-UP     Future Appointments   Date Time Provider Department Center   10/11/2023  1:00 PM DO BACILIO Mcqueen[address]

## 2023-10-10 NOTE — PROGRESS NOTES
Department of Internal Medicine  Gastroenterology  Progress note      Subjective  GI is following for abdominal pain and intermittent rectal bleeding.    Hemoglobin has remained stable/improved from yesterday 9.2-> 9.7 today with no new signs of overt GI bleeding.  VSS.  Per bedside RN, Anitra the patient had a couple of mucousy brown, nonbloody bowel movements yesterday.    No reports of abdominal pain, currently on a regular diet.    Discussed repeating colonoscopy with the patient and .   reports he would like to take the patient home and reschedule a colonoscopy again outpatient in a few weeks.      Current Medication    Current Facility-Administered Medications:     acetaminophen (Tylenol) tablet 650 mg, 650 mg, oral, q4h PRN, 650 mg at 10/10/23 0009 **OR** acetaminophen (Tylenol) oral liquid 650 mg, 650 mg, oral, q4h PRN **OR** acetaminophen (Tylenol) suppository 650 mg, 650 mg, rectal, q4h PRN, Aniyah Lara MD    bisacodyl (Dulcolax) EC tablet 10 mg, 10 mg, oral, Daily PRN, Aniyah Lara MD, 10 mg at 10/09/23 0930    erythromycin (Romycin) 5 mg/gram (0.5 %) ophthalmic ointment 1 cm, 1 cm, Both Eyes, q8h RERE, Aniyah Lara MD, 1 cm at 10/09/23 2138    ferrous gluconate (Fergon) 324 (38 Fe) mg tablet 38 mg of iron, 38 mg of iron, oral, Daily with breakfast, Aniyah Lara MD, 38 mg of iron at 10/10/23 0940    gabapentin (Neurontin) capsule 600 mg, 600 mg, oral, Nightly, Aniyah Lara MD, 600 mg at 10/09/23 2137    levothyroxine (Synthroid, Levoxyl) tablet 50 mcg, 50 mcg, oral, Daily, Aniyah Lara MD, 50 mcg at 10/10/23 0614    metoprolol tartrate (Lopressor) tablet 25 mg, 25 mg, oral, BID, Aniyah Lara MD, 25 mg at 10/10/23 0934    morphine injection 2 mg, 2 mg, intravenous, q4h PRN, Aniyah Lara MD    ondansetron ODT (Zofran-ODT) disintegrating tablet 4 mg, 4 mg, oral, q8h PRN **OR** ondansetron (Zofran) injection 4 mg, 4 mg, intravenous, q8h PRN,  Aniyah Lara MD    pantoprazole (ProtoNix) EC tablet 40 mg, 40 mg, oral, Daily, Aniyah Lara MD, 40 mg at 10/10/23 0614    polyethylene glycol (Glycolax, Miralax) packet 17 g, 17 g, oral, BID, Sergio Cagle MD, 17 g at 10/10/23 0935    prednisoLONE acetate (Pred-Forte) 1 % ophthalmic suspension 1 drop, 1 drop, Left Eye, BID, Aniyah Lara MD, 1 drop at 10/10/23 0900    predniSONE (Deltasone) tablet 5 mg, 5 mg, oral, Daily, Aniyah Lara MD, 5 mg at 10/10/23 0934    sennosides-docusate sodium (Shante-Colace) 8.6-50 mg per tablet 1 tablet, 1 tablet, oral, BID, Sergio Cagle MD, 1 tablet at 10/10/23 0933    SUMAtriptan (Imitrex) tablet 50 mg, 50 mg, oral, q8h PRN, Aniyah Lara MD, 50 mg at 10/10/23 0936    traMADol (Ultram) tablet 50 mg, 50 mg, oral, q8h PRN, José Miguel Sharma MD, 50 mg at 10/10/23 0936    Past Medical History  Active Ambulatory Problems     Diagnosis Date Noted    Bilateral sensorineural hearing loss 03/16/2023    Chronic nonspecific colitis 03/16/2023    Constipation in female 03/16/2023    Forgetfulness 03/16/2023    General physical deterioration 03/16/2023    GERD (gastroesophageal reflux disease) 03/16/2023    Hypothyroidism 03/16/2023    Intractable vascular headache 03/16/2023    Iron deficiency anemia 03/16/2023    Migraine without aura and with status migrainosus, not intractable 03/16/2023    Mild episode of recurrent major depressive disorder (CMS/HCC) 03/16/2023    Peripheral neuropathy 03/16/2023    Physical deconditioning 03/16/2023    Recurrent UTI 03/16/2023    Rheumatoid arthritis (CMS/HCC) 03/16/2023    Encounter for immunization 05/14/2023    Medicare annual wellness visit, subsequent 05/14/2023    Adjustment reaction with prolonged depressive reaction 06/06/2018    Anxiety 10/17/2011    Back pain 03/06/2014    Flank pain 04/04/2019    Bunion of right foot 06/28/2017    Cornea guttata 03/06/2016    Difficulty walking involving foot 06/28/2017    Edema  10/24/2013    Elevated C-reactive protein (CRP) 11/11/2013    Elevated sed rate 11/11/2013    Fatigue 10/21/2013    Foot pain 02/03/2015    Generalized abdominal pain 04/04/2019    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 10/19/2004    Hypercholesteremia 10/17/2011    Hypokalemia 03/05/2014    Joint swelling 10/21/2013    Knee pain, left 12/12/2013    Multiple joint pain 10/21/2013    Myalgia 10/21/2013    OA (osteoarthritis) 10/17/2011    Acute pyelonephritis 03/27/2019    Obstructive pyelonephritis 09/19/2016    Pain in both feet 02/03/2015    Pain in extremity 10/24/2013    Pelvic fracture (CMS/[facility]) 06/18/2014    Pseudophakia, left eye 03/06/2016    Rash 03/05/2014    Purpura (CMS/[facility]) 04/03/2015    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/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    Rheumatoid arthritis involving both knees with positive rheumatoid factor (CMS/[facility]) 11/20/2016    Gutierrez's esophagus without dysplasia 09/15/2023    Gastroesophageal reflux disease without esophagitis 09/15/2023    Acquired hypothyroidism 10/17/2011    Rheumatoid arthritis of multiple sites without organ or system involvement with positive rheumatoid factor (CMS/[facility]) 03/05/2014     Resolved Ambulatory Problems     Diagnosis Date Noted    No Resolved Ambulatory Problems     Past Medical History:   Diagnosis Date    Calculus of kidney 10/01/2020    Diverticulitis of large intestine without perforation or abscess without bleeding 10/01/2020    Other specified disorders of eye and adnexa     Personal history of (healed) traumatic fracture 10/18/2020    Personal history of diseases of the blood and blood-forming organs and certain disorders involving the immune mechanism 10/27/2020    Personal history of other diseases of the musculoskeletal system and connective tissue 10/27/2020    Personal history of other specified conditions 10/18/2020    Personal history of other specified conditions     Personal history of other specified conditions 10/27/2020     "Unspecified visual loss 10/27/2020    Uses walker     Wears glasses        PHYSICAL EXAM  VS: /66 (BP Location: Left arm, Patient Position: Lying)   Pulse 71   Temp 36.4 °C (97.5 °F) (Temporal)   Resp 18   Ht 1.6 m (5' 3\")   Wt 54.4 kg (120 lb)   SpO2 95%   BMI 21.26 kg/m²  Body mass index is 21.26 kg/m².       DATA  Recent blood work and relevant radiology and endoscopic studies were reviewed and discussed with the patient   Results from last 7 days   Lab Units 10/10/23  0619   WBC AUTO x10*3/uL 8.1   RBC AUTO x10*6/uL 4.44   HEMOGLOBIN g/dL 9.7*   HEMATOCRIT % 36.0   MCV fL 81   MCHC g/dL 26.9*   RDW % 23.5*   PLATELETS AUTO x10*3/uL 375   MPV fL 10.0       Results from last 72 hours   Lab Units 10/10/23  0619 10/09/23  0646 10/08/23  0518   SODIUM mmol/L 140   < > 140   POTASSIUM mmol/L 3.4*   < > 3.1*   CHLORIDE mmol/L 105   < > 103   CO2 mmol/L 25   < > 25   BUN mg/dL 6   < > 13   CREATININE mg/dL 0.51   < > 0.57   CALCIUM mg/dL 9.2   < > 9.6   PROTEIN TOTAL g/dL  --   --  7.8   BILIRUBIN TOTAL mg/dL  --   --  0.8   ALK PHOS U/L  --   --  41   AST U/L  --   --  16   ALT U/L  --   --  9    < > = values in this interval not displayed.       Results from last 72 hours   Lab Units 10/08/23  0518   INR  1.1       Results from last 72 hours   Lab Units 10/08/23  0518   LIPASE U/L 7*           IMPRESSION/RECOMMENDATIONS    Assessment  #history of GIB (hematochezia), but no current signs of active GI bleeding with no bloody bowel movements reported for the past several days.  Patient had a couple mucousy brown, nonbloody bowel movements yesterday.  Patient had recent EGD last month and colonoscopy aborted due to inadequate prep. VSS  #Normocytic anemia-history of RACHELLE on iron - Hemoglobin has remained stable/improved from yesterday 9.2-> 9.7, baseline hemoglobin 12  #abdominal pain - appears to have subsided.  Currently on a regular diet        Plan  -Patient needs repeat colonoscopy at some point with 2 " days of bowel prep.  However, it is nonemergent since no signs of current active GI bleeding and patient/ also prefers to hold off on repeating colonoscopy at this time.  reports he would like to take the patient home and reschedule a colonoscopy again outpatient in a few weeks.  -Continue pantoprazole 40 mg daily  -trend hgb and hct  -monitor consistency and color of stool. Monitor for signs of overt GI bleeding  -tranfuse for hgb < 7.0  -avoid NSAIDs  -Continue supportive care per primary team              Plan discussed with Dr. Stevenson.  GI will sign off.  Total of 30 minutes spent on visit and overall professional care of the patient including patient/family education.  All questions answered              (Electronically signed byANGEL Rey on 10/10/2023 at 12:23 PM)

## 2023-10-11 ENCOUNTER — HOME CARE VISIT (OUTPATIENT)
Dept: HOME HEALTH SERVICES | Facility: HOME HEALTH | Age: 73
End: 2023-10-11

## 2023-10-11 ENCOUNTER — APPOINTMENT (OUTPATIENT)
Dept: PRIMARY CARE | Facility: CLINIC | Age: 73
End: 2023-10-11

## 2023-10-11 ENCOUNTER — PATIENT OUTREACH (OUTPATIENT)
Dept: PRIMARY CARE | Facility: CLINIC | Age: 73
End: 2023-10-11

## 2023-10-11 NOTE — PROGRESS NOTES
Discharge Facility: [facility]  Discharge Diagnosis: Abdominal pain, Constipation, Migraine HA,Generalized weakness, Recent hx of GIB, Hypokalemia, Hypomagnesemia, Hypothyroidism, RACHELLE, RA, GERD  Admission Date: 10/8/23  Discharge Date:  10/10/23    PCP Appointment Date: 11/1/23- task to office for sooner appt.  Specialist Appointment Date:   Hospital Encounter and Summary: Linked   See discharge assessment below for further details    Medications  Medications reviewed with patient/caregiver?: Yes (new/changes only) (10/12/2023  9:17 AM)  Is the patient having any side effects they believe may be caused by any medication additions or changes?: No (10/12/2023  9:17 AM)  Does the patient have all medications ordered at discharge?: Yes (10/12/2023  9:17 AM)  Care Management Interventions: No intervention needed (10/12/2023  9:17 AM)  Is the patient taking all medications as directed (includes completed medication regime)?: Yes (10/12/2023  9:17 AM)  Care Management Interventions: Provided patient education (10/12/2023  9:17 AM)  Medication Comments: START: imitrex  STOP: nitrofurantoin, Nulytely (10/12/2023  9:17 AM)    Appointments  Does the patient have a primary care provider?: Yes (10/12/2023  9:17 AM)  Care Management Interventions: Verified appointment date/time/provider (10/12/2023  9:17 AM)  Care Management Interventions: Advised patient to keep appointment (10/12/2023  9:17 AM)    Self Management  What is the home health agency?: [facility] (10/12/2023  9:17 AM)  Has home health visited the patient within 72 hours of discharge?: Call prior to 72 hours (10/12/2023  9:17 AM)  What Durable Medical Equipment (DME) was ordered?: n/a (10/12/2023  9:17 AM)    Patient Teaching  Does the patient have access to their discharge instructions?: Yes (10/12/2023  9:17 AM)  Care Management Interventions: Reviewed instructions with patient (10/12/2023  9:17 AM)  What is the patient's perception of their health status since discharge?:  Improving (10/12/2023  9:17 AM)  Is the patient/caregiver able to teach back the hierarchy of who to call/visit for symptoms/problems? PCP, Specialist, Home Health nurse, Urgent Care, ED, 911: Yes (10/12/2023  9:17 AM)  Patient/Caregiver Education Comments: Spoke with patient . He states yesterday was a little rough and patient is weak but she seems to be doing better today. States they rescheduled her follow up from yesterday but the soonest they had was November 1. States she is scheduled for repeat colonoscopy November 7. Denies concerns at this time. (10/12/2023  9:17 AM)

## 2023-10-12 ENCOUNTER — TELEPHONE (OUTPATIENT)
Dept: PRIMARY CARE | Facility: CLINIC | Age: 73
End: 2023-10-12
Payer: COMMERCIAL

## 2023-10-12 DIAGNOSIS — F43.22 ADJUSTMENT DISORDER WITH ANXIOUS MOOD: Primary | ICD-10-CM

## 2023-10-12 RX ORDER — BUSPIRONE HYDROCHLORIDE 5 MG/1
TABLET ORAL
Qty: 60 TABLET | Refills: 1 | Status: SHIPPED | OUTPATIENT
Start: 2023-10-12

## 2023-10-12 NOTE — TELEPHONE ENCOUNTER
FYI:  Patients  called on 10/11/2023 to cancel appt which was a 5 month follow up. I offered him an earlier appt due to it being a VIRA which he declined.  After viewing patients chart I noticed home care appts scheduled for 10/12 and 10/13 have been cancelled and rescheduled later in the month.  Patient will start with home care on 10/16 with PT and OT evaluation on 10/17.  Please read staff message I received from sri Levi copied and pasted below:     Luzmaria Levi LPN  [facility]  The patient is scheduled for hospital follow up on ( 11/1/23 ). This encounter does not qualify for TCM billing.  Pts appt falls outside the 14 day window. Message sent to office to see if sooner appt is avail.    Discharge Facility: [facility]  Discharge Diagnosis: Abdominal pain, Constipation, Migraine HA,Generalized weakness, Recent hx of GIB, Hypokalemia, Hypomagnesemia, Hypothyroidism, RACHELLE, RA, GERD  Admission Date: 10/8/23  Discharge Date:  10/10/23   No PCP appointments available within 14 days of discharge.  Please reach out to patient and schedule an appointment within 7-13 days from discharge date.    While speaking with the patient's , he expressed that he cannot bring her in for a sooner time.   They are aware the upcoming appointment will not be a HFU or VIRA due to it being out of the dated time line.

## 2023-10-12 NOTE — TELEPHONE ENCOUNTER
Patient's  called asking for medication for anxiety for patient, as patient is worked up with everything that was found in the hospital the last time. Please advise. Thank you.

## 2023-10-16 ENCOUNTER — HOME CARE VISIT (OUTPATIENT)
Dept: HOME HEALTH SERVICES | Facility: HOME HEALTH | Age: 73
End: 2023-10-16
Payer: COMMERCIAL

## 2023-10-16 VITALS
DIASTOLIC BLOOD PRESSURE: 72 MMHG | RESPIRATION RATE: 18 BRPM | TEMPERATURE: 98.4 F | OXYGEN SATURATION: 100 % | SYSTOLIC BLOOD PRESSURE: 116 MMHG | HEART RATE: 84 BPM

## 2023-10-16 PROCEDURE — 0023 HH SOC

## 2023-10-16 PROCEDURE — G0299 HHS/HOSPICE OF RN EA 15 MIN: HCPCS

## 2023-10-16 ASSESSMENT — ENCOUNTER SYMPTOMS
FATIGUES EASILY: 1
DENIES PAIN: 1
APPETITE LEVEL: FAIR
CHANGE IN APPETITE: UNCHANGED
DESCRIPTION OF MEMORY LOSS: SHORT TERM
DIZZINESS: 1
OCCASIONAL FEELINGS OF UNSTEADINESS: 1

## 2023-10-16 ASSESSMENT — ACTIVITIES OF DAILY LIVING (ADL)
OASIS_M1830: 03
TOILETING: 1
TOILETING: MINIMUM ASSIST
BATHING_CURRENT_FUNCTION: MODERATE ASSIST
BATHING ASSESSED: 1
ENTERING_EXITING_HOME: MODERATE ASSIST

## 2023-10-17 ENCOUNTER — HOME CARE VISIT (OUTPATIENT)
Dept: HOME HEALTH SERVICES | Facility: HOME HEALTH | Age: 73
End: 2023-10-17
Payer: COMMERCIAL

## 2023-10-17 VITALS — HEART RATE: 65 BPM | RESPIRATION RATE: 18 BRPM | DIASTOLIC BLOOD PRESSURE: 65 MMHG | SYSTOLIC BLOOD PRESSURE: 127 MMHG

## 2023-10-17 PROCEDURE — G0152 HHCP-SERV OF OT,EA 15 MIN: HCPCS

## 2023-10-17 PROCEDURE — G0151 HHCP-SERV OF PT,EA 15 MIN: HCPCS

## 2023-10-17 SDOH — HEALTH STABILITY: PHYSICAL HEALTH: EXERCISE TYPE: SEATED HEP

## 2023-10-17 ASSESSMENT — ACTIVITIES OF DAILY LIVING (ADL)
BATHING_CURRENT_FUNCTION: MINIMUM ASSIST
AMBULATION_DISTANCE/DURATION_TOLERATED: 20
CURRENT_FUNCTION: MINIMUM ASSIST
AMBULATION ASSISTANCE: MINIMUM ASSIST
LIGHT HOUSEKEEPING: NEEDS ASSISTANCE
PREPARING MEALS: NEEDS ASSISTANCE
BATHING ASSESSED: 1
SHOPPING: NEEDS ASSISTANCE
AMBULATION ASSISTANCE: 1
BATHING_CURRENT_FUNCTION: MODERATE ASSIST
LAUNDRY: NEEDS ASSISTANCE
BATHING ASSESSED: 1
CURRENT_FUNCTION: MINIMUM ASSIST
PHYSICAL TRANSFERS ASSESSED: 1
SHOPPING ASSESSED: 1
AMBULATION ASSISTANCE ON FLAT SURFACES: 1
PHYSICAL TRANSFERS ASSESSED: 1
DRESSING_LB_CURRENT_FUNCTION: MODERATE ASSIST
HOUSEKEEPING ASSESSED: 1
LAUNDRY ASSESSED: 1

## 2023-10-17 ASSESSMENT — ENCOUNTER SYMPTOMS
PERSON REPORTING PAIN: PATIENT
LOSS OF SENSATION IN FEET: 0
OCCASIONAL FEELINGS OF UNSTEADINESS: 1
DENIES PAIN: 1
DENIES PAIN: 1
DEPRESSION: 1

## 2023-10-17 ASSESSMENT — PAIN SCALES - PAIN ASSESSMENT IN ADVANCED DEMENTIA (PAINAD)
BODYLANGUAGE: 0 - RELAXED.
BODYLANGUAGE: 0
CONSOLABILITY: 0 - NO NEED TO CONSOLE.
NEGVOCALIZATION: 0 - NONE.
BREATHING: 0
NEGVOCALIZATION: 0
FACIALEXPRESSION: 0
CONSOLABILITY: 0
FACIALEXPRESSION: 0 - SMILING OR INEXPRESSIVE.
TOTALSCORE: 0

## 2023-10-17 NOTE — HOME HEALTH
Admission visit completed    Patient is familiar with  home care and was on service prior to hospital stays.     Patients spouse manages all medication. Medication reconciliation completed, education reinforced. Patient denies any skin integrity concerns currently.     Patient denies and bowel or bladder concerns currently    At this time, no further SN homecare visits anticipated as patient and spouse decline any skilled needs.

## 2023-10-19 ENCOUNTER — HOME CARE VISIT (OUTPATIENT)
Dept: HOME HEALTH SERVICES | Facility: HOME HEALTH | Age: 73
End: 2023-10-19
Payer: COMMERCIAL

## 2023-10-19 PROCEDURE — G0152 HHCP-SERV OF OT,EA 15 MIN: HCPCS

## 2023-10-19 PROCEDURE — G0151 HHCP-SERV OF PT,EA 15 MIN: HCPCS

## 2023-10-19 ASSESSMENT — ENCOUNTER SYMPTOMS: DENIES PAIN: 1

## 2023-10-19 NOTE — HOME HEALTH
DEMOGRAPHICS: PLEASANT FEMALE WHO LIVES IN [place]  ENVIRONMENT: 2-STORY HOME WITH 2 STEPS TO ENTER WITH UNILATERAL RAILING. PATIENT HAS A TUB SHOWER COMBO WITH GRAB BARS AND SHOWER SEAT AVAILABLE. PATIENT LIVES IN A CLEAN, UNCLUTTERED HOME.     PRECAUTIONS: WBAT, FALL PRECAUTIONS, UNIVERSAL PRECAUTIONS, KEEP PATHWAYS CLEAR,    ASSISTANCE: ASSISTANCE IS AVAILABLE AS NEEDED FROM SPOUSE FOR SAFETY, SELF-CARE, MEDICATION MANAGEMENT, ACTIVITIES OF DAILY LIVING, GROCERY SHOPPING, DRIVING, AND SAFETY.     DME: FWW, CANE, SHOWER CHAIR, WC, COMMODE   AUDITORY: [place], ELEVATED VOLUME NEEDED  VISUAL: LEGALLY BLIND PER REPORT    ENVIRONMENTAL BARRIERS: CARPET SURFACE, STAIRS TO ENTER  PRIOR FUNCTIONAL STATUS/ACTIVITY: PATIENT WAS IND IN ALL FUNCTIONAL MOBILITY AND WAS ABLE TO SAFELY NAVIGATE IN AND OUT OF HER HOME. PATIENT WAS ABLE TO SAFELY NAVIGATE DOWN STAIRS. SPOUSE ASSISTED WITH DRIVING, GROCERY SHOPPING.    CURRENT CONDITION: PT PRESENTS WITH DECREASED FUNCTIONAL ACTIVITY TOLERANCE, REDUCED STATIC AND DYNAMIC STANDING BALANCE, REDUCED OVERALL MOBILITY, UNSTEADINESS ON FEET, INCREASED NEED FOR TRANSFERS, IS A HIGH FALL RISK, PRESENTS WITH GENERALIZED WEAKNESS/FATIGUE, AND PRESENTS WITH GAIT PATTERN ABNORMALITIES. PATIENT IS CURRENTLY BELOW PRIOR LEVEL OF FUNCTION AND IS A HIGH FALL RISK. PATIENT REQUIRES ASSIST FROM HER SPOUSE AND IS UNABLE TO SAFELY NAVIGATE DOWN THE ENTRYWAY STAIRS IN BACK OF HOME. FAMILY CURRENTLY REPORTS USING A WHEELCHAIR TO NAVIGATE DOWN STAIRS    SYSTEMS REVIEW:            TINETTI BALANCE TEST: NT DUE TO DIZZINESS  COGNITION: FORGETFUL  MUSCULOSKELETAL SYSTEM: 3-/5 R LE GROSSLY, 3-/5 L LE GROSSLY   NEUROMUSCULAR SYSTEM:  STATIC SITTING BALANCE (NORMAL), STATIC STANDING BALANCE (POOR DUE TO DIZZINESS)   TRANSFERS: MINIMAL TO CGA PENDING FATIGUE   GAIT: UNSTEADY, REDUCED KRISTINE, REDUCED BILATERAL STEP LENGTH, FORWARD FLEXION, ROUNDED SHOULDERS, MUST USE FWW  AMBULATION: 20-30 FT WITH ASSITANCE DUE TO  HIGH FALL RISK, AD USED TO ENSURE SAFETY     PLEASANT FEMALE REFERRED TO [facility] PHYSICAL THERAPY FOR DX AND PMHX AS NOTED ABOVE. PATIENT WAS INSTRUCTED IN HOME SAFETY, FALL PREVENTION MEASURES, SAFE SEQUENCING TECHNIQUES, APPROPRIATE DME, ENERGY CONSERVATION TECHNIQUES, INFECTION CONTROL TECHNIQUES, AND WAS EDUCATED IN EMERGENCY PREPAREDNESS AND EVACUATION STRATEGIES. PT WAS ALSO EDUCATED ON STRATEGIES TO PREVENT RISK OF PRESSURE ULCERS AND SKIN BREAKDOWN AND WAS EDUCATED ON PAIN REDUCTION/MANAGEMENT STRATGIES IF/WHEN APPLICABLE. ADDITIONAL EDUCATION INCLUDED APPROPRIATE THEAPEUTIC DIET, PROPER HYDRATION AND BENEFITS OF PROPER FOOD INTAKE AS IT PERTAINED TO THERAPY AND IMPROVED MOBILITY. PATIENT WAS ALSO INSTRUCTED ON DISEASE PROCESS, POTENTIAL SIGNS, SYMPTOMS OR COMPLICATIONS TO BE OBSERVANT FOR INCLUDING, BUT NOT LIMITED TO, INCREASED PAIN, EDEMA, SWELLING, AND ANY CHANGE IN FUNCTIONAL STATUS.    INSTRUCTION WAS PROVIDED ON WHEN TO CALL THEIR DOCTOR AND/OR EMERGENCY MEDICAL SERVICES IF NEEDED. ONE OF THE PATIENTS GOALS IS TO IMPROVE OVERALL MOBILITY.                     CLINICAL IMPRESSIONS/MEDICAL NECESSITY:   PT REQUIRES SKILLS OF A LICENSED PHYSICAL THERAPIST TO ADDRESS DEFICITS IN LOWER EXTREMITY STRENGTH, BALANCE, FUNCTIONAL ACTIVITY TOLERANCE, AND MOBILITY TO DECREASE RISK FOR FALLS. CURRENT FUNCTIONAL IMPAIRMENTS IMPACT INDEPENDENT FUNCTIONING AND IMPACTS OVERALL SAFETY AWARENESS. ANTICIPATE NEED FOR CONTINUED PARTICIPATION IN HEP UPON DISCHARGE.     PATIENT IS AT RISK FOR FALLS AND FURTHER FUNCTIONAL DECLINE WITHOUT [facility] THERAPY TO ADDRESS DEFICIT AREAS.  CONTINUE SKILLED PT SERVICES PER PLAN OF CARE FOR THERAPEUTIC ACTIVITIES/EXERCISES, NEURO RE-EDUCATION, FUNCTIONAL TRANSFERS AND PATIENT/CAREGIVER EDUCATION TO FACILITATE FUNCTIONAL INDEPENDENCE/SAFETY WITHIN HOME.

## 2023-10-20 VITALS
DIASTOLIC BLOOD PRESSURE: 86 MMHG | RESPIRATION RATE: 18 BRPM | SYSTOLIC BLOOD PRESSURE: 137 MMHG | HEART RATE: 70 BPM | TEMPERATURE: 97.5 F

## 2023-10-20 SDOH — HEALTH STABILITY: PHYSICAL HEALTH: EXERCISE TYPE: SEATED HEP

## 2023-10-20 ASSESSMENT — ACTIVITIES OF DAILY LIVING (ADL)
AMBULATION ASSISTANCE: 1
SHOPPING ASSESSED: 1
SHOPPING: NEEDS ASSISTANCE
AMBULATION ASSISTANCE: ONE PERSON
TRANSPORTATION: NEEDS ASSISTANCE
PHYSICAL TRANSFERS ASSESSED: 1
LAUNDRY: NEEDS ASSISTANCE
BATHING_CURRENT_FUNCTION: MINIMUM ASSIST
AMBULATION ASSISTANCE ON FLAT SURFACES: 1
DRESSING_LB_CURRENT_FUNCTION: MINIMUM ASSIST
CURRENT_FUNCTION: MINIMUM ASSIST
BATHING ASSESSED: 1
LAUNDRY ASSESSED: 1
TRANSPORTATION ASSESSED: 1
DRESSING_UB_CURRENT_FUNCTION: MINIMUM ASSIST
LIGHT HOUSEKEEPING: NEEDS ASSISTANCE
HOUSEKEEPING ASSESSED: 1

## 2023-10-20 ASSESSMENT — PAIN SCALES - PAIN ASSESSMENT IN ADVANCED DEMENTIA (PAINAD)
BODYLANGUAGE: 0
NEGVOCALIZATION: 0 - NONE.
TOTALSCORE: 0
NEGVOCALIZATION: 0
CONSOLABILITY: 0
FACIALEXPRESSION: 0
BREATHING: 0
CONSOLABILITY: 0 - NO NEED TO CONSOLE.
FACIALEXPRESSION: 0 - SMILING OR INEXPRESSIVE.
BODYLANGUAGE: 0 - RELAXED.

## 2023-10-20 ASSESSMENT — ENCOUNTER SYMPTOMS
PERSON REPORTING PAIN: PATIENT
DEPRESSION: 0
LOSS OF SENSATION IN FEET: 0
MUSCLE WEAKNESS: 1
DENIES PAIN: 1
OCCASIONAL FEELINGS OF UNSTEADINESS: 1

## 2023-10-24 ENCOUNTER — HOME CARE VISIT (OUTPATIENT)
Dept: HOME HEALTH SERVICES | Facility: HOME HEALTH | Age: 73
End: 2023-10-24
Payer: COMMERCIAL

## 2023-10-26 ENCOUNTER — HOME CARE VISIT (OUTPATIENT)
Dept: HOME HEALTH SERVICES | Facility: HOME HEALTH | Age: 73
End: 2023-10-26
Payer: COMMERCIAL

## 2023-10-26 ENCOUNTER — PATIENT OUTREACH (OUTPATIENT)
Dept: PRIMARY CARE | Facility: CLINIC | Age: 73
End: 2023-10-26
Payer: COMMERCIAL

## 2023-10-26 PROCEDURE — G0157 HHC PT ASSISTANT EA 15: HCPCS

## 2023-10-26 SDOH — HEALTH STABILITY: PHYSICAL HEALTH: EXERCISE TYPE: SEATED

## 2023-10-26 SDOH — HEALTH STABILITY: PHYSICAL HEALTH
EXERCISE COMMENTS: ANKLE PUMPS   X 12  MARCHING          X12  LAQS               X12  HIP ABD/ADD    X12  HS CURLS           X12  HIP ROT             X12

## 2023-10-26 ASSESSMENT — ENCOUNTER SYMPTOMS
PAIN LOCATION: LEFT KNEE
PAIN SEVERITY GOAL: 0/10
LOWEST PAIN SEVERITY IN PAST 24 HOURS: 0/10
PAIN LOCATION - PAIN DURATION: VARIES
PAIN: 1
PAIN LOCATION - EXACERBATING FACTORS: USE
PAIN LOCATION - RELIEVING FACTORS: REST
HIGHEST PAIN SEVERITY IN PAST 24 HOURS: 6/10
PAIN LOCATION - PAIN SEVERITY: 6/10
PAIN LOCATION - PAIN FREQUENCY: WITH ACTIVITY
PERSON REPORTING PAIN: PATIENT
MUSCLE WEAKNESS: 1
SUBJECTIVE PAIN PROGRESSION: WAXING AND WANING

## 2023-10-26 ASSESSMENT — ACTIVITIES OF DAILY LIVING (ADL)
AMBULATION ASSISTANCE: 1
PHYSICAL TRANSFERS ASSESSED: 1
AMBULATION ASSISTANCE ON FLAT SURFACES: 1

## 2023-10-26 NOTE — PROGRESS NOTES
Unable to reach patient for call back after patient's follow up appointment with PCP.   LINSEYM with call back number for patient to call if needed   If no voicemail available call attempts x 2 were made to contact the patient to assist with any questions or concerns patient may have.

## 2023-10-27 ENCOUNTER — HOME CARE VISIT (OUTPATIENT)
Dept: HOME HEALTH SERVICES | Facility: HOME HEALTH | Age: 73
End: 2023-10-27
Payer: COMMERCIAL

## 2023-10-27 PROCEDURE — G0152 HHCP-SERV OF OT,EA 15 MIN: HCPCS

## 2023-10-27 ASSESSMENT — ENCOUNTER SYMPTOMS
DENIES PAIN: 1
PERSON REPORTING PAIN: PATIENT

## 2023-10-30 ENCOUNTER — HOME CARE VISIT (OUTPATIENT)
Dept: HOME HEALTH SERVICES | Facility: HOME HEALTH | Age: 73
End: 2023-10-30
Payer: COMMERCIAL

## 2023-10-30 PROCEDURE — G0157 HHC PT ASSISTANT EA 15: HCPCS

## 2023-10-30 PROCEDURE — G0152 HHCP-SERV OF OT,EA 15 MIN: HCPCS

## 2023-10-30 SDOH — HEALTH STABILITY: PHYSICAL HEALTH: EXERCISE TYPE: SEATED

## 2023-10-30 ASSESSMENT — ENCOUNTER SYMPTOMS
PAIN: 1
DENIES PAIN: 1
MUSCLE WEAKNESS: 1
PERSON REPORTING PAIN: PATIENT
PERSON REPORTING PAIN: PATIENT

## 2023-10-31 ENCOUNTER — HOME CARE VISIT (OUTPATIENT)
Dept: HOME HEALTH SERVICES | Facility: HOME HEALTH | Age: 73
End: 2023-10-31
Payer: COMMERCIAL

## 2023-10-31 VITALS
HEART RATE: 61 BPM | TEMPERATURE: 97.4 F | RESPIRATION RATE: 16 BRPM | OXYGEN SATURATION: 96 % | DIASTOLIC BLOOD PRESSURE: 74 MMHG | SYSTOLIC BLOOD PRESSURE: 122 MMHG

## 2023-10-31 PROCEDURE — G0151 HHCP-SERV OF PT,EA 15 MIN: HCPCS

## 2023-10-31 PROCEDURE — G0152 HHCP-SERV OF OT,EA 15 MIN: HCPCS

## 2023-10-31 SDOH — HEALTH STABILITY: PHYSICAL HEALTH: EXERCISE ACTIVITIES SETS: 1

## 2023-10-31 SDOH — HEALTH STABILITY: PHYSICAL HEALTH: EXERCISE ACTIVITY: LAQ

## 2023-10-31 SDOH — HEALTH STABILITY: PHYSICAL HEALTH: PHYSICAL EXERCISE: 15

## 2023-10-31 SDOH — HEALTH STABILITY: PHYSICAL HEALTH: EXERCISE ACTIVITY: HIP ABD/ADD

## 2023-10-31 SDOH — HEALTH STABILITY: PHYSICAL HEALTH: PHYSICAL EXERCISE: SEATED

## 2023-10-31 SDOH — HEALTH STABILITY: PHYSICAL HEALTH: EXERCISE ACTIVITY: MARCHING

## 2023-10-31 SDOH — HEALTH STABILITY: PHYSICAL HEALTH: EXERCISE ACTIVITY: HIP CIRCLES

## 2023-10-31 SDOH — HEALTH STABILITY: PHYSICAL HEALTH: EXERCISE ACTIVITY: ANKLE DF/PF

## 2023-10-31 SDOH — HEALTH STABILITY: PHYSICAL HEALTH: EXERCISE TYPE: LE EXERCISES

## 2023-10-31 ASSESSMENT — GAIT ASSESSMENTS
PATH SCORE: 0
INITIATION OF GAIT IMMEDIATELY AFTER GO: 1 - NO HESITANCY
WALKING STANCE: 1 - HEELS ALMOST TOUCHING WHILE WALKING
GAIT SCORE: 8
TRUNK SCORE: 0
TRUNK: 0 - MARKED SWAY OR USES WALKING AID
STEP SYMMETRY: 1 - RIGHT AND LEFT STEP LENGTH APPEAR EQUAL
BALANCE AND GAIT SCORE: 18
STEP CONTINUITY: 1 - STEPS APPEAR CONTINUOUS
PATH: 0 - MARKED DEVIATION

## 2023-10-31 ASSESSMENT — BALANCE ASSESSMENTS
TURNING 360 DEGREES STEPS: 1 - CONTINUOUS STEPS
NUDGED: 1 - STAGGERS, GRABS, CATCHES SELF
IMMEDIATE STANDING BALANCE FIRST 5 SECONDS: 1 - STEADY BUT USES WALKER OR OTHER SUPPORT
BALANCE SCORE: 10
ARISES: 1 - ABLE, USES ARMS TO HELP
SITTING BALANCE: 1 - STEADY, SAFE
EYES CLOSED AT MAXIMUM POSITION NUDGED: 0 - UNSTEADY
ATTEMPTS TO ARISE: 2 - ABLE TO RISE, ONE ATTEMPT
NUDGED SCORE: 1
STANDING BALANCE: 1 - STEADY BUT WIDE STANCE AND USES CANE OR OTHER SUPPORT
ARISING SCORE: 1
SITTING DOWN: 1 - USES ARMS OR NOT SMOOTH MOTION

## 2023-10-31 ASSESSMENT — ENCOUNTER SYMPTOMS
MUSCLE WEAKNESS: 1
DENIES PAIN: 1
DENIES PAIN: 1
OCCASIONAL FEELINGS OF UNSTEADINESS: 1

## 2023-10-31 ASSESSMENT — ACTIVITIES OF DAILY LIVING (ADL)
OASIS_M1830: 01
AMBULATION ASSISTANCE: STAND BY ASSIST
HOME_HEALTH_OASIS: 01
AMBULATION ASSISTANCE ON FLAT SURFACES: 1
AMBULATION_DISTANCE/DURATION_TOLERATED: 75FT X 2
CURRENT_FUNCTION: STAND BY ASSIST

## 2023-11-01 ENCOUNTER — OFFICE VISIT (OUTPATIENT)
Dept: PRIMARY CARE | Facility: CLINIC | Age: 73
End: 2023-11-01
Payer: COMMERCIAL

## 2023-11-01 VITALS
TEMPERATURE: 97.8 F | SYSTOLIC BLOOD PRESSURE: 124 MMHG | DIASTOLIC BLOOD PRESSURE: 72 MMHG | HEART RATE: 61 BPM | HEIGHT: 63 IN | RESPIRATION RATE: 16 BRPM | BODY MASS INDEX: 21.26 KG/M2 | OXYGEN SATURATION: 96 %

## 2023-11-01 DIAGNOSIS — E03.9 ACQUIRED HYPOTHYROIDISM: ICD-10-CM

## 2023-11-01 DIAGNOSIS — N39.0 RECURRENT UTI: ICD-10-CM

## 2023-11-01 PROCEDURE — 1159F MED LIST DOCD IN RCRD: CPT | Performed by: FAMILY MEDICINE

## 2023-11-01 PROCEDURE — 99214 OFFICE O/P EST MOD 30 MIN: CPT | Performed by: FAMILY MEDICINE

## 2023-11-01 PROCEDURE — 1160F RVW MEDS BY RX/DR IN RCRD: CPT | Performed by: FAMILY MEDICINE

## 2023-11-01 PROCEDURE — 1036F TOBACCO NON-USER: CPT | Performed by: FAMILY MEDICINE

## 2023-11-01 PROCEDURE — 1125F AMNT PAIN NOTED PAIN PRSNT: CPT | Performed by: FAMILY MEDICINE

## 2023-11-01 PROCEDURE — 3008F BODY MASS INDEX DOCD: CPT | Performed by: FAMILY MEDICINE

## 2023-11-01 RX ORDER — LEVOTHYROXINE SODIUM 50 UG/1
50 TABLET ORAL DAILY
Qty: 90 TABLET | Refills: 3 | Status: SHIPPED | OUTPATIENT
Start: 2023-11-01

## 2023-11-01 RX ORDER — NITROFURANTOIN MACROCRYSTALS 50 MG/1
50 CAPSULE ORAL DAILY
Qty: 30 CAPSULE | Refills: 0 | Status: SHIPPED | OUTPATIENT
Start: 2023-11-01 | End: 2023-12-06 | Stop reason: SDUPTHER

## 2023-11-01 NOTE — PROGRESS NOTES
Subjective   Patient ID: Alisa Fregoso is a 73 y.o. female who presents for Migraine (5 month follow up ).  HPI  She was seen 73-year-old female a couple admissions in September and October revolving around a lot of generalized weakness.  Most of the weight is due to decreased appetite and she was seen with abdominal pain which had some constipation obstructive stool but otherwise no small bowel obstruction or large bowel obstruction she had an EGD which completely negative and also for poor bowel prep and had her colonoscopy post bone  Notably because of poor appetite she had low potassium low magnesium and she is currently taking magnesium taking high potassium foods at this time potassium was corrected and went before discharge October 10 in the evening.  She because of weakness rheumatoid generalized weak condition, OT PT is working for her at home on a regular basis.  Today she has no vomiting we did extensive review regarding taking Colace at night daily for fruit juice with her Metamucil or MiraLAX.  She has not had a bowel movement in 3 days and she could take either milk of magnesia or Dulcolax to keep her having abdominal pain  Also instructed her to stop her Flexeril which may be contributing to her constipation since she has to take iron pills to help her iron deficiency anemia also in order to help her migraines to take Imitrex and use the earlier Ultram only on if needed basis.  Because of anxiety related headaches also we did give her BuSpar earlier to take for anxiety along with Exer strength Tylenol help her headache and to avoid Ultram if possible.  This reviewed with patient as well as spouse  Notably after reviewing discharge summary she is DNR CCA.  She is aware frequent small meals perhaps could try iron Gummies if she has difficulty swallowing the current iron pill importance of rehired replacement of her iron was reviewed with patient  She is under 50 mcg of Synthroid which is usual total  "increase liquids in addition to the anticonstipation maneuvers.  Gratefully no chest pain or shortness of breath or palpitations at this time  Chronic meds reviewed with spouse stopping Flexeril adding high potassium foods was reviewed in detail continue magnesium oxide 400 mg daily in the morning prednisone 5 mg the morning is that of the Naprosyn for her rheumatoid arthritis is aware to stay off NSAIDs because potential GI blood loss colonoscopy pending  Review of Systems   Constitutional:  Positive for fatigue and unexpected weight change. Negative for fever.   Eyes:  Positive for visual disturbance.   Respiratory:  Negative for cough, chest tightness and shortness of breath.    Cardiovascular:  Negative for chest pain, palpitations and leg swelling.   Gastrointestinal:  Positive for abdominal pain, constipation and nausea. Negative for abdominal distention, blood in stool, rectal pain and vomiting.   Genitourinary:  Positive for frequency and urgency. Negative for dysuria, flank pain and hematuria.   Musculoskeletal:  Positive for arthralgias, back pain and gait problem. Negative for myalgias.   Skin:  Negative for rash.   Neurological:  Positive for weakness and headaches. Negative for light-headedness and numbness.   Hematological:  Negative for adenopathy.   Psychiatric/Behavioral:  Positive for dysphoric mood. Negative for behavioral problems, sleep disturbance and suicidal ideas.        Objective   /72 (BP Location: Left arm, Patient Position: Sitting, BP Cuff Size: Adult)   Pulse 61   Temp 36.6 °C (97.8 °F) (Temporal)   Resp 16   Ht 1.6 m (5' 3\")   SpO2 96%   BMI 21.26 kg/m²           Physical Exam  Vital signs reviewed and normal pulse ox is 96  General-inspection reveals a thin female in no acute distress mild cognitive impairment is noted  ENT mild dryness the oral membranes otherwise no exudate noted no other inflammation noted  Neck neck is supple without masses adenopathy bruits or " rigidity thyroid is normal chest-slightly reduced breath sounds at bases otherwise no wheezing or rales  Cardiovascular RSR without significant murmurs gallop or ectopy  Abdominal very minimal tenderness upon palpation left lower quadrant otherwise no rebound tenderness bowel sounds are normal no mid or upper abdominal tenderness and gratefully no rebound no distention no CVA tenderness  Peripheral vascular muscle atrophy noted lower extremities but otherwise no symmetric or asymmetric edema distal pulses are intact  Musculoskeletal significant weakness of both thigh muscles lower legs and trunk.  Patient seated in a wheelchair no acute synovitis noted but still some bony tenderness of the MCP and PIP joints of the second and third digits of the right and left hand some tenderness of the anterior shoulders as well in bilateral hips and low back SI joints  Skin no rash petechiae jaundice diffuse dryness of the skin      Assessment/Plan   Problem List Items Addressed This Visit       Hypothyroidism    Recurrent UTI   UTI prevention placed on low-dose Macrobid which is the best for urinary suppression  Increasing liquids strongly urged and increased high potassium foods  We will take at lunchtime fruit juice with MiraLAX or Metamucil on a regular basis to stop her Flexeril which may be contributing to constipation  Take Ultram only on if needed basis since also could be contributed to her constipation  Continue follow-up with GI for colonoscopy  May switch to Gummies iron preparations to help her rebuild her hemoglobin also Mag-Ox 400 milligrams with breakfast with small meals advised  Continue home OT PT above reviewed with spouse as well as patient will recheck in 4 weeks and will get magnesium Fidel BMP and CBC repeat recheck iron magnesium potassium  @discharge  The above diagnosis and treatment plan was discussed with the patient patient will continue appropriate diet and exercise as reviewed  Patient will  recheck earlier if any interval problems of significance or clinical worsening of the above problems.  Agrees above surveillance.  All question were addressed regarding above meds

## 2023-11-02 PROCEDURE — G0180 MD CERTIFICATION HHA PATIENT: HCPCS | Performed by: FAMILY MEDICINE

## 2023-11-05 ASSESSMENT — ENCOUNTER SYMPTOMS
BACK PAIN: 1
WEAKNESS: 1
FLANK PAIN: 0
NAUSEA: 1
DYSURIA: 0
COUGH: 0
CONSTIPATION: 1
DYSPHORIC MOOD: 1
FATIGUE: 1
FREQUENCY: 1
FEVER: 0
ABDOMINAL PAIN: 1
LIGHT-HEADEDNESS: 0
SHORTNESS OF BREATH: 0
SLEEP DISTURBANCE: 0
PALPITATIONS: 0
RECTAL PAIN: 0
ADENOPATHY: 0
HEADACHES: 1
UNEXPECTED WEIGHT CHANGE: 1
VOMITING: 0
ARTHRALGIAS: 1
BLOOD IN STOOL: 0
MYALGIAS: 0
NUMBNESS: 0
CHEST TIGHTNESS: 0
HEMATURIA: 0
ABDOMINAL DISTENTION: 0

## 2023-11-15 ENCOUNTER — HOME CARE VISIT (OUTPATIENT)
Dept: HOME HEALTH SERVICES | Facility: HOME HEALTH | Age: 73
End: 2023-11-15
Payer: COMMERCIAL

## 2023-11-15 ASSESSMENT — ACTIVITIES OF DAILY LIVING (ADL)
OASIS_M1830: 01
HOME_HEALTH_OASIS: 01

## 2023-11-20 ENCOUNTER — PATIENT OUTREACH (OUTPATIENT)
Dept: PRIMARY CARE | Facility: CLINIC | Age: 73
End: 2023-11-20
Payer: COMMERCIAL

## 2023-11-20 DIAGNOSIS — M06.9 RHEUMATOID ARTHRITIS, INVOLVING UNSPECIFIED SITE, UNSPECIFIED WHETHER RHEUMATOID FACTOR PRESENT (MULTI): ICD-10-CM

## 2023-11-20 RX ORDER — PREDNISONE 5 MG/1
5 TABLET ORAL DAILY
Qty: 30 TABLET | Refills: 2 | Status: SHIPPED | OUTPATIENT
Start: 2023-11-20 | End: 2024-03-01

## 2023-11-20 NOTE — PROGRESS NOTES
Call regarding appt. with PCP on 11/1/23 after hospitalization.  At time of outreach call the patient  Polo feels as if their condition has improved since last visit and patient is doing fair. States she is scheduled to get her colonoscopy done. Reviewed the PCP appointment with the pt and addressed any questions or concerns.

## 2023-11-28 ENCOUNTER — ANESTHESIA EVENT (OUTPATIENT)
Dept: GASTROENTEROLOGY | Facility: HOSPITAL | Age: 73
End: 2023-11-28

## 2023-11-28 RX ORDER — SODIUM CHLORIDE, SODIUM LACTATE, POTASSIUM CHLORIDE, CALCIUM CHLORIDE 600; 310; 30; 20 MG/100ML; MG/100ML; MG/100ML; MG/100ML
100 INJECTION, SOLUTION INTRAVENOUS CONTINUOUS
OUTPATIENT
Start: 2023-11-28

## 2023-11-29 ENCOUNTER — ANESTHESIA (OUTPATIENT)
Dept: GASTROENTEROLOGY | Facility: HOSPITAL | Age: 73
End: 2023-11-29
Payer: COMMERCIAL

## 2023-11-29 ENCOUNTER — APPOINTMENT (OUTPATIENT)
Dept: GASTROENTEROLOGY | Facility: HOSPITAL | Age: 73
End: 2023-11-29
Payer: COMMERCIAL

## 2023-11-29 ENCOUNTER — TELEPHONE (OUTPATIENT)
Dept: PRIMARY CARE | Facility: CLINIC | Age: 73
End: 2023-11-29
Payer: COMMERCIAL

## 2023-11-29 NOTE — TELEPHONE ENCOUNTER
FYI  states pt was taking PEG 3350 as a prep for a colonoscopy. She c/o severe cramping in her lower pelvis so the prep was stopped and test was cancelled.  wants to know what he can give pt for the cramping. Per CASSY he has been advised to give pt small doses of Imodium. He will call back with any further issues.

## 2023-12-06 ENCOUNTER — OFFICE VISIT (OUTPATIENT)
Dept: PRIMARY CARE | Facility: CLINIC | Age: 73
End: 2023-12-06
Payer: COMMERCIAL

## 2023-12-06 VITALS
SYSTOLIC BLOOD PRESSURE: 124 MMHG | HEIGHT: 63 IN | HEART RATE: 65 BPM | DIASTOLIC BLOOD PRESSURE: 76 MMHG | OXYGEN SATURATION: 95 % | RESPIRATION RATE: 16 BRPM | TEMPERATURE: 97.1 F | BODY MASS INDEX: 21.26 KG/M2

## 2023-12-06 DIAGNOSIS — M05.79 RHEUMATOID ARTHRITIS OF MULTIPLE SITES WITHOUT ORGAN OR SYSTEM INVOLVEMENT WITH POSITIVE RHEUMATOID FACTOR (MULTI): ICD-10-CM

## 2023-12-06 DIAGNOSIS — K21.9 GASTROESOPHAGEAL REFLUX DISEASE, UNSPECIFIED WHETHER ESOPHAGITIS PRESENT: ICD-10-CM

## 2023-12-06 DIAGNOSIS — G61.9 INFLAMMATORY NEUROPATHY (MULTI): ICD-10-CM

## 2023-12-06 DIAGNOSIS — H35.89 RETINAL MACULAR ATROPHY: ICD-10-CM

## 2023-12-06 DIAGNOSIS — M54.16 LUMBAR RADICULOPATHY, CHRONIC: ICD-10-CM

## 2023-12-06 DIAGNOSIS — N39.0 RECURRENT UTI: ICD-10-CM

## 2023-12-06 DIAGNOSIS — R53.81 GENERAL PHYSICAL DETERIORATION: ICD-10-CM

## 2023-12-06 PROCEDURE — 1159F MED LIST DOCD IN RCRD: CPT | Performed by: FAMILY MEDICINE

## 2023-12-06 PROCEDURE — 99214 OFFICE O/P EST MOD 30 MIN: CPT | Performed by: FAMILY MEDICINE

## 2023-12-06 PROCEDURE — 1125F AMNT PAIN NOTED PAIN PRSNT: CPT | Performed by: FAMILY MEDICINE

## 2023-12-06 PROCEDURE — 1036F TOBACCO NON-USER: CPT | Performed by: FAMILY MEDICINE

## 2023-12-06 PROCEDURE — 3008F BODY MASS INDEX DOCD: CPT | Performed by: FAMILY MEDICINE

## 2023-12-06 PROCEDURE — 1160F RVW MEDS BY RX/DR IN RCRD: CPT | Performed by: FAMILY MEDICINE

## 2023-12-06 RX ORDER — PANTOPRAZOLE SODIUM 40 MG/1
40 TABLET, DELAYED RELEASE ORAL DAILY
Qty: 90 TABLET | Refills: 3 | Status: SHIPPED | OUTPATIENT
Start: 2023-12-06 | End: 2024-11-30

## 2023-12-06 RX ORDER — NITROFURANTOIN MACROCRYSTALS 50 MG/1
50 CAPSULE ORAL DAILY
Qty: 30 CAPSULE | Refills: 5 | Status: SHIPPED | OUTPATIENT
Start: 2023-12-06 | End: 2024-05-29

## 2023-12-06 NOTE — PROGRESS NOTES
Subjective   Patient ID: Alisa Fregoso is a 73 y.o. female who presents for Hypothyroidism (1 month follow up ).  HPI  73-year-old female with history of multiple medical problems patient was admitted because of increased abdominal pain and CT of the abdomen showed no evidence of acute process except for: Stool obstructive because of left lower quadrant pain she has had improved colon cleanse out and has upcoming colonoscopy to clarify her left lower quadrant abdominal pain  History of rheumatoid arthritis physical degeneration with back and shoulder and arm pain.  Pretty much wheelchair confined and otherwise avoids NSAIDs takes only 5 mg of prednisone for her RA.  Also takes Macrodantin 50 mg for urinary suppression with recurrent pyelonephritis  Recent hospitalized with weakness and secondary perhaps low magnesium because of poor oral intake status increasing liquids frequent small meals and return to Mag-Ox once a day.  Has history of microcytic  Anemia but denies normochromic iron deficiency anemia and suggested taking multivitamins with iron and gummy form.  The patient otherwise we have hypothyroidism on 50 mcg of Synthroid.  Her TSH was recently normal.  Otherwise potassium is slightly low and this was replaced and takes supplemental potassium at home.  Chronic meds reviewed with both spouse and patient today.  Otherwise denies any chest pain or shortness of breath cough or dysuria.  Review anticonstipation maneuvers as well as increasing liquids increasing fruit juice and increasing bran to prepare for her upcoming colonoscopy by GI.  Chronic meds reviewed no reported side effects  Review of Systems   Constitutional:  Positive for fatigue. Negative for fever and unexpected weight change.   Eyes:  Positive for visual disturbance.   Respiratory:  Negative for cough, chest tightness and shortness of breath.    Cardiovascular:  Negative for chest pain, palpitations and leg swelling.   Gastrointestinal:   "Positive for abdominal pain, constipation and nausea. Negative for blood in stool, diarrhea and vomiting.   Genitourinary:  Positive for frequency. Negative for dysuria and hematuria.   Musculoskeletal:  Positive for arthralgias, back pain, gait problem and myalgias.   Skin:  Negative for rash.   Neurological:  Positive for weakness and headaches. Negative for light-headedness.   Hematological:  Negative for adenopathy.   Psychiatric/Behavioral:  Positive for dysphoric mood. Negative for behavioral problems, sleep disturbance and suicidal ideas.        Objective   /76 (BP Location: Right arm, Patient Position: Sitting, BP Cuff Size: Adult)   Pulse 65   Temp 36.2 °C (97.1 °F) (Temporal)   Resp 16   Ht 1.6 m (5' 3\")   SpO2 95%   BMI 21.26 kg/m²          Contains abnormal data CBC  Order: [id number]  Status: Final result       Visible to patient: No (inaccessible in [facility])    0 Result Notes              Component  Ref Range & Units 2 mo ago  (10/10/23) 2 mo ago  (10/9/23) 2 mo ago  (10/8/23) 2 mo ago  (10/2/23) 2 mo ago  (9/20/23) 2 mo ago  (9/19/23) 2 mo ago  (9/18/23)   WBC  4.4 - 11.3 x10*3/uL 8.1 8.8 9.5 9.3 10.0 R 12.6 High  R 13.7 High  R   nRBC  0.0 - 0.0 /100 WBCs 0.0 0.0 0.0 0.0      RBC  4.00 - 5.20 x10*6/uL 4.44 4.26 5.05 4.68 4.03 R 3.83 Low  R 4.45 R   Hemoglobin  12.0 - 16.0 g/dL 9.7 Low  9.2 Low  11.0 Low  10.1 Low  8.2 Low  7.7 Low  8.9 Low    Hematocrit  36.0 - 46.0 % 36.0 33.9 Low  41.2 37.5 31.5 Low  29.8 Low  35.6 Low    MCV  80 - 100 fL 81 80 82 80 78 Low  78 Low  80   MCH  26.0 - 34.0 pg 21.8 Low  21.6 Low  21.8 Low  21.6 Low       MCHC  32.0 - 36.0 g/dL 26.9 Low  27.1 Low  26.7 Low  26.9 Low  26.0 Low  25.8 Low  25.0 Low    RDW  11.5 - 14.5 % 23.5 High  23.2 High  23.5 High  23.0 High  19.3 High  18.6 High  18.6 High    Platelets  150 - 450 x10*3/uL 375 306 390 386 282 R 310 R 260 R   MPV  7.5 - 11.5 fL 10.0 9.9 9.9 10.4      Resulting Agency [facility]            Contains abnormal data " Magnesium  Order: [id number]  Status: Final result       Visible to patient: No (inaccessible in [facility])    0 Result Notes            Component  Ref Range & Units 2 mo ago  (10/10/23) 2 mo ago  (10/8/23) 2 mo ago  (9/19/23) 2 mo ago  (9/17/23) 2 mo ago  (9/16/23) 1 yr ago  (5/28/22) 1 yr ago  (5/23/22)   Magnesium  1.60 - 2.40 mg/dL 1.40 Low  1.61 1.91 1.68 CANCELED R, CM 1.80 1.60   Resulting Agency              Contains abnormal data Basic metabolic panel  Order: [id number]  Status: Final result       Visible to patient: No (inaccessible in [facility])    0 Result Notes              Component  Ref Range & Units 2 mo ago  (10/10/23) 2 mo ago  (10/9/23) 2 mo ago  (10/8/23) 2 mo ago  (10/2/23) 2 mo ago  (9/20/23) 2 mo ago  (9/19/23) 2 mo ago  (9/19/23)   Glucose  74 - 99 mg/dL 103 High  82 94 116 High  96 109 High  115 High    Sodium  136 - 145 mmol/L 140 142 140 138 141 141 142   Potassium  3.5 - 5.3 mmol/L 3.4 Low  3.4 Low  3.1 Low  3.8 3.6 3.3 Low  3.7   Chloride  98 - 107 mmol/L 105 105 103 103 104 105 99   Bicarbonate  21 - 32 mmol/L 25 25 25 25 30 28 26   Anion Gap  10 - 20 mmol/L 13 15 R 15 R 14 11 11 21 High    Urea Nitrogen  6 - 23 mg/dL 6 5 Low  13 14 10 7 6   Creatinine  0.50 - 1.05 mg/dL 0.51 0.40 Low  0.57 0.58 0.65 0.54 0.53   eGFR  >60 mL/min/1.73m*2 >90 >90 CM >90 CM >90 CM      Comment: Calculations of estimated GFR are performed using the 2021 CKD-EPI Study Refit equation without the race variable for the IDMS-Traceable creatinine methods.  https://jasn.asnjournals.org/content/early/2021/09/22/ASN.8136482591   Calcium  8.6 - 10.3 mg/dL 9.2 8.7 9.6 9.4 9.2 8.7 8.7   Resulting Agency               Physical Exam    Reviewed and normal patient in wheelchair and otherwise pulses regular  General inspection reveals a pleasant more alert less depressed individual with generalized weakness in a wheelchair  Neck neck is supple without mass adenopathy bruits or rigidity oral exam shows better  hydration  Chest chest shows moderate breath sounds at bases otherwise without wheezing rales or rhonchi  Cardiovascular soft grade 1/2 over 6 systolic ejection murmur otherwise no diastolic murmurs gallop or ectopy  Abdominal very minimal tenderness today upon palpation left lower quadrant much less tender as is on Protonix in the mid to high epigastric area otherwise bowel sounds are normal no CVA tenderness no suprapubic tenderness  Musculoskeletal diffuse muscle atrophy of the quadriceps and lower leg muscles.  We does a dorsal extension of the feet bilaterally otherwise notes symmetric or asymmetric edema  Peripheral vascular distal pulses are slightly reduced but otherwise symmetrical  Skin dryness to skin atrophic changes otherwise no rashes petechiae or jaundice  Mood mood is more alert spontaneous and less depressed than usual.  Did review Hospital lab x-rays with spouse and patient due to importance of frequent small meals and continue oral potassium and oral magnesium      Assessment/Plan   Problem List Items Addressed This Visit       General physical deterioration    GERD (gastroesophageal reflux disease)    Peripheral neuropathy    Recurrent UTI    Retinal macular atrophy    Rheumatoid arthritis of multiple sites without organ or system involvement with positive rheumatoid factor (CMS/HCC)     Other Visit Diagnoses       Lumbar radiculopathy, chronic            With chronic low back pain rheumatoid arthritis of the hands shoulders will continue prednisone 5 mg daily will continue frequent small meals and continue her potassium magnesium supplementation as well as multivitamin with iron  Advised to take her iron maybe every other day to prevent constipation and to increase bran increase liquids and fruit juices and discussed milk magnesia if no bowel movements in 72 hours.  Continue with bowel prep to repeat colonoscopy  Continue otherwise her chronic medicines her thyroids been stable intermittent isms  low which importance of magnesium supplementation.  Discussed metabolic induced constipation with low magnesium and potassium with .  Follow-up in 6 to 7 weeks continue follow-up with GI at their direction for bowel prep and colonoscopy investigate lower abdominal pain in the left quadrant  Declines mammography after review otherwise all questions were addressed with spouse and patient today

## 2023-12-10 ASSESSMENT — ENCOUNTER SYMPTOMS
SLEEP DISTURBANCE: 0
HEMATURIA: 0
BLOOD IN STOOL: 0
FATIGUE: 1
HEADACHES: 1
WEAKNESS: 1
FREQUENCY: 1
NAUSEA: 1
DYSURIA: 0
SHORTNESS OF BREATH: 0
DYSPHORIC MOOD: 1
PALPITATIONS: 0
VOMITING: 0
MYALGIAS: 1
CONSTIPATION: 1
COUGH: 0
FEVER: 0
ADENOPATHY: 0
LIGHT-HEADEDNESS: 0
ARTHRALGIAS: 1
UNEXPECTED WEIGHT CHANGE: 0
CHEST TIGHTNESS: 0
DIARRHEA: 0
BACK PAIN: 1
ABDOMINAL PAIN: 1

## 2023-12-13 ENCOUNTER — PATIENT OUTREACH (OUTPATIENT)
Dept: PRIMARY CARE | Facility: CLINIC | Age: 73
End: 2023-12-13
Payer: COMMERCIAL

## 2023-12-13 LAB
HOLD SPECIMEN: NORMAL
HOLD SPECIMEN: NORMAL

## 2023-12-29 DIAGNOSIS — M54.16 LUMBAR RADICULOPATHY, CHRONIC: ICD-10-CM

## 2023-12-29 RX ORDER — TRAMADOL HYDROCHLORIDE 50 MG/1
50 TABLET ORAL EVERY 8 HOURS PRN
Qty: 90 TABLET | Refills: 0 | OUTPATIENT
Start: 2023-12-29 | End: 2024-05-04

## 2023-12-29 NOTE — TELEPHONE ENCOUNTER
Rx Refill Request Telephone Encounter    Name:  Alisa Fregoso  :  [id number]  Medication Name:  traMADol (Ultram) 50 mg tablet [[id number]]     Specific Pharmacy location:  [facility] [place]   Date of last appointment:  23  Date of next appointment:  3/6/24  Best number to reach patient:  [phone]

## 2024-03-01 DIAGNOSIS — M06.9 RHEUMATOID ARTHRITIS, INVOLVING UNSPECIFIED SITE, UNSPECIFIED WHETHER RHEUMATOID FACTOR PRESENT (MULTI): ICD-10-CM

## 2024-03-01 RX ORDER — PREDNISONE 5 MG/1
5 TABLET ORAL DAILY
Qty: 30 TABLET | Refills: 0 | Status: SHIPPED | OUTPATIENT
Start: 2024-03-01 | End: 2024-03-27

## 2024-03-06 ENCOUNTER — APPOINTMENT (OUTPATIENT)
Dept: PRIMARY CARE | Facility: CLINIC | Age: 74
End: 2024-03-06
Payer: COMMERCIAL

## 2024-03-27 DIAGNOSIS — M06.9 RHEUMATOID ARTHRITIS, INVOLVING UNSPECIFIED SITE, UNSPECIFIED WHETHER RHEUMATOID FACTOR PRESENT (MULTI): ICD-10-CM

## 2024-03-27 RX ORDER — PREDNISONE 5 MG/1
5 TABLET ORAL DAILY
Qty: 30 TABLET | Refills: 0 | Status: SHIPPED | OUTPATIENT
Start: 2024-03-27 | End: 2024-04-29

## 2024-03-31 ENCOUNTER — APPOINTMENT (OUTPATIENT)
Dept: GENERAL RADIOLOGY | Age: 74
End: 2024-03-31
Payer: COMMERCIAL

## 2024-03-31 ENCOUNTER — HOSPITAL ENCOUNTER (EMERGENCY)
Age: 74
Discharge: HOME | End: 2024-03-31
Attending: EMERGENCY MEDICINE
Payer: COMMERCIAL

## 2024-03-31 VITALS
DIASTOLIC BLOOD PRESSURE: 82 MMHG | HEART RATE: 80 BPM | TEMPERATURE: 97.9 F | RESPIRATION RATE: 17 BRPM | HEIGHT: 63 IN | SYSTOLIC BLOOD PRESSURE: 136 MMHG | BODY MASS INDEX: 21.26 KG/M2 | WEIGHT: 120 LBS | OXYGEN SATURATION: 98 %

## 2024-03-31 DIAGNOSIS — S82.124A CLOSED NONDISPLACED FRACTURE OF LATERAL CONDYLE OF RIGHT TIBIA, INITIAL ENCOUNTER: ICD-10-CM

## 2024-03-31 DIAGNOSIS — N39.0 URINARY TRACT INFECTION WITHOUT HEMATURIA, SITE UNSPECIFIED: ICD-10-CM

## 2024-03-31 DIAGNOSIS — S32.591A CLOSED FRACTURE OF RAMUS OF RIGHT PUBIS, INITIAL ENCOUNTER (HCC): ICD-10-CM
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
## 2024-03-31 LAB
ANION GAP SERPL CALCULATED.3IONS-SCNC: 12 MEQ/L (ref 9–15)
BACTERIA URNS QL MICRO: ABNORMAL /HPF
BASOPHILS # BLD: 0 K/UL (ref 0–0.1)
BASOPHILS NFR BLD: 0.3 % (ref 0.1–1.2)
BILIRUB UR QL STRIP: NEGATIVE
BUN SERPL-MCNC: 13 MG/DL (ref 8–23)
CALCIUM SERPL-MCNC: 9.6 MG/DL (ref 8.5–9.9)
CHLORIDE SERPL-SCNC: 102 MEQ/L (ref 95–107)
CLARITY UR: NORMAL
CO2 SERPL-SCNC: 29 MEQ/L (ref 20–31)
COLOR UR: YELLOW
CREAT SERPL-MCNC: 0.62 MG/DL (ref 0.5–0.9)
EOSINOPHIL # BLD: 0 K/UL (ref 0–0.4)
EOSINOPHIL NFR BLD: 0 % (ref 0.7–5.8)
EPI CELLS #/AREA URNS HPF: ABNORMAL /HPF
ERYTHROCYTE [DISTWIDTH] IN BLOOD BY AUTOMATED COUNT: 17.4 % (ref 11.7–14.4)
GLUCOSE SERPL-MCNC: 168 MG/DL (ref 70–99)
GLUCOSE UR STRIP-MCNC: NEGATIVE MG/DL
HCT VFR BLD AUTO: 41.2 % (ref 37–47)
HGB BLD-MCNC: 12.5 G/DL (ref 11.2–15.7)
HGB UR QL STRIP: NEGATIVE
IMM GRANULOCYTES # BLD: 0.1 K/UL
IMM GRANULOCYTES NFR BLD: 1 %
KETONES UR STRIP-MCNC: NEGATIVE MG/DL
LEUKOCYTE ESTERASE UR QL STRIP: NORMAL
LYMPHOCYTES # BLD: 0.9 K/UL (ref 1.2–3.7)
LYMPHOCYTES NFR BLD: 12.2 %
MCH RBC QN AUTO: 27.4 PG (ref 25.6–32.2)
MCHC RBC AUTO-ENTMCNC: 30.3 % (ref 32.2–35.5)
MCV RBC AUTO: 90.2 FL (ref 79.4–94.8)
MONOCYTES # BLD: 0.2 K/UL (ref 0.2–0.9)
MONOCYTES NFR BLD: 2.2 % (ref 4.7–12.5)
NEUTROPHILS # BLD: 6 K/UL (ref 1.6–6.1)
NEUTS SEG NFR BLD: 84.3 % (ref 34–71.1)
NITRITE UR QL STRIP: POSITIVE
PH UR STRIP: 6.5 [PH] (ref 5–9)
PLATELET # BLD AUTO: 202 K/UL (ref 182–369)
POTASSIUM SERPL-SCNC: 3.1 MEQ/L (ref 3.4–4.9)
PROT UR STRIP-MCNC: NEGATIVE MG/DL
RBC # BLD AUTO: 4.57 M/UL (ref 3.93–5.22)
RBC #/AREA URNS HPF: ABNORMAL /HPF (ref 0–2)
SODIUM SERPL-SCNC: 143 MEQ/L (ref 135–144)
SP GR UR STRIP: 1.01 (ref 1–1.03)
URINE REFLEX TO CULTURE: NORMAL
UROBILINOGEN UR STRIP-ACNC: 0.2 E.U./DL
WBC # BLD AUTO: 7.2 K/UL (ref 4–10)
WBC #/AREA URNS HPF: ABNORMAL /HPF (ref 0–5)

## 2024-03-31 PROCEDURE — 96360 HYDRATION IV INFUSION INIT: CPT

## 2024-03-31 PROCEDURE — 81001 URINALYSIS AUTO W/SCOPE: CPT

## 2024-03-31 PROCEDURE — 73630 X-RAY EXAM OF FOOT: CPT

## 2024-03-31 PROCEDURE — 72100 X-RAY EXAM L-S SPINE 2/3 VWS: CPT

## 2024-03-31 PROCEDURE — 73562 X-RAY EXAM OF KNEE 3: CPT

## 2024-03-31 PROCEDURE — 36415 COLL VENOUS BLD VENIPUNCTURE: CPT

## 2024-03-31 PROCEDURE — 80048 BASIC METABOLIC PNL TOTAL CA: CPT

## 2024-03-31 PROCEDURE — 6370000000 HC RX 637 (ALT 250 FOR IP): Performed by: EMERGENCY MEDICINE

## 2024-03-31 PROCEDURE — 96361 HYDRATE IV INFUSION ADD-ON: CPT

## 2024-03-31 PROCEDURE — 73610 X-RAY EXAM OF ANKLE: CPT

## 2024-03-31 PROCEDURE — 85025 COMPLETE CBC W/AUTO DIFF WBC: CPT

## 2024-03-31 PROCEDURE — 73502 X-RAY EXAM HIP UNI 2-3 VIEWS: CPT

## 2024-03-31 PROCEDURE — 99284 EMERGENCY DEPT VISIT MOD MDM: CPT

## 2024-03-31 PROCEDURE — 2580000003 HC RX 258: Performed by: EMERGENCY MEDICINE

## 2024-03-31 RX ORDER — BUTALBITAL, ACETAMINOPHEN AND CAFFEINE 300; 40; 50 MG/1; MG/1; MG/1
1 CAPSULE ORAL ONCE
Status: COMPLETED | OUTPATIENT
Start: 2024-03-31 | End: 2024-03-31

## 2024-03-31 RX ORDER — 0.9 % SODIUM CHLORIDE 0.9 %
1000 INTRAVENOUS SOLUTION INTRAVENOUS ONCE
Status: COMPLETED | OUTPATIENT
Start: 2024-03-31 | End: 2024-03-31

## 2024-03-31 RX ORDER — POTASSIUM CHLORIDE 20 MEQ/1
40 TABLET, EXTENDED RELEASE ORAL ONCE
Status: COMPLETED | OUTPATIENT
Start: 2024-03-31 | End: 2024-03-31

## 2024-03-31 RX ORDER — ACETAMINOPHEN 325 MG/1
650 TABLET ORAL ONCE
Status: COMPLETED | OUTPATIENT
Start: 2024-03-31 | End: 2024-03-31

## 2024-03-31 RX ORDER — SULFAMETHOXAZOLE AND TRIMETHOPRIM 800; 160 MG/1; MG/1
1 TABLET ORAL 2 TIMES DAILY
Qty: 10 TABLET | Refills: 0 | Status: SHIPPED | OUTPATIENT
Start: 2024-03-31 | End: 2024-03-31

## 2024-03-31 RX ORDER — SULFAMETHOXAZOLE AND TRIMETHOPRIM 800; 160 MG/1; MG/1
1 TABLET ORAL 2 TIMES DAILY
Qty: 6 TABLET | Refills: 0 | Status: SHIPPED | OUTPATIENT
Start: 2024-03-31 | End: 2024-04-03

## 2024-03-31 RX ORDER — SULFAMETHOXAZOLE AND TRIMETHOPRIM 800; 160 MG/1; MG/1
1 TABLET ORAL ONCE
Status: COMPLETED | OUTPATIENT
Start: 2024-03-31 | End: 2024-03-31

## 2024-03-31 RX ADMIN — POTASSIUM CHLORIDE 40 MEQ: 1500 TABLET, EXTENDED RELEASE ORAL at 21:06

## 2024-03-31 RX ADMIN — SODIUM CHLORIDE 1000 ML: 9 INJECTION, SOLUTION INTRAVENOUS at 19:23

## 2024-03-31 RX ADMIN — ACETAMINOPHEN 650 MG: 325 TABLET ORAL at 21:06

## 2024-03-31 RX ADMIN — BUTALBITA,ACETAMINOPHEN AND CAFFEINE 1 CAPSULE: 50; 300; 40 CAPSULE ORAL at 22:13

## 2024-03-31 RX ADMIN — SULFAMETHOXAZOLE AND TRIMETHOPRIM 1 TABLET: 800; 160 TABLET ORAL at 21:06

## 2024-03-31 ASSESSMENT — PAIN SCALES - GENERAL
PAINLEVEL_OUTOF10: 6
PAINLEVEL_OUTOF10: 4
PAINLEVEL_OUTOF10: 5
PAINLEVEL_OUTOF10: 3

## 2024-03-31 ASSESSMENT — LIFESTYLE VARIABLES
HOW MANY STANDARD DRINKS CONTAINING ALCOHOL DO YOU HAVE ON A TYPICAL DAY: PATIENT DOES NOT DRINK
HOW OFTEN DO YOU HAVE A DRINK CONTAINING ALCOHOL: NEVER

## 2024-03-31 ASSESSMENT — PAIN DESCRIPTION - ORIENTATION
ORIENTATION: RIGHT

## 2024-03-31 ASSESSMENT — PAIN DESCRIPTION - PAIN TYPE
TYPE: ACUTE PAIN
TYPE: ACUTE PAIN

## 2024-03-31 ASSESSMENT — PAIN DESCRIPTION - ONSET: ONSET: ON-GOING

## 2024-03-31 ASSESSMENT — PAIN DESCRIPTION - FREQUENCY: FREQUENCY: CONTINUOUS

## 2024-03-31 ASSESSMENT — PAIN - FUNCTIONAL ASSESSMENT: PAIN_FUNCTIONAL_ASSESSMENT: 0-10

## 2024-03-31 ASSESSMENT — PAIN DESCRIPTION - LOCATION
LOCATION: ANKLE;KNEE
LOCATION: ANKLE
LOCATION: ANKLE
LOCATION: HEAD

## 2024-03-31 ASSESSMENT — PAIN DESCRIPTION - DESCRIPTORS: DESCRIPTORS: ACHING

## 2024-03-31 NOTE — ED PROVIDER NOTES
Femoral pulses are full and symmetric  Back: No focal point bony midline tenderness step-off or deformity, no sacral or coccygeal tenderness no ecchymosis or abrasion.  No paravertebral muscle tenderness no costovertebral angle tenderness.  Straight leg raise test is negative,  Lower extremities: No gross trauma range of motion full and intact hips knees and ankles, motor sensory pulses intact distally, straight leg raise test is negative  Neurologic: Patient is awake alert oriented x3 speech is clear and fluent pupils are equal and reactive extraocular muscles are intact facial symmetry is intact tongue is midline strength testing is symmetric in both the upper and lower extremities sensation is intact in all 4 extremities      DIAGNOSTIC RESULTS       RADIOLOGY:   Non-plain film images such as CT, Ultrasound and MRI are read by the radiologist. Plain radiographic images are visualized and preliminarily interpreted by the emergency physician with the below findings:    Interpretation per the Radiologist below, if available at the time of this note:    XR FOOT RIGHT (MIN 3 VIEWS)   Final Result   No acute fractures or dislocations in right foot.         XR HIP 2-3 VW W PELVIS RIGHT   Final Result   1. Minimally displaced fractures of the superior and inferior pubic rami on   the right.   2. No signs of fracture or dislocation of the right hip.         XR KNEE RIGHT (3 VIEWS)   Final Result   Mild tricompartmental degenerative changes of the knee.      No acute fracture         XR LUMBAR SPINE (2-3 VIEWS)   Final Result   Scoliosis of the lumbar spine with right-sided convexity old finding as are   previous CT lumbar spine of August 2021.      Degenerative changes predominant seen in the center the curvature: Left   concave side.      No indication for acute fracture or dislocation in the lumbar spine.         XR ANKLE RIGHT (MIN 3 VIEWS)   Final Result   Suspicious for a subtle fibula tip avulsion fracture.

## 2024-04-01 NOTE — ED NOTES
Patient assisted to bedside commode. Pt stood on both legs with little assistance. She tuned and sat on bed. Pt needed some support.

## 2024-04-03 ENCOUNTER — OFFICE VISIT (OUTPATIENT)
Dept: ORTHOPEDIC SURGERY | Facility: CLINIC | Age: 74
End: 2024-04-03
Payer: COMMERCIAL

## 2024-04-03 DIAGNOSIS — S82.831A CLOSED FRACTURE OF DISTAL END OF RIGHT FIBULA, UNSPECIFIED FRACTURE MORPHOLOGY, INITIAL ENCOUNTER: ICD-10-CM

## 2024-04-03 DIAGNOSIS — S32.591A PUBIC RAMUS FRACTURE, RIGHT, CLOSED, INITIAL ENCOUNTER (MULTI): ICD-10-CM

## 2024-04-03 PROCEDURE — 99204 OFFICE O/P NEW MOD 45 MIN: CPT | Performed by: FAMILY MEDICINE

## 2024-04-03 PROCEDURE — 1159F MED LIST DOCD IN RCRD: CPT | Performed by: FAMILY MEDICINE

## 2024-04-03 PROCEDURE — 3008F BODY MASS INDEX DOCD: CPT | Performed by: FAMILY MEDICINE

## 2024-04-03 PROCEDURE — 27786 TREATMENT OF ANKLE FRACTURE: CPT | Performed by: FAMILY MEDICINE

## 2024-04-03 PROCEDURE — 1036F TOBACCO NON-USER: CPT | Performed by: FAMILY MEDICINE

## 2024-04-03 PROCEDURE — 99214 OFFICE O/P EST MOD 30 MIN: CPT | Mod: 57 | Performed by: FAMILY MEDICINE

## 2024-04-03 RX ORDER — HYDROCODONE BITARTRATE AND ACETAMINOPHEN 5; 325 MG/1; MG/1
1 TABLET ORAL EVERY 12 HOURS PRN
Qty: 14 TABLET | Refills: 0 | Status: SHIPPED | OUTPATIENT
Start: 2024-04-03 | End: 2024-04-09 | Stop reason: SDUPTHER

## 2024-04-03 NOTE — PROGRESS NOTES
Acute Injury New Patient Visit    CC:   Chief Complaint   Patient presents with    Left Ankle - Pain     Lt ankle injury  Xrays at [facility]       HPI: Alisa is a 73 y.o.female who presents today with new complaints of lateral sided right foot and ankle pain status post fall.  Patient was seen evaluated at [facility] emergency department after fall where multiple x-rays were obtained.  There was presence and evidence of superior and inferior right-sided pubic rami fractures.  The patient states she has no pain whatsoever about the groin or the hip.  She has pain to the lateral side of the ankle.  She has a history of some mild chronic deformities about the ankle.  She denies any numbness tingling or burning here today.  She states in the past tramadol has not been helpful for her chronic headaches she would like to request additional pain medication.        Review of Systems   GENERAL: Negative for malaise, significant weight loss, fever  MUSCULOSKELETAL: See HPI  NEURO: Negative for numbness / tingling     Past Medical History  Past Medical History:   Diagnosis Date    Arthritis     Blindness     Calculus of kidney 10/01/2020    Bilateral nephrolithiasis    Chronic headaches     Diverticulitis of large intestine without perforation or abscess without bleeding 10/01/2020    Sigmoid diverticulitis    Hypothyroidism     Migraine     Other specified disorders of eye and adnexa     Itchy, watery, and red eye    Personal history of (healed) traumatic fracture 10/18/2020    Status post fracture of left hip    Personal history of diseases of the blood and blood-forming organs and certain disorders involving the immune mechanism 10/27/2020    History of bleeding disorder    Personal history of other diseases of the musculoskeletal system and connective tissue 10/27/2020    History of arthritis    Personal history of other specified conditions 10/18/2020    History of cachexia    Personal history of other specified conditions      History of abdominal pain    Personal history of other specified conditions 10/27/2020    History of balance disorder    Short-term memory loss     Unspecified visual loss 10/27/2020    Vision problems    Uses walker     Wears glasses        Medication review  Medication Documentation Review Audit       Reviewed by Mindy Jamison MA (Technologist) on 04/03/24 at 1306      Medication Order Taking? Sig Documenting Provider Last Dose Status   acetaminophen (Tylenol) 500 mg tablet [id number] No Take 1 tablet (500 mg) by mouth every 6 hours if needed for headaches, moderate pain (4 - 6) or mild pain (1 - 3). Yvonne Preciado MD Taking Active   busPIRone (Buspar) 5 mg tablet [id number] No Take 1 tab every 12 hrs. IF needed for anxiety Alvarado Forde DO Taking Active   cyclobenzaprine (Flexeril) 10 mg tablet [id number]  Take 1 tablet (10 mg) by mouth 3 times a day as needed for muscle spasms. Historical Provider, MD  Active   docusate sodium (Colace) 100 mg capsule [id number] No Take 1 capsule (100 mg) by mouth 2 times a day. Historical Provider, MD Taking Active   gabapentin (Neurontin) 300 mg capsule [id number] No Take 2 capsules (600 mg) by mouth once daily at bedtime. Alvarado Forde DO Taking Active   L. acidophilus/pectin, citrus (ACIDOPHILUS PROBIOTIC ORAL) [id number] No Take by mouth. Historical Provider, MD Taking Active   levothyroxine (Synthroid, Levoxyl) 50 mcg tablet [id number] No Take 1 tablet (50 mcg) by mouth once daily. Alvarado Forde DO Taking Active   nitrofurantoin (Macrodantin) 50 mg capsule [id number]  Take 1 capsule (50 mg) by mouth once daily. Alvarado Forde DO  Active   pantoprazole (ProtoNix) 40 mg EC tablet [id number]  Take 1 tablet (40 mg) by mouth once daily. Alvarado Forde DO  Active   polyethylene glycol (Glycolax) 17 gram/dose powder [id number]  Take 17 g by mouth once daily. Historical Provider, MD  Active   predniSONE (Deltasone) 5 mg tablet [id number]  Take 1 tablet by mouth once daily Alvarado Forde  DO  Active   traMADol (Ultram) 50 mg tablet [id number]  Take 1 tablet (50 mg) by mouth every 8 hours if needed for severe pain (7 - 10). Rashard Gibbs MD  Active                    Allergies  Allergies   Allergen Reactions    Ciprofloxacin Unknown       Social History  Social History     Socioeconomic History    Marital status:      Spouse name: Not on file    Number of children: Not on file    Years of education: Not on file    Highest education level: Not on file   Occupational History    Not on file   Tobacco Use    Smoking status: Never     Passive exposure: Past    Smokeless tobacco: Never   Vaping Use    Vaping Use: Never used   Substance and Sexual Activity    Alcohol use: Not Currently    Drug use: Never    Sexual activity: Defer   Other Topics Concern    Not on file   Social History Narrative    Not on file     Social Determinants of Health     Financial Resource Strain: Not on file   Food Insecurity: Not on file   Transportation Needs: No Transportation Needs (11/15/2023)    OASIS : Transportation     Lack of Transportation (Medical): No     Lack of Transportation (Non-Medical): No     Patient Unable or Declines to Respond: No   Physical Activity: Not on file   Stress: Not on file   Social Connections: Feeling Socially Integrated (11/15/2023)    OASIS : Social Isolation     Frequency of experiencing loneliness or isolation: Rarely   Recent Concern: Social Connections - Feeling Somewhat Isolated (10/16/2023)    OASIS : Social Isolation     Frequency of experiencing loneliness or isolation: Sometimes   Intimate Partner Violence: Not on file   Housing Stability: Not on file       Surgical History  Past Surgical History:   Procedure Laterality Date    CHOLECYSTECTOMY      COLONOSCOPY      CORNEAL TRANSPLANT Left     CT ABDOMEN PELVIS ANGIOGRAM W AND/OR WO IV CONTRAST  05/28/2022    CT ABDOMEN PELVIS ANGIOGRAM W AND/OR WO IV CONTRAST 5/28/2022 [facility]     ESOPHAGOGASTRODUODENOSCOPY      HIP SURGERY Left 09/25/2020    hardware- fracture    HYSTERECTOMY      PARTIAL GASTRECTOMY      PARTIAL KNEE ARTHROPLASTY Left     Partial Replacement    TOTAL HIP ARTHROPLASTY Left     Total Hip Replacement       Physical Exam:  GENERAL:  Patient is awake, alert, and oriented to person place and time.  Patient appears well nourished and well kept.  Affect Calm, Not Acutely Distressed.  HEENT:  Normocephalic, Atraumatic, EOMI  CARDIOVASCULAR:  Hemodynamically stable.  RESPIRATORY:  Normal respirations with unlabored breathing.  NEURO: Gross sensation intact to the lower extremities bilaterally.  Extremity: Right hip exam demonstrates no tenderness to the greater trochanter bursa she has no deep inguinal groin pain she has 5 out of 5 hip flexion strength 5 out of 5 abduction and adduction strength with normal internal and external rotation.  She has no pain or discomfort at the knee here today she is able to flex and extend there is no obvious joint effusion or crepitus.  Calf is soft nontender.  Right ankle exam: The affected ankle was examined and inspected.  There was evidence of lateral sided soft tissue swelling and fullness with mild bruising noted.  The distal fibula had mild tenderness to palpation, the distal medial malleolus was non-tender.  Tenderness across the anterior joint space and over the soft tissues in the area of the ATFL and CFL ligaments.  Negative Heel Tap and Calcaneal Squeeze, Achilles is non-tender.  Negative Simone´s and Bansal sign.  Negative Midfoot and distal metatarsal squeeze.  Distal pulses and sensation are intact with good distal cap refill.  Active and passive ROM and strength about the ankle is limited with Dorsiflexion, Plantarflexion, Eversion, and Inversion. Unable to tolerate full weight bearing secondary to pain.      Diagnostics: Previous images reviewed consistent with nondisplaced right distal fibular fracture, superior and inferior pubic  rami fractures of the right hip        Procedure: None  Procedures    Assessment:   Problem List Items Addressed This Visit    None  Visit Diagnoses       Closed fracture of distal end of right fibula, unspecified fracture morphology, initial encounter        Relevant Medications    HYDROcodone-acetaminophen (Norco) 5-325 mg tablet    Other Relevant Orders    Walking boot    Ankle Brace, Lace Up or A60    Pubic ramus fracture, right, closed, initial encounter ([facility])        Relevant Medications    HYDROcodone-acetaminophen (Norco) 5-325 mg tablet    Other Relevant Orders    Walking boot    Ankle Brace, Lace Up or A60             Plan: Discussed the nonoperative nature of the injuries as above.  Will offer her a short walking boot here today and pre-CERT for a lace up ankle brace.  She denied the need for a walker or crutches.  She has access to a hinged knee brace at home which she can utilize as needed for her knee discomfort.  We will see her back in 4 to 6 weeks for repeat evaluation repeat x-rays 3 views of the right ankle at that time we will also get updated single AP view of the pelvis to follow along with the pubic rami fractures.  We provided her with pain medication here today OARRS was checked and okay.  She should call should she need a refill if necessary.  Orders Placed This Encounter    Walking boot    Ankle Brace, Lace Up or A60    HYDROcodone-acetaminophen (Norco) 5-325 mg tablet      At the conclusion of the visit there were no further questions by the patient/family regarding their plan of care.  Patient was instructed to call or return with any issues, questions, or concerns regarding their injury and/or treatment plan described above.     04/03/24 at 6:05 PM - Cole C Budinsky, MD    Office: [phone]    This note was prepared using voice recognition software.  The details of this note are correct and have been reviewed, and corrected to the best of my ability.  Some grammatical errors may  persist related to the Dragon software.

## 2024-04-09 DIAGNOSIS — S32.591A PUBIC RAMUS FRACTURE, RIGHT, CLOSED, INITIAL ENCOUNTER (MULTI): ICD-10-CM

## 2024-04-09 DIAGNOSIS — S82.831A CLOSED FRACTURE OF DISTAL END OF RIGHT FIBULA, UNSPECIFIED FRACTURE MORPHOLOGY, INITIAL ENCOUNTER: ICD-10-CM

## 2024-04-09 RX ORDER — HYDROCODONE BITARTRATE AND ACETAMINOPHEN 5; 325 MG/1; MG/1
1 TABLET ORAL EVERY 12 HOURS PRN
Qty: 10 TABLET | Refills: 0 | Status: SHIPPED | OUTPATIENT
Start: 2024-04-09 | End: 2024-04-16 | Stop reason: SDUPTHER

## 2024-04-12 ENCOUNTER — TELEPHONE (OUTPATIENT)
Dept: ORTHOPEDIC SURGERY | Facility: CLINIC | Age: 74
End: 2024-04-12
Payer: COMMERCIAL

## 2024-04-15 ENCOUNTER — TELEPHONE (OUTPATIENT)
Dept: ORTHOPEDIC SURGERY | Facility: CLINIC | Age: 74
End: 2024-04-15
Payer: COMMERCIAL

## 2024-04-16 DIAGNOSIS — M54.16 LUMBAR RADICULOPATHY, CHRONIC: ICD-10-CM

## 2024-04-16 RX ORDER — HYDROCODONE BITARTRATE AND ACETAMINOPHEN 5; 325 MG/1; MG/1
1 TABLET ORAL EVERY 12 HOURS PRN
Qty: 10 TABLET | Refills: 0 | Status: SHIPPED | OUTPATIENT
Start: 2024-04-16 | End: 2024-04-23

## 2024-04-16 RX ORDER — GABAPENTIN 300 MG/1
600 CAPSULE ORAL NIGHTLY
Qty: 180 CAPSULE | Refills: 0 | Status: SHIPPED | OUTPATIENT
Start: 2024-04-16

## 2024-04-27 ENCOUNTER — APPOINTMENT (OUTPATIENT)
Dept: GENERAL RADIOLOGY | Age: 74
End: 2024-04-27
Payer: COMMERCIAL

## 2024-04-27 ENCOUNTER — HOSPITAL ENCOUNTER (EMERGENCY)
Age: 74
Discharge: HOME | End: 2024-04-27
Payer: COMMERCIAL

## 2024-04-27 VITALS
HEIGHT: 62 IN | HEART RATE: 61 BPM | SYSTOLIC BLOOD PRESSURE: 181 MMHG | BODY MASS INDEX: 20.24 KG/M2 | DIASTOLIC BLOOD PRESSURE: 88 MMHG | TEMPERATURE: 97.5 F | WEIGHT: 110 LBS | RESPIRATION RATE: 18 BRPM | OXYGEN SATURATION: 99 %

## 2024-04-27 DIAGNOSIS — M06.9 RHEUMATOID ARTHRITIS, INVOLVING UNSPECIFIED SITE, UNSPECIFIED WHETHER RHEUMATOID FACTOR PRESENT (MULTI): ICD-10-CM

## 2024-04-27 DIAGNOSIS — K59.00 CONSTIPATION, UNSPECIFIED CONSTIPATION TYPE: Primary | ICD-10-CM

## 2024-04-27 DIAGNOSIS — R10.84 GENERALIZED ABDOMINAL PAIN: ICD-10-CM

## 2024-04-27 DIAGNOSIS — R10.9 ABDOMINAL PAIN, UNSPECIFIED ABDOMINAL LOCATION: ICD-10-CM

## 2024-04-27 LAB
BACTERIA URNS QL MICRO: ABNORMAL /HPF
BILIRUB UR QL STRIP: NEGATIVE
CLARITY UR: CLEAR
COLOR UR: YELLOW
EPI CELLS #/AREA URNS HPF: ABNORMAL /HPF
GLUCOSE UR STRIP-MCNC: NEGATIVE MG/DL
HGB UR QL STRIP: NEGATIVE
KETONES UR STRIP-MCNC: 15 MG/DL
LEUKOCYTE ESTERASE UR QL STRIP: NEGATIVE
NITRITE UR QL STRIP: POSITIVE
PH UR STRIP: 6 [PH] (ref 5–9)
PROT UR STRIP-MCNC: NEGATIVE MG/DL
RBC #/AREA URNS HPF: ABNORMAL /HPF (ref 0–2)
SP GR UR STRIP: 1.02 (ref 1–1.03)
URINE REFLEX TO CULTURE: ABNORMAL
UROBILINOGEN UR STRIP-ACNC: 0.2 E.U./DL
WBC #/AREA URNS HPF: ABNORMAL /HPF (ref 0–5)

## 2024-04-27 PROCEDURE — 99284 EMERGENCY DEPT VISIT MOD MDM: CPT

## 2024-04-27 PROCEDURE — 81001 URINALYSIS AUTO W/SCOPE: CPT

## 2024-04-27 PROCEDURE — 74018 RADEX ABDOMEN 1 VIEW: CPT

## 2024-04-27 RX ORDER — POLYETHYLENE GLYCOL 3350 17 G/17G
17 POWDER, FOR SOLUTION ORAL DAILY
Qty: 510 G | Refills: 0 | Status: SHIPPED | OUTPATIENT
Start: 2024-04-27 | End: 2024-05-27

## 2024-04-27 ASSESSMENT — ENCOUNTER SYMPTOMS
BLOOD IN STOOL: 0
VOMITING: 0
SORE THROAT: 0
NAUSEA: 0
CONSTIPATION: 1
ABDOMINAL PAIN: 0
COUGH: 0
DIARRHEA: 0
SHORTNESS OF BREATH: 0
BACK PAIN: 0

## 2024-04-27 ASSESSMENT — PAIN DESCRIPTION - ONSET: ONSET: SUDDEN

## 2024-04-27 ASSESSMENT — PAIN DESCRIPTION - LOCATION: LOCATION: PELVIS

## 2024-04-27 ASSESSMENT — PAIN DESCRIPTION - FREQUENCY: FREQUENCY: CONTINUOUS

## 2024-04-27 ASSESSMENT — PAIN DESCRIPTION - PAIN TYPE: TYPE: ACUTE PAIN

## 2024-04-27 ASSESSMENT — PAIN DESCRIPTION - DESCRIPTORS: DESCRIPTORS: SHARP

## 2024-04-27 ASSESSMENT — PAIN - FUNCTIONAL ASSESSMENT: PAIN_FUNCTIONAL_ASSESSMENT: 0-10

## 2024-04-27 ASSESSMENT — PAIN SCALES - GENERAL: PAINLEVEL_OUTOF10: 7

## 2024-04-27 ASSESSMENT — PAIN DESCRIPTION - ORIENTATION: ORIENTATION: LEFT

## 2024-04-27 NOTE — ED TRIAGE NOTES
Pt arrives to ED, from home, via personal vehicle-pt's spouse at her bedside.  Pt presents with possible UTI.

## 2024-04-27 NOTE — DISCHARGE INSTRUCTIONS
Please continue taking your routine prescription of Macrobid.  Please take MiraLAX as needed for constipation on a daily basis.  Follow-up with your primary care doctor Monday.  Return to emergency department for any new or worsening symptoms.

## 2024-04-27 NOTE — ED PROVIDER NOTES
shortness of breath.    Cardiovascular:  Negative for chest pain, palpitations and leg swelling.   Gastrointestinal:  Positive for constipation. Negative for abdominal pain, blood in stool, diarrhea, nausea and vomiting.   Genitourinary:  Positive for dysuria. Negative for hematuria.   Musculoskeletal:  Negative for back pain.   Skin:  Negative for rash.   Neurological:  Negative for dizziness and weakness.        as noted above the remainder of the review of systems was reviewed and negative.       PAST MEDICAL HISTORY     Past Medical History:   Diagnosis Date    Depression     GERD (gastroesophageal reflux disease)     Headache     History of kidney stones     hospitalized 9/2016 obstructive pyelonephritis    Hypercholesteremia     Hypothyroidism     Osteoarthritis     Osteopenia     RA (rheumatoid arthritis) (HCC)          SURGICAL HISTORY       Past Surgical History:   Procedure Laterality Date    CYSTOSCOPY Left 08/28/2016    PHILIP GRIGSBY MD  PYELOGRAM & DOUBLE-J STENT PLACEMENT    GALLBLADDER SURGERY      HYSTERECTOMY (CERVIX STATUS UNKNOWN)      KNEE SURGERY      left    LITHOTRIPSY Left 10/12/2016    HOLMIUM LASER LITHOTRIPSY AND REMOVAL OF LEFT J STENT  performed by Wilfred Grigsby MD at [facility] OR    UPPER GASTROINTESTINAL ENDOSCOPY  08/09/2013    DR. RHODES    URETER SURGERY Left 10/12/2016    /left ureteroscopy/ cysto/ retrogrades/pyelogram/ holmium laser lithotripsy removal left urerteral stent performed by Wilfred Grigsby MD at [facility] OR         CURRENT MEDICATIONS       Previous Medications    ACETAMINOPHEN (TYLENOL) 325 MG TABLET    Take 650 mg by mouth every 6 hours as needed for Pain Indications: this am    AMOXICILLIN-CLAVULANATE (AUGMENTIN) 875-125 MG PER TABLET    Take 1 tablet by mouth 2 times daily    CYCLOBENZAPRINE (FLEXERIL) 10 MG TABLET    Take 10 mg by mouth 3 times daily as needed    DIPHENHYDRAMINE (BENADRYL) 25 MG CAPSULE    Take 50 mg by mouth every 6 hours as needed for Itching     GABAPENTIN (NEURONTIN) 300 MG CAPSULE    TAKE 3 CAPSULES BY MOUTH AT BEDTIME    INULIN 1.5 G CHEW CHEWABLE TABLET    Take by mouth    KETOROLAC (TORADOL) 10 MG TABLET    Take 1 tablet by mouth every 6 hours as needed for Pain    LEVOTHYROXINE (SYNTHROID) 50 MCG TABLET    TAKE 1 TABLET BY MOUTH ONCE DAILY    METRONIDAZOLE (FLAGYL) 250 MG TABLET    Take 250 mg by mouth in the morning and at bedtime    NAPROXEN (NAPROSYN) 375 MG TABLET    TAKE 1 TABLET BY MOUTH TWICE DAILY AFTER A MEAL AS NEEDED    ONDANSETRON (ZOFRAN ODT) 4 MG DISINTEGRATING TABLET    Take 1 tablet by mouth every 8 hours as needed for Nausea    PREDNISOLONE ACETATE (PRED FORTE) 1 % OPHTHALMIC SUSPENSION    instill 1 (ONE) DROP in left eye TWICE DAILY    PREDNISONE (DELTASONE) 5 MG TABLET    Take by mouth     TRAMADOL (ULTRAM) 50 MG TABLET    Take 50 mg by mouth every 6 hours as needed for Pain.       ALLERGIES     Ciprofloxacin    HISTORY       Family History   Problem Relation Age of Onset    Diabetes Father     Heart Disease Father           SOCIAL HISTORY       Social History     Socioeconomic History    Marital status:      Spouse name: None    Number of children: None    Years of education: None    Highest education level: None   Tobacco Use    Smoking status: Never    Smokeless tobacco: Never   Vaping Use    Vaping Use: Never used   Substance and Sexual Activity    Alcohol use: No    Drug use: No    Sexual activity: Not Currently       SCREENINGS    [place] Coma Scale  Eye Opening: Spontaneous  Best Verbal Response: Oriented  Best Motor Response: Obeys commands  Brandie Coma Scale Score: 15      PHYSICAL EXAM    (up to 7 forlevel 4, 8 or more for level 5)     ED Triage Vitals   BP Temp Temp src Pulse Resp SpO2 Height Weight   -- -- -- -- -- -- -- --       Physical Exam  Vitals and nursing note reviewed.   Constitutional:       General: She is not in acute distress.     Appearance: She is well-developed. She is not ill-appearing,

## 2024-04-29 ENCOUNTER — HOSPITAL ENCOUNTER (EMERGENCY)
Facility: HOSPITAL | Age: 74
Discharge: HOME | End: 2024-04-29
Attending: EMERGENCY MEDICINE
Payer: COMMERCIAL

## 2024-04-29 ENCOUNTER — TELEPHONE (OUTPATIENT)
Dept: PRIMARY CARE | Facility: CLINIC | Age: 74
End: 2024-04-29
Payer: COMMERCIAL

## 2024-04-29 ENCOUNTER — APPOINTMENT (OUTPATIENT)
Dept: RADIOLOGY | Facility: HOSPITAL | Age: 74
End: 2024-04-29
Payer: COMMERCIAL

## 2024-04-29 VITALS
DIASTOLIC BLOOD PRESSURE: 82 MMHG | TEMPERATURE: 97.9 F | SYSTOLIC BLOOD PRESSURE: 166 MMHG | WEIGHT: 120 LBS | OXYGEN SATURATION: 96 % | HEART RATE: 75 BPM | RESPIRATION RATE: 20 BRPM | BODY MASS INDEX: 21.26 KG/M2 | HEIGHT: 63 IN

## 2024-04-29 DIAGNOSIS — R10.32 LEFT LOWER QUADRANT ABDOMINAL PAIN: Primary | ICD-10-CM

## 2024-04-29 LAB
ALBUMIN SERPL BCP-MCNC: 4.3 G/DL (ref 3.4–5)
ALP SERPL-CCNC: 65 U/L (ref 33–136)
ALT SERPL W P-5'-P-CCNC: 10 U/L (ref 7–45)
ANION GAP SERPL CALC-SCNC: 12 MMOL/L (ref 10–20)
AST SERPL W P-5'-P-CCNC: 16 U/L (ref 9–39)
BASOPHILS # BLD AUTO: 0.1 X10*3/UL (ref 0–0.1)
BASOPHILS NFR BLD AUTO: 1.1 %
BILIRUB SERPL-MCNC: 0.6 MG/DL (ref 0–1.2)
BUN SERPL-MCNC: 9 MG/DL (ref 6–23)
CALCIUM SERPL-MCNC: 9.4 MG/DL (ref 8.6–10.3)
CHLORIDE SERPL-SCNC: 101 MMOL/L (ref 98–107)
CO2 SERPL-SCNC: 31 MMOL/L (ref 21–32)
CREAT SERPL-MCNC: 0.75 MG/DL (ref 0.5–1.05)
EGFRCR SERPLBLD CKD-EPI 2021: 84 ML/MIN/1.73M*2
EOSINOPHIL # BLD AUTO: 0.03 X10*3/UL (ref 0–0.4)
EOSINOPHIL NFR BLD AUTO: 0.3 %
ERYTHROCYTE [DISTWIDTH] IN BLOOD BY AUTOMATED COUNT: 17.3 % (ref 11.5–14.5)
GLUCOSE SERPL-MCNC: 114 MG/DL (ref 74–99)
HCT VFR BLD AUTO: 42.5 % (ref 36–46)
HGB BLD-MCNC: 13 G/DL (ref 12–16)
IMM GRANULOCYTES # BLD AUTO: 0.03 X10*3/UL (ref 0–0.5)
IMM GRANULOCYTES NFR BLD AUTO: 0.3 % (ref 0–0.9)
INR PPP: 1 (ref 0.9–1.1)
LACTATE SERPL-SCNC: 1.7 MMOL/L (ref 0.4–2)
LYMPHOCYTES # BLD AUTO: 2.28 X10*3/UL (ref 0.8–3)
LYMPHOCYTES NFR BLD AUTO: 24.3 %
MCH RBC QN AUTO: 28.3 PG (ref 26–34)
MCHC RBC AUTO-ENTMCNC: 30.6 G/DL (ref 32–36)
MCV RBC AUTO: 93 FL (ref 80–100)
MONOCYTES # BLD AUTO: 0.73 X10*3/UL (ref 0.05–0.8)
MONOCYTES NFR BLD AUTO: 7.8 %
NEUTROPHILS # BLD AUTO: 6.21 X10*3/UL (ref 1.6–5.5)
NEUTROPHILS NFR BLD AUTO: 66.2 %
NRBC BLD-RTO: 0 /100 WBCS (ref 0–0)
PLATELET # BLD AUTO: 213 X10*3/UL (ref 150–450)
POTASSIUM SERPL-SCNC: 2.8 MMOL/L (ref 3.5–5.3)
PROT SERPL-MCNC: 7.3 G/DL (ref 6.4–8.2)
PROTHROMBIN TIME: 11.6 SECONDS (ref 9.8–12.8)
RBC # BLD AUTO: 4.59 X10*6/UL (ref 4–5.2)
SODIUM SERPL-SCNC: 141 MMOL/L (ref 136–145)
WBC # BLD AUTO: 9.4 X10*3/UL (ref 4.4–11.3)

## 2024-04-29 PROCEDURE — 83605 ASSAY OF LACTIC ACID: CPT | Performed by: NURSE PRACTITIONER

## 2024-04-29 PROCEDURE — 2550000001 HC RX 255 CONTRASTS: Performed by: NURSE PRACTITIONER

## 2024-04-29 PROCEDURE — 2500000006 HC RX 250 W HCPCS SELF ADMINISTERED DRUGS (ALT 637 FOR ALL PAYERS): Performed by: EMERGENCY MEDICINE

## 2024-04-29 PROCEDURE — 85610 PROTHROMBIN TIME: CPT | Performed by: NURSE PRACTITIONER

## 2024-04-29 PROCEDURE — 80053 COMPREHEN METABOLIC PANEL: CPT | Performed by: NURSE PRACTITIONER

## 2024-04-29 PROCEDURE — 74177 CT ABD & PELVIS W/CONTRAST: CPT

## 2024-04-29 PROCEDURE — 96375 TX/PRO/DX INJ NEW DRUG ADDON: CPT

## 2024-04-29 PROCEDURE — 96361 HYDRATE IV INFUSION ADD-ON: CPT

## 2024-04-29 PROCEDURE — 85025 COMPLETE CBC W/AUTO DIFF WBC: CPT | Performed by: NURSE PRACTITIONER

## 2024-04-29 PROCEDURE — 74177 CT ABD & PELVIS W/CONTRAST: CPT | Performed by: RADIOLOGY

## 2024-04-29 PROCEDURE — 2500000004 HC RX 250 GENERAL PHARMACY W/ HCPCS (ALT 636 FOR OP/ED): Performed by: NURSE PRACTITIONER

## 2024-04-29 PROCEDURE — 36415 COLL VENOUS BLD VENIPUNCTURE: CPT | Performed by: NURSE PRACTITIONER

## 2024-04-29 PROCEDURE — 96365 THER/PROPH/DIAG IV INF INIT: CPT

## 2024-04-29 PROCEDURE — 99284 EMERGENCY DEPT VISIT MOD MDM: CPT | Mod: 25

## 2024-04-29 PROCEDURE — 2500000004 HC RX 250 GENERAL PHARMACY W/ HCPCS (ALT 636 FOR OP/ED): Performed by: EMERGENCY MEDICINE

## 2024-04-29 PROCEDURE — 96366 THER/PROPH/DIAG IV INF ADDON: CPT

## 2024-04-29 RX ORDER — POTASSIUM CHLORIDE 20 MEQ/1
40 TABLET, EXTENDED RELEASE ORAL DAILY
Status: DISCONTINUED | OUTPATIENT
Start: 2024-04-29 | End: 2024-04-29 | Stop reason: HOSPADM

## 2024-04-29 RX ORDER — HYDROCODONE BITARTRATE AND ACETAMINOPHEN 5; 325 MG/1; MG/1
1 TABLET ORAL EVERY 6 HOURS PRN
Qty: 12 TABLET | Refills: 0 | OUTPATIENT
Start: 2024-04-29 | End: 2024-05-04

## 2024-04-29 RX ORDER — DICYCLOMINE HYDROCHLORIDE 20 MG/1
20 TABLET ORAL EVERY 6 HOURS PRN
Qty: 60 TABLET | Refills: 3 | Status: SHIPPED | OUTPATIENT
Start: 2024-04-29

## 2024-04-29 RX ORDER — ONDANSETRON HYDROCHLORIDE 2 MG/ML
4 INJECTION, SOLUTION INTRAVENOUS ONCE
Status: COMPLETED | OUTPATIENT
Start: 2024-04-29 | End: 2024-04-29

## 2024-04-29 RX ORDER — POTASSIUM CHLORIDE 14.9 MG/ML
20 INJECTION INTRAVENOUS ONCE
Status: COMPLETED | OUTPATIENT
Start: 2024-04-29 | End: 2024-04-29

## 2024-04-29 RX ORDER — MORPHINE SULFATE 4 MG/ML
4 INJECTION, SOLUTION INTRAMUSCULAR; INTRAVENOUS ONCE
Status: COMPLETED | OUTPATIENT
Start: 2024-04-29 | End: 2024-04-29

## 2024-04-29 RX ORDER — PREDNISONE 5 MG/1
5 TABLET ORAL DAILY
Qty: 30 TABLET | Refills: 0 | Status: SHIPPED | OUTPATIENT
Start: 2024-04-29 | End: 2024-06-03 | Stop reason: SDUPTHER

## 2024-04-29 RX ADMIN — IOHEXOL 75 ML: 350 INJECTION, SOLUTION INTRAVENOUS at 11:03

## 2024-04-29 RX ADMIN — MORPHINE SULFATE 4 MG: 4 INJECTION, SOLUTION INTRAMUSCULAR; INTRAVENOUS at 11:27

## 2024-04-29 RX ADMIN — ONDANSETRON 4 MG: 2 INJECTION INTRAMUSCULAR; INTRAVENOUS at 11:27

## 2024-04-29 RX ADMIN — POTASSIUM CHLORIDE 40 MEQ: 1500 TABLET, EXTENDED RELEASE ORAL at 11:08

## 2024-04-29 RX ADMIN — POTASSIUM CHLORIDE 20 MEQ: 14.9 INJECTION, SOLUTION INTRAVENOUS at 11:08

## 2024-04-29 RX ADMIN — SODIUM CHLORIDE 500 ML: 9 INJECTION, SOLUTION INTRAVENOUS at 10:06

## 2024-04-29 ASSESSMENT — LIFESTYLE VARIABLES
EVER FELT BAD OR GUILTY ABOUT YOUR DRINKING: NO
HAVE YOU EVER FELT YOU SHOULD CUT DOWN ON YOUR DRINKING: NO
HAVE PEOPLE ANNOYED YOU BY CRITICIZING YOUR DRINKING: NO
TOTAL SCORE: 0
EVER HAD A DRINK FIRST THING IN THE MORNING TO STEADY YOUR NERVES TO GET RID OF A HANGOVER: NO

## 2024-04-29 ASSESSMENT — PAIN - FUNCTIONAL ASSESSMENT
PAIN_FUNCTIONAL_ASSESSMENT: 0-10

## 2024-04-29 ASSESSMENT — PAIN DESCRIPTION - PAIN TYPE
TYPE: ACUTE PAIN
TYPE: ACUTE PAIN

## 2024-04-29 ASSESSMENT — PAIN DESCRIPTION - ONSET: ONSET: ONGOING

## 2024-04-29 ASSESSMENT — PAIN SCALES - GENERAL
PAINLEVEL_OUTOF10: 0 - NO PAIN
PAINLEVEL_OUTOF10: 6
PAINLEVEL_OUTOF10: 6
PAINLEVEL_OUTOF10: 7
PAINLEVEL_OUTOF10: 6

## 2024-04-29 ASSESSMENT — PAIN DESCRIPTION - LOCATION
LOCATION: ABDOMEN

## 2024-04-29 ASSESSMENT — PAIN DESCRIPTION - ORIENTATION
ORIENTATION: LEFT;LOWER
ORIENTATION: LEFT;LOWER

## 2024-04-29 ASSESSMENT — PAIN DESCRIPTION - FREQUENCY: FREQUENCY: CONSTANT/CONTINUOUS

## 2024-04-29 ASSESSMENT — PAIN DESCRIPTION - PROGRESSION: CLINICAL_PROGRESSION: NOT CHANGED

## 2024-04-29 ASSESSMENT — COLUMBIA-SUICIDE SEVERITY RATING SCALE - C-SSRS
1. IN THE PAST MONTH, HAVE YOU WISHED YOU WERE DEAD OR WISHED YOU COULD GO TO SLEEP AND NOT WAKE UP?: NO
6. HAVE YOU EVER DONE ANYTHING, STARTED TO DO ANYTHING, OR PREPARED TO DO ANYTHING TO END YOUR LIFE?: NO
2. HAVE YOU ACTUALLY HAD ANY THOUGHTS OF KILLING YOURSELF?: NO

## 2024-04-29 ASSESSMENT — PAIN DESCRIPTION - DESCRIPTORS: DESCRIPTORS: ACHING

## 2024-04-29 NOTE — ED PROVIDER NOTES
HPI   Chief Complaint   Patient presents with    Constipation     Seen at [facility] Saturday and they told her to use Mirelax for constipation but it is not helping.         73-year-old female presents emergency department, according to the patient and her spouse the patient's been having issues with some constipation, unable to have a bowel movement for the last several days.  States he was seen at [facility] couple days ago, had an x-ray performed which did show some constipation so they have been using MiraLAX twice a day at home but still unable to have a bowel movement.  Patient describes significant left lower quadrant pain.  Did take Tylenol this morning for the pain.  No fevers, no vomiting      History provided by:  Patient and spouse   used: No                        Steve Coma Scale Score: 15                     Patient History   Past Medical History:   Diagnosis Date    Arthritis     Blindness     Calculus of kidney 10/01/2020    Bilateral nephrolithiasis    Chronic headaches     Diverticulitis of large intestine without perforation or abscess without bleeding 10/01/2020    Sigmoid diverticulitis    Hypothyroidism     Migraine     Other specified disorders of eye and adnexa     Itchy, watery, and red eye    Personal history of (healed) traumatic fracture 10/18/2020    Status post fracture of left hip    Personal history of diseases of the blood and blood-forming organs and certain disorders involving the immune mechanism 10/27/2020    History of bleeding disorder    Personal history of other diseases of the musculoskeletal system and connective tissue 10/27/2020    History of arthritis    Personal history of other specified conditions 10/18/2020    History of cachexia    Personal history of other specified conditions     History of abdominal pain    Personal history of other specified conditions 10/27/2020    History of balance disorder    Short-term memory loss     Unspecified  visual loss 10/27/2020    Vision problems    Uses walker     Wears glasses      Past Surgical History:   Procedure Laterality Date    CHOLECYSTECTOMY      COLONOSCOPY      CORNEAL TRANSPLANT Left     CT ABDOMEN PELVIS ANGIOGRAM W AND/OR WO IV CONTRAST  05/28/2022    CT ABDOMEN PELVIS ANGIOGRAM W AND/OR WO IV CONTRAST 5/28/2022 [facility]    ESOPHAGOGASTRODUODENOSCOPY      HIP SURGERY Left 09/25/2020    hardware- fracture    HYSTERECTOMY      PARTIAL GASTRECTOMY      PARTIAL KNEE ARTHROPLASTY Left     Partial Replacement    TOTAL HIP ARTHROPLASTY Left     Total Hip Replacement     Family History   Problem Relation Name Age of Onset    No Known Problems Mother      No Known Problems Father      No Known Problems Sister      No Known Problems Brother       Social History     Tobacco Use    Smoking status: Never     Passive exposure: Past    Smokeless tobacco: Never   Vaping Use    Vaping status: Never Used   Substance Use Topics    Alcohol use: Not Currently    Drug use: Never       Physical Exam   ED Triage Vitals   Temperature Heart Rate Respirations BP   04/29/24 0938 04/29/24 0938 04/29/24 0938 04/29/24 0942   36.6 °C (97.9 °F) 64 20 137/84      Pulse Ox Temp Source Heart Rate Source Patient Position   04/29/24 0938 04/29/24 0938 04/29/24 0938 --   94 % Temporal Monitor       BP Location FiO2 (%)     -- --             Physical Exam  Physical Exam:  Constitutional: Vitals noted, no distress. Afebrile.   Cardiovascular: Regular, rate, rhythm, no murmur.   Pulmonary: Lungs clear bilaterally with good aeration. No adventitious breath sounds.   Gastrointestinal: Soft, nonsurgical.  Tender left lower quadrant. No peritoneal signs. Normoactive bowel sounds.   Musculoskeletal: No peripheral edema. Negative Homans bilaterally, no cords.   Skin: No rash.   Neuro: No focal neurologic deficits, NIH score of 0.    ED Course & MDM   Diagnoses as of 04/29/24 1312   Left lower quadrant abdominal pain     Labs Reviewed    CBC WITH AUTO DIFFERENTIAL - Abnormal       Result Value    WBC 9.4      nRBC 0.0      RBC 4.59      Hemoglobin 13.0      Hematocrit 42.5      MCV 93      MCH 28.3      MCHC 30.6 (*)     RDW 17.3 (*)     Platelets 213      Neutrophils % 66.2      Immature Granulocytes %, Automated 0.3      Lymphocytes % 24.3      Monocytes % 7.8      Eosinophils % 0.3      Basophils % 1.1      Neutrophils Absolute 6.21 (*)     Immature Granulocytes Absolute, Automated 0.03      Lymphocytes Absolute 2.28      Monocytes Absolute 0.73      Eosinophils Absolute 0.03      Basophils Absolute 0.10     COMPREHENSIVE METABOLIC PANEL - Abnormal    Glucose 114 (*)     Sodium 141      Potassium 2.8 (*)     Chloride 101      Bicarbonate 31      Anion Gap 12      Urea Nitrogen 9      Creatinine 0.75      eGFR 84      Calcium 9.4      Albumin 4.3      Alkaline Phosphatase 65      Total Protein 7.3      AST 16      Bilirubin, Total 0.6      ALT 10     LACTATE - Normal    Lactate 1.7      Narrative:     Venipuncture immediately after or during the administration of Metamizole may lead to falsely low results. Testing should be performed immediately  prior to Metamizole dosing.   PROTIME-INR - Normal    Protime 11.6      INR 1.0     URINALYSIS WITH REFLEX CULTURE AND MICROSCOPIC    Narrative:     The following orders were created for panel order Urinalysis with Reflex Culture and Microscopic.  Procedure                               Abnormality         Status                     ---------                               -----------         ------                     Urinalysis with Reflex C...[[id number]]                                                 Extra Urine Gray Tube[[id number]]                                                         Please view results for these tests on the individual orders.   URINALYSIS WITH REFLEX CULTURE AND MICROSCOPIC   EXTRA URINE GRAY TUBE        CT abdomen pelvis w IV contrast   Final Result   1. Bibasilar atelectatic  changes.  Two small nodules now present in   the right lower lobe, not clearly visualized on previous exam.   Further evaluation could be obtained with nonemergent CT of the chest.   2. Previous cholecystectomy with mild biliary duct dilatation,   similar to prior exam.   3. Colonic diverticulosis without acute diverticulitis. Additional   chronic findings as above..        Signed by: Srinivas Cochran 4/29/2024 11:48 AM   Dictation workstation:   [id number]            Medical Decision Making    Patient presents with her spouse, concerned that she may be constipated, describes left lower quadrant pain.  Is been taking MiraLAX.  Spouse states that she did have large bowel movements on Thursday and Friday.  States after starting MiraLAX on Sunday did have a smaller, soft bowel movement.    Workup initiated, patient did initially declined any medication for pain.  IV established and was given IV fluids.    Laboratory workup, CBC and metabolic panel unremarkable other than hypokalemia, potassium 2.8.  Normal lactate level.    CT imaging of the abdomen/pelvis unremarkable for acute findings.    I did review CT scan myself, there is no significant stool burden.    Discussed this with the patient.  Patient describes pain left lower quadrant/left groin area.  Discussed possibility of muscular skeletal pain as a cause of her discomfort.    Discussed pain control.  She has been taking MiraLAX, discussed that if she is going to use a narcotic she can continue with MiraLAX every other day to prevent constipation.  Did recommend she follow-up with both primary care as well as Ortho.  Should return with any worsening symptoms or any additional concerns.          Shared DAYANA Attestation:    This patient was seen by the advanced practice provider.  I personally saw the patient and made/approved the management plan and take responsibility for the patient management.    History: 73-year-old female presents with left lower abdominal  pain.    Exam: Regular rate and rhythm cardiac exam with clear breath sounds bilaterally.  Abdomen is soft with tenderness to palpation of the left lower abdomen with no rebound or guarding.  Neurological exam is grossly intact.    MDM: Diverticulitis, bowel obstruction, colitis    Labs showed a low potassium at 2.8.  This was supplemented with oral and IV potassium.  Lactate was in the normal range.  CT of the abdomen pelvis showed bibasilar atelectatic changes.  Colonic diverticulosis without acute diverticulitis.    I have seen and examined the patient, agree with the workup, evaluation, medical decision making, management and diagnosis.  The care plan has been discussed.    Polo Hoffman MD      Procedure  Procedures     Ting Saldana, APRN-CNP  04/29/24 1826

## 2024-04-29 NOTE — TELEPHONE ENCOUNTER
states that pt was seen at ER on Sat and was advised she had a stool blockage. She has been taking Miralax as instructed since Sat with no results. He wanted to schedule her for ov today to find out what should be done next. He was advised that nothing can be done in the office for this issue and that pt should return to the ER.

## 2024-04-30 NOTE — TELEPHONE ENCOUNTER
called back. He states pt has had a suppository and half a bottle of Mag Citrate and still has not had a bm. He has been advised that the medication needs time to work. He wants to know what to do if it doesn't work. He wanted to know if it was ok for pt to take her pain medication. He was advised to follow label directions. He wants to talk to you and I advised him that you are with pts and that a message would be sent to you.

## 2024-04-30 NOTE — TELEPHONE ENCOUNTER
called back this morning. Pt was seen at ER yesterday. She has continued to take Miralax, but has not had a bm yet. He is requesting an appt today if possible. If pt can't be seen today what can they do about her issue? Please advise.  Cell # [phone]

## 2024-04-30 NOTE — TELEPHONE ENCOUNTER
Per CASSY  has been advised to give pt one half a bottle of magcitrate and a Dulcolax suppository. He will call back with any further issues.

## 2024-05-03 ENCOUNTER — OFFICE VISIT (OUTPATIENT)
Dept: ORTHOPEDIC SURGERY | Facility: CLINIC | Age: 74
End: 2024-05-03
Payer: COMMERCIAL

## 2024-05-03 ENCOUNTER — HOSPITAL ENCOUNTER (OUTPATIENT)
Dept: RADIOLOGY | Facility: CLINIC | Age: 74
Discharge: HOME | End: 2024-05-03
Payer: COMMERCIAL

## 2024-05-03 DIAGNOSIS — M54.50 ACUTE BILATERAL LOW BACK PAIN, UNSPECIFIED WHETHER SCIATICA PRESENT: Primary | ICD-10-CM

## 2024-05-03 DIAGNOSIS — M54.50 ACUTE BILATERAL LOW BACK PAIN, UNSPECIFIED WHETHER SCIATICA PRESENT: ICD-10-CM

## 2024-05-03 PROCEDURE — 72100 X-RAY EXAM L-S SPINE 2/3 VWS: CPT

## 2024-05-03 PROCEDURE — 3008F BODY MASS INDEX DOCD: CPT | Performed by: PHYSICIAN ASSISTANT

## 2024-05-03 PROCEDURE — 72120 X-RAY BEND ONLY L-S SPINE: CPT | Performed by: ORTHOPAEDIC SURGERY

## 2024-05-03 PROCEDURE — 99214 OFFICE O/P EST MOD 30 MIN: CPT | Performed by: PHYSICIAN ASSISTANT

## 2024-05-03 PROCEDURE — 1159F MED LIST DOCD IN RCRD: CPT | Performed by: PHYSICIAN ASSISTANT

## 2024-05-03 RX ORDER — TRAMADOL HYDROCHLORIDE 50 MG/1
50 TABLET ORAL EVERY 8 HOURS PRN
Qty: 21 TABLET | Refills: 0 | OUTPATIENT
Start: 2024-05-03 | End: 2024-05-04

## 2024-05-03 NOTE — PROGRESS NOTES
Alisa Fregoso is a 73 y.o. female who presents for Pain of the Lower Back (Patient states has back pain went to [facility] 4/29/24 had CT. Xrays today. ).    HPI:  73-year-old female here for evaluation of low back pain.  Referred by [facility] emergency department.  She denies any fever chills nausea vomiting night sweats.  She has no bowel or bladder complaints.    Physical exam:  Well-nourished, well kept.No lymphangitis or lymphadenopathy in the examined extremities.  Gait is not evaluated.  Patient is in a long-term wheelchair.  Not stand on heels or toes.   Examination of the back shows tenderness in the paraspinous musculature mostly off to the right, in the upper lumbar region.  Range of motion is not assessed except for flexion and extension.  There is good strength and no instability.  Examination of the lower extremities reveals no point tenderness, swelling, or deformity.  Range of motion of the hips, knees, and ankles are full without crepitance, instability, or exacerbation of pain, except she has right ankle pain status post fall.  Strength is 5/5 throughout.  No redness, abrasions, or lesions on extremities  Gross sensation intact in the extremities.  Affect normal.  Alert and oriented ×3.  Coordination normal.    Imaging studies:  Flexion-extension plain films were ordered and reviewed today.  A CT of the lumbar spine from April 29, 2024 was reviewed.  X-rays of the lumbar spine from [facility] were also reviewed.    Assessment:  73-year-old female here for evaluation of low back pain.  She is sent over by [facility] emergency department.  She had a fall on April 29, she injured her right ankle and landed on her tailbone, she began having moderate to severe low back pain after that fall.  She is not describing any pain radiating into the legs.  Her CAT scan shows that she has a compression fracture at L1, this is age-indeterminate.  Her  is here as a helpful historian, he  is providing most of the history.  The pain is localized to the upper lumbar area a little more right sided.    Plan:  Suspected fresh compression fracture at L1.  I will get MRIs of the lumbar and thoracic spine to determine the extent of the fractures and the age.  I will see her back after that.  We will get her into pain management to help with medication.  We can give her just a little bit of tramadol today to help until she gets into pain management.  I will see her back after the MRIs    I have ordered and reviewed test today.  X-rays and CAT scan.  Her  is here as a helpful historian.  I reviewed the emergency department note from April 29, 2024.  This is an undiagnosed new problem with uncertain prognosis that is affecting her bodily function.    David Montez PA-C

## 2024-05-04 ENCOUNTER — HOSPITAL ENCOUNTER (EMERGENCY)
Facility: HOSPITAL | Age: 74
Discharge: HOME | End: 2024-05-04
Attending: STUDENT IN AN ORGANIZED HEALTH CARE EDUCATION/TRAINING PROGRAM
Payer: COMMERCIAL

## 2024-05-04 VITALS
DIASTOLIC BLOOD PRESSURE: 91 MMHG | BODY MASS INDEX: 19.49 KG/M2 | OXYGEN SATURATION: 96 % | HEIGHT: 63 IN | TEMPERATURE: 98.1 F | RESPIRATION RATE: 16 BRPM | HEART RATE: 70 BPM | SYSTOLIC BLOOD PRESSURE: 185 MMHG | WEIGHT: 110 LBS

## 2024-05-04 DIAGNOSIS — M54.50 RIGHT LOW BACK PAIN, UNSPECIFIED CHRONICITY, UNSPECIFIED WHETHER SCIATICA PRESENT: Primary | ICD-10-CM

## 2024-05-04 LAB
ANION GAP BLDV CALCULATED.4IONS-SCNC: 7 MMOL/L (ref 10–25)
BASE EXCESS BLDV CALC-SCNC: 7.4 MMOL/L (ref -2–3)
BODY TEMPERATURE: ABNORMAL
CA-I BLDV-SCNC: 1.24 MMOL/L (ref 1.1–1.33)
CHLORIDE BLDV-SCNC: 100 MMOL/L (ref 98–107)
GLUCOSE BLDV-MCNC: 98 MG/DL (ref 74–99)
HCO3 BLDV-SCNC: 34.3 MMOL/L (ref 22–26)
HCT VFR BLD EST: 38 % (ref 36–46)
HGB BLDV-MCNC: 12.5 G/DL (ref 12–16)
INHALED O2 CONCENTRATION: 95 %
LACTATE BLDV-SCNC: 1.3 MMOL/L (ref 0.4–2)
OXYHGB MFR BLDV: 20.1 % (ref 45–75)
PCO2 BLDV: 58 MM HG (ref 41–51)
PH BLDV: 7.38 PH (ref 7.33–7.43)
PO2 BLDV: 19 MM HG (ref 35–45)
POTASSIUM BLDV-SCNC: 3.7 MMOL/L (ref 3.5–5.3)
SAO2 % BLDV: 20 % (ref 45–75)
SODIUM BLDV-SCNC: 138 MMOL/L (ref 136–145)

## 2024-05-04 PROCEDURE — 2500000005 HC RX 250 GENERAL PHARMACY W/O HCPCS: Performed by: STUDENT IN AN ORGANIZED HEALTH CARE EDUCATION/TRAINING PROGRAM

## 2024-05-04 PROCEDURE — 36415 COLL VENOUS BLD VENIPUNCTURE: CPT | Performed by: STUDENT IN AN ORGANIZED HEALTH CARE EDUCATION/TRAINING PROGRAM

## 2024-05-04 PROCEDURE — 84132 ASSAY OF SERUM POTASSIUM: CPT | Mod: 91 | Performed by: STUDENT IN AN ORGANIZED HEALTH CARE EDUCATION/TRAINING PROGRAM

## 2024-05-04 PROCEDURE — 99283 EMERGENCY DEPT VISIT LOW MDM: CPT

## 2024-05-04 PROCEDURE — 2500000001 HC RX 250 WO HCPCS SELF ADMINISTERED DRUGS (ALT 637 FOR MEDICARE OP): Performed by: STUDENT IN AN ORGANIZED HEALTH CARE EDUCATION/TRAINING PROGRAM

## 2024-05-04 RX ORDER — ACETAMINOPHEN 325 MG/1
975 TABLET ORAL ONCE
Status: COMPLETED | OUTPATIENT
Start: 2024-05-04 | End: 2024-05-04

## 2024-05-04 RX ORDER — ACETAMINOPHEN 500 MG
1000 TABLET ORAL 3 TIMES DAILY
Qty: 60 TABLET | Refills: 0 | Status: SHIPPED | OUTPATIENT
Start: 2024-05-04 | End: 2024-05-14

## 2024-05-04 RX ORDER — LIDOCAINE 560 MG/1
1 PATCH PERCUTANEOUS; TOPICAL; TRANSDERMAL ONCE
Status: DISCONTINUED | OUTPATIENT
Start: 2024-05-04 | End: 2024-05-05 | Stop reason: HOSPADM

## 2024-05-04 RX ORDER — IBUPROFEN 400 MG/1
400 TABLET ORAL ONCE
Status: COMPLETED | OUTPATIENT
Start: 2024-05-04 | End: 2024-05-04

## 2024-05-04 RX ORDER — OXYCODONE HYDROCHLORIDE 5 MG/1
5 TABLET ORAL ONCE
Status: COMPLETED | OUTPATIENT
Start: 2024-05-04 | End: 2024-05-04

## 2024-05-04 RX ORDER — OXYCODONE HYDROCHLORIDE 5 MG/1
5 TABLET ORAL EVERY 6 HOURS PRN
Qty: 8 TABLET | Refills: 0 | Status: SHIPPED | OUTPATIENT
Start: 2024-05-04 | End: 2024-05-06

## 2024-05-04 RX ORDER — NAPROXEN 250 MG/1
250 TABLET ORAL
Qty: 20 TABLET | Refills: 0 | Status: SHIPPED | OUTPATIENT
Start: 2024-05-04 | End: 2024-05-14

## 2024-05-04 RX ADMIN — ACETAMINOPHEN 975 MG: 325 TABLET ORAL at 21:37

## 2024-05-04 RX ADMIN — OXYCODONE HYDROCHLORIDE 5 MG: 5 TABLET ORAL at 21:37

## 2024-05-04 RX ADMIN — IBUPROFEN 400 MG: 400 TABLET, FILM COATED ORAL at 21:37

## 2024-05-04 RX ADMIN — LIDOCAINE 1 PATCH: 4 PATCH TOPICAL at 21:37

## 2024-05-04 ASSESSMENT — COLUMBIA-SUICIDE SEVERITY RATING SCALE - C-SSRS
6. HAVE YOU EVER DONE ANYTHING, STARTED TO DO ANYTHING, OR PREPARED TO DO ANYTHING TO END YOUR LIFE?: NO
2. HAVE YOU ACTUALLY HAD ANY THOUGHTS OF KILLING YOURSELF?: NO
1. IN THE PAST MONTH, HAVE YOU WISHED YOU WERE DEAD OR WISHED YOU COULD GO TO SLEEP AND NOT WAKE UP?: NO

## 2024-05-04 ASSESSMENT — LIFESTYLE VARIABLES
HAVE PEOPLE ANNOYED YOU BY CRITICIZING YOUR DRINKING: NO
EVER HAD A DRINK FIRST THING IN THE MORNING TO STEADY YOUR NERVES TO GET RID OF A HANGOVER: NO
HAVE YOU EVER FELT YOU SHOULD CUT DOWN ON YOUR DRINKING: NO
TOTAL SCORE: 0
EVER FELT BAD OR GUILTY ABOUT YOUR DRINKING: NO

## 2024-05-04 ASSESSMENT — PAIN DESCRIPTION - ORIENTATION: ORIENTATION: LEFT;LOWER

## 2024-05-04 ASSESSMENT — PAIN SCALES - GENERAL
PAINLEVEL_OUTOF10: 9
PAINLEVEL_OUTOF10: 7

## 2024-05-04 ASSESSMENT — PAIN - FUNCTIONAL ASSESSMENT: PAIN_FUNCTIONAL_ASSESSMENT: 0-10

## 2024-05-04 ASSESSMENT — PAIN DESCRIPTION - LOCATION: LOCATION: BACK

## 2024-05-04 ASSESSMENT — PAIN DESCRIPTION - PAIN TYPE: TYPE: ACUTE PAIN

## 2024-05-04 ASSESSMENT — PAIN DESCRIPTION - DESCRIPTORS: DESCRIPTORS: ACHING

## 2024-05-05 NOTE — ED PROVIDER NOTES
HPI: The patient is a 73-year-old woman with history of GERD, osteopenia, anxiety, arthritis who presents to the Emergency Department with a chief complaint of low back pain.  Patient reports she has had pain in her low back ever since she fell about 1 month ago.  She reports that the pain is more on the right side of her back and denies any inability urinate or defecate fecal urinary incontinence numbness tingling weakness saddle anesthesia or IV drug use or fevers.  Denies any dysuria or increased urinary frequency.  She reports that she has been taking tramadol at home as well as the Percocet that she was prescribed and has not been controlling her pain so she came in for assessment.  She reports that she went to the spine surgeon yesterday and was told that they cannot prescribe any pain meds and she will need to follow-up with the chronic pain specialist who she reports she is planning to follow-up with this week.  She denies any worsening of her symptoms but reports that she is still in severe pain and needs relief before she can follow-up with her regular doctor or the chronic pain specialist.  She reports that she has an outpatient MRI planned.     PAST MEDICAL HISTORY:  as per HPI  ALLERGIES:  as per HPI  MEDICATIONS:  as per HPI  FAMILY HISTORY: as per HPI  SURGICAL HISTORY: as per HPI  SOCIAL HISTORY: as per HPI     PHYSICAL EXAM:  VITAL SIGNS: Nursing notes reviewed.  GENERAL:  Alert and interactive  EYES:   Eyes track.  ENT:  Airway patent.  RESPIRATORY:  Nonlabored breathing.  CARDIOVASCULAR:  [Regular rate.] [Regular rhythm.]  GASTROINTESTINAL: Soft nontender nondistended.  No masses.  MUSCULOSKELETAL:  No deformity.  No swelling the lower extremities.  Good distal perfusion.  No pain with compression of the pelvis or with passive range of motion of the right hip.  NEUROLOGICAL:  Awake.  Sensation intact in bilateral lower extremities.  Able to flex hips off the chair and extend knees  bilaterally.  SKIN:  Dry.  BACK: No focal midline spinal tenderness step-offs or deformities.  No cutaneous lesions in the low back.        MEDICAL DECISION MAKING (MDM):     DIAGNOSTIC STUDIES  Labs: Venous full panel       REVIEW OF OLD RECORDS: I reviewed the outpatient orthopedic surgery note from yesterday     SUMMARY:  The patient is admitted to the Emergency Department for evaluation of above. Complete history and physical examination was performed by me.  Patient presents with persistent chronic right low back pain.  I did treat her with Tylenol Motrin lidocaine patch and oxycodone here.  She reports no changes and per the outpatient orthopedic surgery note, it is believed that she has pain from a compression fracture although I am not seeing this reported in any of her advanced imaging that I was able to review.  No evidence of shingles.  Recent CT did not show any evidence of AAA so that is less likely the cause of her presentation today and given no sudden changes dissection also seems less likely.  She has a nonfocal exam so my suspicion for cord compression syndrome discitis or osteomyelitis or epidural abscess is low.  Also low suspicion for epidural hematoma.  She had a benign abdominal exam and a reassuring CT recently so I did not repeat the CT.  I also think that occult hip fracture seems less likely.  I did feel that she needed better pain control and I discussed the risks and benefits of different options.  She is currently on Percocet as well as tramadol and I recommended she no longer take either of these medications and give them to a pharmacy for disposal.  I did instead prescribe her Tylenol around-the-clock, naproxen around-the-clock, lidocaine patches as well as 2 days of oxycodone.  She recently was quite hypokalemic when she was seen here in the ER so I did repeat blood work today and I suspect part of her hypokalemia is being precipitated by her tramadol which has been taking which is  one of the reasons why I recommended she no longer take this medication ever again.  VBG here revealed no hypokalemia. I suspect that this is exacerbation of her chronic right low back pain although the exact etiology is unclear.  I did recommend she continue to follow-up with orthopedic surgery, her regular doctor as well as chronic pain.  The work-up and plan were discussed with the patient/caregiver and questions were answered regarding the ED visit.  Educational materials were provided as well as return precautions including returning for any persisting or worsening symptoms.  Patient was recommended to follow-up with PCP in the next few days in addition to any potential specialists that were discussed.  The patient/family expressed understanding and agreed to the described plan.  Patient was discharged in stable condition.     DIAGNOSIS:    Right low back pain     DISPOSITION:    1) discharged  [2) Please see Exit Care and discharge instructions for complete details.]       Russell Oquendo MD  05/04/24 2338

## 2024-05-08 ENCOUNTER — HOSPITAL ENCOUNTER (EMERGENCY)
Age: 74
Discharge: HOME | End: 2024-05-08
Attending: EMERGENCY MEDICINE
Payer: COMMERCIAL

## 2024-05-08 VITALS
HEIGHT: 62 IN | OXYGEN SATURATION: 94 % | BODY MASS INDEX: 19.32 KG/M2 | WEIGHT: 105 LBS | SYSTOLIC BLOOD PRESSURE: 168 MMHG | TEMPERATURE: 98.3 F | HEART RATE: 78 BPM | RESPIRATION RATE: 18 BRPM | DIASTOLIC BLOOD PRESSURE: 96 MMHG

## 2024-05-08 DIAGNOSIS — M54.50 ACUTE RIGHT-SIDED LOW BACK PAIN WITHOUT SCIATICA: Primary | ICD-10-CM

## 2024-05-08 PROCEDURE — 6360000002 HC RX W HCPCS: Performed by: EMERGENCY MEDICINE

## 2024-05-08 PROCEDURE — 6370000000 HC RX 637 (ALT 250 FOR IP): Performed by: EMERGENCY MEDICINE

## 2024-05-08 PROCEDURE — 99284 EMERGENCY DEPT VISIT MOD MDM: CPT

## 2024-05-08 RX ORDER — DEXAMETHASONE SODIUM PHOSPHATE 10 MG/ML
10 INJECTION, SOLUTION INTRAMUSCULAR; INTRAVENOUS ONCE
Status: COMPLETED | OUTPATIENT
Start: 2024-05-08 | End: 2024-05-08

## 2024-05-08 RX ORDER — OXYCODONE HYDROCHLORIDE AND ACETAMINOPHEN 5; 325 MG/1; MG/1
1 TABLET ORAL EVERY 6 HOURS PRN
Qty: 12 TABLET | Refills: 0 | Status: SHIPPED | OUTPATIENT
Start: 2024-05-08 | End: 2024-05-11

## 2024-05-08 RX ORDER — OXYCODONE HYDROCHLORIDE AND ACETAMINOPHEN 5; 325 MG/1; MG/1
1 TABLET ORAL ONCE
Status: COMPLETED | OUTPATIENT
Start: 2024-05-08 | End: 2024-05-08

## 2024-05-08 RX ADMIN — DEXAMETHASONE SODIUM PHOSPHATE 10 MG: 10 INJECTION INTRAMUSCULAR; INTRAVENOUS at 17:33

## 2024-05-08 RX ADMIN — OXYCODONE HYDROCHLORIDE AND ACETAMINOPHEN 1 TABLET: 5; 325 TABLET ORAL at 17:32

## 2024-05-08 ASSESSMENT — PAIN DESCRIPTION - LOCATION: LOCATION: BACK

## 2024-05-08 ASSESSMENT — ENCOUNTER SYMPTOMS
DIARRHEA: 0
ABDOMINAL PAIN: 0
SORE THROAT: 0
SHORTNESS OF BREATH: 0
NAUSEA: 0
COUGH: 0
EYE DISCHARGE: 0
SINUS PAIN: 0
EYE REDNESS: 0
COLOR CHANGE: 0
BACK PAIN: 1
VOMITING: 0

## 2024-05-08 ASSESSMENT — PAIN DESCRIPTION - PAIN TYPE: TYPE: ACUTE PAIN

## 2024-05-08 ASSESSMENT — PAIN - FUNCTIONAL ASSESSMENT: PAIN_FUNCTIONAL_ASSESSMENT: PREVENTS OR INTERFERES SOME ACTIVE ACTIVITIES AND ADLS

## 2024-05-08 ASSESSMENT — PAIN DESCRIPTION - DESCRIPTORS: DESCRIPTORS: THROBBING

## 2024-05-08 ASSESSMENT — PAIN DESCRIPTION - ORIENTATION: ORIENTATION: RIGHT;LOWER

## 2024-05-08 ASSESSMENT — PAIN SCALES - GENERAL: PAINLEVEL_OUTOF10: 9

## 2024-05-08 ASSESSMENT — PAIN DESCRIPTION - FREQUENCY: FREQUENCY: CONTINUOUS

## 2024-05-08 ASSESSMENT — PAIN DESCRIPTION - ONSET: ONSET: GRADUAL

## 2024-05-08 NOTE — ED PROVIDER NOTES
[facility] ED  EMERGENCY DEPARTMENT ENCOUNTER      Pt Name: Shirin Allen  MRN: [id number]  Birthdate 1950  Date of evaluation: 5/8/2024  Provider: Jami Lala DO  5:23 PM    CHIEF COMPLAINT       Chief Complaint   Patient presents with    Back Pain     Right lower back/flank pain. Onset- Fell on East.      Chief complaint: Back pain  History of chief complaint: This 73-year-old female presents the emergency department complaining of ongoing low back pain.  Patient states that she had tripped and fallen on [place] states she \"cracked her ankle\".  Patient states she was having pain in the right groin area following that incident states she came back to the emergency department thinking maybe she was constipated she was found to have increased stool and given a laxative but groin pain persisting.  Patient states started feeling it around into the right low back as well.  Patient states she went back to [facility] had a CT scan of her back which did show a compression fracture.  Patient has been following with orthopedics was given a boot for her ankle and scheduled for an upcoming MRI of her back.   states she was given Ultram for pain but is not touching her pain.  Patient unable to get comfortable today.  Patient points over the right low lumbar area patient denies any radiation of pain down into the buttocks or lower extremities states she does feel it come around into the right groin area intermittent patient denies any numb tingling sensations to the groin or lower extremities no loss of bowel or bladder control.  Patient denies any abdominal pain no dysuria hematuria frequency or urgency no chest pain palpitation short of breath weak or dizzy no recent illness fevers chills cough cold vomiting or diarrhea patient states bowels have been moving normally.  Patient lives with family.  History of underlying dementia, predominantly in wheelchair.    Nursing Notes were

## 2024-05-09 ENCOUNTER — APPOINTMENT (OUTPATIENT)
Dept: ORTHOPEDIC SURGERY | Facility: CLINIC | Age: 74
End: 2024-05-09
Payer: COMMERCIAL

## 2024-05-13 ENCOUNTER — HOSPITAL ENCOUNTER (OUTPATIENT)
Dept: RADIOLOGY | Facility: HOSPITAL | Age: 74
Discharge: HOME | End: 2024-05-13
Payer: COMMERCIAL

## 2024-05-13 DIAGNOSIS — M54.50 ACUTE BILATERAL LOW BACK PAIN, UNSPECIFIED WHETHER SCIATICA PRESENT: ICD-10-CM

## 2024-05-13 PROCEDURE — 72146 MRI CHEST SPINE W/O DYE: CPT | Performed by: RADIOLOGY

## 2024-05-13 PROCEDURE — 72146 MRI CHEST SPINE W/O DYE: CPT

## 2024-05-13 PROCEDURE — 72148 MRI LUMBAR SPINE W/O DYE: CPT

## 2024-05-13 PROCEDURE — 72148 MRI LUMBAR SPINE W/O DYE: CPT | Performed by: RADIOLOGY

## 2024-05-15 ENCOUNTER — APPOINTMENT (OUTPATIENT)
Dept: ORTHOPEDIC SURGERY | Facility: CLINIC | Age: 74
End: 2024-05-15
Payer: COMMERCIAL

## 2024-05-15 ENCOUNTER — INITIAL CONSULT (OUTPATIENT)
Age: 74
End: 2024-05-15
Payer: COMMERCIAL

## 2024-05-15 VITALS
TEMPERATURE: 97.1 F | DIASTOLIC BLOOD PRESSURE: 62 MMHG | BODY MASS INDEX: 19.32 KG/M2 | SYSTOLIC BLOOD PRESSURE: 110 MMHG | HEIGHT: 62 IN | WEIGHT: 105 LBS

## 2024-05-15 DIAGNOSIS — M80.88XA OSTEOPOROTIC COMPRESSION FRACTURE OF VERTEBRA, INITIAL ENCOUNTER (HCC): Primary | ICD-10-CM

## 2024-05-15 PROCEDURE — 99204 OFFICE O/P NEW MOD 45 MIN: CPT | Performed by: STUDENT IN AN ORGANIZED HEALTH CARE EDUCATION/TRAINING PROGRAM

## 2024-05-15 PROCEDURE — 1123F ACP DISCUSS/DSCN MKR DOCD: CPT | Performed by: STUDENT IN AN ORGANIZED HEALTH CARE EDUCATION/TRAINING PROGRAM

## 2024-05-15 RX ORDER — NALOXONE HYDROCHLORIDE 4 MG/.1ML
1 SPRAY NASAL PRN
Qty: 2 EACH | Refills: 1 | Status: SHIPPED | OUTPATIENT
Start: 2024-05-15

## 2024-05-15 RX ORDER — OXYCODONE HYDROCHLORIDE AND ACETAMINOPHEN 5; 325 MG/1; MG/1
1 TABLET ORAL 2 TIMES DAILY PRN
Qty: 60 TABLET | Refills: 0 | Status: SHIPPED | OUTPATIENT
Start: 2024-05-15 | End: 2024-06-14

## 2024-05-15 ASSESSMENT — ENCOUNTER SYMPTOMS: BACK PAIN: 1

## 2024-05-15 NOTE — ASSESSMENT & PLAN NOTE
Subacute illness with a severe exacerbation and/or progression which affects ADL's. Pain has been present for 6+ weeks and is expected to last at least a year. Patient is unable to sleep, transfer, nor ambulate. Goals of care include pain relief >50% and ability to perform ADLs with less pain.    73-year-old female with history of acute on chronic low back pain status post fall in April, 2024, which resulted in severe low back pain with radiation to the bilateral anterior thighs/hips.  Pain is extremely debilitating affects ADLs.  Pain score 6 or greater with activity.  Pain limits mobility and functionality.  Patient currently needs a wheelchair to for mobilization.    Patient displays extreme tenderness palpation in the lumbar region.  Pain is reproducible upon palpation of the spinous processes.    Lumbar MRI 5/13/24  -Nonacute compression deformity of the L1 vertebral body with 50% loss  of vertebral body height and no osseous retropulsion into the spinal  canal.     -Given the patient's history of falling in April and then developing severe back pain with radiation to the groin/thigh, this is likely a subacute compression fracture. Patient continues to have significant back pain and extreme tenderness to palpation in the lumbar spine region.  -Will plan for L1 vertebral augmentation/kyphoplasty   -In the meantime, we will provide Percocet twice daily as needed for severe back pain.  There is no intention for long-term management.  Naloxone also provided.

## 2024-05-15 NOTE — PROGRESS NOTES
[facility] [place] [facility], [facility] PAIN MANAGEMENT  [address] [address]  Dept: [phone]  Dept Fax: [phone]  Loc: [phone]     5/15/2024    Visit type: New patient    Reason for Visit: Back Pain (Low back pain, around waist )       ASSESSMENT/PLAN   1. Osteoporotic compression fracture of vertebra, initial encounter (HCC)  Assessment & Plan:  Subacute illness with a severe exacerbation and/or progression which affects ADL's. Pain has been present for 6+ weeks and is expected to last at least a year. Patient is unable to sleep, transfer, nor ambulate. Goals of care include pain relief >50% and ability to perform ADLs with less pain.    73-year-old female with history of acute on chronic low back pain status post fall in April, 2024, which resulted in severe low back pain with radiation to the bilateral anterior thighs/hips.  Pain is extremely debilitating affects ADLs.  Pain score 6 or greater with activity.  Pain limits mobility and functionality.  Patient currently needs a wheelchair to for mobilization.    Patient displays extreme tenderness palpation in the lumbar region.  Pain is reproducible upon palpation of the spinous processes.    Lumbar MRI 5/13/24  -Nonacute compression deformity of the L1 vertebral body with 50% loss  of vertebral body height and no osseous retropulsion into the spinal  canal.     -Given the patient's history of falling in April and then developing severe back pain with radiation to the groin/thigh, this is likely a subacute compression fracture. Patient continues to have significant back pain and extreme tenderness to palpation in the lumbar spine region.  -Will plan for L1 vertebral augmentation/kyphoplasty   -In the meantime, we will provide Percocet twice daily as needed for severe back pain.  There is no intention for long-term management.  Naloxone also provided.    Orders:  -     CT PERQ VERTEBROPLASTY UNI/BI

## 2024-05-15 NOTE — PROGRESS NOTES
HPI  Onset: April 2024  Location:Low back pain, around waist line   Quality: aching and stabbing  Pain states that her pain is at a 2 at rest and a 8 with activity on the pain scale.   The pain is present: Constantly  and consistent  The pain is exacerbated by: Walking, Sitting, and Standing and alleviated by: Medication.  The patient states the pain interferes with her  ADL's : Yes Transferring , Ambulating , and Sleeping  Recent falls: yes - Easter Sunday 2024  Bladder bowel dysfunction:no  She is taking the following medications for pain:   Opioids: Percocet and Ultram   Prior treatments tried for chief complaint listed above: None  Surgery: no  Physical Therapy: no.  Chiropractor: no  Acupuncture: no  Massage Therapy: no   Behavior/Wellness/Psychological support: yes  Expectations of Treatment at the Pain Center: The patient presents today for evaluation with the expectation that they will be able to perform their ADL's without excruciating pain.   Patient denies the following symptoms: Ataxia, saddle anesthesia, nausea, fever, vomiting, or recent antibiotics.

## 2024-05-20 ENCOUNTER — TELEPHONE (OUTPATIENT)
Dept: PAIN MANAGEMENT | Age: 74
End: 2024-05-20

## 2024-05-20 NOTE — TELEPHONE ENCOUNTER
Patient's  called to inquire about the status of her procedure. Her  called called her insurance and was told they never received anything. He was informed that the auth department is currently working on this and we will update once applicable.

## 2024-05-21 ENCOUNTER — TELEPHONE (OUTPATIENT)
Age: 74
End: 2024-05-21

## 2024-05-21 ENCOUNTER — APPOINTMENT (OUTPATIENT)
Dept: ORTHOPEDIC SURGERY | Facility: CLINIC | Age: 74
End: 2024-05-21
Payer: COMMERCIAL

## 2024-05-28 ENCOUNTER — TELEPHONE (OUTPATIENT)
Dept: NEUROSURGERY | Age: 74
End: 2024-05-28

## 2024-05-28 ENCOUNTER — PREP FOR PROCEDURE (OUTPATIENT)
Dept: NEUROSURGERY | Age: 74
End: 2024-05-28

## 2024-05-28 DIAGNOSIS — M80.88XA OSTEOPOROTIC COMPRESSION FRACTURE OF VERTEBRA, INITIAL ENCOUNTER (HCC): Primary | ICD-10-CM

## 2024-05-28 RX ORDER — OXYCODONE HYDROCHLORIDE 5 MG/1
5 TABLET ORAL EVERY 6 HOURS PRN
Qty: 56 TABLET | Refills: 0 | Status: SHIPPED | OUTPATIENT
Start: 2024-05-28 | End: 2024-06-11

## 2024-05-28 NOTE — PROGRESS NOTES
Percocet discontinued due to ineffectiveness. Will substitute regimen to Oxycodone (IR) 5mg q6h prn. Able to supplement with Tylenol for associated headaches. Kyphoplasty scheduled for 6/7/24.

## 2024-05-28 NOTE — TELEPHONE ENCOUNTER
SURG SCHEDULED:  6/7/24 - L1 VERTEBRAL AUGMENTATION    PTS  STATES THE PERCOCET IS NOT WORKING, SHE IS NOT GETTING ANY RELIEF FROM THE PAIN.  IT ALSO GIVES HER A MIGRAINE.    SHE TAKES PILLS, 1 IN AM 1 AT NIGHT, AND IT TAKES 5 -6 HOURS BEFORE IT STARTS GIVING HER ANY RELIEF.    HE IS ASKING IF THERE IS ANYTHING ELSE SHE CAN BE GIVEN FOR PAIN UNTIL THE SURGERY ON 6/7/24.   THEY HAVE RUN OUT OF NAPROXEN, BUT DOES SHE NEED TO STOP THAT PRIOR TO THE PROCEDURE?    PLEASE CALL PTS  TO REVIEW WHAT CAN BE DONE UNTIL SURGERY.

## 2024-05-29 DIAGNOSIS — N39.0 RECURRENT UTI: ICD-10-CM

## 2024-05-29 RX ORDER — NITROFURANTOIN MACROCRYSTALS 50 MG/1
50 CAPSULE ORAL DAILY
Qty: 30 CAPSULE | Refills: 1 | Status: SHIPPED | OUTPATIENT
Start: 2024-05-29

## 2024-05-29 NOTE — TELEPHONE ENCOUNTER
Spoke with  who states pt is on this med prophylacticly for uti. Rx pended. Please accept or decline.

## 2024-05-31 DIAGNOSIS — M06.9 RHEUMATOID ARTHRITIS, INVOLVING UNSPECIFIED SITE, UNSPECIFIED WHETHER RHEUMATOID FACTOR PRESENT (MULTI): ICD-10-CM

## 2024-05-31 NOTE — TELEPHONE ENCOUNTER
Rx Refill Request     Name: Alisa Fregoso  :  1950   Medication Name:  PREDNISONE 5MG  Specific Pharmacy location:  [facility]  Date of last appointment:  2023   Date of next appointment:  Visit date not found   Best number to reach patient:  [phone]

## 2024-06-03 RX ORDER — PREDNISONE 5 MG/1
5 TABLET ORAL DAILY
Qty: 30 TABLET | Refills: 0 | Status: SHIPPED | OUTPATIENT
Start: 2024-06-03

## 2024-06-04 ENCOUNTER — HOSPITAL ENCOUNTER (OUTPATIENT)
Dept: PREADMISSION TESTING | Age: 74
Discharge: HOME | End: 2024-06-08
Payer: COMMERCIAL

## 2024-06-04 VITALS
BODY MASS INDEX: 19.32 KG/M2 | SYSTOLIC BLOOD PRESSURE: 137 MMHG | DIASTOLIC BLOOD PRESSURE: 80 MMHG | HEART RATE: 66 BPM | RESPIRATION RATE: 12 BRPM | OXYGEN SATURATION: 95 % | HEIGHT: 62 IN | WEIGHT: 105 LBS | TEMPERATURE: 97.8 F

## 2024-06-04 PROBLEM — D64.9 ANEMIA: Status: ACTIVE | Noted: 2024-06-04

## 2024-06-04 LAB
ABO + RH BLD: NORMAL
ALBUMIN SERPL-MCNC: 4.4 G/DL (ref 3.5–4.6)
ALP SERPL-CCNC: 57 U/L (ref 40–130)
ALT SERPL-CCNC: 7 U/L (ref 0–33)
ANION GAP SERPL CALCULATED.3IONS-SCNC: 13 MEQ/L (ref 9–15)
APTT PPP: 24.9 SEC (ref 24.4–36.8)
AST SERPL-CCNC: 12 U/L (ref 0–35)
BILIRUB SERPL-MCNC: 0.5 MG/DL (ref 0.2–0.7)
BLD GP AB SCN SERPL QL: NORMAL
BUN SERPL-MCNC: 11 MG/DL (ref 8–23)
CALCIUM SERPL-MCNC: 9.9 MG/DL (ref 8.5–9.9)
CHLORIDE SERPL-SCNC: 101 MEQ/L (ref 95–107)
CO2 SERPL-SCNC: 28 MEQ/L (ref 20–31)
CREAT SERPL-MCNC: 0.63 MG/DL (ref 0.5–0.9)
ERYTHROCYTE [DISTWIDTH] IN BLOOD BY AUTOMATED COUNT: 15.6 % (ref 11.5–14.5)
GLOBULIN SER CALC-MCNC: 2.8 G/DL (ref 2.3–3.5)
GLUCOSE SERPL-MCNC: 114 MG/DL (ref 70–99)
HCT VFR BLD AUTO: 42.2 % (ref 37–47)
HGB BLD-MCNC: 12.8 G/DL (ref 12–16)
INR PPP: 1
MCH RBC QN AUTO: 28.4 PG (ref 27–31.3)
MCHC RBC AUTO-ENTMCNC: 30.3 % (ref 33–37)
MCV RBC AUTO: 93.8 FL (ref 79.4–94.8)
PLATELET # BLD AUTO: 243 K/UL (ref 130–400)
POTASSIUM SERPL-SCNC: 3.1 MEQ/L (ref 3.4–4.9)
PROT SERPL-MCNC: 7.2 G/DL (ref 6.3–8)
PROTHROMBIN TIME: 13.7 SEC (ref 12.3–14.9)
RBC # BLD AUTO: 4.5 M/UL (ref 4.2–5.4)
SODIUM SERPL-SCNC: 142 MEQ/L (ref 135–144)
WBC # BLD AUTO: 13.7 K/UL (ref 4.8–10.8)

## 2024-06-04 PROCEDURE — 85730 THROMBOPLASTIN TIME PARTIAL: CPT

## 2024-06-04 PROCEDURE — 85610 PROTHROMBIN TIME: CPT

## 2024-06-04 PROCEDURE — 85027 COMPLETE CBC AUTOMATED: CPT

## 2024-06-04 PROCEDURE — 86900 BLOOD TYPING SEROLOGIC ABO: CPT

## 2024-06-04 PROCEDURE — 93005 ELECTROCARDIOGRAM TRACING: CPT

## 2024-06-04 PROCEDURE — 86850 RBC ANTIBODY SCREEN: CPT

## 2024-06-04 PROCEDURE — 80053 COMPREHEN METABOLIC PANEL: CPT

## 2024-06-04 PROCEDURE — 86901 BLOOD TYPING SEROLOGIC RH(D): CPT

## 2024-06-04 RX ORDER — PANTOPRAZOLE SODIUM 40 MG/1
40 TABLET, DELAYED RELEASE ORAL DAILY
COMMUNITY

## 2024-06-04 RX ORDER — NITROFURANTOIN MACROCRYSTALS 50 MG/1
50 CAPSULE ORAL DAILY
COMMUNITY

## 2024-06-04 ASSESSMENT — ENCOUNTER SYMPTOMS
ALLERGIC/IMMUNOLOGIC NEGATIVE: 1
COUGH: 0
BACK PAIN: 1
RHINORRHEA: 0
CONSTIPATION: 0
TROUBLE SWALLOWING: 0
EYE PAIN: 0
ABDOMINAL PAIN: 0
EYE REDNESS: 0
SINUS PRESSURE: 0
BLOOD IN STOOL: 0
EYE ITCHING: 0
ABDOMINAL DISTENTION: 0
SHORTNESS OF BREATH: 0
VOMITING: 0
PHOTOPHOBIA: 0
NAUSEA: 0
SORE THROAT: 0
DIARRHEA: 0
SINUS PAIN: 0
EYE DISCHARGE: 0
WHEEZING: 0
CHEST TIGHTNESS: 0

## 2024-06-04 NOTE — H&P (VIEW-ONLY)
Preoperative Consultation      Name: Shirin Allen  YOB: 1950  Date of Service: 6/4/2024  PCP: Trip Eller DO    CHIEF COMPLAINT:  osteoporotic compression fracture of spine    HISTORY OF PRESENT ILLNESS:      The patient is a 73 y.o. female with significant past medical history of RA, OA, GERD, HPL, anemia, hypothyroidism who presents for a preoperative consultation at the request of surgeon, , who plans on performing lumbar 1 vertebral augmentation kyphoplasty on 6/7/24 at [facility].     Pt reports she fell in April 2024 which resulted in her injuring her right foot. Pt reports she follows with ortho for right foot-currently in a small boot. Pt denies pain in right foot and reports she feels she has full ROM. Pt reports she has another follow up with ortho soon. Pt reports after fall she began having severe low back pain that radiated to her bilateral thighs and hips. Pt reports the pain affects her ADLs. Pt reports she uses walker at home and wheelchair. Pt reports lower extremity weakness. Pt denies numbness or tingling. Pt denies back pain today. Pt reports she takes oxycodone for pain which \"takes my pain all away\". Lumbar MRI 5/13/24.     Smoking hx: non smoker    Known Anesthesia Problems: None  Bleeding Risk: Hx anemia  Patient Objection to Receiving Blood Products: No  Personal of FH of DVT/PE: No    Medical/Cardiac Clearance: No    Past Medical History:        Diagnosis Date    Depression     GERD (gastroesophageal reflux disease)     Headache     History of kidney stones     hospitalized 9/2016 obstructive pyelonephritis    Hypercholesteremia     Hypothyroidism     Osteoarthritis     Osteopenia     RA (rheumatoid arthritis) ([facility])      Past Surgical History:    Past Surgical History:   Procedure Laterality Date    CYSTOSCOPY Left 08/28/2016    PHILIP GRIGSBY MD  PYELOGRAM & DOUBLE-J STENT PLACEMENT    EYE SURGERY      left cornea transplant    GALLBLADDER SURGERY      HIP

## 2024-06-04 NOTE — H&P
Cervical: No cervical adenopathy.   Skin:     General: Skin is warm and dry.      Capillary Refill: Capillary refill takes less than 2 seconds.      Findings: No bruising, erythema or rash.   Neurological:      General: No focal deficit present.      Mental Status: She is alert and oriented to person, place, and time.      Gait: Gait abnormal.   Psychiatric:         Mood and Affect: Mood normal.         Behavior: Behavior normal.         Thought Content: Thought content normal.         Judgment: Judgment normal.         Assessment:  73 y.o. 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    Plan:  Preoperative workup as follows: PAT, CBC, CMP, PTT, PT/INR, T&S, EKG   2.   Scheduled for: lumbar 1 vertebral augmentation kyphoplasty on 6/7/24     MEERA Chou - CNP  6/4/2024  2:05 PM

## 2024-06-06 ENCOUNTER — ANESTHESIA EVENT (OUTPATIENT)
Dept: OPERATING ROOM | Age: 74
End: 2024-06-06
Payer: COMMERCIAL

## 2024-06-06 LAB
EKG ATRIAL RATE: 71 BPM
EKG P AXIS: 31 DEGREES
EKG P-R INTERVAL: 120 MS
EKG Q-T INTERVAL: 398 MS
EKG QRS DURATION: 84 MS
EKG QTC CALCULATION (BAZETT): 432 MS
EKG R AXIS: -18 DEGREES
EKG T AXIS: 267 DEGREES
EKG VENTRICULAR RATE: 71 BPM

## 2024-06-07 ENCOUNTER — APPOINTMENT (OUTPATIENT)
Dept: GENERAL RADIOLOGY | Age: 74
End: 2024-06-07
Attending: STUDENT IN AN ORGANIZED HEALTH CARE EDUCATION/TRAINING PROGRAM
Payer: COMMERCIAL

## 2024-06-07 ENCOUNTER — ANESTHESIA (OUTPATIENT)
Dept: OPERATING ROOM | Age: 74
End: 2024-06-07
Payer: COMMERCIAL

## 2024-06-07 ENCOUNTER — HOSPITAL ENCOUNTER (OUTPATIENT)
Age: 74
Setting detail: OUTPATIENT SURGERY
Discharge: HOME | End: 2024-06-07
Attending: STUDENT IN AN ORGANIZED HEALTH CARE EDUCATION/TRAINING PROGRAM | Admitting: STUDENT IN AN ORGANIZED HEALTH CARE EDUCATION/TRAINING PROGRAM
Payer: COMMERCIAL

## 2024-06-07 VITALS
TEMPERATURE: 97.3 F | DIASTOLIC BLOOD PRESSURE: 65 MMHG | HEART RATE: 62 BPM | OXYGEN SATURATION: 93 % | RESPIRATION RATE: 18 BRPM | SYSTOLIC BLOOD PRESSURE: 164 MMHG

## 2024-06-07 DIAGNOSIS — R52 PAIN: ICD-10-CM

## 2024-06-07 PROCEDURE — 2709999900 HC NON-CHARGEABLE SUPPLY: Performed by: STUDENT IN AN ORGANIZED HEALTH CARE EDUCATION/TRAINING PROGRAM

## 2024-06-07 PROCEDURE — 3600000004 HC SURGERY LEVEL 4 BASE: Performed by: STUDENT IN AN ORGANIZED HEALTH CARE EDUCATION/TRAINING PROGRAM

## 2024-06-07 PROCEDURE — 3700000000 HC ANESTHESIA ATTENDED CARE: Performed by: STUDENT IN AN ORGANIZED HEALTH CARE EDUCATION/TRAINING PROGRAM

## 2024-06-07 PROCEDURE — 2580000003 HC RX 258: Performed by: ANESTHESIOLOGY

## 2024-06-07 PROCEDURE — 2580000003 HC RX 258: Performed by: STUDENT IN AN ORGANIZED HEALTH CARE EDUCATION/TRAINING PROGRAM

## 2024-06-07 PROCEDURE — 7100000001 HC PACU RECOVERY - ADDTL 15 MIN: Performed by: STUDENT IN AN ORGANIZED HEALTH CARE EDUCATION/TRAINING PROGRAM

## 2024-06-07 PROCEDURE — 7100000000 HC PACU RECOVERY - FIRST 15 MIN: Performed by: STUDENT IN AN ORGANIZED HEALTH CARE EDUCATION/TRAINING PROGRAM

## 2024-06-07 PROCEDURE — 7100000010 HC PHASE II RECOVERY - FIRST 15 MIN: Performed by: STUDENT IN AN ORGANIZED HEALTH CARE EDUCATION/TRAINING PROGRAM

## 2024-06-07 PROCEDURE — 3700000001 HC ADD 15 MINUTES (ANESTHESIA): Performed by: STUDENT IN AN ORGANIZED HEALTH CARE EDUCATION/TRAINING PROGRAM

## 2024-06-07 PROCEDURE — 6360000002 HC RX W HCPCS: Performed by: STUDENT IN AN ORGANIZED HEALTH CARE EDUCATION/TRAINING PROGRAM

## 2024-06-07 PROCEDURE — 7100000011 HC PHASE II RECOVERY - ADDTL 15 MIN: Performed by: STUDENT IN AN ORGANIZED HEALTH CARE EDUCATION/TRAINING PROGRAM

## 2024-06-07 PROCEDURE — 6360000002 HC RX W HCPCS

## 2024-06-07 PROCEDURE — C1713 ANCHOR/SCREW BN/BN,TIS/BN: HCPCS | Performed by: STUDENT IN AN ORGANIZED HEALTH CARE EDUCATION/TRAINING PROGRAM

## 2024-06-07 PROCEDURE — C1894 INTRO/SHEATH, NON-LASER: HCPCS | Performed by: STUDENT IN AN ORGANIZED HEALTH CARE EDUCATION/TRAINING PROGRAM

## 2024-06-07 PROCEDURE — 6360000004 HC RX CONTRAST MEDICATION: Performed by: STUDENT IN AN ORGANIZED HEALTH CARE EDUCATION/TRAINING PROGRAM

## 2024-06-07 PROCEDURE — 22514 PERQ VERTEBRAL AUGMENTATION: CPT | Performed by: STUDENT IN AN ORGANIZED HEALTH CARE EDUCATION/TRAINING PROGRAM

## 2024-06-07 PROCEDURE — 3600000014 HC SURGERY LEVEL 4 ADDTL 15MIN: Performed by: STUDENT IN AN ORGANIZED HEALTH CARE EDUCATION/TRAINING PROGRAM

## 2024-06-07 PROCEDURE — 6370000000 HC RX 637 (ALT 250 FOR IP): Performed by: STUDENT IN AN ORGANIZED HEALTH CARE EDUCATION/TRAINING PROGRAM

## 2024-06-07 PROCEDURE — A4217 STERILE WATER/SALINE, 500 ML: HCPCS | Performed by: STUDENT IN AN ORGANIZED HEALTH CARE EDUCATION/TRAINING PROGRAM

## 2024-06-07 PROCEDURE — 2500000003 HC RX 250 WO HCPCS

## 2024-06-07 RX ORDER — ROCURONIUM BROMIDE 10 MG/ML
INJECTION, SOLUTION INTRAVENOUS PRN
Status: DISCONTINUED | OUTPATIENT
Start: 2024-06-07 | End: 2024-06-07 | Stop reason: SDUPTHER

## 2024-06-07 RX ORDER — LIDOCAINE HYDROCHLORIDE 10 MG/ML
INJECTION, SOLUTION EPIDURAL; INFILTRATION; INTRACAUDAL; PERINEURAL PRN
Status: DISCONTINUED | OUTPATIENT
Start: 2024-06-07 | End: 2024-06-07 | Stop reason: SDUPTHER

## 2024-06-07 RX ORDER — ACETAMINOPHEN 500 MG
1000 TABLET ORAL ONCE
Status: COMPLETED | OUTPATIENT
Start: 2024-06-07 | End: 2024-06-07

## 2024-06-07 RX ORDER — SODIUM CHLORIDE, SODIUM LACTATE, POTASSIUM CHLORIDE, CALCIUM CHLORIDE 600; 310; 30; 20 MG/100ML; MG/100ML; MG/100ML; MG/100ML
INJECTION, SOLUTION INTRAVENOUS CONTINUOUS
Status: DISCONTINUED | OUTPATIENT
Start: 2024-06-07 | End: 2024-06-07 | Stop reason: HOSPADM

## 2024-06-07 RX ORDER — BUPIVACAINE HYDROCHLORIDE 5 MG/ML
INJECTION, SOLUTION EPIDURAL; INTRACAUDAL PRN
Status: DISCONTINUED | OUTPATIENT
Start: 2024-06-07 | End: 2024-06-07 | Stop reason: ALTCHOICE

## 2024-06-07 RX ORDER — FENTANYL CITRATE 50 UG/ML
INJECTION, SOLUTION INTRAMUSCULAR; INTRAVENOUS PRN
Status: DISCONTINUED | OUTPATIENT
Start: 2024-06-07 | End: 2024-06-07 | Stop reason: SDUPTHER

## 2024-06-07 RX ORDER — OXYCODONE HYDROCHLORIDE 5 MG/1
5 TABLET ORAL
Status: DISCONTINUED | OUTPATIENT
Start: 2024-06-07 | End: 2024-06-07 | Stop reason: HOSPADM

## 2024-06-07 RX ORDER — ONDANSETRON 2 MG/ML
INJECTION INTRAMUSCULAR; INTRAVENOUS PRN
Status: DISCONTINUED | OUTPATIENT
Start: 2024-06-07 | End: 2024-06-07 | Stop reason: SDUPTHER

## 2024-06-07 RX ORDER — SODIUM CHLORIDE 0.9 % (FLUSH) 0.9 %
5-40 SYRINGE (ML) INJECTION PRN
Status: DISCONTINUED | OUTPATIENT
Start: 2024-06-07 | End: 2024-06-07 | Stop reason: HOSPADM

## 2024-06-07 RX ORDER — SODIUM CHLORIDE 0.9 % (FLUSH) 0.9 %
5-40 SYRINGE (ML) INJECTION EVERY 12 HOURS SCHEDULED
Status: DISCONTINUED | OUTPATIENT
Start: 2024-06-07 | End: 2024-06-07 | Stop reason: HOSPADM

## 2024-06-07 RX ORDER — METOCLOPRAMIDE HYDROCHLORIDE 5 MG/ML
10 INJECTION INTRAMUSCULAR; INTRAVENOUS
Status: DISCONTINUED | OUTPATIENT
Start: 2024-06-07 | End: 2024-06-07 | Stop reason: HOSPADM

## 2024-06-07 RX ORDER — MAGNESIUM HYDROXIDE 1200 MG/15ML
LIQUID ORAL CONTINUOUS PRN
Status: COMPLETED | OUTPATIENT
Start: 2024-06-07 | End: 2024-06-07

## 2024-06-07 RX ORDER — FENTANYL CITRATE 0.05 MG/ML
50 INJECTION, SOLUTION INTRAMUSCULAR; INTRAVENOUS EVERY 10 MIN PRN
Status: DISCONTINUED | OUTPATIENT
Start: 2024-06-07 | End: 2024-06-07 | Stop reason: HOSPADM

## 2024-06-07 RX ORDER — LIDOCAINE HYDROCHLORIDE 10 MG/ML
1 INJECTION, SOLUTION EPIDURAL; INFILTRATION; INTRACAUDAL; PERINEURAL
Status: DISCONTINUED | OUTPATIENT
Start: 2024-06-07 | End: 2024-06-07 | Stop reason: HOSPADM

## 2024-06-07 RX ORDER — IOPAMIDOL 408 MG/ML
INJECTION, SOLUTION INTRATHECAL PRN
Status: DISCONTINUED | OUTPATIENT
Start: 2024-06-07 | End: 2024-06-07 | Stop reason: ALTCHOICE

## 2024-06-07 RX ORDER — DEXAMETHASONE SODIUM PHOSPHATE 10 MG/ML
INJECTION INTRAMUSCULAR; INTRAVENOUS PRN
Status: DISCONTINUED | OUTPATIENT
Start: 2024-06-07 | End: 2024-06-07 | Stop reason: SDUPTHER

## 2024-06-07 RX ORDER — NALOXONE HYDROCHLORIDE 0.4 MG/ML
INJECTION, SOLUTION INTRAMUSCULAR; INTRAVENOUS; SUBCUTANEOUS PRN
Status: DISCONTINUED | OUTPATIENT
Start: 2024-06-07 | End: 2024-06-07 | Stop reason: HOSPADM

## 2024-06-07 RX ORDER — PROPOFOL 10 MG/ML
INJECTION, EMULSION INTRAVENOUS PRN
Status: DISCONTINUED | OUTPATIENT
Start: 2024-06-07 | End: 2024-06-07 | Stop reason: SDUPTHER

## 2024-06-07 RX ORDER — SODIUM CHLORIDE 9 MG/ML
INJECTION, SOLUTION INTRAVENOUS PRN
Status: DISCONTINUED | OUTPATIENT
Start: 2024-06-07 | End: 2024-06-07 | Stop reason: HOSPADM

## 2024-06-07 RX ORDER — DIPHENHYDRAMINE HYDROCHLORIDE 50 MG/ML
12.5 INJECTION INTRAMUSCULAR; INTRAVENOUS
Status: DISCONTINUED | OUTPATIENT
Start: 2024-06-07 | End: 2024-06-07 | Stop reason: HOSPADM

## 2024-06-07 RX ORDER — ONDANSETRON 2 MG/ML
4 INJECTION INTRAMUSCULAR; INTRAVENOUS
Status: DISCONTINUED | OUTPATIENT
Start: 2024-06-07 | End: 2024-06-07 | Stop reason: HOSPADM

## 2024-06-07 RX ADMIN — ACETAMINOPHEN 1000 MG: 500 TABLET ORAL at 10:37

## 2024-06-07 RX ADMIN — ONDANSETRON 4 MG: 2 INJECTION INTRAMUSCULAR; INTRAVENOUS at 11:36

## 2024-06-07 RX ADMIN — CEFAZOLIN 2000 MG: 2 INJECTION, POWDER, FOR SOLUTION INTRAMUSCULAR; INTRAVENOUS at 11:16

## 2024-06-07 RX ADMIN — ROCURONIUM BROMIDE 40 MG: 10 INJECTION, SOLUTION INTRAVENOUS at 11:07

## 2024-06-07 RX ADMIN — FENTANYL CITRATE 25 MCG: 50 INJECTION, SOLUTION INTRAMUSCULAR; INTRAVENOUS at 11:05

## 2024-06-07 RX ADMIN — SUGAMMADEX 100 MG: 100 INJECTION, SOLUTION INTRAVENOUS at 11:53

## 2024-06-07 RX ADMIN — DEXAMETHASONE SODIUM PHOSPHATE 10 MG: 10 INJECTION INTRAMUSCULAR; INTRAVENOUS at 11:21

## 2024-06-07 RX ADMIN — LIDOCAINE HYDROCHLORIDE 40 MG: 10 INJECTION, SOLUTION EPIDURAL; INFILTRATION; INTRACAUDAL; PERINEURAL at 11:06

## 2024-06-07 RX ADMIN — PROPOFOL 100 MG: 10 INJECTION, EMULSION INTRAVENOUS at 11:06

## 2024-06-07 RX ADMIN — SODIUM CHLORIDE, POTASSIUM CHLORIDE, SODIUM LACTATE AND CALCIUM CHLORIDE: 600; 310; 30; 20 INJECTION, SOLUTION INTRAVENOUS at 10:33

## 2024-06-07 ASSESSMENT — PAIN SCALES - GENERAL
PAINLEVEL_OUTOF10: 0

## 2024-06-07 NOTE — ANESTHESIA PRE PROCEDURE
reviewed  Rhythm: regular                      Neuro/Psych:   (+) headaches:, psychiatric history:            GI/Hepatic/Renal:   (+) GERD:          Endo/Other:    (+) hypothyroidism: arthritis: rheumatoid..                 Abdominal:             Vascular:          Other Findings:         Anesthesia Plan      general     ASA 2           MIPS: Postoperative opioids intended and Prophylactic antiemetics administered.  Anesthetic plan and risks discussed with spouse and patient.    Use of blood products discussed with patient and spouse whom consented to blood products.    Plan discussed with CRNA.    Attending anesthesiologist reviewed and agrees with Preprocedure content            Fifi Sadler MD   6/7/2024

## 2024-06-07 NOTE — ANESTHESIA POSTPROCEDURE EVALUATION
Department of Anesthesiology  Postprocedure Note    Patient: Shirin Allen  MRN: [id number]  YOB: 1950  Date of evaluation: 6/7/2024    Procedure Summary       Date: 06/07/24 Room / Location: [place] [address] / [facility]    Anesthesia Start: 1101 Anesthesia Stop: 1205    Procedure: L1 VERTEBRAL AUGMENTATION / KYPHOPLASTY  1.0 HOUR / 2 C-ARMS / JOSE E SITE Diagnosis:       Osteoporotic compression fracture of vertebra, initial encounter ([facility])      (Osteoporotic compression fracture of vertebra, initial encounter ([facility]) [M80.88XA])    Surgeons: Lb Hunter DO Responsible Provider: Fifi Sadler MD    Anesthesia Type: general ASA Status: 2            Anesthesia Type: No value filed.    Tuan Phase I: Tuan Score: 10    Tuan Phase II:      Anesthesia Post Evaluation    Patient location during evaluation: PACU  Patient participation: complete - patient participated  Level of consciousness: awake  Airway patency: patent  Nausea & Vomiting: no nausea and no vomiting  Cardiovascular status: blood pressure returned to baseline and hemodynamically stable  Respiratory status: acceptable  Hydration status: euvolemic  Pain management: adequate        No notable events documented.

## 2024-06-07 NOTE — BRIEF OP NOTE
Brief Postoperative Note      Patient: Shirin Allen  YOB: 1950  MRN: [id number]    Date of Procedure: 6/7/2024    Pre-Op Diagnosis Codes:     * Osteoporotic compression fracture of vertebra, initial encounter ([facility]) [M80.88XA]    Post-Op Diagnosis: Same       Procedure(s):  L1 VERTEBRAL AUGMENTATION / KYPHOPLASTY  1.0 HOUR / 2 C-ARMS / JOSE E SITE    Surgeon(s):  Lb Hunetr DO    Assistant:  * No surgical staff found *    Anesthesia: General    Estimated Blood Loss (mL): Minimal    Complications: None    Specimens:   * No specimens in log *    Implants:  * No implants in log *      Drains: * No LDAs found *    Findings:  Infection Present At Time Of Surgery (PATOS) (choose all levels that have infection present):  No infection present  Other Findings: none    Electronically signed by Lb Hunter DO on 6/7/2024 at 11:53 AM

## 2024-06-07 NOTE — DISCHARGE INSTRUCTIONS
[facility]  [facility]  P: [phone]  F: [phone]      Vertebral Augmentation Kyphoplasty Discharge Summary      Patient Name: Shirin Allen : 1950  Date:24  Physician: Lb Hunter DO       Shirin Allen  is here today for interventional pain management.    Preoperatively, the patient presents with intractable back pain in the affected area as per history and exam. Patient has failed NSAIDs, PT/HEP, and conservative treatment. Patient has significant psychological and functional impairment due to this condition.  Informed consent was obtained.  Fluoroscopic guidance was used for this procedure.     OPERATION PERFORMED:  L1 Vertebral Augmentation Kyphoplasty     HOSPITAL COURSE:  The patient tolerated the procedure well.  Plan  discharge home when recovered from the anesthesia.     DISCHARGE DIAGNOSIS:   Osteoporotic Compression Fracture. Improved.      DISCHARGE MEDICATIONS:  None. Patient already has Percocet prescription.      DISCHARGE INSTRUCTIONS:  Remove the Band-Aid later tonight.  Resume all  of his preoperative diet, cares, medications, showering, bathing, and so  forth.  Follow up in 2-4 weeks.  Any questions or problems, contact the  office.        Lb Hunter DO    [facility]  Outpatient Discharge Instructions    To continue your care at home, please follow the instructions below and any additional discharge instructions given to you by your physician.    GENERAL ANESTHESIA:  Do not drive or operate machinery for 24hrs after discharge,  Do not drink alcohol, take tranquilizers, sleeping medication, or any other medication not directly instructed by your physician,   Do not make any important decisions or sign any legal documents for 24hrs after surgery,  Have someone with you for 24hrs after surgery to assist you as needed.    ACTIVITY:  Light activity for 24hrs,  No heavy lifting or

## 2024-06-11 NOTE — INTERVAL H&P NOTE
Update History & Physical    The patient's History and Physical of June 4, 2024 was reviewed with the patient and I examined the patient. There was no change. The surgical site was confirmed by the patient and me.     Plan: The risks, benefits, expected outcome, and alternative to the recommended procedure have been discussed with the patient. Patient understands and wants to proceed with the procedure.     Electronically signed by Lb Hunter DO on 6/11/2024 at 8:58 AM

## 2024-06-25 ENCOUNTER — OFFICE VISIT (OUTPATIENT)
Age: 74
End: 2024-06-25
Payer: COMMERCIAL

## 2024-06-25 VITALS — BODY MASS INDEX: 19.32 KG/M2 | WEIGHT: 105 LBS | HEIGHT: 62 IN | TEMPERATURE: 96.8 F

## 2024-06-25 DIAGNOSIS — M80.88XA OSTEOPOROTIC COMPRESSION FRACTURE OF VERTEBRA, INITIAL ENCOUNTER (HCC): Primary | ICD-10-CM

## 2024-06-25 PROCEDURE — 1123F ACP DISCUSS/DSCN MKR DOCD: CPT | Performed by: STUDENT IN AN ORGANIZED HEALTH CARE EDUCATION/TRAINING PROGRAM

## 2024-06-25 PROCEDURE — 99214 OFFICE O/P EST MOD 30 MIN: CPT | Performed by: STUDENT IN AN ORGANIZED HEALTH CARE EDUCATION/TRAINING PROGRAM

## 2024-06-25 ASSESSMENT — ENCOUNTER SYMPTOMS: BACK PAIN: 1

## 2024-06-25 NOTE — PROGRESS NOTES
[facility] [place] [facility], [facility]  [address] [address]  Dept: [phone]  Dept Fax: [phone]  Loc: [phone]     6/25/2024    Visit type: Established     Reason for Visit: Post-Op Check       ASSESSMENT/PLAN   1. Osteoporotic compression fracture of vertebra, initial encounter (HCC)  Overview:  Subacute illness with a severe exacerbation and/or progression which affects ADL's. Pain has been present for 6+ weeks and is expected to last at least a year. Patient is unable to sleep, transfer, nor ambulate. Goals of care include pain relief >50% and ability to perform ADLs with less pain.     73-year-old female with history of acute on chronic low back pain status post fall in April, 2024, which resulted in severe low back pain with radiation to the bilateral anterior thighs/hips.  Pain is extremely debilitating affects ADLs.  Pain score 6 or greater with activity.  Pain limits mobility and functionality.  Patient currently needs a wheelchair to for mobilization.     Patient displays extreme tenderness palpation in the lumbar region.  Pain is reproducible upon palpation of the spinous processes.     Lumbar MRI 5/13/24  -Nonacute compression deformity of the L1 vertebral body with 50% loss  of vertebral body height and no osseous retropulsion into the spinal  canal.   Assessment & Plan:     6/7/24: L1 vertebral augmentation kyphoplasty. 100% pain relief. Positive response.     Return if symptoms worsen or fail to improve.  No orders of the defined types were placed in this encounter.     Subjective    Patient: Shirin Allen is a 73 y.o. female     HPI: Shirin Allen was last seen on 6/7/24 for L1 vertebral augmentation.     Patient presents today for postop check.  She reports no pain.  Procedure site is clean dry and intact.  She has no complaints at this time.  Successful vertebral augmentation.  Patient is no longer taking pain

## 2024-07-01 DIAGNOSIS — M06.9 RHEUMATOID ARTHRITIS, INVOLVING UNSPECIFIED SITE, UNSPECIFIED WHETHER RHEUMATOID FACTOR PRESENT (MULTI): ICD-10-CM

## 2024-07-01 RX ORDER — PREDNISONE 5 MG/1
5 TABLET ORAL DAILY
Qty: 30 TABLET | Refills: 0 | Status: SHIPPED | OUTPATIENT
Start: 2024-07-01

## 2024-07-30 ENCOUNTER — APPOINTMENT (OUTPATIENT)
Dept: RADIOLOGY | Facility: HOSPITAL | Age: 74
End: 2024-07-30
Payer: COMMERCIAL

## 2024-07-30 ENCOUNTER — HOSPITAL ENCOUNTER (INPATIENT)
Facility: HOSPITAL | Age: 74
LOS: 3 days | Discharge: HOME HEALTH | End: 2024-08-02
Attending: EMERGENCY MEDICINE | Admitting: STUDENT IN AN ORGANIZED HEALTH CARE EDUCATION/TRAINING PROGRAM
Payer: COMMERCIAL

## 2024-07-30 ENCOUNTER — APPOINTMENT (OUTPATIENT)
Dept: CARDIOLOGY | Facility: HOSPITAL | Age: 74
End: 2024-07-30
Payer: COMMERCIAL

## 2024-07-30 DIAGNOSIS — N39.0 URINARY TRACT INFECTION WITHOUT HEMATURIA, SITE UNSPECIFIED: ICD-10-CM

## 2024-07-30 DIAGNOSIS — R41.0 CONFUSION: ICD-10-CM

## 2024-07-30 DIAGNOSIS — R53.1 WEAKNESS: ICD-10-CM

## 2024-07-30 DIAGNOSIS — I48.0 PAROXYSMAL ATRIAL FIBRILLATION (MULTI): ICD-10-CM

## 2024-07-30 DIAGNOSIS — E87.6 HYPOKALEMIA: ICD-10-CM

## 2024-07-30 DIAGNOSIS — R53.1 GENERALIZED WEAKNESS: Primary | ICD-10-CM

## 2024-07-30 DIAGNOSIS — E83.42 HYPOMAGNESEMIA: ICD-10-CM

## 2024-07-30 LAB
ALBUMIN SERPL BCP-MCNC: 3.8 G/DL (ref 3.4–5)
ALP SERPL-CCNC: 53 U/L (ref 33–136)
ALT SERPL W P-5'-P-CCNC: 9 U/L (ref 7–45)
ANION GAP SERPL CALC-SCNC: 12 MMOL/L (ref 10–20)
ANION GAP SERPL CALC-SCNC: 12 MMOL/L (ref 10–20)
APPEARANCE UR: ABNORMAL
AST SERPL W P-5'-P-CCNC: 23 U/L (ref 9–39)
ATRIAL RATE: 129 BPM
ATRIAL RATE: 96 BPM
BASOPHILS # BLD AUTO: 0.06 X10*3/UL (ref 0–0.1)
BASOPHILS NFR BLD AUTO: 0.3 %
BILIRUB SERPL-MCNC: 0.8 MG/DL (ref 0–1.2)
BILIRUB UR STRIP.AUTO-MCNC: NEGATIVE MG/DL
BNP SERPL-MCNC: 68 PG/ML (ref 0–99)
BUN SERPL-MCNC: 11 MG/DL (ref 6–23)
BUN SERPL-MCNC: 9 MG/DL (ref 6–23)
CALCIUM SERPL-MCNC: 8.7 MG/DL (ref 8.6–10.3)
CALCIUM SERPL-MCNC: 9 MG/DL (ref 8.6–10.3)
CARDIAC TROPONIN I PNL SERPL HS: 11 NG/L (ref 0–13)
CARDIAC TROPONIN I PNL SERPL HS: 14 NG/L (ref 0–13)
CHLORIDE SERPL-SCNC: 102 MMOL/L (ref 98–107)
CHLORIDE SERPL-SCNC: 104 MMOL/L (ref 98–107)
CO2 SERPL-SCNC: 30 MMOL/L (ref 21–32)
CO2 SERPL-SCNC: 30 MMOL/L (ref 21–32)
COLOR UR: ABNORMAL
CREAT SERPL-MCNC: 0.54 MG/DL (ref 0.5–1.05)
CREAT SERPL-MCNC: 0.6 MG/DL (ref 0.5–1.05)
EGFRCR SERPLBLD CKD-EPI 2021: >90 ML/MIN/1.73M*2
EGFRCR SERPLBLD CKD-EPI 2021: >90 ML/MIN/1.73M*2
EOSINOPHIL # BLD AUTO: 0.02 X10*3/UL (ref 0–0.4)
EOSINOPHIL NFR BLD AUTO: 0.1 %
ERYTHROCYTE [DISTWIDTH] IN BLOOD BY AUTOMATED COUNT: 14.6 % (ref 11.5–14.5)
GLUCOSE SERPL-MCNC: 100 MG/DL (ref 74–99)
GLUCOSE SERPL-MCNC: 82 MG/DL (ref 74–99)
GLUCOSE UR STRIP.AUTO-MCNC: NORMAL MG/DL
HCT VFR BLD AUTO: 39.4 % (ref 36–46)
HGB BLD-MCNC: 11.9 G/DL (ref 12–16)
HOLD SPECIMEN: NORMAL
HOLD SPECIMEN: NORMAL
IMM GRANULOCYTES # BLD AUTO: 0.13 X10*3/UL (ref 0–0.5)
IMM GRANULOCYTES NFR BLD AUTO: 0.7 % (ref 0–0.9)
INR PPP: 1 (ref 0.9–1.1)
KETONES UR STRIP.AUTO-MCNC: NEGATIVE MG/DL
LACTATE SERPL-SCNC: 2.4 MMOL/L (ref 0.4–2)
LEUKOCYTE ESTERASE UR QL STRIP.AUTO: ABNORMAL
LIPASE SERPL-CCNC: 17 U/L (ref 9–82)
LYMPHOCYTES # BLD AUTO: 0.76 X10*3/UL (ref 0.8–3)
LYMPHOCYTES NFR BLD AUTO: 3.8 %
MAGNESIUM SERPL-MCNC: 1.3 MG/DL (ref 1.6–2.4)
MAGNESIUM SERPL-MCNC: 1.97 MG/DL (ref 1.6–2.4)
MCH RBC QN AUTO: 28.1 PG (ref 26–34)
MCHC RBC AUTO-ENTMCNC: 30.2 G/DL (ref 32–36)
MCV RBC AUTO: 93 FL (ref 80–100)
MONOCYTES # BLD AUTO: 0.42 X10*3/UL (ref 0.05–0.8)
MONOCYTES NFR BLD AUTO: 2.1 %
NEUTROPHILS # BLD AUTO: 18.49 X10*3/UL (ref 1.6–5.5)
NEUTROPHILS NFR BLD AUTO: 93 %
NITRITE UR QL STRIP.AUTO: ABNORMAL
NRBC BLD-RTO: 0 /100 WBCS (ref 0–0)
P AXIS: 12 DEGREES
P AXIS: 13 DEGREES
P OFFSET: 198 MS
P OFFSET: 213 MS
P ONSET: 147 MS
P ONSET: 165 MS
PH UR STRIP.AUTO: 6.5 [PH]
PLATELET # BLD AUTO: 154 X10*3/UL (ref 150–450)
POTASSIUM SERPL-SCNC: 2.7 MMOL/L (ref 3.5–5.3)
POTASSIUM SERPL-SCNC: 2.9 MMOL/L (ref 3.5–5.3)
PR INTERVAL: 122 MS
PR INTERVAL: 136 MS
PROT SERPL-MCNC: 6.5 G/DL (ref 6.4–8.2)
PROT UR STRIP.AUTO-MCNC: NEGATIVE MG/DL
PROTHROMBIN TIME: 11.6 SECONDS (ref 9.8–12.8)
Q ONSET: 215 MS
Q ONSET: 226 MS
QRS COUNT: 16 BEATS
QRS COUNT: 21 BEATS
QRS DURATION: 82 MS
QRS DURATION: 84 MS
QT INTERVAL: 286 MS
QT INTERVAL: 330 MS
QTC CALCULATION(BAZETT): 416 MS
QTC CALCULATION(BAZETT): 418 MS
QTC FREDERICIA: 369 MS
QTC FREDERICIA: 386 MS
R AXIS: -25 DEGREES
R AXIS: -30 DEGREES
RBC # BLD AUTO: 4.24 X10*6/UL (ref 4–5.2)
RBC # UR STRIP.AUTO: NEGATIVE /UL
RBC #/AREA URNS AUTO: ABNORMAL /HPF
SARS-COV-2 RNA RESP QL NAA+PROBE: NOT DETECTED
SODIUM SERPL-SCNC: 141 MMOL/L (ref 136–145)
SODIUM SERPL-SCNC: 143 MMOL/L (ref 136–145)
SP GR UR STRIP.AUTO: 1.01
SQUAMOUS #/AREA URNS AUTO: ABNORMAL /HPF
T AXIS: 127 DEGREES
T AXIS: 174 DEGREES
T OFFSET: 358 MS
T OFFSET: 391 MS
UROBILINOGEN UR STRIP.AUTO-MCNC: NORMAL MG/DL
VENTRICULAR RATE: 129 BPM
VENTRICULAR RATE: 96 BPM
WBC # BLD AUTO: 19.9 X10*3/UL (ref 4.4–11.3)
WBC #/AREA URNS AUTO: ABNORMAL /HPF
WBC CLUMPS #/AREA URNS AUTO: ABNORMAL /HPF

## 2024-07-30 PROCEDURE — 85025 COMPLETE CBC W/AUTO DIFF WBC: CPT | Performed by: EMERGENCY MEDICINE

## 2024-07-30 PROCEDURE — 99223 1ST HOSP IP/OBS HIGH 75: CPT

## 2024-07-30 PROCEDURE — 74177 CT ABD & PELVIS W/CONTRAST: CPT | Mod: FOREIGN READ | Performed by: RADIOLOGY

## 2024-07-30 PROCEDURE — 99285 EMERGENCY DEPT VISIT HI MDM: CPT | Mod: CS

## 2024-07-30 PROCEDURE — 2550000001 HC RX 255 CONTRASTS: Performed by: EMERGENCY MEDICINE

## 2024-07-30 PROCEDURE — 87635 SARS-COV-2 COVID-19 AMP PRB: CPT | Performed by: EMERGENCY MEDICINE

## 2024-07-30 PROCEDURE — 2500000004 HC RX 250 GENERAL PHARMACY W/ HCPCS (ALT 636 FOR OP/ED): Performed by: EMERGENCY MEDICINE

## 2024-07-30 PROCEDURE — 2500000004 HC RX 250 GENERAL PHARMACY W/ HCPCS (ALT 636 FOR OP/ED)

## 2024-07-30 PROCEDURE — 71045 X-RAY EXAM CHEST 1 VIEW: CPT | Mod: FOREIGN READ | Performed by: RADIOLOGY

## 2024-07-30 PROCEDURE — 85610 PROTHROMBIN TIME: CPT | Performed by: EMERGENCY MEDICINE

## 2024-07-30 PROCEDURE — 87040 BLOOD CULTURE FOR BACTERIA: CPT | Mod: ELYLAB | Performed by: EMERGENCY MEDICINE

## 2024-07-30 PROCEDURE — 70450 CT HEAD/BRAIN W/O DYE: CPT | Performed by: RADIOLOGY

## 2024-07-30 PROCEDURE — 80053 COMPREHEN METABOLIC PANEL: CPT | Performed by: EMERGENCY MEDICINE

## 2024-07-30 PROCEDURE — 82374 ASSAY BLOOD CARBON DIOXIDE: CPT

## 2024-07-30 PROCEDURE — 74177 CT ABD & PELVIS W/CONTRAST: CPT

## 2024-07-30 PROCEDURE — 84484 ASSAY OF TROPONIN QUANT: CPT | Performed by: EMERGENCY MEDICINE

## 2024-07-30 PROCEDURE — 1200000002 HC GENERAL ROOM WITH TELEMETRY DAILY

## 2024-07-30 PROCEDURE — 96368 THER/DIAG CONCURRENT INF: CPT

## 2024-07-30 PROCEDURE — 36415 COLL VENOUS BLD VENIPUNCTURE: CPT | Performed by: EMERGENCY MEDICINE

## 2024-07-30 PROCEDURE — 70450 CT HEAD/BRAIN W/O DYE: CPT

## 2024-07-30 PROCEDURE — 93005 ELECTROCARDIOGRAM TRACING: CPT

## 2024-07-30 PROCEDURE — 87075 CULTR BACTERIA EXCEPT BLOOD: CPT | Mod: ELYLAB | Performed by: EMERGENCY MEDICINE

## 2024-07-30 PROCEDURE — 83735 ASSAY OF MAGNESIUM: CPT

## 2024-07-30 PROCEDURE — 83605 ASSAY OF LACTIC ACID: CPT | Performed by: EMERGENCY MEDICINE

## 2024-07-30 PROCEDURE — 2500000001 HC RX 250 WO HCPCS SELF ADMINISTERED DRUGS (ALT 637 FOR MEDICARE OP): Performed by: STUDENT IN AN ORGANIZED HEALTH CARE EDUCATION/TRAINING PROGRAM

## 2024-07-30 PROCEDURE — 96366 THER/PROPH/DIAG IV INF ADDON: CPT

## 2024-07-30 PROCEDURE — 96365 THER/PROPH/DIAG IV INF INIT: CPT

## 2024-07-30 PROCEDURE — 81001 URINALYSIS AUTO W/SCOPE: CPT | Performed by: EMERGENCY MEDICINE

## 2024-07-30 PROCEDURE — 83880 ASSAY OF NATRIURETIC PEPTIDE: CPT | Performed by: EMERGENCY MEDICINE

## 2024-07-30 PROCEDURE — 2500000001 HC RX 250 WO HCPCS SELF ADMINISTERED DRUGS (ALT 637 FOR MEDICARE OP)

## 2024-07-30 PROCEDURE — 83735 ASSAY OF MAGNESIUM: CPT | Performed by: EMERGENCY MEDICINE

## 2024-07-30 PROCEDURE — 2500000002 HC RX 250 W HCPCS SELF ADMINISTERED DRUGS (ALT 637 FOR MEDICARE OP, ALT 636 FOR OP/ED)

## 2024-07-30 PROCEDURE — 87086 URINE CULTURE/COLONY COUNT: CPT | Mod: ELYLAB | Performed by: EMERGENCY MEDICINE

## 2024-07-30 PROCEDURE — 83690 ASSAY OF LIPASE: CPT | Performed by: EMERGENCY MEDICINE

## 2024-07-30 PROCEDURE — 71045 X-RAY EXAM CHEST 1 VIEW: CPT

## 2024-07-30 RX ORDER — POTASSIUM CHLORIDE 20 MEQ/1
40 TABLET, EXTENDED RELEASE ORAL ONCE
Status: COMPLETED | OUTPATIENT
Start: 2024-07-30 | End: 2024-07-30

## 2024-07-30 RX ORDER — ENOXAPARIN SODIUM 100 MG/ML
40 INJECTION SUBCUTANEOUS EVERY 24 HOURS
Status: DISCONTINUED | OUTPATIENT
Start: 2024-07-30 | End: 2024-08-02 | Stop reason: HOSPADM

## 2024-07-30 RX ORDER — PREDNISOLONE ACETATE 10 MG/ML
1 SUSPENSION/ DROPS OPHTHALMIC 4 TIMES DAILY
COMMUNITY

## 2024-07-30 RX ORDER — BUSPIRONE HYDROCHLORIDE 5 MG/1
5 TABLET ORAL 2 TIMES DAILY PRN
Status: DISCONTINUED | OUTPATIENT
Start: 2024-07-30 | End: 2024-08-02 | Stop reason: HOSPADM

## 2024-07-30 RX ORDER — ACETAMINOPHEN 500 MG
5 TABLET ORAL NIGHTLY PRN
Status: DISCONTINUED | OUTPATIENT
Start: 2024-07-30 | End: 2024-08-02 | Stop reason: HOSPADM

## 2024-07-30 RX ORDER — POTASSIUM CHLORIDE 14.9 MG/ML
20 INJECTION INTRAVENOUS ONCE
Status: COMPLETED | OUTPATIENT
Start: 2024-07-30 | End: 2024-07-30

## 2024-07-30 RX ORDER — ONDANSETRON HYDROCHLORIDE 2 MG/ML
4 INJECTION, SOLUTION INTRAVENOUS EVERY 8 HOURS PRN
Status: DISCONTINUED | OUTPATIENT
Start: 2024-07-30 | End: 2024-08-02 | Stop reason: HOSPADM

## 2024-07-30 RX ORDER — HYDROCODONE BITARTRATE AND ACETAMINOPHEN 5; 325 MG/1; MG/1
1 TABLET ORAL ONCE
Status: COMPLETED | OUTPATIENT
Start: 2024-07-30 | End: 2024-07-30

## 2024-07-30 RX ORDER — SODIUM CHLORIDE 9 MG/ML
100 INJECTION, SOLUTION INTRAVENOUS CONTINUOUS
Status: DISCONTINUED | OUTPATIENT
Start: 2024-07-30 | End: 2024-07-31

## 2024-07-30 RX ORDER — ACETAMINOPHEN 325 MG/1
650 TABLET ORAL EVERY 4 HOURS PRN
Status: DISCONTINUED | OUTPATIENT
Start: 2024-07-30 | End: 2024-08-02 | Stop reason: HOSPADM

## 2024-07-30 RX ORDER — ONDANSETRON 4 MG/1
4 TABLET, FILM COATED ORAL EVERY 8 HOURS PRN
Status: DISCONTINUED | OUTPATIENT
Start: 2024-07-30 | End: 2024-08-02 | Stop reason: HOSPADM

## 2024-07-30 RX ORDER — POLYETHYLENE GLYCOL 3350 17 G/17G
17 POWDER, FOR SOLUTION ORAL DAILY
Status: DISCONTINUED | OUTPATIENT
Start: 2024-07-30 | End: 2024-08-02 | Stop reason: HOSPADM

## 2024-07-30 RX ORDER — MAGNESIUM SULFATE HEPTAHYDRATE 40 MG/ML
2 INJECTION, SOLUTION INTRAVENOUS ONCE
Status: COMPLETED | OUTPATIENT
Start: 2024-07-30 | End: 2024-07-30

## 2024-07-30 RX ORDER — CYCLOBENZAPRINE HCL 10 MG
10 TABLET ORAL 3 TIMES DAILY PRN
Status: DISCONTINUED | OUTPATIENT
Start: 2024-07-30 | End: 2024-08-02 | Stop reason: HOSPADM

## 2024-07-30 RX ORDER — CEFTRIAXONE 1 G/50ML
1 INJECTION, SOLUTION INTRAVENOUS EVERY 24 HOURS
Status: DISCONTINUED | OUTPATIENT
Start: 2024-07-31 | End: 2024-08-02 | Stop reason: HOSPADM

## 2024-07-30 RX ORDER — DOCUSATE SODIUM 100 MG/1
100 CAPSULE, LIQUID FILLED ORAL 2 TIMES DAILY
Status: DISCONTINUED | OUTPATIENT
Start: 2024-07-30 | End: 2024-08-02 | Stop reason: HOSPADM

## 2024-07-30 RX ORDER — POTASSIUM CHLORIDE 14.9 MG/ML
20 INJECTION INTRAVENOUS
Status: DISPENSED | OUTPATIENT
Start: 2024-07-30 | End: 2024-07-30

## 2024-07-30 RX ORDER — GABAPENTIN 300 MG/1
600 CAPSULE ORAL NIGHTLY
Status: DISCONTINUED | OUTPATIENT
Start: 2024-07-30 | End: 2024-08-02 | Stop reason: HOSPADM

## 2024-07-30 RX ORDER — ACETAMINOPHEN 650 MG/1
650 SUPPOSITORY RECTAL EVERY 4 HOURS PRN
Status: DISCONTINUED | OUTPATIENT
Start: 2024-07-30 | End: 2024-08-02 | Stop reason: HOSPADM

## 2024-07-30 RX ORDER — PANTOPRAZOLE SODIUM 40 MG/1
40 TABLET, DELAYED RELEASE ORAL DAILY
Status: DISCONTINUED | OUTPATIENT
Start: 2024-07-31 | End: 2024-08-02 | Stop reason: HOSPADM

## 2024-07-30 RX ORDER — DICYCLOMINE HYDROCHLORIDE 10 MG/1
20 CAPSULE ORAL EVERY 6 HOURS PRN
Status: DISCONTINUED | OUTPATIENT
Start: 2024-07-30 | End: 2024-08-02 | Stop reason: HOSPADM

## 2024-07-30 RX ORDER — ACETAMINOPHEN 160 MG/5ML
650 SOLUTION ORAL EVERY 4 HOURS PRN
Status: DISCONTINUED | OUTPATIENT
Start: 2024-07-30 | End: 2024-08-02 | Stop reason: HOSPADM

## 2024-07-30 RX ORDER — LEVOTHYROXINE SODIUM 50 UG/1
50 TABLET ORAL DAILY
Status: DISCONTINUED | OUTPATIENT
Start: 2024-07-31 | End: 2024-08-02 | Stop reason: HOSPADM

## 2024-07-30 RX ORDER — ACETAMINOPHEN 325 MG/1
1000 TABLET ORAL ONCE
Status: COMPLETED | OUTPATIENT
Start: 2024-07-30 | End: 2024-07-30

## 2024-07-30 RX ORDER — CEFTRIAXONE 1 G/50ML
1 INJECTION, SOLUTION INTRAVENOUS ONCE
Status: COMPLETED | OUTPATIENT
Start: 2024-07-30 | End: 2024-07-30

## 2024-07-30 SDOH — SOCIAL STABILITY: SOCIAL INSECURITY: WERE YOU ABLE TO COMPLETE ALL THE BEHAVIORAL HEALTH SCREENINGS?: YES

## 2024-07-30 SDOH — SOCIAL STABILITY: SOCIAL INSECURITY: HAS ANYONE EVER THREATENED TO HURT YOUR FAMILY OR YOUR PETS?: NO

## 2024-07-30 SDOH — SOCIAL STABILITY: SOCIAL INSECURITY: DOES ANYONE TRY TO KEEP YOU FROM HAVING/CONTACTING OTHER FRIENDS OR DOING THINGS OUTSIDE YOUR HOME?: NO

## 2024-07-30 SDOH — SOCIAL STABILITY: SOCIAL INSECURITY: DO YOU FEEL ANYONE HAS EXPLOITED OR TAKEN ADVANTAGE OF YOU FINANCIALLY OR OF YOUR PERSONAL PROPERTY?: NO

## 2024-07-30 SDOH — SOCIAL STABILITY: SOCIAL INSECURITY: ABUSE: ADULT

## 2024-07-30 SDOH — SOCIAL STABILITY: SOCIAL INSECURITY: ARE YOU OR HAVE YOU BEEN THREATENED OR ABUSED PHYSICALLY, EMOTIONALLY, OR SEXUALLY BY ANYONE?: NO

## 2024-07-30 SDOH — SOCIAL STABILITY: SOCIAL INSECURITY: ARE THERE ANY APPARENT SIGNS OF INJURIES/BEHAVIORS THAT COULD BE RELATED TO ABUSE/NEGLECT?: NO

## 2024-07-30 SDOH — SOCIAL STABILITY: SOCIAL INSECURITY: HAVE YOU HAD ANY THOUGHTS OF HARMING ANYONE ELSE?: NO

## 2024-07-30 SDOH — SOCIAL STABILITY: SOCIAL INSECURITY: HAVE YOU HAD THOUGHTS OF HARMING ANYONE ELSE?: NO

## 2024-07-30 SDOH — SOCIAL STABILITY: SOCIAL INSECURITY: DO YOU FEEL UNSAFE GOING BACK TO THE PLACE WHERE YOU ARE LIVING?: NO

## 2024-07-30 ASSESSMENT — ACTIVITIES OF DAILY LIVING (ADL)
PATIENT'S MEMORY ADEQUATE TO SAFELY COMPLETE DAILY ACTIVITIES?: YES
LACK_OF_TRANSPORTATION: NO
WALKS IN HOME: NEEDS ASSISTANCE
WALKS IN HOME: NEEDS ASSISTANCE
TOILETING: NEEDS ASSISTANCE
DRESSING YOURSELF: NEEDS ASSISTANCE
JUDGMENT_ADEQUATE_SAFELY_COMPLETE_DAILY_ACTIVITIES: YES
FEEDING YOURSELF: NEEDS ASSISTANCE
BATHING: NEEDS ASSISTANCE
PATIENT'S MEMORY ADEQUATE TO SAFELY COMPLETE DAILY ACTIVITIES?: NO
ADEQUATE_TO_COMPLETE_ADL: NO
GROOMING: NEEDS ASSISTANCE
GROOMING: NEEDS ASSISTANCE
JUDGMENT_ADEQUATE_SAFELY_COMPLETE_DAILY_ACTIVITIES: YES
HEARING - LEFT EAR: DIFFICULTY WITH NOISE
ADEQUATE_TO_COMPLETE_ADL: NO
TOILETING: NEEDS ASSISTANCE
HEARING - LEFT EAR: FUNCTIONAL
HEARING - RIGHT EAR: FUNCTIONAL
DRESSING YOURSELF: NEEDS ASSISTANCE
HEARING - RIGHT EAR: DIFFICULTY WITH NOISE
FEEDING YOURSELF: NEEDS ASSISTANCE

## 2024-07-30 ASSESSMENT — LIFESTYLE VARIABLES
EVER FELT BAD OR GUILTY ABOUT YOUR DRINKING: NO
SKIP TO QUESTIONS 9-10: 1
HAVE YOU EVER FELT YOU SHOULD CUT DOWN ON YOUR DRINKING: NO
HOW MANY STANDARD DRINKS CONTAINING ALCOHOL DO YOU HAVE ON A TYPICAL DAY: PATIENT DOES NOT DRINK
HOW OFTEN DO YOU HAVE A DRINK CONTAINING ALCOHOL: NEVER
TOTAL SCORE: 0
HOW OFTEN DO YOU HAVE A DRINK CONTAINING ALCOHOL: NEVER
AUDIT-C TOTAL SCORE: 0
HAVE PEOPLE ANNOYED YOU BY CRITICIZING YOUR DRINKING: NO
EVER HAD A DRINK FIRST THING IN THE MORNING TO STEADY YOUR NERVES TO GET RID OF A HANGOVER: NO
AUDIT-C TOTAL SCORE: 0
HOW OFTEN DO YOU HAVE 6 OR MORE DRINKS ON ONE OCCASION: NEVER

## 2024-07-30 ASSESSMENT — PAIN SCALES - GENERAL
PAINLEVEL_OUTOF10: 10 - WORST POSSIBLE PAIN
PAINLEVEL_OUTOF10: 7
PAINLEVEL_OUTOF10: 2

## 2024-07-30 ASSESSMENT — PAIN DESCRIPTION - LOCATION: LOCATION: HEAD

## 2024-07-30 ASSESSMENT — COGNITIVE AND FUNCTIONAL STATUS - GENERAL
STANDING UP FROM CHAIR USING ARMS: TOTAL
MOVING TO AND FROM BED TO CHAIR: A LOT
EATING MEALS: A LITTLE
DRESSING REGULAR LOWER BODY CLOTHING: A LOT
DAILY ACTIVITIY SCORE: 13
DRESSING REGULAR UPPER BODY CLOTHING: A LOT
PATIENT BASELINE BEDBOUND: YES
TOILETING: A LOT
MOBILITY SCORE: 11
WALKING IN HOSPITAL ROOM: TOTAL
CLIMB 3 TO 5 STEPS WITH RAILING: TOTAL
MOVING FROM LYING ON BACK TO SITTING ON SIDE OF FLAT BED WITH BEDRAILS: A LITTLE
PERSONAL GROOMING: A LOT
HELP NEEDED FOR BATHING: A LOT
TURNING FROM BACK TO SIDE WHILE IN FLAT BAD: A LITTLE

## 2024-07-30 ASSESSMENT — PAIN - FUNCTIONAL ASSESSMENT: PAIN_FUNCTIONAL_ASSESSMENT: 0-10

## 2024-07-30 ASSESSMENT — PATIENT HEALTH QUESTIONNAIRE - PHQ9
SUM OF ALL RESPONSES TO PHQ9 QUESTIONS 1 & 2: 0
1. LITTLE INTEREST OR PLEASURE IN DOING THINGS: NOT AT ALL
2. FEELING DOWN, DEPRESSED OR HOPELESS: NOT AT ALL

## 2024-07-30 NOTE — H&P
History Of Present Illness  Alisa Fregoso is a 73 y.o. female presenting with generalized weakness with worsening nausea vomiting and diarrhea.  Patient with history of dementia  at bedside who is her caregiver.   states she has had intermittent diarrhea for the past couple months with multiple ER/hospital visits.  Patient's PCP has prescribed Pepto-Bismol in which she has been taking.  Patient complaining of abdominal pain with palpation, denies nausea or vomiting.  Patient has history of diverticulitis per  she tries to eat a bland diet to avoid flareups.  Patient has history of UTIs and is on nitrofurantoin daily.  Patient is legally blind and wheelchair-bound.  On admission potassium 2.7, magnesium 1.30, lactate 2.4.  Electrolytes were replaced in the ER along with a bolus.  Leukocytes 19.9, UA positive for leukocytes and patient was started on Rocephin IV.  CXR negative, CT abdomen pelvis negative without diverticulitis.  EKG showed tachycardia with occasional PVCs.  Troponin slightly elevated 11, 14.  Patient will be admitted for further evaluation and treatment.     Past Medical History  She has a past medical history of Arthritis, Blindness, Calculus of kidney (10/01/2020), Chronic headaches, Diverticulitis of large intestine without perforation or abscess without bleeding (10/01/2020), Hypothyroidism, Migraine, Other specified disorders of eye and adnexa, Personal history of (healed) traumatic fracture (10/18/2020), Personal history of diseases of the blood and blood-forming organs and certain disorders involving the immune mechanism (10/27/2020), Personal history of other diseases of the musculoskeletal system and connective tissue (10/27/2020), Personal history of other specified conditions (10/18/2020), Personal history of other specified conditions, Personal history of other specified conditions (10/27/2020), Short-term memory loss, Unspecified visual loss (10/27/2020), Uses  walker, and Wears glasses.    She has no past medical history of Delayed emergence from general anesthesia.    Surgical History  She has a past surgical history that includes CT angio abdomen pelvis w and or wo IV IV contrast (05/28/2022); Hip surgery (Left, 09/25/2020); Total hip arthroplasty (Left); Cholecystectomy; Hysterectomy; Partial knee arthroplasty (Left); Corneal transplant (Left); Partial gastrectomy; Esophagogastroduodenoscopy; and Colonoscopy.     Social History  She reports that she has never smoked. She has been exposed to tobacco smoke. She has never used smokeless tobacco. She reports that she does not currently use alcohol. She reports that she does not use drugs.    Family History  Family History   Problem Relation Name Age of Onset    No Known Problems Mother      No Known Problems Father      No Known Problems Sister      No Known Problems Brother          Allergies  Ciprofloxacin    Review of Systems   Review of systems: 10 system were reviewed and were negative except what was mentioned in history of present illness    Physical Exam  Constitutional:       Appearance: She is ill-appearing.   HENT:      Head: Normocephalic.      Mouth/Throat:      Mouth: Mucous membranes are moist.   Eyes:      Pupils: Pupils are equal, round, and reactive to light.   Cardiovascular:      Rate and Rhythm: Normal rate. Rhythm irregular.      Heart sounds: Normal heart sounds, S1 normal and S2 normal.   Pulmonary:      Effort: Pulmonary effort is normal.      Breath sounds: Normal breath sounds.   Abdominal:      General: Bowel sounds are normal.      Palpations: Abdomen is soft.   Musculoskeletal:         General: Normal range of motion.      Cervical back: Neck supple.   Skin:     General: Skin is warm.   Neurological:      Mental Status: She is alert. Mental status is at baseline. She is disoriented.      Motor: Weakness present.   Psychiatric:         Mood and Affect: Mood normal.         Behavior: Behavior  normal.         Cognition and Memory: Cognition is impaired. Memory is impaired.          Last Recorded Vitals  /69 (BP Location: Right arm, Patient Position: Sitting)   Pulse 84   Temp 37.5 °C (99.5 °F) (Temporal)   Resp 17   Wt 49.9 kg (110 lb)   SpO2 95%     Relevant Results  CBC:   Results from last 7 days   Lab Units 07/30/24  1155   WBC AUTO x10*3/uL 19.9*   RBC AUTO x10*6/uL 4.24   HEMOGLOBIN g/dL 11.9*   HEMATOCRIT % 39.4   MCV fL 93   MCH pg 28.1   MCHC g/dL 30.2*   RDW % 14.6*   PLATELETS AUTO x10*3/uL 154     CMP:    Results from last 7 days   Lab Units 07/30/24  1155   SODIUM mmol/L 143   POTASSIUM mmol/L 2.7*   CHLORIDE mmol/L 104   CO2 mmol/L 30   BUN mg/dL 11   CREATININE mg/dL 0.60   GLUCOSE mg/dL 82   PROTEIN TOTAL g/dL 6.5   CALCIUM mg/dL 9.0   BILIRUBIN TOTAL mg/dL 0.8   ALK PHOS U/L 53   AST U/L 23   ALT U/L 9     BMP:    Results from last 7 days   Lab Units 07/30/24  1155   SODIUM mmol/L 143   POTASSIUM mmol/L 2.7*   CHLORIDE mmol/L 104   CO2 mmol/L 30   BUN mg/dL 11   CREATININE mg/dL 0.60   CALCIUM mg/dL 9.0   GLUCOSE mg/dL 82     Magnesium:  Results from last 7 days   Lab Units 07/30/24  1155   MAGNESIUM mg/dL 1.30*     Troponin:    Results from last 7 days   Lab Units 07/30/24  1313 07/30/24  1155   TROPHS ng/L 14* 11     BNP:   Results from last 7 days   Lab Units 07/30/24  1155   BNP pg/mL 68     Lipid Panel:    Imagining  CT abdomen pelvis w IV contrast  Narrative: STUDY:  CT Abdomen and Pelvis with IV Contrast; 07/30/2024 at 2:48 PM  INDICATION:  Abdominal pain, cramping, nausea.  COMPARISON:  CT abdomen and pelvis 04/29/24, 10/08/23, 09/15/23, 11/22/22.  ACCESSION NUMBER(S):  [id number]  ORDERING CLINICIAN:  SHANTELLE PENDLETON  TECHNIQUE:  CT of the abdomen and pelvis was performed.  Contiguous axial images  were obtained at 3 mm slice thickness through the abdomen and pelvis.   Coronal and sagittal reconstructions at 3 mm slice thickness were  performed.  Omnipaque-350 75 mL  was administered intravenously.    FINDINGS:  LOWER CHEST:  No cardiomegaly.  No pericardial effusion.  There are interstitial  changes posteriorly in the lung bases consistent with dependent  atelectasis.     ABDOMEN:     LIVER:  No hepatomegaly.  Smooth surface contour.  There is mild fatty  infiltration of liver.     BILE DUCTS:  No intrahepatic or extrahepatic biliary ductal dilatation.     GALLBLADDER:  The gallbladder is absent.  STOMACH:  Postsurgical changes to the stomach.     PANCREAS:  No masses or ductal dilatation.     SPLEEN:  No splenomegaly or focal splenic lesion.     ADRENAL GLANDS:  No thickening or nodules.     KIDNEYS AND URETERS:  Kidneys are normal in size and location.  No renal or ureteral  calculi.     PELVIS:     BLADDER:  No abnormalities identified.     REPRODUCTIVE ORGANS:  No abnormalities identified.     BOWEL:  There is diverticulosis.  The appendix is identified and is normal.     VESSELS:  No abnormalities identified.  Abdominal aorta is normal in caliber.      PERITONEUM/RETROPERITONEUM/LYMPH NODES:  No free fluid.  No pneumoperitoneum.  No lymphadenopathy.     ABDOMINAL WALL:  No abnormalities identified.  SOFT TISSUES:   No abnormalities identified.     BONES:  There is a left hip arthroplasty.  There is rotoscoliosis of the  lumbosacral spine with the curve to the right.  There is diffuse  neural foraminal narrowing.  There are old right-sided pelvic  fractures.  No acute fracture or aggressive osseous lesion.  Impression: Hepatic steatosis.  Diverticulosis without diverticulitis.  No acute process.  Signed by Zack Goins MD  ECG 12 lead  Sinus rhythm with occasional and consecutive Premature ventricular complexes  ST & T wave abnormality, consider lateral ischemia  Abnormal ECG  When compared with ECG of 08-OCT-2023 05:49,  Premature ventricular complexes are now Present  Aberrant conduction is no longer Present  ST now depressed in Anterior leads  Nonspecific T wave  abnormality, improved in Inferior leads  T wave inversion more evident in Lateral leads    See ED provider note for full interpretation and clinical correlation    Confirmed by Ting Bolden ([id number]) on 7/30/2024 3:23:20 PM  ECG 12 lead  Sinus tachycardia with occasional Premature ventricular complexes  Left axis deviation  ST & T wave abnormality, consider lateral ischemia  Abnormal ECG  When compared with ECG of 30-JUL-2024 11:48, (unconfirmed)  No significant change was found  See ED provider note for full interpretation and clinical correlation  Confirmed by Ting Bolden ([id number]) on 7/30/2024 3:22:49 PM  CT head wo IV contrast  Narrative: Interpreted By:  Simon May,   STUDY:  CT HEAD WO IV CONTRAST;  7/30/2024 2:49 pm      INDICATION:  Signs/Symptoms:weakness confusion.      COMPARISON:  10/08/2023      ACCESSION NUMBER(S):  [id number]      ORDERING CLINICIAN:  SHANTELLE PENDLETON      TECHNIQUE:  Sequential trans axial images were obtained  .      FINDINGS:  INTRACRANIAL:      There is mild prominence of the cortical sulci indicating atrophy.      There is moderate ventriculomegaly, again consistent with atrophy.  Ventriculomegaly appears slightly greater proportion to the sulcal  prominence, possibly with a degree of normal pressure hydrocephalus.      Mild decreased attenuation of the periventricular and long tracks of  the white matter most consistent with gliosis from arterial disease.  There is no evidence of definite subacute infarction, intracranial  hemorrhage or mass.          EXTRACRANIAL:  Visualized paranasal sinuses and mastoids are clear.  The calvarium is intact.      Impression: Age related degenerative change as described without acute findings  or significant change from the prior exam.      Signed by: Simon May 7/30/2024 3:10 PM  Dictation workstation:   [id number]  XR chest 1 view  Narrative: STUDY:  Chest Radiograph;  7/30/2024 at 12:18 PM.  INDICATION:  Weakness.  COMPARISON:  XR chest  9/17/2023, 10/1/2020.  ACCESSION NUMBER(S):  [id number]  ORDERING CLINICIAN:  SHANTELLE PENDLETON  TECHNIQUE:  Frontal chest was obtained at 12:18 hours.  FINDINGS:  CARDIOMEDIASTINAL SILHOUETTE:  The cardiac silhouette is normal in size.  There is stable tortuosity  of the thoracic aorta.     LUNGS:  There is stable elevation of the right diaphragm.  Significant  clearing of the right midlung linear density and bibasilar atelectasis  is noted.with possible minor residual scarring.  No dense  consolidation, pleural effusion, or pneumothorax.     ABDOMEN:  No remarkable upper abdominal findings.     BONES:  No acute osseous changes.  There is an old fracture deformity of the  right clavicle unchanged.  Impression: Normal heart size with significant clearing of the right midlung and  bibasilar atelectasis and possible minor residual scarring. No adverse  changes.  Signed by Ciarra Poole DO       Assessment/Plan      Acute on chronic UTI  Generalized weakness/dehydration   Hypokalemia  Hypomagnesia  Leukocytosis  History of compression fracture L1 April 2024  Dementia  Kidney stones  Diverticulitis  Migraine  RACHELLE  GERD  OA  Vertigo      -CT of the head negative  -CT abdomen negative  -CXR negative  -CBC BMP and electrolytes daily  -Rocephin IV  -Will check PVR to rule out urinary retention  -Hold nitrofurantoin while in hospital  -IV fluids  -Resume home meds when appropriate  -PT/OT evaluation  -TCC for discharge planning      DVTp: Lovenox subcu    PLAN: Home with     Funmilayo MARK Frederick-CNP    Plan of care was discussed extensively with patient.  Patient verbalized understanding through teach back method.  All question and concerns addressed upon examination.    Of note, this documentation is completed using the Dragon Dictation system (voice recognition software). There may be spelling and/or grammatical errors that were not corrected prior to final submission.

## 2024-07-30 NOTE — ED PROVIDER NOTES
73-year-old female presents emergency department via EMS from home with report of generalized weakness and concern for dehydration.  EMS report that the patient's  called them and sent her here to the ED.  Patient admits she is not exactly sure why she was sent here.  She denies fevers, coughing, or congestion.  She denies chest pain or difficulty breathing.  No abdominal pain, nausea, vomiting, dysuria, diarrhea, constipation, black or bloody stools.  Denies any recent falls or trauma.  Patient is alert and oriented to self and place but not time and it is unclear if this is her baseline. Chart review shows significant past medical tree of sensorineural hearing loss, constipation, forgetfulness, GERD, hypothyroidism, iron deficiency anemia, peripheral neuropathy, recurrent UTI, rheumatoid arthritis, anxiety, hyperlipidemia, osteoarthritis, GERD, and generalized weakness.  No previous history of tobacco use, illicit drug use, or regular alcohol use.  Patient is not on any anticoagulants.    Patient's  arrived shortly after her and aided in providing history.  He reports that for the past couple of weeks she has been having a lot of nausea and decreased appetite.  He reports today she woke up and stated she was not feeling well and then became very shaky and weak.  No reported loss of consciousness, biting of the tongue, or incontinence.  Patient does not have history of seizures.   states he called EMS because the patient was very weak and shaky about 45 minutes prior to arrival.      History provided by:  Patient and EMS personnel  History limited by: Patient is confused.   used: No           ------------------------------------------------------------------------------------------------------------------------------------------    VS: As documented in the triage note and EMR flowsheet from this visit were reviewed.    Review of Systems  Thorough review of systems unable to  be obtained due to the patient's confusion please see [facility]  Nursing notes reviewed and confirmed by me.  Chart review performed including medications, allergies, and medical, surgical, and family history  Visit Vitals  /75 (BP Location: Right arm, Patient Position: Sitting)   Pulse 76   Temp 37.5 °C (99.5 °F) (Temporal)   Resp 17     Physical Exam  Vitals and nursing note reviewed.   Constitutional:       General: She is not in acute distress.     Appearance: She is ill-appearing.   HENT:      Head: Normocephalic and atraumatic.      Right Ear: External ear normal.      Left Ear: External ear normal.      Nose: Nose normal. No congestion or rhinorrhea.      Mouth/Throat:      Mouth: Mucous membranes are dry.      Pharynx: No oropharyngeal exudate or posterior oropharyngeal erythema.   Eyes:      Extraocular Movements: Extraocular movements intact.      Conjunctiva/sclera: Conjunctivae normal.      Pupils: Pupils are equal, round, and reactive to light.   Cardiovascular:      Rate and Rhythm: Regular rhythm. Tachycardia present.      Pulses: Normal pulses.      Heart sounds: Normal heart sounds.   Pulmonary:      Effort: Pulmonary effort is normal. No respiratory distress.      Breath sounds: No stridor. No wheezing, rhonchi or rales.      Comments: Coarse breath sounds bilaterally  Abdominal:      General: There is no distension.      Palpations: Abdomen is soft.      Tenderness: There is no abdominal tenderness. There is no guarding or rebound.   Musculoskeletal:         General: No swelling or deformity. Normal range of motion.      Cervical back: Normal range of motion and neck supple. No rigidity.      Right lower leg: No edema.      Left lower leg: No edema.      Comments: No calf tenderness    Skin:     General: Skin is warm and dry.      Capillary Refill: Capillary refill takes less than 2 seconds.      Coloration: Skin is not jaundiced.      Findings: No rash.   Neurological:      General: No  focal deficit present.      Mental Status: She is alert.      Sensory: No sensory deficit.      Motor: No weakness.      Comments: Patient is alert and oriented to self and place but not time.Cranial nerves II through XII grossly intact.   Psychiatric:         Mood and Affect: Mood normal.         Behavior: Behavior normal.        Past Medical History:   Diagnosis Date    Arthritis     Blindness     Calculus of kidney 10/01/2020    Bilateral nephrolithiasis    Chronic headaches     Diverticulitis of large intestine without perforation or abscess without bleeding 10/01/2020    Sigmoid diverticulitis    Hypothyroidism     Migraine     Other specified disorders of eye and adnexa     Itchy, watery, and red eye    Personal history of (healed) traumatic fracture 10/18/2020    Status post fracture of left hip    Personal history of diseases of the blood and blood-forming organs and certain disorders involving the immune mechanism 10/27/2020    History of bleeding disorder    Personal history of other diseases of the musculoskeletal system and connective tissue 10/27/2020    History of arthritis    Personal history of other specified conditions 10/18/2020    History of cachexia    Personal history of other specified conditions     History of abdominal pain    Personal history of other specified conditions 10/27/2020    History of balance disorder    Short-term memory loss     Unspecified visual loss 10/27/2020    Vision problems    Uses walker     Wears glasses       Past Surgical History:   Procedure Laterality Date    CHOLECYSTECTOMY      COLONOSCOPY      CORNEAL TRANSPLANT Left     CT ABDOMEN PELVIS ANGIOGRAM W AND/OR WO IV CONTRAST  05/28/2022    CT ABDOMEN PELVIS ANGIOGRAM W AND/OR WO IV CONTRAST 5/28/2022 [facility]    ESOPHAGOGASTRODUODENOSCOPY      HIP SURGERY Left 09/25/2020    hardware- fracture    HYSTERECTOMY      PARTIAL GASTRECTOMY      PARTIAL KNEE ARTHROPLASTY Left     Partial Replacement    TOTAL  HIP ARTHROPLASTY Left     Total Hip Replacement      Social History     Socioeconomic History    Marital status:    Tobacco Use    Smoking status: Never     Passive exposure: Past    Smokeless tobacco: Never   Vaping Use    Vaping status: Never Used   Substance and Sexual Activity    Alcohol use: Not Currently    Drug use: Never    Sexual activity: Defer     Social Determinants of Health     Transportation Needs: No Transportation Needs (11/15/2023)    OASIS : Transportation     Lack of Transportation (Medical): No     Lack of Transportation (Non-Medical): No     Patient Unable or Declines to Respond: No   Social Connections: Feeling Socially Integrated (11/15/2023)    OASIS : Social Isolation     Frequency of experiencing loneliness or isolation: Rarely   Recent Concern: Social Connections - Feeling Somewhat Isolated (10/16/2023)    OASIS : Social Isolation     Frequency of experiencing loneliness or isolation: Sometimes      ------------------------------------------------------------------------------------------------------------------------------------------  CT head wo IV contrast   Final Result   Age related degenerative change as described without acute findings   or significant change from the prior exam.        Signed by: Simon May 7/30/2024 3:10 PM   Dictation workstation:   [id number]      CT abdomen pelvis w IV contrast   Final Result   Hepatic steatosis.   Diverticulosis without diverticulitis.   No acute process.   Signed by Zack Goins MD      XR chest 1 view   Final Result   Normal heart size with significant clearing of the right midlung and   bibasilar atelectasis and possible minor residual scarring. No adverse   changes.   Signed by Ciarra Poole DO         Labs Reviewed   CBC WITH AUTO DIFFERENTIAL - Abnormal       Result Value    WBC 19.9 (*)     nRBC 0.0      RBC 4.24      Hemoglobin 11.9 (*)     Hematocrit 39.4      MCV 93      MCH 28.1      MCHC 30.2 (*)     RDW  14.6 (*)     Platelets 154      Neutrophils % 93.0      Immature Granulocytes %, Automated 0.7      Lymphocytes % 3.8      Monocytes % 2.1      Eosinophils % 0.1      Basophils % 0.3      Neutrophils Absolute 18.49 (*)     Immature Granulocytes Absolute, Automated 0.13      Lymphocytes Absolute 0.76 (*)     Monocytes Absolute 0.42      Eosinophils Absolute 0.02      Basophils Absolute 0.06     COMPREHENSIVE METABOLIC PANEL - Abnormal    Glucose 82      Sodium 143      Potassium 2.7 (*)     Chloride 104      Bicarbonate 30      Anion Gap 12      Urea Nitrogen 11      Creatinine 0.60      eGFR >90      Calcium 9.0      Albumin 3.8      Alkaline Phosphatase 53      Total Protein 6.5      AST 23      Bilirubin, Total 0.8      ALT 9     MAGNESIUM - Abnormal    Magnesium 1.30 (*)    LACTATE - Abnormal    Lactate 2.4 (*)     Narrative:     Venipuncture immediately after or during the administration of Metamizole may lead to falsely low results. Testing should be performed immediately  prior to Metamizole dosing.   URINALYSIS WITH REFLEX CULTURE AND MICROSCOPIC - Abnormal    Color, Urine Light-Yellow      Appearance, Urine Turbid (*)     Specific Gravity, Urine 1.011      pH, Urine 6.5      Protein, Urine NEGATIVE      Glucose, Urine Normal      Blood, Urine NEGATIVE      Ketones, Urine NEGATIVE      Bilirubin, Urine NEGATIVE      Urobilinogen, Urine Normal      Nitrite, Urine 1+ (*)     Leukocyte Esterase, Urine 25 Rodolfo/µL (*)    SERIAL TROPONIN, 1 HOUR - Abnormal    Troponin I, High Sensitivity 14 (*)     Narrative:     Less than 99th percentile of normal range cutoff-  Female and children under 18 years old <14 ng/L; Male <21 ng/L: Negative  Repeat testing should be performed if clinically indicated.     Female and children under 18 years old 14-50 ng/L; Male 21-50 ng/L:  Consistent with possible cardiac damage and possible increased clinical   risk. Serial measurements may help to assess extent of myocardial damage.      >50 ng/L: Consistent with cardiac damage, increased clinical risk and  myocardial infarction. Serial measurements may help assess extent of   myocardial damage.      NOTE: Children less than 1 year old may have higher baseline troponin   levels and results should be interpreted in conjunction with the overall   clinical context.     NOTE: Troponin I testing is performed using a different   testing methodology at [facility] than at other   [facility]. Direct result comparisons should only   be made within the same method.   MICROSCOPIC ONLY, URINE - Abnormal    WBC, Urine 6-10 (*)     WBC Clumps, Urine RARE      RBC, Urine 1-2      Squamous Epithelial Cells, Urine 1-9 (SPARSE)     BLOOD CULTURE - Normal    Blood Culture Loaded on Instrument - Culture in progress     BLOOD CULTURE - Normal    Blood Culture Loaded on Instrument - Culture in progress     LIPASE - Normal    Lipase 17      Narrative:     Venipuncture immediately after or during the administration of Metamizole may lead to falsely low results. Testing should be performed immediately prior to Metamizole dosing.   PROTIME-INR - Normal    Protime 11.6      INR 1.0     B-TYPE NATRIURETIC PEPTIDE - Normal    BNP 68      Narrative:        <100 pg/mL - Heart failure unlikely  100-299 pg/mL - Intermediate probability of acute heart                  failure exacerbation. Correlate with clinical                  context and patient history.    >=300 pg/mL - Heart Failure likely. Correlate with clinical                  context and patient history.    BNP testing is performed using different testing methodology at [facility] than at other [facility]. Direct result comparisons should only be made within the same method.      SARS-COV-2 PCR - Normal    Coronavirus 2019, PCR Not Detected      Narrative:     This assay has received FDA Emergency Use Authorization (EUA) and is only authorized for the duration of time that  circumstances exist to justify the authorization of the emergency use of in vitro diagnostic tests for the detection of SARS-CoV-2 virus and/or diagnosis of COVID-19 infection under section 564(b)(1) of the Act, 21 U.S.C. 360bbb-3(b)(1). This assay is an in vitro diagnostic nucleic acid amplification test for the qualitative detection of SARS-CoV-2 from nasopharyngeal specimens and has been validated for use at [facility]. Negative results do not preclude COVID-19 infections and should not be used as the sole basis for diagnosis, treatment, or other management decisions.     SERIAL TROPONIN-INITIAL - Normal    Troponin I, High Sensitivity 11      Narrative:     Less than 99th percentile of normal range cutoff-  Female and children under 18 years old <14 ng/L; Male <21 ng/L: Negative  Repeat testing should be performed if clinically indicated.     Female and children under 18 years old 14-50 ng/L; Male 21-50 ng/L:  Consistent with possible cardiac damage and possible increased clinical   risk. Serial measurements may help to assess extent of myocardial damage.     >50 ng/L: Consistent with cardiac damage, increased clinical risk and  myocardial infarction. Serial measurements may help assess extent of   myocardial damage.      NOTE: Children less than 1 year old may have higher baseline troponin   levels and results should be interpreted in conjunction with the overall   clinical context.     NOTE: Troponin I testing is performed using a different   testing methodology at [facility] than at other   [facility]. Direct result comparisons should only   be made within the same method.   URINE CULTURE   TROPONIN SERIES- (INITIAL, 1 HR)    Narrative:     The following orders were created for panel order Troponin I Series, High Sensitivity (0, 1 HR).  Procedure                               Abnormality         Status                     ---------                                -----------         ------                     Troponin I, High Sensiti...[[id number]]  Normal              Final result               Troponin, High Sensitivi...[[id number]]  Abnormal            Final result                 Please view results for these tests on the individual orders.   URINALYSIS WITH REFLEX CULTURE AND MICROSCOPIC    Narrative:     The following orders were created for panel order Urinalysis with Reflex Culture and Microscopic.  Procedure                               Abnormality         Status                     ---------                               -----------         ------                     Urinalysis with Reflex C...[[id number]]  Abnormal            Final result               Extra Urine Gray Tube[[id number]]                            In process                   Please view results for these tests on the individual orders.   EXTRA URINE GRAY TUBE   LACTATE        Medical Decision Making  EKG interpreted by ED physician: Sinus rhythm with PACs and occasional PVC rate of 96.  HI, QRS, QTc intervals all within normal limits.  No significant ST elevations or depressions.  Good R wave progression.  Left axis deviation.  Nonspecific ST-T wave changes present in lateral leads.  Lateral ST-T wave changes are new when compared to October 2023.    EKG interpreted by ED physician: Sinus tachycardia with PVC and PACs rate of 129.  HI, QRS, QTc intervals all within normal limits.  No significant ST elevations or depressions.  No significant Q waves.  Poor R wave progression.  Left axis deviation.    73-year-old female presents emergency department with reports of generalized weakness, concern for dehydration, and episode of shaking.  Patient's  also states that she has had nausea and decreased appetite.  History is somewhat limited as patient has some confusion after discussion with the  this is baseline for her.  Given her presenting complaints a thorough workup was obtained.  Cardiac  enzymes x 2 are not significantly increasing or elevated.  EKG shows no acute ACS.  Lipase within normal range I do not suspect pancreatitis.  BNP within normal range I do not suspect CHF.  CMP is significant for hypokalemia which has been present in the past patient also appreciated to have hypomagnesemia.  UA is concerning for urinary tract infection will be sent for culture.  Patient also treated with Rocephin.  CBC shows significant leukocytosis and therefore septic workup was obtained including urine and blood cultures.  Lactate is mildly elevated patient given IV fluids.  COVID testing is negative.  CT head shows no acute intracranial process such as hemorrhage or mass effect.  Chest x-ray shows no acute cardiopulmonary process such as pneumonia, pleural effusion, or pulmonary edema.  CT abdomen pelvis given the patient's reported symptoms of nausea and appetite no acute intra-abdominal process such as bowel obstruction, bowel perforation, or intra-abdominal infection.  Given the patient's symptoms, UTI, hypomagnesemia, and hypokalemia I recommend admission for further evaluation and treatment.  Patient and family agreeable with this plan.  Case discussed with hospitalist on-call.       Diagnoses as of 07/30/24 1817   Generalized weakness   Urinary tract infection without hematuria, site unspecified   Hypomagnesemia   Hypokalemia   Confusion      1. Generalized weakness        2. Urinary tract infection without hematuria, site unspecified        3. Hypomagnesemia        4. Hypokalemia        5. Confusion           Procedures     This note was dictated using dragon software and may contain errors related to dictation interpretation errors.      Julien Dee, DO  07/30/24 1817

## 2024-07-31 LAB
ANION GAP SERPL CALC-SCNC: 8 MMOL/L (ref 10–20)
BUN SERPL-MCNC: 9 MG/DL (ref 6–23)
CALCIUM SERPL-MCNC: 8.2 MG/DL (ref 8.6–10.3)
CARDIAC TROPONIN I PNL SERPL HS: 12 NG/L (ref 0–13)
CHLORIDE SERPL-SCNC: 107 MMOL/L (ref 98–107)
CO2 SERPL-SCNC: 28 MMOL/L (ref 21–32)
CREAT SERPL-MCNC: 0.51 MG/DL (ref 0.5–1.05)
EGFRCR SERPLBLD CKD-EPI 2021: >90 ML/MIN/1.73M*2
ERYTHROCYTE [DISTWIDTH] IN BLOOD BY AUTOMATED COUNT: 14.8 % (ref 11.5–14.5)
GLUCOSE SERPL-MCNC: 83 MG/DL (ref 74–99)
HCT VFR BLD AUTO: 36.8 % (ref 36–46)
HGB BLD-MCNC: 11.3 G/DL (ref 12–16)
HOLD SPECIMEN: NORMAL
LACTATE SERPL-SCNC: 2.2 MMOL/L (ref 0.4–2)
LACTATE SERPL-SCNC: 2.3 MMOL/L (ref 0.4–2)
LACTATE SERPL-SCNC: 2.8 MMOL/L (ref 0.4–2)
MCH RBC QN AUTO: 28.5 PG (ref 26–34)
MCHC RBC AUTO-ENTMCNC: 30.7 G/DL (ref 32–36)
MCV RBC AUTO: 93 FL (ref 80–100)
NRBC BLD-RTO: 0 /100 WBCS (ref 0–0)
PLATELET # BLD AUTO: 149 X10*3/UL (ref 150–450)
POTASSIUM SERPL-SCNC: 3.4 MMOL/L (ref 3.5–5.3)
RBC # BLD AUTO: 3.97 X10*6/UL (ref 4–5.2)
SODIUM SERPL-SCNC: 140 MMOL/L (ref 136–145)
WBC # BLD AUTO: 14.1 X10*3/UL (ref 4.4–11.3)

## 2024-07-31 PROCEDURE — 2500000001 HC RX 250 WO HCPCS SELF ADMINISTERED DRUGS (ALT 637 FOR MEDICARE OP): Performed by: STUDENT IN AN ORGANIZED HEALTH CARE EDUCATION/TRAINING PROGRAM

## 2024-07-31 PROCEDURE — 97161 PT EVAL LOW COMPLEX 20 MIN: CPT | Mod: GP | Performed by: PHYSICAL THERAPIST

## 2024-07-31 PROCEDURE — 84484 ASSAY OF TROPONIN QUANT: CPT

## 2024-07-31 PROCEDURE — 83605 ASSAY OF LACTIC ACID: CPT | Performed by: EMERGENCY MEDICINE

## 2024-07-31 PROCEDURE — 2500000002 HC RX 250 W HCPCS SELF ADMINISTERED DRUGS (ALT 637 FOR MEDICARE OP, ALT 636 FOR OP/ED)

## 2024-07-31 PROCEDURE — 2500000004 HC RX 250 GENERAL PHARMACY W/ HCPCS (ALT 636 FOR OP/ED)

## 2024-07-31 PROCEDURE — 99232 SBSQ HOSP IP/OBS MODERATE 35: CPT

## 2024-07-31 PROCEDURE — 36415 COLL VENOUS BLD VENIPUNCTURE: CPT

## 2024-07-31 PROCEDURE — 2500000004 HC RX 250 GENERAL PHARMACY W/ HCPCS (ALT 636 FOR OP/ED): Performed by: STUDENT IN AN ORGANIZED HEALTH CARE EDUCATION/TRAINING PROGRAM

## 2024-07-31 PROCEDURE — 82565 ASSAY OF CREATININE: CPT

## 2024-07-31 PROCEDURE — 85027 COMPLETE CBC AUTOMATED: CPT

## 2024-07-31 PROCEDURE — 83605 ASSAY OF LACTIC ACID: CPT

## 2024-07-31 PROCEDURE — 2500000005 HC RX 250 GENERAL PHARMACY W/O HCPCS: Performed by: STUDENT IN AN ORGANIZED HEALTH CARE EDUCATION/TRAINING PROGRAM

## 2024-07-31 PROCEDURE — 97165 OT EVAL LOW COMPLEX 30 MIN: CPT | Mod: GO

## 2024-07-31 PROCEDURE — 1200000002 HC GENERAL ROOM WITH TELEMETRY DAILY

## 2024-07-31 RX ORDER — PREDNISONE 5 MG/1
5 TABLET ORAL DAILY
Status: DISCONTINUED | OUTPATIENT
Start: 2024-07-31 | End: 2024-08-02 | Stop reason: HOSPADM

## 2024-07-31 RX ORDER — POTASSIUM CHLORIDE 20 MEQ/1
40 TABLET, EXTENDED RELEASE ORAL ONCE
Status: COMPLETED | OUTPATIENT
Start: 2024-07-31 | End: 2024-07-31

## 2024-07-31 ASSESSMENT — COGNITIVE AND FUNCTIONAL STATUS - GENERAL
MOBILITY SCORE: 16
EATING MEALS: A LITTLE
DRESSING REGULAR UPPER BODY CLOTHING: A LITTLE
PERSONAL GROOMING: A LITTLE
TOILETING: A LOT
WALKING IN HOSPITAL ROOM: A LITTLE
STANDING UP FROM CHAIR USING ARMS: A LITTLE
CLIMB 3 TO 5 STEPS WITH RAILING: TOTAL
DRESSING REGULAR LOWER BODY CLOTHING: A LOT
MOVING TO AND FROM BED TO CHAIR: A LITTLE
TURNING FROM BACK TO SIDE WHILE IN FLAT BAD: A LITTLE
MOBILITY SCORE: 16
DRESSING REGULAR LOWER BODY CLOTHING: A LOT
PERSONAL GROOMING: A LITTLE
HELP NEEDED FOR BATHING: A LITTLE
STANDING UP FROM CHAIR USING ARMS: A LITTLE
TURNING FROM BACK TO SIDE WHILE IN FLAT BAD: A LITTLE
HELP NEEDED FOR BATHING: A LITTLE
EATING MEALS: A LITTLE
CLIMB 3 TO 5 STEPS WITH RAILING: TOTAL
DAILY ACTIVITIY SCORE: 16
DRESSING REGULAR UPPER BODY CLOTHING: A LITTLE
MOVING TO AND FROM BED TO CHAIR: A LITTLE
MOVING FROM LYING ON BACK TO SITTING ON SIDE OF FLAT BED WITH BEDRAILS: A LITTLE
TOILETING: A LOT
MOVING FROM LYING ON BACK TO SITTING ON SIDE OF FLAT BED WITH BEDRAILS: A LITTLE
DAILY ACTIVITIY SCORE: 16
WALKING IN HOSPITAL ROOM: A LITTLE

## 2024-07-31 ASSESSMENT — ACTIVITIES OF DAILY LIVING (ADL): BATHING_ASSISTANCE: MINIMAL

## 2024-07-31 ASSESSMENT — PAIN SCALES - GENERAL
PAINLEVEL_OUTOF10: 7
PAINLEVEL_OUTOF10: 7
PAINLEVEL_OUTOF10: 0 - NO PAIN

## 2024-07-31 ASSESSMENT — PAIN - FUNCTIONAL ASSESSMENT
PAIN_FUNCTIONAL_ASSESSMENT: 0-10
PAIN_FUNCTIONAL_ASSESSMENT: 0-10

## 2024-07-31 NOTE — PROGRESS NOTES
Occupational Therapy    Evaluation    Patient Name: Ailsa Fregoso  MRN: [id number]  Today's Date: 7/31/2024  Time Calculation  Start Time: 0936  Stop Time: 0955  Time Calculation (min): 19 min        Assessment:  Evaluation/Treatment Tolerance: Patient limited by fatigue  End of Session Communication: Bedside nurse  End of Session Patient Position: Bed, 3 rail up, Alarm on (Call light within reach)  OT Assessment Results: Decreased ADL status, Decreased safe judgment during ADL, Decreased endurance, Decreased functional mobility  Evaluation/Treatment Tolerance: Patient limited by fatigue  Plan:  Treatment Interventions: ADL retraining, Functional transfer training, Endurance training  OT Frequency: 2 times per week  OT Discharge Recommendations: Low intensity level of continued care, Moderate intensity level of continued care, 24 hr supervision due to cognition  OT - OK to Discharge: Yes (when medically stable/cleared)      Subjective   Current Problem:  1. Generalized weakness        2. Urinary tract infection without hematuria, site unspecified        3. Hypomagnesemia        4. Hypokalemia        5. Confusion          General:  General  Reason for Referral: ADL impairment  Referred By: Meaghan OJEDA 7/30 OT/PT  Past Medical History Relevant to Rehab: Arthritis, depression, anxiety, dementia, GERD, hypothryoidism, headaches, UTI, divertiulosis, legally blind, L hip fx  Family/Caregiver Present: Yes  Caregiver Feedback:  present; supportive  Co-Treatment: PT  Co-Treatment Reason: to maximize pt. safety with ADLs and mobility  Prior to Session Communication: Bedside nurse  Patient Position Received: Bed, 3 rail up, Alarm on  General Comment: Pt. is 74 y/o female to ED with generalized weakness, nausea, vomiting, diarrhea. K 2.7 on admission, 3.4 7/31. CXR: bibasilar atelectasis, CT abd: hepatic steatosis, diverticulosis, CT head: degenerative changes.  Precautions:  Hearing/Visual Limitations: [facility]  Medical  Precautions: Fall precautions  Precautions Comment: Pt. is legally blind; wears sunglasses due to light sensitivity    Pain:  Pain Assessment  Pain Assessment: 0-10  0-10 (Numeric) Pain Score: 7  Pain Type: Chronic pain  Pain Location:  (L flank)    Objective   Cognition:  Overall Cognitive Status: Within Functional Limits (confused)  Orientation Level: Disoriented to time  Safety/Judgement: Exceptions to WFL  Novel Situations: Minimal (difficulty navigating new environments due to blindness)  Insight: Mild    Home Living:  Home Living Comments: Pt. lives with spouse in two story house, ramp to enter. Pt. sleeps on main floor in lazy boy recliner. Has tub shower, seat, and grab bars.  Prior Function:  Prior Function Comments: Independent with dressing, sponge bathes, independent toileting to BS.  does IADLs. Laundry on main floor. Pt. has WW that she uses for pivot transfers from recliner to BSC. Has W/C for longer distances that  pushes her in. No falls. Does not drive.    ADL:  Eating Assistance:  (set up)  Grooming Assistance: Minimal  Bathing Assistance: Minimal  UE Dressing Assistance: Stand by  LE Dressing Assistance: Moderate  Toileting Assistance with Device: Moderate  Activity Tolerance:  Endurance: Decreased tolerance for upright activites  Bed Mobility/Transfers: Bed Mobility  Bed Mobility: Yes  Bed Mobility 1  Bed Mobility 1: Supine to sitting  Level of Assistance 1: Contact guard  Bed Mobility Comments 1: CGA for safety/steadying. head of bed elevated  Bed Mobility 2  Bed Mobility  2: Sitting to supine  Level of Assistance 2: Minimum assistance  Bed Mobility Comments 2: Assist to bring LEs back into bed.    Transfers  Transfer: Yes  Transfer 1  Technique 1: Sit to stand, Stand to sit  Transfer Device 1: Walker  Transfer Level of Assistance 1: Minimum assistance  Trials/Comments 1: Assist to lift, steady, balance with WW from edge of bed.    Functional Mobility:  Functional  Mobility  Functional Mobility Performed: Yes (WW use; Min A for steadying, safety, balance and navigating walker.)    Standing Balance:  Dynamic Standing Balance  Dynamic Standing-Comments: Fair -     Vision:Vision - Basic Assessment  Current Vision:  (sunglasses due to glare. L eye blindness)    Strength:  Strength Comments: Mary 3+/5 MMT    Hand Function:  Gross Grasp: Functional  Extremities: RUE   RUE : Within Functional Limits and LUE   LUE: Within Functional Limits    Outcome Measures:[facility] Daily Activity  Putting on and taking off regular lower body clothing: A lot  Bathing (including washing, rinsing, drying): A little  Putting on and taking off regular upper body clothing: A little  Toileting, which includes using toilet, bedpan or urinal: A lot  Taking care of personal grooming such as brushing teeth: A little  Eating Meals: A little  Daily Activity - Total Score: 16    Education Documentation  ADL Training, taught by Joan Torres OT at 7/31/2024 12:26 PM.  Learner: Patient  Readiness: Acceptance  Method: Explanation  Response: Needs Reinforcement    IP EDUCATION:  Education  Individual(s) Educated: Patient, Spouse  Education Provided: Fall precautons, Risk and benefits of OT discussed with patient or other, POC discussed and agreed upon    Goals:  Encounter Problems       Encounter Problems (Active)       OT Goals       SBA for all functional transfers  (Progressing)       Start:  07/31/24    Expected End:  08/14/24            SBA LB dressing (Progressing)       Start:  07/31/24    Expected End:  08/14/24            Fair + dyn standing balance during functional ADLs/activities and transfers  (Progressing)       Start:  07/31/24    Expected End:  08/14/24

## 2024-07-31 NOTE — PROGRESS NOTES
Physical Therapy    Physical Therapy Evaluation    Patient Name: Alisa Fregoso  MRN: [id number]  Today's Date: 7/31/2024   Time Calculation  Start Time: 0937  Stop Time: 0955  Time Calculation (min): 18 min  1012/1012-A    Assessment/Plan   PT Assessment  PT Assessment Results: Decreased strength, Decreased endurance, Impaired balance, Decreased mobility, Impaired vision, Impaired hearing, Pain  Rehab Prognosis: Good  Evaluation/Treatment Tolerance: Patient limited by fatigue, Patient limited by pain  Medical Staff Made Aware: Yes  Strengths: Support of Caregivers, Housing layout  Barriers to Participation: Comorbidities  End of Session Communication: Bedside nurse  Assessment Comment: Pt. is weak and deconditioned. Recomend continued therapy at a moderate intensity level vs home with 24/7 assist  End of Session Patient Position: Bed, 3 rail up, Alarm on (Call light within reach)  IP OR SWING BED PT PLAN  Inpatient or Swing Bed: Inpatient  PT Plan  Treatment/Interventions: Bed mobility, Transfer training, Gait training, Balance training, Strengthening, Endurance training, Therapeutic exercise  PT Plan: Ongoing PT  PT Frequency: 3 times per week  PT Discharge Recommendations: High intensity level of continued care, Low intensity level of continued care  PT Recommended Transfer Status: Assist x1, Assistive device  Physical Therapy eval completed per MD requisition. P.T. recommendations as outlined above. Recommend D/C from acute care when medically appropriate as deemed by medical staff.    Subjective           General Visit Information:  General  Reason for Referral: impaired mobility  Referred By: AYAN Harding CNP (7/30 OT/PT)  Past Medical History Relevant to Rehab: includes: Arthritis, depression, anxiety, dementia, GERD, hypothryoidism, headaches, UTI, divertiulosis, legally blind, L hip fx  Family/Caregiver Present: Yes  Caregiver Feedback:  present; supportive  Co-Treatment: OT  Co-Treatment Reason: Pt.  seen with OT to maximize safety and function  Prior to Session Communication: Bedside nurse  Patient Position Received: Bed, 3 rail up, Alarm on  Preferred Learning Style: auditory, verbal  General Comment: Pt. is a 72yo who presented to [facility] ED on 7/30/2024 with generalized weakness, nausea, vomiting, diarrhea. K 2.7 on admission, 3.4 on 7/31.   CXR (7/30) (+) bibasilar atelectasis,   ABD CT (7/30) (+) hepatic steatosis, diverticulosis  Head CT (7/30) (-) acute findings (+)  degenerative changes.  Dx: weakness, UTI, hypomagnesia, hypokalemia    Home Living:  Home Living  Home Living Comments: Pt. lives with spouse in 2 level house with ramp to enter. Pt. sleeps on main floor in lazy boy recliner. Has tub shower, seat, and grab bars. Pt. has a BSC.    Prior Level of Function:  Prior Function Per Pt/Caregiver Report  Prior Function Comments: Pt. stated she was Independent with dressing, sponge bathes, independent toileting to BSC.  does IADLs. Laundry on main floor. Pt. has WW that she uses for pivot transfers from recliner to BSC. Has W/C for longer distances that  pushes her in. No falls. Does not drive.    Precautions:  Precautions  Hearing/Visual Limitations: [place], legally blind L eye. Wears darkened glasses due to being very sensitive to light  Medical Precautions:  (Activity orders: No restrictions)  Precautions Comment: Per EMR: High fall risk         Objective     Pain:  Pain Assessment  Pain Assessment: 0-10  0-10 (Numeric) Pain Score: 7  Pain Type: Chronic pain  Pain Location:  (L flank)    Cognition:  Cognition  Overall Cognitive Status: Within Functional Limits  Orientation Level: Disoriented to time  Safety/Judgement: Exceptions to WFL  Novel Situations: Minimal (difficulty navigating new environments due to blindness)  Insight: Mild    General Assessments:  General Observation  General Observation: Tele, IV   Activity Tolerance  Endurance: Decreased tolerance for upright activites                  Dynamic Sitting Balance  Dynamic Sitting-Comments: Good static and dynamic sitting balance  Dynamic Standing Balance  Dynamic Standing-Comments: Fair static and dynamic standing balance    Functional Assessments:     Bed Mobility  Bed Mobility: Yes  Bed Mobility 1  Bed Mobility 1: Supine to sitting  Level of Assistance 1: Contact guard  Bed Mobility Comments 1: CGA for safety  Bed Mobility 2  Bed Mobility  2: Sitting to supine  Level of Assistance 2: Minimum assistance  Bed Mobility Comments 2: A to lift LEs onto bed and to control descent/placement of trunk  Transfers  Transfer: Yes  Transfer 1  Technique 1: Sit to stand  Transfer Device 1:  (FWW)  Transfer Level of Assistance 1: Minimum assistance  Trials/Comments 1: A for lifting, transferring weight over feet, steadying. VC's for hand placement  Transfers 2  Technique 2: Stand to sit  Transfer Device 2:  (FWW)  Transfer Level of Assistance 2: Minimum assistance  Trials/Comments 2: A to control descent. VC's padmini directions due to visual impairment  Ambulation/Gait Training  Ambulation/Gait Training Performed: Yes  Ambulation/Gait Training 1  Surface 1: Level tile  Device 1: Rolling walker  Assistance 1: Minimum assistance  Quality of Gait 1: Narrow base of support  Comments/Distance (ft) 1: 15'; A for balance, safety, guiding/maneuvering FWW. VC's for directions due to impaired vision  Stairs  Stairs: No       Extremity/Trunk Assessments:        RLE   RLE :  (AROM WFL, strength 4-/5)  LLE   LLE :  (AROM WFL, strength 4-/5)    Outcome Measures:     [facility] Basic Mobility  Turning from your back to your side while in a flat bed without using bedrails: A little  Moving from lying on your back to sitting on the side of a flat bed without using bedrails: A little  Moving to and from bed to chair (including a wheelchair): A little  Standing up from a chair using your arms (e.g. wheelchair or bedside chair): A little  To walk in hospital room: A little  Climbing 3-5  steps with railing: Total  Basic Mobility - Total Score: 16                                        Goals:  Encounter Problems       Encounter Problems (Active)       PT Problem       Pt. will transfer supine/sit with SBA (Progressing)       Start:  07/31/24    Expected End:  08/14/24            Pt. will transfer sit/stand with FWW with CGA (Progressing)       Start:  07/31/24    Expected End:  08/14/24            Pt.will ambulate 40' with FWW with CGA (Progressing)       Start:  07/31/24    Expected End:  08/14/24            Pt. will perform 2 x 15 B LE AROM exercises  (Not Progressing)       Start:  07/31/24    Expected End:  08/14/24                   Education Documentation  Mobility Training, taught by Berhane Mcnamara, PT at 7/31/2024  1:29 PM.  Learner: Significant Other, Patient  Readiness: Acceptance  Method: Explanation  Response: Verbalizes Understanding, Needs Reinforcement  Comment: Role of PT, transfers, amb, safety, PT POC

## 2024-07-31 NOTE — CARE PLAN
The patient's goals for the shift include      The clinical goals for the shift include pt to remain HDS throughout shift

## 2024-07-31 NOTE — PROGRESS NOTES
"Daily Progress Note    Alisa Fregoso is a 73 y.o. female on day 1 of admission presenting with Generalized weakness.    Subjective   Patient seen resting quietly in bed with no complaints.  Patient is legally blind  at bedside.  Patient receiving IV bolus for elevated lactate will repeat.  Continue to replace electrolytes and IV antibiotics for UTI.  Anticipate discharge in 24 hours.       Objective     Physical Exam    Physical Exam  Constitutional:       Appearance: She is well-groomed, underweight.   HENT:      Head: Normocephalic.      Mouth/Throat:      Mouth: Mucous membranes are moist.   Eyes:      Pupils: Pupils are equal, round, and reactive to light.   Cardiovascular:      Rate and Rhythm: Normal rate. Rhythm irregular.      Heart sounds: Normal heart sounds, S1 normal and S2 normal.   Pulmonary:      Effort: Pulmonary effort is normal.      Breath sounds: Normal breath sounds.   Abdominal:      General: Bowel sounds are normal.      Palpations: Abdomen is soft.   Musculoskeletal:         General: Normal range of motion.      Cervical back: Neck supple.   Skin:     General: Skin is warm.   Neurological:      Mental Status: She is alert. Mental status is at baseline.      Motor: Weakness present.   Psychiatric:         Mood and Affect: Mood normal.         Behavior: Behavior normal.         Cognition and Memory: Cognition is impaired. Memory is impaired.     Last Recorded Vitals  Blood pressure 88/52, pulse 72, temperature 36.9 °C (98.4 °F), temperature source Temporal, resp. rate 17, height 1.6 m (5' 3\"), weight 45.9 kg (101 lb 3.1 oz), SpO2 95%.  Intake/Output last 3 Shifts:  I/O last 3 completed shifts:  In: 1016.7 (22.1 mL/kg) [I.V.:866.7 (18.9 mL/kg); IV Piggyback:150]  Out: - (0 mL/kg)   Weight: 45.9 kg     Medications  Scheduled medications  cefTRIAXone, 1 g, intravenous, q24h  docusate sodium, 100 mg, oral, BID  enoxaparin, 40 mg, subcutaneous, q24h  gabapentin, 600 mg, oral, " Nightly  levothyroxine, 50 mcg, oral, Daily  pantoprazole, 40 mg, oral, Daily  polyethylene glycol, 17 g, oral, Daily  predniSONE, 5 mg, oral, Daily  sodium chloride, 500 mL, intravenous, Once      Continuous medications     PRN medications  PRN medications: acetaminophen **OR** acetaminophen **OR** acetaminophen, busPIRone, cyclobenzaprine, dicyclomine, melatonin, ondansetron **OR** ondansetron    Labs  CBC:   Results from last 7 days   Lab Units 07/31/24 0419 07/30/24  1155   WBC AUTO x10*3/uL 14.1* 19.9*   RBC AUTO x10*6/uL 3.97* 4.24   HEMOGLOBIN g/dL 11.3* 11.9*   HEMATOCRIT % 36.8 39.4   MCV fL 93 93   MCH pg 28.5 28.1   MCHC g/dL 30.7* 30.2*   RDW % 14.8* 14.6*   PLATELETS AUTO x10*3/uL 149* 154     CMP:    Results from last 7 days   Lab Units 07/31/24 0419 07/30/24 2036 07/30/24  1155   SODIUM mmol/L 140 141 143   POTASSIUM mmol/L 3.4* 2.9* 2.7*   CHLORIDE mmol/L 107 102 104   CO2 mmol/L 28 30 30   BUN mg/dL 9 9 11   CREATININE mg/dL 0.51 0.54 0.60   GLUCOSE mg/dL 83 100* 82   PROTEIN TOTAL g/dL  --   --  6.5   CALCIUM mg/dL 8.2* 8.7 9.0   BILIRUBIN TOTAL mg/dL  --   --  0.8   ALK PHOS U/L  --   --  53   AST U/L  --   --  23   ALT U/L  --   --  9     BMP:    Results from last 7 days   Lab Units 07/31/24 0419 07/30/24 2036 07/30/24  1155   SODIUM mmol/L 140 141 143   POTASSIUM mmol/L 3.4* 2.9* 2.7*   CHLORIDE mmol/L 107 102 104   CO2 mmol/L 28 30 30   BUN mg/dL 9 9 11   CREATININE mg/dL 0.51 0.54 0.60   CALCIUM mg/dL 8.2* 8.7 9.0   GLUCOSE mg/dL 83 100* 82     Magnesium:  Results from last 7 days   Lab Units 07/30/24 2036 07/30/24  1155   MAGNESIUM mg/dL 1.97 1.30*     Troponin:    Results from last 7 days   Lab Units 07/31/24  0419 07/30/24  1313 07/30/24  1155   TROPHS ng/L 12 14* 11     BNP:   Results from last 7 days   Lab Units 07/30/24  1155   BNP pg/mL 68     Lipid Panel:  Results from last 7 days   Lab Units 07/30/24  1155   INR  1.0   PROTIME seconds 11.6        Relevant Results  Results from  "last 7 days   Lab Units 07/31/24  0419 07/30/24 2036 07/30/24  1155   GLUCOSE mg/dL 83 100* 82     No results found for: \"HGBA1C\"     Assessment/Plan    Acute on chronic UTI  Generalized weakness/dehydration   Hypokalemia  Hypomagnesia  Leukocytosis  History of compression fracture L1 April 2024  Dementia  Kidney stones  Diverticulitis  Migraine  RACHELLE  GERD  OA  Vertigo    -CT of the head negative  -CT abdomen negative  -CXR negative  -CBC BMP and electrolytes daily  -Rocephin IV  -IV fluids  -PVR <180  -Resume home meds when appropriate  -Normal saline bolus repeat lactate  -PT/OT evaluation  -TCC for discharge planning    DVTp: Lovenox    PLAN: Home with     MARK Fuentes-CNP    Plan of care was discussed extensively with patient.  Patient verbalized understanding through teach back method.  All question and concerns addressed upon examination.    Of note, this documentation is completed using the Dragon Dictation system (voice recognition software). There may be spelling and/or grammatical errors that were not corrected prior to final submission.        "

## 2024-07-31 NOTE — CONSULTS
"Nutrition Initial Assessment:   Nutrition Assessment    Reason for Assessment: Admission nursing screening    Patient is a 73 y.o. female presenting with generalized weakness.  at bedside assisted with hx as pt has dementia and likely is not an accurate historian.       Nutrition History:  Energy Intake: Poor < 50 %  Food and Nutrient History: Pt reports good appetite and intake but  at bedside states pt has been eating less ebcause she has had an upset stomach. States she takes pepto bismol for upset stomach but pt is still getting upset stomach and not eating well, so may not be helping. Pt also gets stool softeners/laxatives sometimes, too.  states pt goes through phases where she eats a certain food very often, then doesn't want it anymore for a while. Will not drink any ONS. States ice cream makes her stomach more upset.  reports possible discharge today. Pt agreeable to cottage cheese and hard boiled eggs for extra protein at meals if she is not discharged.  also states pt was told not to eat chips due to an issue with ehr colon that was never fully evaluated - unclear why chips would be restricted from diet and this RD sees no reason to restrict this food item. Would recommend as minimal restrictions as possible due to malnutrition/underweight status.  Vitamin/Herbal Supplement Use: probiotic, per home med list  Food Allergies/Intolerances:  None  GI Symptoms: Diarrhea, Nausea, and Vomiting  Oral Problems: None       Anthropometrics:  Height: 160 cm (5' 3\")   Weight: 45.9 kg (101 lb 3.1 oz)   BMI (Calculated): 17.93  IBW/kg (Dietitian Calculated): 52.3 kg          Weight History:   Wt Readings from Last 10 Encounters:   07/30/24 45.9 kg (101 lb 3.1 oz)   05/04/24 49.9 kg (110 lb)   04/29/24 54.4 kg (120 lb)   10/08/23 54.4 kg (120 lb)   10/02/23 54.4 kg (120 lb)   10/27/20 58.1 kg (128 lb)   09/25/20 52.8 kg (116 lb 8 oz)      Weight Change %:  Weight History / % Weight " Change: Pt and  report pt has lost weight but do not know how much or when it started. States clothes fit looser. Based matthew vailable wt records, wt loss may have been 8.5 kg (15.6%) in 3-4 months.  Significant Weight Loss: Yes  Interpretation of Weight Loss: >7.5% in 3 months    Nutrition Focused Physical Exam Findings:    Subcutaneous Fat Loss:   Orbital Fat Pads: Mild-Moderate (slight dark circles and slight hollowing)  Buccal Fat Pads: Mild-Moderate (flat cheeks, minimal bounce)  Triceps: Severe (negligible fat tissue)  Muscle Wasting:  Temporalis: Severe (hollowed scooping depression)  Pectoralis (Clavicular Region): Mild-Moderate (some protrusion of clavicle)  Deltoid/Trapezius: Mild-Moderate (slight protrusion of acromion process)  Edema:  Edema: none  Physical Findings:  Mouth: Negative  Nails: Positive (broken, extremely short)  Skin: Negative    Nutrition Significant Labs:    Reviewed   Nutrition Specific Medications:  Reviewed     I/O:    ; Stool Appearance: Formed, Loose, Soft (07/31/24 0948)    Dietary Orders (From admission, onward)       Start     Ordered    07/30/24 1911  Adult diet Regular  Diet effective now        Question:  Diet type  Answer:  Regular    07/30/24 1910                     Estimated Needs:   Total Energy Estimated Needs (kCal): 1606 kCal  Method for Estimating Needs: 35 kcal/kg ABW  Total Protein Estimated Needs (g): 55 g  Method for Estimating Needs: 1.2 g/kg ABW  Total Fluid Estimated Needs (mL): 1606 mL  Method for Estimating Needs: 1 ml/kcal or per MD        Nutrition Diagnosis   Malnutrition Diagnosis  Patient has Malnutrition Diagnosis: Yes  Diagnosis Status: New  Malnutrition Diagnosis: Severe malnutrition related to chronic disease or condition  As Evidenced by: moderate-severe fat wasting; moderate-severe muscle wasting; 15.6% wt loss x 3-4 months; reports of decreased PO intake for prolonged period of time            Nutrition Interventions/Recommendations          Nutrition Prescription:  Individualized Nutrition Prescription Provided for : Regular diet with ONS        Nutrition Interventions:   Interventions: Meals and snacks  Meals and Snacks: General healthful diet  Goal: Consumes 3 meals per day  Additional Interventions: Pt declined ONS. Agreed to hard boiled egg and cottage cheese at every meal for consistent supplemental protein.         Nutrition Education:   Discussed keeping protein foods/snacks that pt likes available for when she does not eat much of her meal. Encouraged pt and  to speak with physician regarding treatment for digestive issues as they appear to be unresolved and interfering with nutrition intake.        Nutrition Monitoring and Evaluation   Food/Nutrient Related History Monitoring  Monitoring and Evaluation Plan: Energy intake, Amount of food, Fluid intake  Energy Intake: Estimated energy intake  Criteria: Pt meets >75% of estimated energy needs  Fluid Intake: Estimated fluid intake  Criteria: Meets >75% of estimated fluid needs  Amount of Food: Estimated amout of food  Criteria: Pt consumes >75% of meals    Body Composition/Growth/Weight History  Monitoring and Evaluation Plan: Weight  Weight: Measured weight  Criteria: Maintains stable weight    Biochemical Data, Medical Tests and Procedures  Monitoring and Evaluation Plan: Glucose/endocrine profile, Electrolyte/renal panel  Electrolyte and Renal Panel: Sodium, Potassium, Phosphorus  Criteria: Electrolytes WNL  Glucose/Endocrine Profile: Glucose, casual  Criteria: BG within desirable range    Nutrition Focused Physical Findings  Monitoring and Evaluation Plan: Digestive System  Digestive System: Nausea, Vomiting, Diarrhea  Criteria: Improvement in GI symptoms         Time Spent (min): 60 minutes

## 2024-08-01 ENCOUNTER — APPOINTMENT (OUTPATIENT)
Dept: RADIOLOGY | Facility: HOSPITAL | Age: 74
End: 2024-08-01
Payer: COMMERCIAL

## 2024-08-01 ENCOUNTER — APPOINTMENT (OUTPATIENT)
Dept: CARDIOLOGY | Facility: HOSPITAL | Age: 74
End: 2024-08-01
Payer: COMMERCIAL

## 2024-08-01 LAB
ANION GAP SERPL CALC-SCNC: 13 MMOL/L (ref 10–20)
ATRIAL RATE: 113 BPM
BACTERIA UR CULT: ABNORMAL
BUN SERPL-MCNC: 5 MG/DL (ref 6–23)
CALCIUM SERPL-MCNC: 8.5 MG/DL (ref 8.6–10.3)
CHLORIDE SERPL-SCNC: 109 MMOL/L (ref 98–107)
CO2 SERPL-SCNC: 25 MMOL/L (ref 21–32)
CREAT SERPL-MCNC: 0.46 MG/DL (ref 0.5–1.05)
EGFRCR SERPLBLD CKD-EPI 2021: >90 ML/MIN/1.73M*2
ERYTHROCYTE [DISTWIDTH] IN BLOOD BY AUTOMATED COUNT: 14.9 % (ref 11.5–14.5)
GLUCOSE SERPL-MCNC: 79 MG/DL (ref 74–99)
HCT VFR BLD AUTO: 40.7 % (ref 36–46)
HGB BLD-MCNC: 12.5 G/DL (ref 12–16)
HOLD SPECIMEN: NORMAL
HOLD SPECIMEN: NORMAL
LACTATE SERPL-SCNC: 2.3 MMOL/L (ref 0.4–2)
LACTATE SERPL-SCNC: 4.1 MMOL/L (ref 0.4–2)
MAGNESIUM SERPL-MCNC: 1.62 MG/DL (ref 1.6–2.4)
MCH RBC QN AUTO: 28.2 PG (ref 26–34)
MCHC RBC AUTO-ENTMCNC: 30.7 G/DL (ref 32–36)
MCV RBC AUTO: 92 FL (ref 80–100)
NRBC BLD-RTO: 0 /100 WBCS (ref 0–0)
P AXIS: 21 DEGREES
P OFFSET: 204 MS
P ONSET: 163 MS
PLATELET # BLD AUTO: 157 X10*3/UL (ref 150–450)
POTASSIUM SERPL-SCNC: 3.3 MMOL/L (ref 3.5–5.3)
PR INTERVAL: 126 MS
Q ONSET: 216 MS
QRS COUNT: 19 BEATS
QRS DURATION: 84 MS
QT INTERVAL: 284 MS
QTC CALCULATION(BAZETT): 389 MS
QTC FREDERICIA: 350 MS
R AXIS: -27 DEGREES
RBC # BLD AUTO: 4.43 X10*6/UL (ref 4–5.2)
SODIUM SERPL-SCNC: 144 MMOL/L (ref 136–145)
T AXIS: 157 DEGREES
T OFFSET: 358 MS
T4 FREE SERPL-MCNC: 1.06 NG/DL (ref 0.61–1.12)
T4 FREE SERPL-MCNC: 1.1 NG/DL (ref 0.61–1.12)
TSH SERPL-ACNC: 0.1 MIU/L (ref 0.44–3.98)
VENTRICULAR RATE: 113 BPM
WBC # BLD AUTO: 6.6 X10*3/UL (ref 4.4–11.3)

## 2024-08-01 PROCEDURE — 82374 ASSAY BLOOD CARBON DIOXIDE: CPT

## 2024-08-01 PROCEDURE — 76536 US EXAM OF HEAD AND NECK: CPT | Performed by: RADIOLOGY

## 2024-08-01 PROCEDURE — 84481 FREE ASSAY (FT-3): CPT | Mod: ELYLAB

## 2024-08-01 PROCEDURE — 97116 GAIT TRAINING THERAPY: CPT | Mod: GP,CQ | Performed by: PHYSICAL THERAPY ASSISTANT

## 2024-08-01 PROCEDURE — 84439 ASSAY OF FREE THYROXINE: CPT | Performed by: NURSE PRACTITIONER

## 2024-08-01 PROCEDURE — 2500000004 HC RX 250 GENERAL PHARMACY W/ HCPCS (ALT 636 FOR OP/ED): Performed by: STUDENT IN AN ORGANIZED HEALTH CARE EDUCATION/TRAINING PROGRAM

## 2024-08-01 PROCEDURE — 2500000001 HC RX 250 WO HCPCS SELF ADMINISTERED DRUGS (ALT 637 FOR MEDICARE OP): Performed by: NURSE PRACTITIONER

## 2024-08-01 PROCEDURE — 84439 ASSAY OF FREE THYROXINE: CPT

## 2024-08-01 PROCEDURE — 84443 ASSAY THYROID STIM HORMONE: CPT | Performed by: NURSE PRACTITIONER

## 2024-08-01 PROCEDURE — 2500000004 HC RX 250 GENERAL PHARMACY W/ HCPCS (ALT 636 FOR OP/ED): Performed by: NURSE PRACTITIONER

## 2024-08-01 PROCEDURE — 83605 ASSAY OF LACTIC ACID: CPT

## 2024-08-01 PROCEDURE — 2500000004 HC RX 250 GENERAL PHARMACY W/ HCPCS (ALT 636 FOR OP/ED)

## 2024-08-01 PROCEDURE — 76536 US EXAM OF HEAD AND NECK: CPT

## 2024-08-01 PROCEDURE — 83036 HEMOGLOBIN GLYCOSYLATED A1C: CPT | Mod: ELYLAB

## 2024-08-01 PROCEDURE — 2500000005 HC RX 250 GENERAL PHARMACY W/O HCPCS

## 2024-08-01 PROCEDURE — 93005 ELECTROCARDIOGRAM TRACING: CPT

## 2024-08-01 PROCEDURE — 85027 COMPLETE CBC AUTOMATED: CPT

## 2024-08-01 PROCEDURE — 2500000001 HC RX 250 WO HCPCS SELF ADMINISTERED DRUGS (ALT 637 FOR MEDICARE OP): Performed by: STUDENT IN AN ORGANIZED HEALTH CARE EDUCATION/TRAINING PROGRAM

## 2024-08-01 PROCEDURE — 83735 ASSAY OF MAGNESIUM: CPT

## 2024-08-01 PROCEDURE — 2500000002 HC RX 250 W HCPCS SELF ADMINISTERED DRUGS (ALT 637 FOR MEDICARE OP, ALT 636 FOR OP/ED)

## 2024-08-01 PROCEDURE — 36415 COLL VENOUS BLD VENIPUNCTURE: CPT

## 2024-08-01 PROCEDURE — 2500000001 HC RX 250 WO HCPCS SELF ADMINISTERED DRUGS (ALT 637 FOR MEDICARE OP)

## 2024-08-01 PROCEDURE — 99232 SBSQ HOSP IP/OBS MODERATE 35: CPT

## 2024-08-01 PROCEDURE — 97530 THERAPEUTIC ACTIVITIES: CPT | Mod: GP,CQ | Performed by: PHYSICAL THERAPY ASSISTANT

## 2024-08-01 PROCEDURE — 93010 ELECTROCARDIOGRAM REPORT: CPT | Performed by: INTERNAL MEDICINE

## 2024-08-01 PROCEDURE — 99223 1ST HOSP IP/OBS HIGH 75: CPT | Performed by: INTERNAL MEDICINE

## 2024-08-01 PROCEDURE — 1200000002 HC GENERAL ROOM WITH TELEMETRY DAILY

## 2024-08-01 RX ORDER — METOPROLOL TARTRATE 25 MG/1
25 TABLET, FILM COATED ORAL 2 TIMES DAILY
Status: DISCONTINUED | OUTPATIENT
Start: 2024-08-01 | End: 2024-08-02 | Stop reason: HOSPADM

## 2024-08-01 RX ORDER — POTASSIUM CHLORIDE 20 MEQ/1
40 TABLET, EXTENDED RELEASE ORAL ONCE
Status: COMPLETED | OUTPATIENT
Start: 2024-08-01 | End: 2024-08-01

## 2024-08-01 RX ORDER — SODIUM CHLORIDE 9 MG/ML
50 INJECTION, SOLUTION INTRAVENOUS CONTINUOUS
Status: DISCONTINUED | OUTPATIENT
Start: 2024-08-01 | End: 2024-08-02 | Stop reason: HOSPADM

## 2024-08-01 RX ORDER — METOPROLOL TARTRATE 1 MG/ML
5 INJECTION, SOLUTION INTRAVENOUS ONCE
Status: COMPLETED | OUTPATIENT
Start: 2024-08-01 | End: 2024-08-01

## 2024-08-01 RX ORDER — MAGNESIUM SULFATE HEPTAHYDRATE 40 MG/ML
2 INJECTION, SOLUTION INTRAVENOUS ONCE
Status: COMPLETED | OUTPATIENT
Start: 2024-08-01 | End: 2024-08-01

## 2024-08-01 ASSESSMENT — COGNITIVE AND FUNCTIONAL STATUS - GENERAL
HELP NEEDED FOR BATHING: A LITTLE
DAILY ACTIVITIY SCORE: 16
PERSONAL GROOMING: A LITTLE
TURNING FROM BACK TO SIDE WHILE IN FLAT BAD: A LITTLE
MOVING FROM LYING ON BACK TO SITTING ON SIDE OF FLAT BED WITH BEDRAILS: A LITTLE
STANDING UP FROM CHAIR USING ARMS: A LITTLE
EATING MEALS: A LITTLE
TOILETING: A LOT
WALKING IN HOSPITAL ROOM: A LITTLE
DRESSING REGULAR UPPER BODY CLOTHING: A LITTLE
DRESSING REGULAR LOWER BODY CLOTHING: A LOT
CLIMB 3 TO 5 STEPS WITH RAILING: A LOT
MOVING TO AND FROM BED TO CHAIR: A LITTLE
MOBILITY SCORE: 17

## 2024-08-01 ASSESSMENT — PAIN SCALES - GENERAL: PAINLEVEL_OUTOF10: 0 - NO PAIN

## 2024-08-01 ASSESSMENT — PAIN SCALES - WONG BAKER: WONGBAKER_NUMERICALRESPONSE: NO HURT

## 2024-08-01 NOTE — PROGRESS NOTES
"Physical Therapy    Physical Therapy Treatment    Patient Name: Alisa Fregoso  MRN: [id number]  Today's Date: 8/1/2024  Time Calculation  Start Time: 1000  Stop Time: 1023  Time Calculation (min): 23 min    Assessment/Plan   PT Assessment  End of Session Communication: Bedside nurse  End of Session Patient Position: Up in chair     PT Plan  Treatment/Interventions: Bed mobility, Transfer training, Gait training  PT Plan: Ongoing PT  PT Frequency: 3 times per week    General Visit Information:   PT  Visit  PT Received On: 08/01/24  General  Prior to Session Communication: Bedside nurse  Patient Position Received: Bed, 3 rail up  General Comment:  (\"I want out of here\" and \"the medication I get here messes my eyes up\" agreeable to sit up to chair.)    Subjective   Precautions:  Precautions  Precautions Comment:  (Fall, [place], left eye legally blind.)  Vital Signs:  Vital Signs  Heart Rate: 76  Heart Rate Source: Monitor  SpO2: 97 % (room air)    Treatments:  Bed Mobility  Bed Mobility:  (sba)    Ambulation/Gait Training  Ambulation/Gait Training Performed:  (gait training with fww and min x 1 with assist to manuever and place device slow step to pattern stating uses walker at home \"If  I have to \" agreeable on need of device presently 12'x115; x 1distances)  Transfers  Transfer:  (supine to sit with use of rail and cg, sit to stand trials with close cg)    Outcome Measures:  [facility] Basic Mobility  Turning from your back to your side while in a flat bed without using bedrails: A little  Moving from lying on your back to sitting on the side of a flat bed without using bedrails: A little  Moving to and from bed to chair (including a wheelchair): A little  Standing up from a chair using your arms (e.g. wheelchair or bedside chair): A little  To walk in hospital room: A little  Climbing 3-5 steps with railing: A lot  Basic Mobility - Total Score: 17    EDUCATION:  Outpatient Education  Individual(s) Educated: Patient  Patient " Response to Education: Patient/Caregiver Verbalized Understanding of Information    Encounter Problems       Encounter Problems (Active)       PT Problem       Pt. will transfer supine/sit with SBA (Progressing)       Start:  07/31/24    Expected End:  08/14/24            Pt. will transfer sit/stand with FWW with CGA (Progressing)       Start:  07/31/24    Expected End:  08/14/24            Pt.will ambulate 40' with FWW with CGA (Progressing)       Start:  07/31/24    Expected End:  08/14/24            Pt. will perform 2 x 15 B LE AROM exercises  (Progressing)       Start:  07/31/24    Expected End:  08/14/24               Pain - Adult

## 2024-08-01 NOTE — CONSULTS
Inpatient consult to Cardiology  Consult performed by: Ray Jaramillo MD  Consult ordered by: MARK Fuentes-CNP  Reason for consult: Tachycardia      Cardiology Consult Note      Date:   8/1/2024  Patient name:  Alisa Fregoso  Date of admission:  7/30/2024 11:26 AM  MRN:   [id number]  YOB: 1950  Time of Consult:  10:05 AM    Consulting Cardiologist: MARK Flowers, CNP  Primary Cardiologist:   none     Referring Provider: Dr Foley      Admission Diagnosis:     Generalized weakness      History of Present Illness:      Alisa Fregoso is a 73 y.o.  female patient who is being at the request of Dr. Foley for inpatient consultation of  tachycardia . He was admitted on 7/30/2024.  EMR records have been reviewed.  Patient is admitted to medicine service with generalized weakness, worsening nausea vomiting and diarrhea.  She is being treated for urinary tract infection was also noted on admission to have hypokalemia and hypomagnesia and has received supplements.  EKG on admission on 7/30/2024 showed sinus rhythm with occasional PVC with ST-T wave abnormality in lateral leads.  Her lactate was elevated, white count was 14,000.  Troponin 11, 14, and 12 respectively.  Magnesium on admit 1.3 and received supplementation, today 1.62.    This morning she is noted on telemetry monitor to be tachycardic therefore cardiology consult was placed for further evaluation.  Review of telemetry shows baseline sinus rhythm with PACs approximately 8:00 in the morning had episode of what appears to be paroxysmal atrial fibrillation with rapid ventricular rate with ventricular rates in the 150s to 170s, duration approximately 1 hour.  Patient remembers feeling dizzy but denies chest pain, shortness of breath or palpitations.  EKG obtained 8/1 at 08: 42 hours showed sinus tachycardia with occasional PACs and PVCs.  She received 5 mg of IV metoprolol and 40 mEq of oral potassium as  well as receiving 500 cc normal saline bolus.    Patient was evaluated by EP service in September 2023 when she was admitted with GI bleed.  Echocardiogram obtained at that time showed normal LV systolic function with estimated ejection fraction 55 to 60% no significant valvular abnormalities.  She was noted to have PVCs and paroxysmal atrial tachycardia on telemetry monitor, and was recommended to continue beta-blocker therapy.  She does not follow-up with cardiology on an outpatient basis.  She states that she lives at home with her .  She does not use a cane or walker at home but holds onto the walls.  If goes out uses a cane to aid in ambulation.  She is legally blind.  She does not recall specific symptoms at time of her admission but does remember feeling somewhat dizzy, states that her  thought she was having trouble therefore called EMS.      Past medical/cardiac history  Paroxysmal atrial fibrillation not on anticoagulation  Hypothyroidism  Dementia  Osteoarthritis  Bilateral sensory neural hearing loss  Rheumatoid arthritis  Peripheral neuropathy  History of GI bleed August 2023  Diverticulitis  Legally blind    Past cardiac testing  9/19/2023 transthoracic echocardiogram:  CONCLUSIONS:  1. Left ventricular systolic function is normal with a 55-60% estimated ejection fraction.  2. Spectral Doppler shows an impaired relaxation pattern of left ventricular diastolic filling.  3. There is no evidence of mitral valve stenosis.  4. No evidence of mitral valve regurgitation.  5. Trace to mild tricuspid regurgitation.  6. Aortic valve stenosis is not present.  7. The main pulmonary artery is normal in size, and position, with normal bifurcation into the left and right pulmonary arteries.    Allergies:     Allergies   Allergen Reactions    Ciprofloxacin Unknown         Past Medical History:     Past Medical History:   Diagnosis Date    Arthritis     Blindness     Calculus of kidney 10/01/2020     Bilateral nephrolithiasis    Chronic headaches     Diverticulitis of large intestine without perforation or abscess without bleeding 10/01/2020    Sigmoid diverticulitis    Hypothyroidism     Migraine     Other specified disorders of eye and adnexa     Itchy, watery, and red eye    Personal history of (healed) traumatic fracture 10/18/2020    Status post fracture of left hip    Personal history of diseases of the blood and blood-forming organs and certain disorders involving the immune mechanism 10/27/2020    History of bleeding disorder    Personal history of other diseases of the musculoskeletal system and connective tissue 10/27/2020    History of arthritis    Personal history of other specified conditions 10/18/2020    History of cachexia    Personal history of other specified conditions     History of abdominal pain    Personal history of other specified conditions 10/27/2020    History of balance disorder    Short-term memory loss     Unspecified visual loss 10/27/2020    Vision problems    Uses walker     Wears glasses        Past Surgical History:     Past Surgical History:   Procedure Laterality Date    CHOLECYSTECTOMY      COLONOSCOPY      CORNEAL TRANSPLANT Left     CT ABDOMEN PELVIS ANGIOGRAM W AND/OR WO IV CONTRAST  05/28/2022    CT ABDOMEN PELVIS ANGIOGRAM W AND/OR WO IV CONTRAST 5/28/2022 [facility]    ESOPHAGOGASTRODUODENOSCOPY      HIP SURGERY Left 09/25/2020    hardware- fracture    HYSTERECTOMY      PARTIAL GASTRECTOMY      PARTIAL KNEE ARTHROPLASTY Left     Partial Replacement    TOTAL HIP ARTHROPLASTY Left     Total Hip Replacement       Family History:     Family History   Problem Relation Name Age of Onset    No Known Problems Mother      No Known Problems Father      No Known Problems Sister      No Known Problems Brother         Social History:     Social History     Tobacco Use    Smoking status: Never     Passive exposure: Past    Smokeless tobacco: Never   Vaping Use    Vaping  status: Never Used   Substance Use Topics    Alcohol use: Not Currently    Drug use: Never       CURRENT INPATIENT MEDICATIONS    cefTRIAXone, 1 g, intravenous, q24h  docusate sodium, 100 mg, oral, BID  enoxaparin, 40 mg, subcutaneous, q24h  gabapentin, 600 mg, oral, Nightly  levothyroxine, 50 mcg, oral, Daily  magnesium sulfate, 2 g, intravenous, Once  pantoprazole, 40 mg, oral, Daily  polyethylene glycol, 17 g, oral, Daily  predniSONE, 5 mg, oral, Daily  sodium chloride, 500 mL, intravenous, Once         Current Outpatient Medications   Medication Instructions    busPIRone (Buspar) 5 mg tablet Take 1 tab every 12 hrs. IF needed for anxiety    cyclobenzaprine (Flexeril) 10 mg tablet 1 tablet, oral, 3 times daily PRN    dicyclomine (BENTYL) 20 mg, oral, Every 6 hours PRN    docusate sodium (Colace) 100 mg capsule 1 capsule, oral, 2 times daily    gabapentin (NEURONTIN) 600 mg, oral, Nightly    L. acidophilus/pectin, citrus (ACIDOPHILUS PROBIOTIC ORAL) oral    levothyroxine (SYNTHROID, LEVOXYL) 50 mcg, oral, Daily    nitrofurantoin (MACRODANTIN) 50 mg, oral, Daily    pantoprazole (PROTONIX) 40 mg, oral, Daily    polyethylene glycol (GLYCOLAX, MIRALAX) 17 g, oral, Daily RT    prednisoLONE acetate (Pred-Forte) 1 % ophthalmic suspension 1 drop, Left Eye, 4 times daily    predniSONE (DELTASONE) 5 mg, oral, Daily        Review of Systems:      12 point review of systems was obtained in detail and is negative other than that detailed above.    Vital Signs:     Vitals:    07/31/24 1917 08/01/24 0030 08/01/24 0733 08/01/24 0915   BP: (!) 140/92 144/73 126/74 132/78   BP Location: Right arm Right arm Right arm Right arm   Patient Position: Lying Lying Lying Lying   Pulse: 79 72 73 68   Resp: 16 16 18 18   Temp: 36.4 °C (97.5 °F) 36.7 °C (98.1 °F) 36.6 °C (97.9 °F) 36.7 °C (98.1 °F)   TempSrc: Temporal Temporal Temporal Temporal   SpO2: 96% 95% 95% 95%   Weight:       Height:           Intake/Output Summary (Last 24 hours) at  8/1/2024 1005  Last data filed at 8/1/2024 0823  Gross per 24 hour   Intake 2565 ml   Output 2500 ml   Net 65 ml       Wt Readings from Last 4 Encounters:   07/30/24 45.9 kg (101 lb 3.1 oz)   05/04/24 49.9 kg (110 lb)   04/29/24 54.4 kg (120 lb)   10/08/23 54.4 kg (120 lb)       Physical Examination:     GENERAL APPEARANCE: Elderly, thin female in no acute distress.  CHEST: Symmetric and non-tender.  INTEGUMENT: Skin warm and dry, without gross excoriationis or lesions.  HEENT: No gross abnormalities of conjunctiva, teeth, gums, oral mucosa  NECK: Supple, no JVD, no bruit. Thyroid not palpable. Carotid upstrokes normal.  NEURO/PSHCY: Alert and oriented to person and place, speech is clear, appropriate behavior and responses. Moves all extremities equally.  LUNGS: Clear to auscultation bilaterally; normal respiratory effort.  HEART: Rate and rhythm regular with no evident murmur; no gallop appreciated. There are no rubs, clicks or heaves. PMI nondisplaced.  ABDOMEN: Soft, nontender, no palpable hepatosplenomegaly, no mases, no bruits.   MUSCULOSKELETAL: Normal range of motion.  EXTREMITIES: Warm with good color, no clubbing or cyanois. There is no edema noted.  PERIPHERAL VASCULAR: Pulses present and equally palpable; 2+ radial pulse bilateral .    Lab:     CBC:   Results from last 7 days   Lab Units 08/01/24  0540 07/31/24 0419 07/30/24  1155   WBC AUTO x10*3/uL 6.6 14.1* 19.9*   RBC AUTO x10*6/uL 4.43 3.97* 4.24   HEMOGLOBIN g/dL 12.5 11.3* 11.9*   HEMATOCRIT % 40.7 36.8 39.4   MCV fL 92 93 93   MCH pg 28.2 28.5 28.1   MCHC g/dL 30.7* 30.7* 30.2*   RDW % 14.9* 14.8* 14.6*   PLATELETS AUTO x10*3/uL 157 149* 154     CMP:    Results from last 7 days   Lab Units 08/01/24  0539 07/31/24  0419 07/30/24 2036 07/30/24  1155   SODIUM mmol/L 144 140 141 143   POTASSIUM mmol/L 3.3* 3.4* 2.9* 2.7*   CHLORIDE mmol/L 109* 107 102 104   CO2 mmol/L 25 28 30 30   BUN mg/dL 5* 9 9 11   CREATININE mg/dL 0.46* 0.51 0.54 0.60    GLUCOSE mg/dL 79 83 100* 82   PROTEIN TOTAL g/dL  --   --   --  6.5   CALCIUM mg/dL 8.5* 8.2* 8.7 9.0   BILIRUBIN TOTAL mg/dL  --   --   --  0.8   ALK PHOS U/L  --   --   --  53   AST U/L  --   --   --  23   ALT U/L  --   --   --  9     BMP:    Results from last 7 days   Lab Units 08/01/24  0539 07/31/24 0419 07/30/24 2036   SODIUM mmol/L 144 140 141   POTASSIUM mmol/L 3.3* 3.4* 2.9*   CHLORIDE mmol/L 109* 107 102   CO2 mmol/L 25 28 30   BUN mg/dL 5* 9 9   CREATININE mg/dL 0.46* 0.51 0.54   CALCIUM mg/dL 8.5* 8.2* 8.7   GLUCOSE mg/dL 79 83 100*     Magnesium:  Results from last 7 days   Lab Units 08/01/24  0539 07/30/24 2036 07/30/24  1155   MAGNESIUM mg/dL 1.62 1.97 1.30*     Troponin:    Results from last 7 days   Lab Units 07/31/24  0419 07/30/24  1313 07/30/24  1155   TROPHS ng/L 12 14* 11     BNP:   Results from last 7 days   Lab Units 07/30/24  1155   BNP pg/mL 68     Lipid Panel:         Diagnostic Studies:   @No results found for this or any previous visit.    CT abdomen pelvis w IV contrast    Result Date: 7/30/2024  STUDY: CT Abdomen and Pelvis with IV Contrast; 07/30/2024 at 2:48 PM INDICATION: Abdominal pain, cramping, nausea. COMPARISON: CT abdomen and pelvis 04/29/24, 10/08/23, 09/15/23, 11/22/22. ACCESSION NUMBER(S): [id number] ORDERING CLINICIAN: SHANTELLE PENDLETON TECHNIQUE: CT of the abdomen and pelvis was performed.  Contiguous axial images were obtained at 3 mm slice thickness through the abdomen and pelvis. Coronal and sagittal reconstructions at 3 mm slice thickness were performed.  Omnipaque-350 75 mL was administered intravenously.  FINDINGS: LOWER CHEST: No cardiomegaly.  No pericardial effusion.  There are interstitial changes posteriorly in the lung bases consistent with dependent atelectasis.  ABDOMEN:  LIVER: No hepatomegaly.  Smooth surface contour.  There is mild fatty infiltration of liver.  BILE DUCTS: No intrahepatic or extrahepatic biliary ductal dilatation.  GALLBLADDER: The  gallbladder is absent. STOMACH: Postsurgical changes to the stomach.  PANCREAS: No masses or ductal dilatation.  SPLEEN: No splenomegaly or focal splenic lesion.  ADRENAL GLANDS: No thickening or nodules.  KIDNEYS AND URETERS: Kidneys are normal in size and location.  No renal or ureteral calculi.  PELVIS:  BLADDER: No abnormalities identified.  REPRODUCTIVE ORGANS: No abnormalities identified.  BOWEL: There is diverticulosis.  The appendix is identified and is normal.  VESSELS: No abnormalities identified.  Abdominal aorta is normal in caliber.  PERITONEUM/RETROPERITONEUM/LYMPH NODES: No free fluid.  No pneumoperitoneum. No lymphadenopathy.  ABDOMINAL WALL: No abnormalities identified. SOFT TISSUES: No abnormalities identified.  BONES: There is a left hip arthroplasty.  There is rotoscoliosis of the lumbosacral spine with the curve to the right.  There is diffuse neural foraminal narrowing.  There are old right-sided pelvic fractures.  No acute fracture or aggressive osseous lesion.    Hepatic steatosis. Diverticulosis without diverticulitis. No acute process. Signed by Zack Goins MD    ECG 12 lead    Result Date: 7/30/2024  Sinus rhythm with occasional and consecutive Premature ventricular complexes ST & T wave abnormality, consider lateral ischemia Abnormal ECG When compared with ECG of 08-OCT-2023 05:49, Premature ventricular complexes are now Present Aberrant conduction is no longer Present ST now depressed in Anterior leads Nonspecific T wave abnormality, improved in Inferior leads T wave inversion more evident in Lateral leads See ED provider note for full interpretation and clinical correlation Confirmed by Ting Bolden ([id number]) on 7/30/2024 3:23:20 PM    ECG 12 lead    Result Date: 7/30/2024  Sinus tachycardia with occasional Premature ventricular complexes Left axis deviation ST & T wave abnormality, consider lateral ischemia Abnormal ECG When compared with ECG of 30-JUL-2024 11:48, (unconfirmed) No  significant change was found See ED provider note for full interpretation and clinical correlation Confirmed by Ting Bolden ([id number]) on 7/30/2024 3:22:49 PM    CT head wo IV contrast    Result Date: 7/30/2024  Interpreted By:  Simon May, STUDY: CT HEAD WO IV CONTRAST;  7/30/2024 2:49 pm   INDICATION: Signs/Symptoms:weakness confusion.   COMPARISON: 10/08/2023   ACCESSION NUMBER(S): [id number]   ORDERING CLINICIAN: SHANTELLE PENDLETON   TECHNIQUE: Sequential trans axial images were obtained  .   FINDINGS: INTRACRANIAL:   There is mild prominence of the cortical sulci indicating atrophy.   There is moderate ventriculomegaly, again consistent with atrophy. Ventriculomegaly appears slightly greater proportion to the sulcal prominence, possibly with a degree of normal pressure hydrocephalus.   Mild decreased attenuation of the periventricular and long tracks of the white matter most consistent with gliosis from arterial disease. There is no evidence of definite subacute infarction, intracranial hemorrhage or mass.     EXTRACRANIAL: Visualized paranasal sinuses and mastoids are clear. The calvarium is intact.       Age related degenerative change as described without acute findings or significant change from the prior exam.   Signed by: Simon May 7/30/2024 3:10 PM Dictation workstation:   [id number]    XR chest 1 view    Result Date: 7/30/2024  STUDY: Chest Radiograph;  7/30/2024 at 12:18 PM. INDICATION: Weakness. COMPARISON: XR chest 9/17/2023, 10/1/2020. ACCESSION NUMBER(S): [id number] ORDERING CLINICIAN: SHANTELLE PENDLETON TECHNIQUE:  Frontal chest was obtained at 12:18 hours. FINDINGS: CARDIOMEDIASTINAL SILHOUETTE: The cardiac silhouette is normal in size.  There is stable tortuosity of the thoracic aorta.  LUNGS: There is stable elevation of the right diaphragm.  Significant clearing of the right midlung linear density and bibasilar atelectasis is noted.with possible minor residual scarring.  No dense consolidation,  pleural effusion, or pneumothorax.  ABDOMEN: No remarkable upper abdominal findings.  BONES: No acute osseous changes.  There is an old fracture deformity of the right clavicle unchanged.    Normal heart size with significant clearing of the right midlung and bibasilar atelectasis and possible minor residual scarring. No adverse changes. Signed by Ciarra Poole DO      No echocardiogram results found for the past 14 days    Radiology:     CT head wo IV contrast   Final Result   Age related degenerative change as described without acute findings   or significant change from the prior exam.        Signed by: Simon May 7/30/2024 3:10 PM   Dictation workstation:   [id number]      CT abdomen pelvis w IV contrast   Final Result   Hepatic steatosis.   Diverticulosis without diverticulitis.   No acute process.   Signed by Zack Goins MD      XR chest 1 view   Final Result   Normal heart size with significant clearing of the right midlung and   bibasilar atelectasis and possible minor residual scarring. No adverse   changes.   Signed by Ciarra Poole DO          Problem List:     Patient Active Problem List   Diagnosis    Bilateral sensorineural hearing loss    Chronic nonspecific colitis    Constipation in female    Forgetfulness    General physical deterioration    GERD (gastroesophageal reflux disease)    Hypothyroidism    Intractable vascular headache    Iron deficiency anemia    Migraine without aura and with status migrainosus, not intractable    Mild episode of recurrent major depressive disorder (CMS-HCC)    Peripheral neuropathy    Physical deconditioning    Recurrent UTI    Rheumatoid arthritis (Multi)    Encounter for immunization    Medicare annual wellness visit, subsequent    Adjustment reaction with prolonged depressive reaction    Anxiety    Back pain    Flank pain    Bunion of right foot    Cornea guttata    Difficulty walking involving foot    Edema    Elevated C-reactive protein (CRP)    Elevated sed  rate    Fatigue    Foot pain    Generalized abdominal pain    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    Hypercholesteremia    Hypokalemia    Joint swelling    Knee pain, left    Multiple joint pain    Myalgia    OA (osteoarthritis)    Acute pyelonephritis    Obstructive pyelonephritis    Pain in both feet    Pain in extremity    Pelvic fracture (Multi)    Pseudophakia, left eye    Rash    Purpura (CMS-HCC)    Retinal macular atrophy    Rheumatoid arthritis involving both knees with positive rheumatoid factor (Multi)    Gutierrez's esophagus without dysplasia    Gastroesophageal reflux disease without esophagitis    Acquired hypothyroidism    Rheumatoid arthritis of multiple sites without organ or system involvement with positive rheumatoid factor (Multi)    Abdominal pain    Weakness    Migraine headache    Generalized weakness       Assessment:     Paroxysmal atrial fibrillation not on anticoagulation due to history of GI bleeding.  [id number] score equal to 2  Hypokalemia  Hypomagnesia  Urinary tract infection being treated with IV antibiotics managed by primary service  Normal LV systolic function per echocardiogram September 2023  History of diverticulitis, GI bleed  History of dementia, legally blind    Plan:     Continue telemetry  Keep potassium greater than 4, magnesium greater than 2  Check TSH  Add metoprolol 25 mg twice daily    Natacha Clark CNP  [facility]        Of note, this documentation is completed using the Dragon Dictation system (voice recognition software). There may be spelling and/or grammatical errors that were not corrected prior to final submission.      Electronically signed by MARK Mcbride-CNP, on 8/1/2024 at 10:05 AM   Patient seen and examined in conjunction with MARK Sweeney/ENOC and agree with the evaluation as noted above. 73-year-old lady with history of recent UTI who presents with a generalized  weakness and electrolyte abnormalities.  She was noted to be tachycardic on the monitor for which cardiology was consulted on review of her telemetry reveals an episode of paroxysmal atrial fibrillation with RVR with heart rates in the 150s to 170s.  This lasted for about 1 however the patient felt dizzy with no chest pain at this time.  Follow-up EKG reveals sinus tachycardia with frequent PACs.  She has no prior history of coronary artery disease.  She has had history of diverticulitis with prior GI bleed.  The patient is also legally blind.  Serial troponins so far is negative and recent echocardiogram shows normal LV function with no hemodynamically significant valvular abnormalities.    ASSESSMENT AND PLAN:  1.  Paroxysmal atrial fibrillation: This appears to be an isolated event with spontaneous conversion back to the sinus rhythm, occurring in the setting of relative hypokalemia and hypomagnesemia as well as a UTI.  She has a low [id number] and in view of her history of GI bleed, we will not recommend long-term oral anticoagulation unless she has recurrent symptoms.  In the meantime, we will start her on low-dose beta-blockers and replace electrolytes and encourage adequate hydration.  2.  Generalized weakness: Likely secondary to underlying UTI.  We will defer to primary team for continued management.

## 2024-08-01 NOTE — CARE PLAN
Problem: Pain - Adult  Goal: Verbalizes/displays adequate comfort level or baseline comfort level  Outcome: Progressing     Problem: Safety - Adult  Goal: Free from fall injury  Outcome: Progressing     Problem: Discharge Planning  Goal: Discharge to home or other facility with appropriate resources  Outcome: Progressing     Problem: Chronic Conditions and Co-morbidities  Goal: Patient's chronic conditions and co-morbidity symptoms are monitored and maintained or improved  Outcome: Progressing     Problem: Skin  Goal: Decreased wound size/increased tissue granulation at next dressing change  Outcome: Progressing  Goal: Participates in plan/prevention/treatment measures  Outcome: Progressing  Goal: Prevent/manage excess moisture  Outcome: Progressing  Goal: Prevent/minimize sheer/friction injuries  Outcome: Progressing  Goal: Promote/optimize nutrition  Outcome: Progressing  Goal: Promote skin healing  Outcome: Progressing     Problem: Fall/Injury  Goal: Not fall by end of shift  Outcome: Progressing  Goal: Be free from injury by end of the shift  Outcome: Progressing  Goal: Verbalize understanding of personal risk factors for fall in the hospital  Outcome: Progressing  Goal: Verbalize understanding of risk factor reduction measures to prevent injury from fall in the home  Outcome: Progressing  Goal: Use assistive devices by end of the shift  Outcome: Progressing  Goal: Pace activities to prevent fatigue by end of the shift  Outcome: Progressing   The patient's goals for the shift include rest     The clinical goals for the shift include maintain HDS

## 2024-08-01 NOTE — NURSING NOTE
Went into pt's room to complete hourly rounds. She had taken off her tele monitor and continuous pulse ox. She had also removed one of her IV's. Applied ana sleeve and contacted nursing supervisor for a sitter.

## 2024-08-01 NOTE — PROGRESS NOTES
Rounded this morning, patient heart rate elevated, RN at bedside, will see patient or spouse at a later time. CT team will continue to follow.

## 2024-08-01 NOTE — DOCUMENTATION CLARIFICATION NOTE
"    PATIENT:               GERONIMO HENAO  ACCT #:                  [id number]  MRN:                       [id number]  :                       1950  ADMIT DATE:       2024 11:26 AM  DISCH DATE:  RESPONDING PROVIDER #:        [id number]          PROVIDER RESPONSE TEXT:    Severe Protein Calorie Malnutrition    CDI QUERY TEXT:    Clarification    Instruction:    Based on your assessment of the patient and the clinical information, please provide the requested documentation by clicking on the appropriate radio button and enter any additional information if prompted.    Question: Please further clarify this patient nutritional status as    When answering this query, please exercise your independent professional judgment. The fact that a question is being asked, does not imply that any particular answer is desired or expected.    The patient's clinical indicators include:  Clinical Information:  Pt presents w/ c/o weakness and found to have uti    Clinical Indicators:    Per the dietician note dated 24 - \" Severe malnutrition related to chronic disease or condition As Evidenced by: moderate-severe fat wasting; moderate-severe muscle wasting; 15.6 percent wt loss x 3-4 months; reports of decreased PO intake for prolonged period of time\"    Treatment: dietician recommends regular diet w/ ONS    Risk Factors: dementia  Options provided:  -- Severe Protein Calorie Malnutrition  -- Other - I will add my own diagnosis  -- Refer to Clinical Documentation Reviewer    Query created by: Baldev Rivas on 2024 11:51 AM      Electronically signed by:  JAK FLORES-CNP 2024 2:32 PM          "

## 2024-08-01 NOTE — PROGRESS NOTES
"Daily Progress Note    Alisa Fregoso is a 73 y.o. female on day 2 of admission presenting with Generalized weakness.    Subjective   Patient seen sitting up in chair with cardiology in the room.  Patient had tachycardia with a rate of 150s sustained overnight.  Metoprolol IV x1 given.  Appreciate cardiology recommendations.  TSH is low will consult endocrinology and check T3-T4.  Patient denies any complaints.       Objective     Physical Exam  Constitutional:       Appearance: She is well-groomed, underweight.   HENT:      Head: Normocephalic.      Mouth/Throat:      Mouth: Mucous membranes are moist.   Eyes:      Pupils: Pupils are equal, round, and reactive to light.   Cardiovascular:      Rate and Rhythm: Normal rate. Rhythm irregular.      Heart sounds: Normal heart sounds, S1 normal and S2 normal.   Pulmonary:      Effort: Pulmonary effort is normal.      Breath sounds: Normal breath sounds.   Abdominal:      General: Bowel sounds are normal.      Palpations: Abdomen is soft.   Musculoskeletal:         General: Normal range of motion.      Cervical back: Neck supple.   Skin:     General: Skin is warm.   Neurological:      Mental Status: She is alert. Mental status is at baseline.      Motor: Weakness present.   Psychiatric:         Mood and Affect: Mood normal.         Behavior: Behavior normal.         Cognition and Memory: Cognition is impaired. Memory is impaired.          Last Recorded Vitals  Blood pressure 141/73, pulse 75, temperature 36.8 °C (98.2 °F), resp. rate 18, height 1.6 m (5' 3\"), weight 45.9 kg (101 lb 3.1 oz), SpO2 90%.  Intake/Output last 3 Shifts:  I/O last 3 completed shifts:  In: 2991.7 (65.2 mL/kg) [I.V.:1341.7 (29.2 mL/kg); IV Piggyback:1650]  Out: 2500 (54.5 mL/kg) [Urine:2500 (1.5 mL/kg/hr)]  Weight: 45.9 kg     Medications  Scheduled medications  cefTRIAXone, 1 g, intravenous, q24h  docusate sodium, 100 mg, oral, BID  enoxaparin, 40 mg, subcutaneous, q24h  gabapentin, 600 mg, oral, " Nightly  levothyroxine, 50 mcg, oral, Daily  magnesium sulfate, 2 g, intravenous, Once  metoprolol tartrate, 25 mg, oral, BID  pantoprazole, 40 mg, oral, Daily  polyethylene glycol, 17 g, oral, Daily  predniSONE, 5 mg, oral, Daily      Continuous medications     PRN medications  PRN medications: acetaminophen **OR** acetaminophen **OR** acetaminophen, busPIRone, cyclobenzaprine, dicyclomine, melatonin, ondansetron **OR** ondansetron    Labs  CBC:   Results from last 7 days   Lab Units 08/01/24  0540 07/31/24 0419 07/30/24  1155   WBC AUTO x10*3/uL 6.6 14.1* 19.9*   RBC AUTO x10*6/uL 4.43 3.97* 4.24   HEMOGLOBIN g/dL 12.5 11.3* 11.9*   HEMATOCRIT % 40.7 36.8 39.4   MCV fL 92 93 93   MCH pg 28.2 28.5 28.1   MCHC g/dL 30.7* 30.7* 30.2*   RDW % 14.9* 14.8* 14.6*   PLATELETS AUTO x10*3/uL 157 149* 154     CMP:    Results from last 7 days   Lab Units 08/01/24  0539 07/31/24 0419 07/30/24 2036 07/30/24  1155   SODIUM mmol/L 144 140 141 143   POTASSIUM mmol/L 3.3* 3.4* 2.9* 2.7*   CHLORIDE mmol/L 109* 107 102 104   CO2 mmol/L 25 28 30 30   BUN mg/dL 5* 9 9 11   CREATININE mg/dL 0.46* 0.51 0.54 0.60   GLUCOSE mg/dL 79 83 100* 82   PROTEIN TOTAL g/dL  --   --   --  6.5   CALCIUM mg/dL 8.5* 8.2* 8.7 9.0   BILIRUBIN TOTAL mg/dL  --   --   --  0.8   ALK PHOS U/L  --   --   --  53   AST U/L  --   --   --  23   ALT U/L  --   --   --  9     BMP:    Results from last 7 days   Lab Units 08/01/24  0539 07/31/24 0419 07/30/24 2036   SODIUM mmol/L 144 140 141   POTASSIUM mmol/L 3.3* 3.4* 2.9*   CHLORIDE mmol/L 109* 107 102   CO2 mmol/L 25 28 30   BUN mg/dL 5* 9 9   CREATININE mg/dL 0.46* 0.51 0.54   CALCIUM mg/dL 8.5* 8.2* 8.7   GLUCOSE mg/dL 79 83 100*     Magnesium:  Results from last 7 days   Lab Units 08/01/24  0539 07/30/24  2036 07/30/24  1155   MAGNESIUM mg/dL 1.62 1.97 1.30*     Troponin:    Results from last 7 days   Lab Units 07/31/24  0419 07/30/24  1313 07/30/24  1155   TROPHS ng/L 12 14* 11     BNP:   Results from last  "7 days   Lab Units 07/30/24  1155   BNP pg/mL 68     Lipid Panel:  Results from last 7 days   Lab Units 07/30/24  1155   INR  1.0   PROTIME seconds 11.6        Relevant Results  Results from last 7 days   Lab Units 08/01/24  0539 07/31/24  0419 07/30/24 2036 07/30/24  1155   GLUCOSE mg/dL 79 83 100* 82     No results found for: \"HGBA1C\"     Assessment/Plan    Tachycardia  Hyperthyroidism low TSH  Acute on chronic UTI  Generalized weakness/dehydration   Hypokalemia  Hypomagnesia  Leukocytosis  History of compression fracture L1 April 2024  Dementia  Kidney stones  Diverticulitis  Migraine  RACHELLE  GERD  OA  Vertigo    -CT of the head negative  -CT abdomen negative  -CXR negative  -CBC BMP and electrolytes daily  -Rocephin IV  -IV bolus  -Cardiology consult, metopic 25 mg twice daily watch overnight  -Endocrinology consult  -Will check free T3 and T4, low TSH  -Ultrasound thyroid  -PVR <180  -Resume home meds when appropriate  -Normal saline bolus repeat lactate  -PT/OT evaluation  -TCC for discharge planning      DVTp: Lovenox    PLAN: Home with    MARK Fuentes-CNP    Plan of care was discussed extensively with patient.  Patient verbalized understanding through teach back method.  All question and concerns addressed upon examination.    Of note, this documentation is completed using the Dragon Dictation system (voice recognition software). There may be spelling and/or grammatical errors that were not corrected prior to final submission.        "

## 2024-08-01 NOTE — PROGRESS NOTES
Occupational Therapy                 Therapy Communication Note    Patient Name: Alisa Fregoso  MRN: [id number]  Today's Date: 8/1/2024     Discipline: Occupational Therapy    Missed Visit Reason: Patient placed on medical hold (hold per nursing secondary to high HR)

## 2024-08-02 ENCOUNTER — HOME HEALTH ADMISSION (OUTPATIENT)
Dept: HOME HEALTH SERVICES | Facility: HOME HEALTH | Age: 74
End: 2024-08-02
Payer: COMMERCIAL

## 2024-08-02 ENCOUNTER — APPOINTMENT (OUTPATIENT)
Dept: CARDIOLOGY | Facility: HOSPITAL | Age: 74
End: 2024-08-02
Payer: COMMERCIAL

## 2024-08-02 ENCOUNTER — DOCUMENTATION (OUTPATIENT)
Dept: HOME HEALTH SERVICES | Facility: HOME HEALTH | Age: 74
End: 2024-08-02
Payer: COMMERCIAL

## 2024-08-02 VITALS
DIASTOLIC BLOOD PRESSURE: 74 MMHG | SYSTOLIC BLOOD PRESSURE: 136 MMHG | RESPIRATION RATE: 17 BRPM | HEART RATE: 60 BPM | TEMPERATURE: 97.9 F | HEIGHT: 63 IN | OXYGEN SATURATION: 94 % | BODY MASS INDEX: 17.93 KG/M2 | WEIGHT: 101.19 LBS

## 2024-08-02 LAB
ANION GAP SERPL CALC-SCNC: 12 MMOL/L (ref 10–20)
ATRIAL RATE: 62 BPM
BUN SERPL-MCNC: 5 MG/DL (ref 6–23)
CA-I BLD-SCNC: 1.07 MMOL/L (ref 1.1–1.33)
CALCIUM SERPL-MCNC: 8.3 MG/DL (ref 8.6–10.3)
CHLORIDE SERPL-SCNC: 110 MMOL/L (ref 98–107)
CO2 SERPL-SCNC: 24 MMOL/L (ref 21–32)
CREAT SERPL-MCNC: 0.42 MG/DL (ref 0.5–1.05)
EGFRCR SERPLBLD CKD-EPI 2021: >90 ML/MIN/1.73M*2
ERYTHROCYTE [DISTWIDTH] IN BLOOD BY AUTOMATED COUNT: 14.9 % (ref 11.5–14.5)
EST. AVERAGE GLUCOSE BLD GHB EST-MCNC: 111 MG/DL
GLUCOSE SERPL-MCNC: 92 MG/DL (ref 74–99)
HBA1C MFR BLD: 5.5 %
HCT VFR BLD AUTO: 39.2 % (ref 36–46)
HGB BLD-MCNC: 11.8 G/DL (ref 12–16)
MAGNESIUM SERPL-MCNC: 1.75 MG/DL (ref 1.6–2.4)
MCH RBC QN AUTO: 28 PG (ref 26–34)
MCHC RBC AUTO-ENTMCNC: 30.1 G/DL (ref 32–36)
MCV RBC AUTO: 93 FL (ref 80–100)
NRBC BLD-RTO: 0 /100 WBCS (ref 0–0)
P AXIS: 33 DEGREES
P OFFSET: 214 MS
P ONSET: 165 MS
PLATELET # BLD AUTO: 164 X10*3/UL (ref 150–450)
POTASSIUM SERPL-SCNC: 3.7 MMOL/L (ref 3.5–5.3)
PR INTERVAL: 126 MS
Q ONSET: 228 MS
QRS COUNT: 10 BEATS
QRS DURATION: 80 MS
QT INTERVAL: 412 MS
QTC CALCULATION(BAZETT): 418 MS
QTC FREDERICIA: 416 MS
R AXIS: -21 DEGREES
RBC # BLD AUTO: 4.21 X10*6/UL (ref 4–5.2)
SODIUM SERPL-SCNC: 142 MMOL/L (ref 136–145)
T AXIS: -16 DEGREES
T OFFSET: 434 MS
T3FREE SERPL-MCNC: 2.1 PG/ML (ref 2.3–4.2)
VENTRICULAR RATE: 62 BPM
WBC # BLD AUTO: 6.8 X10*3/UL (ref 4.4–11.3)

## 2024-08-02 PROCEDURE — 83735 ASSAY OF MAGNESIUM: CPT | Performed by: NURSE PRACTITIONER

## 2024-08-02 PROCEDURE — 80051 ELECTROLYTE PANEL: CPT

## 2024-08-02 PROCEDURE — 93005 ELECTROCARDIOGRAM TRACING: CPT

## 2024-08-02 PROCEDURE — 93010 ELECTROCARDIOGRAM REPORT: CPT | Performed by: INTERNAL MEDICINE

## 2024-08-02 PROCEDURE — 2500000004 HC RX 250 GENERAL PHARMACY W/ HCPCS (ALT 636 FOR OP/ED)

## 2024-08-02 PROCEDURE — 83520 IMMUNOASSAY QUANT NOS NONAB: CPT | Performed by: INTERNAL MEDICINE

## 2024-08-02 PROCEDURE — 99239 HOSP IP/OBS DSCHRG MGMT >30: CPT

## 2024-08-02 PROCEDURE — 2500000001 HC RX 250 WO HCPCS SELF ADMINISTERED DRUGS (ALT 637 FOR MEDICARE OP): Performed by: NURSE PRACTITIONER

## 2024-08-02 PROCEDURE — 2500000001 HC RX 250 WO HCPCS SELF ADMINISTERED DRUGS (ALT 637 FOR MEDICARE OP): Performed by: STUDENT IN AN ORGANIZED HEALTH CARE EDUCATION/TRAINING PROGRAM

## 2024-08-02 PROCEDURE — 99233 SBSQ HOSP IP/OBS HIGH 50: CPT | Performed by: INTERNAL MEDICINE

## 2024-08-02 PROCEDURE — 2500000002 HC RX 250 W HCPCS SELF ADMINISTERED DRUGS (ALT 637 FOR MEDICARE OP, ALT 636 FOR OP/ED): Performed by: NURSE PRACTITIONER

## 2024-08-02 PROCEDURE — 84445 ASSAY OF TSI GLOBULIN: CPT | Performed by: INTERNAL MEDICINE

## 2024-08-02 PROCEDURE — 82330 ASSAY OF CALCIUM: CPT

## 2024-08-02 PROCEDURE — 36415 COLL VENOUS BLD VENIPUNCTURE: CPT

## 2024-08-02 PROCEDURE — 85027 COMPLETE CBC AUTOMATED: CPT

## 2024-08-02 PROCEDURE — 2500000004 HC RX 250 GENERAL PHARMACY W/ HCPCS (ALT 636 FOR OP/ED): Performed by: NURSE PRACTITIONER

## 2024-08-02 RX ORDER — LANOLIN ALCOHOL/MO/W.PET/CERES
400 CREAM (GRAM) TOPICAL DAILY
Status: DISCONTINUED | OUTPATIENT
Start: 2024-08-02 | End: 2024-08-02 | Stop reason: HOSPADM

## 2024-08-02 RX ORDER — POTASSIUM CHLORIDE 20 MEQ/1
40 TABLET, EXTENDED RELEASE ORAL ONCE
Status: COMPLETED | OUTPATIENT
Start: 2024-08-02 | End: 2024-08-02

## 2024-08-02 RX ORDER — METOPROLOL TARTRATE 25 MG/1
25 TABLET, FILM COATED ORAL 2 TIMES DAILY
Qty: 60 TABLET | Refills: 5 | Status: SHIPPED | OUTPATIENT
Start: 2024-08-02

## 2024-08-02 RX ORDER — CEPHALEXIN 500 MG/1
500 CAPSULE ORAL 2 TIMES DAILY
Qty: 14 CAPSULE | Refills: 0 | Status: SHIPPED | OUTPATIENT
Start: 2024-08-02 | End: 2024-08-09

## 2024-08-02 RX ORDER — LANOLIN ALCOHOL/MO/W.PET/CERES
400 CREAM (GRAM) TOPICAL DAILY
Qty: 30 TABLET | Refills: 5 | Status: SHIPPED | OUTPATIENT
Start: 2024-08-03

## 2024-08-02 RX ORDER — MAGNESIUM SULFATE HEPTAHYDRATE 40 MG/ML
2 INJECTION, SOLUTION INTRAVENOUS ONCE
Status: COMPLETED | OUTPATIENT
Start: 2024-08-02 | End: 2024-08-02

## 2024-08-02 ASSESSMENT — COGNITIVE AND FUNCTIONAL STATUS - GENERAL
DAILY ACTIVITIY SCORE: 16
STANDING UP FROM CHAIR USING ARMS: A LITTLE
DRESSING REGULAR UPPER BODY CLOTHING: A LITTLE
TOILETING: A LOT
WALKING IN HOSPITAL ROOM: A LITTLE
MOVING FROM LYING ON BACK TO SITTING ON SIDE OF FLAT BED WITH BEDRAILS: A LITTLE
TURNING FROM BACK TO SIDE WHILE IN FLAT BAD: A LITTLE
EATING MEALS: A LITTLE
MOVING TO AND FROM BED TO CHAIR: A LITTLE
MOBILITY SCORE: 17
DRESSING REGULAR LOWER BODY CLOTHING: A LOT
HELP NEEDED FOR BATHING: A LITTLE
PERSONAL GROOMING: A LITTLE
CLIMB 3 TO 5 STEPS WITH RAILING: A LOT

## 2024-08-02 ASSESSMENT — PAIN SCALES - WONG BAKER: WONGBAKER_NUMERICALRESPONSE: NO HURT

## 2024-08-02 ASSESSMENT — PAIN SCALES - GENERAL: PAINLEVEL_OUTOF10: 0 - NO PAIN

## 2024-08-02 NOTE — PROGRESS NOTES
"Occupational Therapy                 Therapy Communication Note    Patient Name: Alisa Fregoso  MRN: [id number]  Today's Date: 8/2/2024     Discipline: Occupational Therapy    Missed Visit Reason: Missed Visit Reason: Patient refused (pt refused OT tx, pt encouraged to engage in OOB activity however pt stating \" I am not doing therapy I want to go home, when I'm home I will do my therapy\" Rn notfied of pt refusal. pt now a 1:1)    Missed Time: Attempt    Comment:  "

## 2024-08-02 NOTE — PROGRESS NOTES
08/02/24 1523   Current Planned Discharge Disposition   Current Planned Discharge Disposition Home Health     Patient is to discharge home with [facility], no other needs identified.

## 2024-08-02 NOTE — DISCHARGE INSTRUCTIONS
Thank you for choosing [facility]. It has been a pleasure taking part in your medical care. Please follow up with your primary care provider as instructed. If your symptoms should persist or worsen, please contact your primary care physician, or in the case of an emergency proceed to the nearest Emergency Room for further care. If you have any questions about the care you received, please call [facility] at [phone]. Thank you again! ENOC Mello

## 2024-08-02 NOTE — HOSPITAL COURSE
Alisa Fregoso is a 73 y.o. female presenting with generalized weakness with worsening nausea vomiting and diarrhea.  Patient with history of dementia  at bedside who is her caregiver.   states she has had intermittent diarrhea for the past couple months with multiple ER/hospital visits.  Patient's PCP has prescribed Pepto-Bismol in which she has been taking.  Patient complaining of abdominal pain with palpation, denies nausea or vomiting.  Patient has history of diverticulitis per  she tries to eat a bland diet to avoid flareups.  Patient has history of UTIs and is on nitrofurantoin daily.  Patient is legally blind and wheelchair-bound.  On admission potassium 2.7, magnesium 1.30, lactate 2.4.  Electrolytes were replaced in the ER along with a bolus.  Leukocytes 19.9, UA positive for leukocytes and patient was started on Rocephin IV.  CXR negative, CT abdomen pelvis negative without diverticulitis.  EKG showed tachycardia with occasional PVCs.  Troponin slightly elevated 11, 14.  Patient will be admitted for further evaluation and treatment. Pt was given IVF and anbx for UTI.  She Showed improvement. During hospitalization she had a moment of  sustained.  She was seen by cardiology and started on Metop 25mg BID, tolerated dosing and observed overnight. Pt also had Low TSH and is on synthroid.  Normal T4 and was seen by endocrinology and can follow up outpt. Pt ready for discharge,  at bedside. On day of discharge pt hemodynamically stable.

## 2024-08-02 NOTE — CONSULTS
Inpatient consult to Endocrinology  Consult performed by: Conor Reid MD  Consult ordered by: ANGEL Fuentes      ,    Assessment/Plan      Low TSH  ORMAL free T4   R/o hyperthyroifidm r/o ab  Check free F3 TSI       Reason For Consult  Low tsh    History Of Present Illness  Alisa Fregoso is a 73 y.o. female with  has a past medical history of Arthritis, Blindness, Calculus of kidney (10/01/2020), Chronic headaches, Diverticulitis of large intestine without perforation or abscess without bleeding (10/01/2020), Hypothyroidism, Migraine, Other specified disorders of eye and adnexa, Personal history of (healed) traumatic fracture (10/18/2020), Personal history of diseases of the blood and blood-forming organs and certain disorders involving the immune mechanism (10/27/2020), Personal history of other diseases of the musculoskeletal system and connective tissue (10/27/2020), Personal history of other specified conditions (10/18/2020), Personal history of other specified conditions, Personal history of other specified conditions (10/27/2020), Short-term memory loss, Unspecified visual loss (10/27/2020), Uses walker, and Wears glasses.    She has no past medical history of Delayed emergence from general anesthesia. presenting with low tsh normal free t4.     ANGEL Fuentes  Nurse Practitioner  Internal Medicine     H&P      Signed     Date of Service: 7/30/2024  4:58 PM     Signed       Expand All Collapse All    History Of Present Illness  Alisa Fregoso is a 73 y.o. female presenting with generalized weakness with worsening nausea vomiting and diarrhea.  Patient with history of dementia  at bedside who is her caregiver.   states she has had intermittent diarrhea for the past couple months with multiple ER/hospital visits.  Patient's PCP has prescribed Pepto-Bismol in which she has been taking.  Patient complaining of abdominal pain with palpation, denies nausea or vomiting.   Patient has history of diverticulitis per  she tries to eat a bland diet to avoid flareups.  Patient has history of UTIs and is on nitrofurantoin daily.  Patient is legally blind and wheelchair-bound.  On admission potassium 2.7, magnesium 1.30, lactate 2.4.  Electrolytes were replaced in the ER along with a bolus.  Leukocytes 19.9, UA positive for leukocytes and patient was started on Rocephin IV.  CXR negative, CT abdomen pelvis negative without diverticulitis.  EKG showed tachycardia with occasional PVCs.  Troponin slightly elevated 11, 14.  Patient will be admitted for further evaluation and treatment.                Past Medical History  She has a past medical history of Arthritis, Blindness, Calculus of kidney (10/01/2020), Chronic headaches, Diverticulitis of large intestine without perforation or abscess without bleeding (10/01/2020), Hypothyroidism, Migraine, Other specified disorders of eye and adnexa, Personal history of (healed) traumatic fracture (10/18/2020), Personal history of diseases of the blood and blood-forming organs and certain disorders involving the immune mechanism (10/27/2020), Personal history of other diseases of the musculoskeletal system and connective tissue (10/27/2020), Personal history of other specified conditions (10/18/2020), Personal history of other specified conditions, Personal history of other specified conditions (10/27/2020), Short-term memory loss, Unspecified visual loss (10/27/2020), Uses walker, and Wears glasses.    She has no past medical history of Delayed emergence from general anesthesia.    Surgical History  She has a past surgical history that includes CT angio abdomen pelvis w and or wo IV IV contrast (05/28/2022); Hip surgery (Left, 09/25/2020); Total hip arthroplasty (Left); Cholecystectomy; Hysterectomy; Partial knee arthroplasty (Left); Corneal transplant (Left); Partial gastrectomy; Esophagogastroduodenoscopy; and Colonoscopy.     Social  History  She reports that she has never smoked. She has been exposed to tobacco smoke. She has never used smokeless tobacco. She reports that she does not currently use alcohol. She reports that she does not use drugs.    Family History  Family History   Problem Relation Name Age of Onset    No Known Problems Mother      No Known Problems Father      No Known Problems Sister      No Known Problems Brother          Allergies  Ciprofloxacin    Review of Systems  Per hpi  Past Medical History:   Diagnosis Date    Arthritis     Blindness     Calculus of kidney 10/01/2020    Bilateral nephrolithiasis    Chronic headaches     Diverticulitis of large intestine without perforation or abscess without bleeding 10/01/2020    Sigmoid diverticulitis    Hypothyroidism     Migraine     Other specified disorders of eye and adnexa     Itchy, watery, and red eye    Personal history of (healed) traumatic fracture 10/18/2020    Status post fracture of left hip    Personal history of diseases of the blood and blood-forming organs and certain disorders involving the immune mechanism 10/27/2020    History of bleeding disorder    Personal history of other diseases of the musculoskeletal system and connective tissue 10/27/2020    History of arthritis    Personal history of other specified conditions 10/18/2020    History of cachexia    Personal history of other specified conditions     History of abdominal pain    Personal history of other specified conditions 10/27/2020    History of balance disorder    Short-term memory loss     Unspecified visual loss 10/27/2020    Vision problems    Uses walker     Wears glasses        Past Surgical History:   Procedure Laterality Date    CHOLECYSTECTOMY      COLONOSCOPY      CORNEAL TRANSPLANT Left     CT ABDOMEN PELVIS ANGIOGRAM W AND/OR WO IV CONTRAST  05/28/2022    CT ABDOMEN PELVIS ANGIOGRAM W AND/OR WO IV CONTRAST 5/28/2022 [facility]    ESOPHAGOGASTRODUODENOSCOPY      HIP SURGERY Left  09/25/2020    hardware- fracture    HYSTERECTOMY      PARTIAL GASTRECTOMY      PARTIAL KNEE ARTHROPLASTY Left     Partial Replacement    TOTAL HIP ARTHROPLASTY Left     Total Hip Replacement       Social History     Socioeconomic History    Marital status:      Spouse name: Not on file    Number of children: Not on file    Years of education: Not on file    Highest education level: Not on file   Occupational History    Not on file   Tobacco Use    Smoking status: Never     Passive exposure: Past    Smokeless tobacco: Never   Vaping Use    Vaping status: Never Used   Substance and Sexual Activity    Alcohol use: Not Currently    Drug use: Never    Sexual activity: Defer   Other Topics Concern    Not on file   Social History Narrative    Not on file     Social Determinants of Health     Financial Resource Strain: High Risk (7/30/2024)    Overall Financial Resource Strain (CARDIA)     Difficulty of Paying Living Expenses: Hard   Food Insecurity: Not on file   Transportation Needs: No Transportation Needs (7/30/2024)    PRAPARE - Transportation     Lack of Transportation (Medical): No     Lack of Transportation (Non-Medical): No   Physical Activity: Not on file   Stress: Not on file   Social Connections: Feeling Socially Integrated (11/15/2023)    OASIS : Social Isolation     Frequency of experiencing loneliness or isolation: Rarely   Recent Concern: Social Connections - Feeling Somewhat Isolated (10/16/2023)    OASIS : Social Isolation     Frequency of experiencing loneliness or isolation: Sometimes   Intimate Partner Violence: Not on file   Housing Stability: High Risk (7/30/2024)    Housing Stability Vital Sign     Unable to Pay for Housing in the Last Year: Yes     Number of Times Moved in the Last Year: 1     Homeless in the Last Year: No        Physical Exam   deferred  ROS, PMH, FH/SH, surgical history and allergies have been reviewed.    Last Recorded Vitals  Blood pressure 166/75, pulse 70,  "temperature 37.1 °C (98.8 °F), resp. rate 18, height 1.6 m (5' 3\"), weight 45.9 kg (101 lb 3.1 oz), SpO2 97%.    Relevant Results  Results from last 7 days   Lab Units 08/01/24  0539 07/31/24  0419 07/30/24 2036 07/30/24  1155   GLUCOSE mg/dL 79 83 100* 82     No results found for: \"HGBA1C\"   Lab Results   Component Value Date    TSH 0.10 (L) 08/01/2024    FREET4 1.10 08/01/2024        Kenny Reid MD FACE  Office phone - [phone]  Fax - [phone]  Address: [address] [address]  Address: [address] [facility] [address]  8/1/2024  11:24 PM  "

## 2024-08-02 NOTE — HH CARE COORDINATION
Home Care received a Referral for Nursing, Physical Therapy, Occupational Therapy, and Home Health Aide. We have processed the referral for a Start of Care on 08/04/2024.     If you have any questions or concerns, please feel free to contact us at [phone]. Follow the prompts, enter your five digit zip code, and you will be directed to your care team on [facility] 1.

## 2024-08-02 NOTE — DISCHARGE SUMMARY
Discharge Diagnosis  Generalized weakness    Issues Requiring Follow-Up  none    Discharge Meds     Your medication list        START taking these medications        Instructions Last Dose Given Next Dose Due   cephalexin 500 mg capsule  Commonly known as: Keflex      Take 1 capsule (500 mg) by mouth 2 times a day for 7 days.       magnesium oxide 400 mg (241.3 mg magnesium) tablet  Commonly known as: Mag-Ox  Start taking on: August 3, 2024      Take 1 tablet (400 mg) by mouth once daily.       metoprolol tartrate 25 mg tablet  Commonly known as: Lopressor      Take 1 tablet (25 mg) by mouth 2 times a day.              CONTINUE taking these medications        Instructions Last Dose Given Next Dose Due   ACIDOPHILUS PROBIOTIC ORAL           busPIRone 5 mg tablet  Commonly known as: Buspar      Take 1 tab every 12 hrs. IF needed for anxiety       cyclobenzaprine 10 mg tablet  Commonly known as: Flexeril           dicyclomine 20 mg tablet  Commonly known as: Bentyl      TAKE 1 TABLET BY MOUTH EVERY 6 HOURS AS NEEDED       docusate sodium 100 mg capsule  Commonly known as: Colace           gabapentin 300 mg capsule  Commonly known as: Neurontin      TAKE 2 CAPSULES BY MOUTH ONCE DAILY AT BEDTIME       levothyroxine 50 mcg tablet  Commonly known as: Synthroid, Levoxyl      Take 1 tablet (50 mcg) by mouth once daily.       nitrofurantoin 50 mg capsule  Commonly known as: Macrodantin      Take 1 capsule by mouth once daily       pantoprazole 40 mg EC tablet  Commonly known as: ProtoNix      Take 1 tablet (40 mg) by mouth once daily.       polyethylene glycol 17 gram/dose powder  Commonly known as: Glycolax, Miralax           prednisoLONE acetate 1 % ophthalmic suspension  Commonly known as: Pred-Forte           predniSONE 5 mg tablet  Commonly known as: Deltasone      Take 1 tablet by mouth once daily                 Where to Get Your Medications        These medications were sent to [facility] [address] [place] [address] -  [id number]  [id number]  [address] [address]      Phone: [phone]   cephalexin 500 mg capsule  magnesium oxide 400 mg (241.3 mg magnesium) tablet  metoprolol tartrate 25 mg tablet         Test Results Pending At Discharge  Pending Labs       Order Current Status    Thyroid stimulating immunoglobulin In process    Thyrotropin receptor antibody In process    Blood Culture Preliminary result    Blood Culture Preliminary result            Last Vitals  Vitals:    08/01/24 1331 08/01/24 2024 08/01/24 2053 08/02/24 0609   BP: 118/58 166/75  136/74   BP Location:       Patient Position:  Sitting  Sitting   Pulse: 71 65 70 60   Resp: 18 18  17   Temp: 36.8 °C (98.2 °F) 37.1 °C (98.8 °F)  36.6 °C (97.9 °F)   TempSrc:    Temporal   SpO2: 94% 97%  94%   Weight:       Height:           Hospital Course  Alisa Fregoso is a 73 y.o. female presenting with generalized weakness with worsening nausea vomiting and diarrhea.  Patient with history of dementia  at bedside who is her caregiver.   states she has had intermittent diarrhea for the past couple months with multiple ER/hospital visits.  Patient's PCP has prescribed Pepto-Bismol in which she has been taking.  Patient complaining of abdominal pain with palpation, denies nausea or vomiting.  Patient has history of diverticulitis per  she tries to eat a bland diet to avoid flareups.  Patient has history of UTIs and is on nitrofurantoin daily.  Patient is legally blind and wheelchair-bound.  On admission potassium 2.7, magnesium 1.30, lactate 2.4.  Electrolytes were replaced in the ER along with a bolus.  Leukocytes 19.9, UA positive for leukocytes and patient was started on Rocephin IV.  CXR negative, CT abdomen pelvis negative without diverticulitis.  EKG showed tachycardia with occasional PVCs.  Troponin slightly elevated 11, 14.  Patient will be admitted for further evaluation and treatment. Pt was given IVF and anbx for UTI.  She Showed  improvement. During hospitalization she had a moment of  sustained.  She was seen by cardiology and started on Metop 25mg BID, tolerated dosing and observed overnight. Pt also had Low TSH and is on synthroid.  Normal T4 and was seen by endocrinology and can follow up outpt. Pt ready for discharge,  at bedside. On day of discharge pt hemodynamically stable.    Pertinent Physical Exam At Time of Discharge  Physical Exam  Constitutional:       Appearance: She is well-groomed, underweight.   HENT:      Head: Normocephalic.      Mouth/Throat:      Mouth: Mucous membranes are moist.   Eyes:      Pupils: Pupils are equal, round, and reactive to light.   Cardiovascular:      Rate and Rhythm: Normal rate. Rhythm irregular.      Heart sounds: Normal heart sounds, S1 normal and S2 normal.   Pulmonary:      Effort: Pulmonary effort is normal.      Breath sounds: Normal breath sounds.   Abdominal:      General: Bowel sounds are normal.      Palpations: Abdomen is soft.   Musculoskeletal:         General: Normal range of motion.      Cervical back: Neck supple.   Skin:     General: Skin is warm.   Neurological:      Mental Status: She is alert. Mental status is at baseline.      Motor: Weakness present.   Psychiatric:         Mood and Affect: Mood normal.         Behavior: Behavior normal.         Cognition and Memory: Cognition is impaired. Memory is impaired    Outpatient Follow-Up  Future Appointments   Date Time Provider Department Center   8/19/2024  1:00 PM MATTI ANDERSEN ECG/HOLTER [id number] [place]   9/9/2024 10:15 AM Ray Jaramillo MD [id number] [place]         Funmilayo Harding APRN-CNP

## 2024-08-02 NOTE — PROGRESS NOTES
[facility] Heart Progress Note           Rounding DAYANA/Cardiologist:  Natacha Clark, MARK-CNP, Dr. Ray Jaramillo  Primary Cardiologist:  none     Date:  8/2/2024  Patient:  Alisa Fregoso  YOB: 1950  MRN:  [id number]   Admit Date:  7/30/2024      SUBJECTIVE:    8/2/2024  Alisa Fregoso was seen and examined today at bedside.  She is resting comfortably, is asking if she will be able to go home today.  She denies chest pain, shortness of breath, palpitations, nausea, vomiting.  Review of telemetry shows sinus rhythm with heart rates in the 60s and 70s.  EKG this morning sinus rhythm with premature atrial complex, ventricular rate 62 bpm.  She is being evaluated by endocrine service due to findings of low TSH      VITALS:     Vitals:    08/01/24 1331 08/01/24 2024 08/01/24 2053 08/02/24 0609   BP: 118/58 166/75  136/74   BP Location:       Patient Position:  Sitting  Sitting   Pulse: 71 65 70 60   Resp: 18 18  17   Temp: 36.8 °C (98.2 °F) 37.1 °C (98.8 °F)  36.6 °C (97.9 °F)   TempSrc:    Temporal   SpO2: 94% 97%  94%   Weight:       Height:           Intake/Output Summary (Last 24 hours) at 8/2/2024 1042  Last data filed at 8/2/2024 0554  Gross per 24 hour   Intake 529.17 ml   Output 1650 ml   Net -1120.83 ml       Wt Readings from Last 4 Encounters:   07/30/24 45.9 kg (101 lb 3.1 oz)   05/04/24 49.9 kg (110 lb)   04/29/24 54.4 kg (120 lb)   10/08/23 54.4 kg (120 lb)       CURRENT HOSPITAL MEDICATIONS:   cefTRIAXone, 1 g, intravenous, q24h  docusate sodium, 100 mg, oral, BID  enoxaparin, 40 mg, subcutaneous, q24h  gabapentin, 600 mg, oral, Nightly  levothyroxine, 50 mcg, oral, Daily  magnesium oxide, 400 mg, oral, Daily  metoprolol tartrate, 25 mg, oral, BID  pantoprazole, 40 mg, oral, Daily  polyethylene glycol, 17 g, oral, Daily  predniSONE, 5 mg, oral, Daily      sodium chloride 0.9%, 50 mL/hr, Last Rate: 50 mL/hr (08/01/24 1826)      Current Outpatient Medications   Medication Instructions     Nathanael pt   lov [id number]   busPIRone (Buspar) 5 mg tablet Take 1 tab every 12 hrs. IF needed for anxiety    cyclobenzaprine (Flexeril) 10 mg tablet 1 tablet, oral, 3 times daily PRN    dicyclomine (BENTYL) 20 mg, oral, Every 6 hours PRN    docusate sodium (Colace) 100 mg capsule 1 capsule, oral, 2 times daily    gabapentin (NEURONTIN) 600 mg, oral, Nightly    L. acidophilus/pectin, citrus (ACIDOPHILUS PROBIOTIC ORAL) oral    levothyroxine (SYNTHROID, LEVOXYL) 50 mcg, oral, Daily    nitrofurantoin (MACRODANTIN) 50 mg, oral, Daily    pantoprazole (PROTONIX) 40 mg, oral, Daily    polyethylene glycol (GLYCOLAX, MIRALAX) 17 g, oral, Daily RT    prednisoLONE acetate (Pred-Forte) 1 % ophthalmic suspension 1 drop, Left Eye, 4 times daily    predniSONE (DELTASONE) 5 mg, oral, Daily        PHYSICAL EXAMINATION:   GENERAL: Thin, elderly female in no acute distress, in no acute distress.  CHEST:  Symmetric and nontender.  NEURO/PSYCH:  Alert and oriented to person and place with approppriate behavior and responses.  NECK:  Supple, no JVD, no bruit.  LUNGS:  Clear to auscultation bilaterally, normal respiratory effort.  HEART:  Rate and rhythm regular no murmur, no gallop appreciated.   EXTREMITIES:  Warm with good color, no clubbing or cyanosis.  No lower extremity edema noted.  PERIPHERAL VASCULAR:  Pulses present and equally palpable; 2+ radial pulse bilateral    LAB DATA:     CBC:   Results from last 7 days   Lab Units 08/02/24  0616 08/01/24  0540 07/31/24  0419   WBC AUTO x10*3/uL 6.8 6.6 14.1*   RBC AUTO x10*6/uL 4.21 4.43 3.97*   HEMOGLOBIN g/dL 11.8* 12.5 11.3*   HEMATOCRIT % 39.2 40.7 36.8   MCV fL 93 92 93   MCH pg 28.0 28.2 28.5   MCHC g/dL 30.1* 30.7* 30.7*   RDW % 14.9* 14.9* 14.8*   PLATELETS AUTO x10*3/uL 164 157 149*     CMP:    Results from last 7 days   Lab Units 08/02/24  0616 08/01/24  0539 07/31/24  0419 07/30/24 2036 07/30/24  1155   SODIUM mmol/L 142 144 140   < > 143   POTASSIUM mmol/L 3.7 3.3* 3.4*   < > 2.7*   CHLORIDE mmol/L  110* 109* 107   < > 104   CO2 mmol/L 24 25 28   < > 30   BUN mg/dL 5* 5* 9   < > 11   CREATININE mg/dL 0.42* 0.46* 0.51   < > 0.60   GLUCOSE mg/dL 92 79 83   < > 82   PROTEIN TOTAL g/dL  --   --   --   --  6.5   CALCIUM mg/dL 8.3* 8.5* 8.2*   < > 9.0   BILIRUBIN TOTAL mg/dL  --   --   --   --  0.8   ALK PHOS U/L  --   --   --   --  53   AST U/L  --   --   --   --  23   ALT U/L  --   --   --   --  9    < > = values in this interval not displayed.     BMP:    Results from last 7 days   Lab Units 08/02/24  0616 08/01/24  0539 07/31/24  0419   SODIUM mmol/L 142 144 140   POTASSIUM mmol/L 3.7 3.3* 3.4*   CHLORIDE mmol/L 110* 109* 107   CO2 mmol/L 24 25 28   BUN mg/dL 5* 5* 9   CREATININE mg/dL 0.42* 0.46* 0.51   CALCIUM mg/dL 8.3* 8.5* 8.2*   GLUCOSE mg/dL 92 79 83     Magnesium:  Results from last 7 days   Lab Units 08/02/24  0616 08/01/24  0539 07/30/24  2036   MAGNESIUM mg/dL 1.75 1.62 1.97     Troponin:    Results from last 7 days   Lab Units 07/31/24  0419 07/30/24  1313 07/30/24  1155   TROPHS ng/L 12 14* 11     BNP:   Results from last 7 days   Lab Units 07/30/24  1155   BNP pg/mL 68     Lipid Panel:         DIAGNOSTIC TESTING:   @No results found for this or any previous visit.    CT abdomen pelvis w IV contrast    Result Date: 7/30/2024  STUDY: CT Abdomen and Pelvis with IV Contrast; 07/30/2024 at 2:48 PM INDICATION: Abdominal pain, cramping, nausea. COMPARISON: CT abdomen and pelvis 04/29/24, 10/08/23, 09/15/23, 11/22/22. ACCESSION NUMBER(S): [id number] ORDERING CLINICIAN: SHANTELLE PENDLETON TECHNIQUE: CT of the abdomen and pelvis was performed.  Contiguous axial images were obtained at 3 mm slice thickness through the abdomen and pelvis. Coronal and sagittal reconstructions at 3 mm slice thickness were performed.  Omnipaque-350 75 mL was administered intravenously.  FINDINGS: LOWER CHEST: No cardiomegaly.  No pericardial effusion.  There are interstitial changes posteriorly in the lung bases consistent with  dependent atelectasis.  ABDOMEN:  LIVER: No hepatomegaly.  Smooth surface contour.  There is mild fatty infiltration of liver.  BILE DUCTS: No intrahepatic or extrahepatic biliary ductal dilatation.  GALLBLADDER: The gallbladder is absent. STOMACH: Postsurgical changes to the stomach.  PANCREAS: No masses or ductal dilatation.  SPLEEN: No splenomegaly or focal splenic lesion.  ADRENAL GLANDS: No thickening or nodules.  KIDNEYS AND URETERS: Kidneys are normal in size and location.  No renal or ureteral calculi.  PELVIS:  BLADDER: No abnormalities identified.  REPRODUCTIVE ORGANS: No abnormalities identified.  BOWEL: There is diverticulosis.  The appendix is identified and is normal.  VESSELS: No abnormalities identified.  Abdominal aorta is normal in caliber.  PERITONEUM/RETROPERITONEUM/LYMPH NODES: No free fluid.  No pneumoperitoneum. No lymphadenopathy.  ABDOMINAL WALL: No abnormalities identified. SOFT TISSUES: No abnormalities identified.  BONES: There is a left hip arthroplasty.  There is rotoscoliosis of the lumbosacral spine with the curve to the right.  There is diffuse neural foraminal narrowing.  There are old right-sided pelvic fractures.  No acute fracture or aggressive osseous lesion.    Hepatic steatosis. Diverticulosis without diverticulitis. No acute process. Signed by Zack Goins MD    ECG 12 lead    Result Date: 7/30/2024  Sinus rhythm with occasional and consecutive Premature ventricular complexes ST & T wave abnormality, consider lateral ischemia Abnormal ECG When compared with ECG of 08-OCT-2023 05:49, Premature ventricular complexes are now Present Aberrant conduction is no longer Present ST now depressed in Anterior leads Nonspecific T wave abnormality, improved in Inferior leads T wave inversion more evident in Lateral leads See ED provider note for full interpretation and clinical correlation Confirmed by Ting Bolden ([id number]) on 7/30/2024 3:23:20 PM    ECG 12 lead    Result Date:  7/30/2024  Sinus tachycardia with occasional Premature ventricular complexes Left axis deviation ST & T wave abnormality, consider lateral ischemia Abnormal ECG When compared with ECG of 30-JUL-2024 11:48, (unconfirmed) No significant change was found See ED provider note for full interpretation and clinical correlation Confirmed by Ting Bolden ([id number]) on 7/30/2024 3:22:49 PM    CT head wo IV contrast    Result Date: 7/30/2024  Interpreted By:  Simon May, STUDY: CT HEAD WO IV CONTRAST;  7/30/2024 2:49 pm   INDICATION: Signs/Symptoms:weakness confusion.   COMPARISON: 10/08/2023   ACCESSION NUMBER(S): [id number]   ORDERING CLINICIAN: SHANTELLE PENDLETON   TECHNIQUE: Sequential trans axial images were obtained  .   FINDINGS: INTRACRANIAL:   There is mild prominence of the cortical sulci indicating atrophy.   There is moderate ventriculomegaly, again consistent with atrophy. Ventriculomegaly appears slightly greater proportion to the sulcal prominence, possibly with a degree of normal pressure hydrocephalus.   Mild decreased attenuation of the periventricular and long tracks of the white matter most consistent with gliosis from arterial disease. There is no evidence of definite subacute infarction, intracranial hemorrhage or mass.     EXTRACRANIAL: Visualized paranasal sinuses and mastoids are clear. The calvarium is intact.       Age related degenerative change as described without acute findings or significant change from the prior exam.   Signed by: Simon May 7/30/2024 3:10 PM Dictation workstation:   [id number]    XR chest 1 view    Result Date: 7/30/2024  STUDY: Chest Radiograph;  7/30/2024 at 12:18 PM. INDICATION: Weakness. COMPARISON: XR chest 9/17/2023, 10/1/2020. ACCESSION NUMBER(S): [id number] ORDERING CLINICIAN: SHANTELLE PENDLETON TECHNIQUE:  Frontal chest was obtained at 12:18 hours. FINDINGS: CARDIOMEDIASTINAL SILHOUETTE: The cardiac silhouette is normal in size.  There is stable tortuosity of the thoracic  aorta.  LUNGS: There is stable elevation of the right diaphragm.  Significant clearing of the right midlung linear density and bibasilar atelectasis is noted.with possible minor residual scarring.  No dense consolidation, pleural effusion, or pneumothorax.  ABDOMEN: No remarkable upper abdominal findings.  BONES: No acute osseous changes.  There is an old fracture deformity of the right clavicle unchanged.    Normal heart size with significant clearing of the right midlung and bibasilar atelectasis and possible minor residual scarring. No adverse changes. Signed by Ciarra Poole DO       US thyroid   Final Result   Unremarkable thyroid ultrasound.             MACRO:   None        Signed by: Shun Cline 8/1/2024 3:01 PM   Dictation workstation:   [id number]      CT head wo IV contrast   Final Result   Age related degenerative change as described without acute findings   or significant change from the prior exam.        Signed by: Simon May 7/30/2024 3:10 PM   Dictation workstation:   [id number]      CT abdomen pelvis w IV contrast   Final Result   Hepatic steatosis.   Diverticulosis without diverticulitis.   No acute process.   Signed by Zack Goins MD      XR chest 1 view   Final Result   Normal heart size with significant clearing of the right midlung and   bibasilar atelectasis and possible minor residual scarring. No adverse   changes.   Signed by Ciarra Poole DO          No echocardiogram results found for the past 14 days    RADIOLOGY:     US thyroid   Final Result   Unremarkable thyroid ultrasound.             MACRO:   None        Signed by: Shun Cline 8/1/2024 3:01 PM   Dictation workstation:   [id number]      CT head wo IV contrast   Final Result   Age related degenerative change as described without acute findings   or significant change from the prior exam.        Signed by: Simon May 7/30/2024 3:10 PM   Dictation workstation:   [id number]      CT abdomen pelvis w IV contrast    Final Result   Hepatic steatosis.   Diverticulosis without diverticulitis.   No acute process.   Signed by Zack Goins MD      XR chest 1 view   Final Result   Normal heart size with significant clearing of the right midlung and   bibasilar atelectasis and possible minor residual scarring. No adverse   changes.   Signed by Ciarra Poole DO          PROBLEM LIST     Patient Active Problem List   Diagnosis    Bilateral sensorineural hearing loss    Chronic nonspecific colitis    Constipation in female    Forgetfulness    General physical deterioration    GERD (gastroesophageal reflux disease)    Hypothyroidism    Intractable vascular headache    Iron deficiency anemia    Migraine without aura and with status migrainosus, not intractable    Mild episode of recurrent major depressive disorder (CMS-HCC)    Peripheral neuropathy    Physical deconditioning    Recurrent UTI    Rheumatoid arthritis (Multi)    Encounter for immunization    Medicare annual wellness visit, subsequent    Adjustment reaction with prolonged depressive reaction    Anxiety    Back pain    Flank pain    Bunion of right foot    Cornea guttata    Difficulty walking involving foot    Edema    Elevated C-reactive protein (CRP)    Elevated sed rate    Fatigue    Foot pain    Generalized abdominal pain    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    Hypercholesteremia    Hypokalemia    Joint swelling    Knee pain, left    Multiple joint pain    Myalgia    OA (osteoarthritis)    Acute pyelonephritis    Obstructive pyelonephritis    Pain in both feet    Pain in extremity    Pelvic fracture (Multi)    Pseudophakia, left eye    Rash    Purpura (CMS-HCC)    Retinal macular atrophy    Rheumatoid arthritis involving both knees with positive rheumatoid factor (Multi)    Gutierrez's esophagus without dysplasia    Gastroesophageal reflux disease without esophagitis    Acquired hypothyroidism    Rheumatoid arthritis of multiple sites without organ  or system involvement with positive rheumatoid factor (Multi)    Abdominal pain    Weakness    Migraine headache    Generalized weakness       ASSESSMENT:     Paroxysmal atrial fibrillation not on anticoagulation due to history of GI bleeding.  [id number] score equal to 2  Hypokalemia  Hypomagnesia  Urinary tract infection being treated with IV antibiotics managed by primary service  Normal LV systolic function per echocardiogram September 2023  History of diverticulitis, GI bleed  History of dementia, legally blind  Low TSH, being evaluated by endocrinology service    PLAN:     8/1/2024  Patient seen and examined in conjunction with MARK Sweeney/ENOC and agree with the evaluation as noted above. 73-year-old lady with history of recent UTI who presents with a generalized weakness and electrolyte abnormalities.  She was noted to be tachycardic on the monitor for which cardiology was consulted on review of her telemetry reveals an episode of paroxysmal atrial fibrillation with RVR with heart rates in the 150s to 170s.  This lasted for about 1 however the patient felt dizzy with no chest pain at this time.  Follow-up EKG reveals sinus tachycardia with frequent PACs.  She has no prior history of coronary artery disease.  She has had history of diverticulitis with prior GI bleed.  The patient is also legally blind.  Serial troponins so far is negative and recent echocardiogram shows normal LV function with no hemodynamically significant valvular abnormalities.     ASSESSMENT AND PLAN:  1.  Paroxysmal atrial fibrillation: This appears to be an isolated event with spontaneous conversion back to the sinus rhythm, occurring in the setting of relative hypokalemia and hypomagnesemia as well as a UTI.  She has a low [id number] and in view of her history of GI bleed, we will not recommend long-term oral anticoagulation unless she has recurrent symptoms.  In the meantime, we will start her on low-dose beta-blockers and  replace electrolytes and encourage adequate hydration.  2.  Generalized weakness: Likely secondary to underlying UTI.  We will defer to primary team for continued management.      8/2/2024  Rhythm stable overnight with sinus rhythm.  Potassium today 3.7, magnesium 1.75.  Patient denies cardiac complaints.  Will supplement potassium and magnesium this morning.  Add oral magnesium 400 mg daily.  Continue metoprolol 25 mg twice daily.  Patient may be discharged to home today from cardiology perspective.  48-hour Holter monitor and office follow-up with Dr. Jaramillo to be arranged, message was sent to schedulers.  Final cardiology recommendation per Dr. Clint ORTIZ  [facility]    Of note, this documentation is completed using the Dragon Dictation system (voice recognition software). There may be spelling and/or grammatical errors that were not corrected prior to final submission.    Electronically signed by ANGEL Mcbride, on 8/2/2024 at 10:42 AM     Patient seen and examined in conjunction with MARK Sweeney/ENOC and agree with the evaluation as noted above.  Patient continues to do well denies any chest pain or shortness of breath.  She continues to maintain sinus rhythm with occasional PVCs.  Agree with plans for discharge and outpatient follow-up with Holter monitor as noted above.  AKA

## 2024-08-02 NOTE — NURSING NOTE
Pt's hr was sustaining in the 110's-180's. Notified Dr. Foley,  RUSTY. AYAN Harding. Dr. Foley ordered IV metoprolol and a bolus NS. Pt's hr went into the 70's. Dr. Foley also ordered continuous NS @ 50 ml/hr. Continued to monitor hr, Pt, and tele throughout shift. Pt maintained stable/normal hr and vs throughout the remaining of the shift.

## 2024-08-03 LAB
BACTERIA BLD CULT: NORMAL
BACTERIA BLD CULT: NORMAL

## 2024-08-04 LAB — TSH RECEP AB SER-ACNC: <1.1 IU/L

## 2024-08-05 LAB
ATRIAL RATE: 113 BPM
ATRIAL RATE: 62 BPM
P AXIS: 21 DEGREES
P AXIS: 33 DEGREES
P OFFSET: 204 MS
P OFFSET: 214 MS
P ONSET: 163 MS
P ONSET: 165 MS
PR INTERVAL: 126 MS
PR INTERVAL: 126 MS
Q ONSET: 216 MS
Q ONSET: 228 MS
QRS COUNT: 10 BEATS
QRS COUNT: 19 BEATS
QRS DURATION: 80 MS
QRS DURATION: 84 MS
QT INTERVAL: 284 MS
QT INTERVAL: 412 MS
QTC CALCULATION(BAZETT): 389 MS
QTC CALCULATION(BAZETT): 418 MS
QTC FREDERICIA: 350 MS
QTC FREDERICIA: 416 MS
R AXIS: -21 DEGREES
R AXIS: -27 DEGREES
T AXIS: -16 DEGREES
T AXIS: 157 DEGREES
T OFFSET: 358 MS
T OFFSET: 434 MS
VENTRICULAR RATE: 113 BPM
VENTRICULAR RATE: 62 BPM

## 2024-08-06 ENCOUNTER — PATIENT OUTREACH (OUTPATIENT)
Dept: CARE COORDINATION | Facility: CLINIC | Age: 74
End: 2024-08-06
Payer: COMMERCIAL

## 2024-08-06 NOTE — PROGRESS NOTES
Discharge Facility: [facility] [place]   Discharge Diagnosis: Generalized weakness     Urinary tract infection without hematuria     Confusion   Admission Date: 7/30/24   Discharge Date: 8/2/24     PCP Appointment Date: Alvarado Forde DO 8/13/24   Specialist Appointment Date: Cardiology 8/19/24   Hospital Encounter and Summary Linked: Yes  See discharge assessment below for further details    Engagement  Call Start Time: 1113 (8/6/2024 11:12 AM)    Medications  Medications reviewed with patient/caregiver?: Yes (8/6/2024 11:12 AM)  Is the patient having any side effects they believe may be caused by any medication additions or changes?: No (8/6/2024 11:12 AM)  Does the patient have all medications ordered at discharge?: Yes (8/6/2024 11:12 AM)  Care Management Interventions: No intervention needed (8/6/2024 11:12 AM)  Prescription Comments: cephalexin (Keflex) magnesium oxide (Mag-Ox) Start taking on: August 3, 2024 metoprolol tartrate (Lopressor) (8/6/2024 11:12 AM)  Medication Comments: no issues obtaining medications (8/6/2024 11:12 AM)    Appointments  Does the patient have a primary care provider?: Yes (8/6/2024 11:12 AM)  Care Management Interventions: Verified appointment date/time/provider (Alvarado Forde DO 8/13/24) (8/6/2024 11:12 AM)  Has the patient kept scheduled appointments due by today?: Yes (8/6/2024 11:12 AM)    Self Management  What is the home health agency?: [facility] (8/6/2024 11:12 AM)  Has home health visited the patient within 72 hours of discharge?: Yes (8/6/2024 11:12 AM)  What Durable Medical Equipment (DME) was ordered?: wheelchair (8/6/2024 11:12 AM)    Patient Teaching  Does the patient have access to their discharge instructions?: Yes (8/6/2024 11:12 AM)  Care Management Interventions: -- (pt did not want to review states she is feeling better) (8/6/2024 11:12 AM)  Is the patient/caregiver able to teach back the hierarchy of who to call/visit for symptoms/problems? PCP, Specialist,  Home Health nurse, Urgent Care, ED, 911: Yes (8/6/2024 11:12 AM)    Wrap Up  Wrap Up Additional Comments: This CM spoke with patient via phone. Successful transition of care outreach complete. Pt reports doing well at home since discharge. New meds reviewed. Pt denies CP and SOB. Patient denies any further discharge questions/needs at this time. Emphasized that Follow up is needed after discharge to review the hospital recommendations, assess your response to your treatment.  Pt aware of my availability for non-emergent concerns. Contact info provided to patient. (8/6/2024 11:12 AM)

## 2024-08-08 LAB — TSI SER-ACNC: <1 TSI INDEX

## 2024-08-13 ENCOUNTER — APPOINTMENT (OUTPATIENT)
Dept: PRIMARY CARE | Facility: CLINIC | Age: 74
End: 2024-08-13
Payer: COMMERCIAL

## 2024-08-13 ENCOUNTER — LAB (OUTPATIENT)
Dept: LAB | Facility: LAB | Age: 74
End: 2024-08-13
Payer: COMMERCIAL

## 2024-08-13 VITALS
BODY MASS INDEX: 17.93 KG/M2 | DIASTOLIC BLOOD PRESSURE: 68 MMHG | OXYGEN SATURATION: 97 % | TEMPERATURE: 97.1 F | SYSTOLIC BLOOD PRESSURE: 116 MMHG | HEART RATE: 50 BPM | RESPIRATION RATE: 16 BRPM | HEIGHT: 63 IN

## 2024-08-13 DIAGNOSIS — N39.0 RECURRENT UTI: ICD-10-CM

## 2024-08-13 DIAGNOSIS — I47.19 ATRIAL TACHYCARDIA (CMS-HCC): ICD-10-CM

## 2024-08-13 DIAGNOSIS — M06.9 RHEUMATOID ARTHRITIS, INVOLVING UNSPECIFIED SITE, UNSPECIFIED WHETHER RHEUMATOID FACTOR PRESENT (MULTI): ICD-10-CM

## 2024-08-13 DIAGNOSIS — E87.6 HYPOKALEMIA: ICD-10-CM

## 2024-08-13 DIAGNOSIS — G43.001 MIGRAINE WITHOUT AURA AND WITH STATUS MIGRAINOSUS, NOT INTRACTABLE: ICD-10-CM

## 2024-08-13 DIAGNOSIS — E03.9 ACQUIRED HYPOTHYROIDISM: ICD-10-CM

## 2024-08-13 DIAGNOSIS — R19.7 DIARRHEA OF PRESUMED INFECTIOUS ORIGIN: ICD-10-CM

## 2024-08-13 DIAGNOSIS — R53.1 WEAKNESS: Primary | ICD-10-CM

## 2024-08-13 LAB
ANION GAP SERPL CALC-SCNC: 14 MMOL/L (ref 10–20)
BUN SERPL-MCNC: 15 MG/DL (ref 6–23)
CALCIUM SERPL-MCNC: 9.2 MG/DL (ref 8.6–10.3)
CHLORIDE SERPL-SCNC: 103 MMOL/L (ref 98–107)
CO2 SERPL-SCNC: 28 MMOL/L (ref 21–32)
CREAT SERPL-MCNC: 0.6 MG/DL (ref 0.5–1.05)
EGFRCR SERPLBLD CKD-EPI 2021: >90 ML/MIN/1.73M*2
GLUCOSE SERPL-MCNC: 76 MG/DL (ref 74–99)
POC APPEARANCE, URINE: ABNORMAL
POC BILIRUBIN, URINE: NEGATIVE
POC BLOOD, URINE: NEGATIVE
POC COLOR, URINE: ABNORMAL
POC GLUCOSE, URINE: NEGATIVE MG/DL
POC KETONES, URINE: NEGATIVE MG/DL
POC LEUKOCYTES, URINE: NEGATIVE
POC NITRITE,URINE: NEGATIVE
POC PH, URINE: 7 PH
POC PROTEIN, URINE: NEGATIVE MG/DL
POC SPECIFIC GRAVITY, URINE: 1.02
POC UROBILINOGEN, URINE: 0.2 EU/DL
POTASSIUM SERPL-SCNC: 4.5 MMOL/L (ref 3.5–5.3)
SODIUM SERPL-SCNC: 140 MMOL/L (ref 136–145)

## 2024-08-13 PROCEDURE — 1160F RVW MEDS BY RX/DR IN RCRD: CPT | Performed by: FAMILY MEDICINE

## 2024-08-13 PROCEDURE — 1111F DSCHRG MED/CURRENT MED MERGE: CPT | Performed by: FAMILY MEDICINE

## 2024-08-13 PROCEDURE — 1036F TOBACCO NON-USER: CPT | Performed by: FAMILY MEDICINE

## 2024-08-13 PROCEDURE — 99495 TRANSJ CARE MGMT MOD F2F 14D: CPT | Performed by: FAMILY MEDICINE

## 2024-08-13 PROCEDURE — 1159F MED LIST DOCD IN RCRD: CPT | Performed by: FAMILY MEDICINE

## 2024-08-13 PROCEDURE — 80048 BASIC METABOLIC PNL TOTAL CA: CPT

## 2024-08-13 PROCEDURE — 81003 URINALYSIS AUTO W/O SCOPE: CPT | Performed by: FAMILY MEDICINE

## 2024-08-13 PROCEDURE — 36415 COLL VENOUS BLD VENIPUNCTURE: CPT

## 2024-08-13 RX ORDER — NITROFURANTOIN MACROCRYSTALS 50 MG/1
50 CAPSULE ORAL DAILY
Qty: 30 CAPSULE | Refills: 1 | Status: SHIPPED | OUTPATIENT
Start: 2024-08-13

## 2024-08-13 RX ORDER — PREDNISONE 5 MG/1
5 TABLET ORAL DAILY
Qty: 30 TABLET | Refills: 0 | Status: SHIPPED | OUTPATIENT
Start: 2024-08-13

## 2024-08-13 RX ORDER — NAPROXEN 375 MG/1
375 TABLET ORAL 2 TIMES DAILY PRN
Qty: 60 TABLET | Refills: 5 | Status: SHIPPED | OUTPATIENT
Start: 2024-08-13 | End: 2025-04-10

## 2024-08-13 NOTE — PROGRESS NOTES
Subjective   Patient ID: Alisa Fregoso is a 73 y.o. female who presents for Hospital Follow-up (Patient was seen at [facility] from 7/30/2024 - 8/2/2024 for generalized weakness with worsening nausea vomiting and diarrhea.).  HPI  73-year-old is here to recheck for after discharge on 2 August for generalized weakness from diarrhea hypokalemia dehydration I did review with the  as well as patient with her history of my dementia of the hospital workup given IV fluids IV Rocephin for UTI and IV and oral replacement of hypokalemia from her diarrhea  Past medical history positive for mild dementia blindness peripheral neuropathy hypothyroidism.  To develop of atrial tachycardia was placed on low-dose metoprolol 12 mg twice a day by cardiology will follow-up as an outpatient  She did have a negative chest x-ray basically negative a CT of the abdomen and after rehydration did well she was sent home on Keflex for UTI and then will be going on her suppression dose of 50 mg of Macrodantin daily.  At this time denies any chest pain shortness of breath eats frequent small meals.  With history of chronic RA does take 5 mg of prednisone daily she will return to this.  Recent vomiting appetite is soft mild to moderate.    Review of Systems   Constitutional:  Positive for fatigue. Negative for fever and unexpected weight change.   Eyes:  Negative for visual disturbance.   Respiratory:  Negative for cough, chest tightness, shortness of breath and stridor.    Cardiovascular:  Negative for chest pain, palpitations and leg swelling.   Gastrointestinal:  Positive for diarrhea and nausea. Negative for abdominal distention, abdominal pain, blood in stool, constipation and vomiting.   Genitourinary:  Positive for urgency. Negative for dysuria, flank pain, frequency and hematuria.   Musculoskeletal:  Negative for arthralgias and myalgias.   Skin:  Negative for rash.   Neurological:  Positive for weakness,  "light-headedness, numbness and headaches.   Psychiatric/Behavioral:  Positive for confusion and dysphoric mood. Negative for behavioral problems, sleep disturbance and suicidal ideas.        Objective   /68 (BP Location: Right arm, Patient Position: Sitting, BP Cuff Size: Child)   Pulse 50   Temp 36.2 °C (97.1 °F) (Temporal)   Resp 16   Ht 1.6 m (5' 3\")   SpO2 97%   BMI 17.93 kg/m²         Component  Ref Range & Units 10:30   POC Color, Urine  Straw, Yellow, Light-Yellow Straw   POC Appearance, Urine  Clear Hazy Abnormal    POC Glucose, Urine  NEGATIVE mg/dl NEGATIVE   POC Bilirubin, Urine  NEGATIVE NEGATIVE   POC Ketones, Urine  NEGATIVE mg/dl NEGATIVE   POC Specific Gravity, Urine  1.005 - 1.035 1.020   POC Blood, Urine  NEGATIVE NEGATIVE   POC PH, Urine  No Reference Range Established PH 7.0   POC Protein, Urine  NEGATIVE, 30 (1+) mg/dl NEGATIVE   POC Urobilinogen, Urine  0.2, 1.0 EU/DL 0.2   Poc Nitrite, Urine  NEGATIVE NEGATIVE   POC Leukocytes, Urine  NEGATIVE NEGATIVE              Specimen Collected: 08/13/24 10:30 Last Resulted: 08/13/24 10:30        Order: [id number]   Status: Final result       Visible to patient: No (inaccessible in [facility])    0 Result Notes              Component  Ref Range & Units 2 wk ago  (8/2/24) 2 wk ago  (8/1/24) 2 wk ago  (7/31/24) 2 wk ago  (7/30/24) 3 mo ago  (4/29/24) 10 mo ago  (10/10/23) 10 mo ago  (10/9/23)   WBC  4.4 - 11.3 x10*3/uL 6.8 6.6 14.1 High  19.9 High  9.4 8.1 8.8   nRBC  0.0 - 0.0 /100 WBCs 0.0 0.0 0.0 0.0 0.0 0.0 0.0   RBC  4.00 - 5.20 x10*6/uL 4.21 4.43 3.97 Low  4.24 4.59 4.44 4.26   Hemoglobin  12.0 - 16.0 g/dL 11.8 Low  12.5 11.3 Low  11.9 Low  13.0 9.7 Low  9.2 Low    Hematocrit  36.0 - 46.0 % 39.2 40.7 36.8 39.4 42.5 36.0 33.9 Low    MCV  80 - 100 fL 93 92 93 93 93 81 80   MCH  26.0 - 34.0 pg 28.0 28.2 28.5 28.1 28.3 21.8 Low  21.6 Low    MCHC  32.0 - 36.0 g/dL 30.1 Low  30.7 Low  30.7 Low  30.2 Low  30.6 Low  26.9 Low  27.1 Low    RDW  11.5 " - 14.5 % 14.9 High  14.9 High  14.8 High  14.6 High  17.3 High  23.5 High  23.2 High    Platelets  150 - 450 x10*3/uL 164 157 149 Low  154 213 375 306   Resulting Agency [facility]            TSH with reflex to Free T4 if abnormal  Order: [id number]   Status: Final result       Visible to patient: No (inaccessible in [facility])    0 Result Notes       1  Topic         Component  Ref Range & Units 2 wk ago 2 yr ago 3 yr ago 4 yr ago   Thyroid Stimulating Hormone  0.44 - 3.98 mIU/L 0.10 Low  0.34 Low  CM 1.73 CM 0.41 Low  CM   Resulting Agency [facility]         Thyroxine, Free  Order: [id number] - Reflex for Order [id number]   Status: Final result       Visible to patient: No (inaccessible in [facility])    0 Result Notes        Component  Ref Range & Units 2 wk ago  (8/1/24) 2 wk ago  (8/1/24) 2 yr ago  (5/23/22)   Thyroxine, Free  0.61 - 1.12 ng/dL 1.10 1.06 1.33 High  CM   Resulting Agen         emoglobin A1C  Order: [id number]   Status: Final result       Visible to patient: No (inaccessible in [facility])    0 Result Notes       1  Topic      Component  Ref Range & Units 2 wk ago   Hemoglobin A1C  see below % 5.5   Estimated Average Glucose  Not Established mg/dL       ns abnormal data Basic Metabolic Panel  Order: [id number]   Status: Final result       Visible to patient: No (inaccessible in [facility])    0 Result Notes   important suggestion  Newer results are available. Click to view them now.               Component  Ref Range & Units 2 wk ago  (8/1/24) 2 wk ago  (7/31/24) 2 wk ago  (7/30/24) 2 wk ago  (7/30/24) 3 mo ago  (4/29/24) 10 mo ago  (10/10/23) 10 mo ago  (10/9/23)   Glucose  74 - 99 mg/dL 79 83 100 High  82 114 High  103 High  82   Sodium  136 - 145 mmol/L 144 140 141 143 141 140 142   Potassium  3.5 - 5.3 mmol/L 3.3 Low  3.4 Low  2.9 Low Panic  2.7 Low Panic  2.8 Low Panic  3.4 Low  3.4 Low    Chloride  98 - 107 mmol/L 109 High  107 102 104 101 105 105   Bicarbonate  21 - 32 mmol/L 25 28 30 30 31 25 25    Anion Gap  10 - 20 mmol/L 13 8 Low  12 12 12 13 15 R   Urea Nitrogen  6 - 23 mg/dL 5 Low  9 9 11 9 6 5 Low    Creatinine  0.50 - 1.05 mg/dL 0.46 Low  0.51 0.54 0.60 0.75 0.51 0.40 Low    eGFR  >60 mL/min/1.73m*2 >90            Sars-CoV-2 PCR: [id number]  Order: [id number]   Collected 7/30/2024 11:55       Status: Final result       Visible to patient: No (inaccessible in  MyChart)    0 Result Notes      Component  Ref Range & Units    Coronavirus 2019, PCR  Not Detected Not Detected   Resulting Agency       Lipase  Order: [id number]   Status: Final result       Visible to patient: No (inaccessible in  MyChart)    0 Result Notes         Component  Ref Range & Units 2 wk ago  (7/30/24) 10 mo ago  (10/8/23) 2 yr ago  (5/28/22) 2 yr ago  (5/22/22)   Lipase  9 - 82 U/L 17 7 Low  16 CM 14 CM   Resulting Agency            Physical Exam  Vital signs reviewed and normal  General Inspection reveals a thin white female in a wheelchair.  Neck neck is supple no mass adenopathy bruits rigidity  Cardiovascular RSR without murmurs gallop or ectopy.  Pulse is regular  Pulmonary chest is clear with reduced breath sounds in the bases  Abdominal really no distention no organomegaly no rebound bowel sounds are normal  Peripheral vascular distal pulses are intact otherwise some decrease sensation to vibration lower extremities  Skin-no rash petechiae or jaundice  Mood mood is withdrawn slightly depressed but otherwise conversant today.  Musculoskeletal still some pain in the SI joints bilaterally as well as midline L2-L5.  No CVA tenderness weakness of the lower extremities.  Minutes reflexes lower extremities no acute synovitis upper or lower extremities      Assessment/Plan   Problem List Items Addressed This Visit       Migraine without aura and with status migrainosus, not intractable    Recurrent UTI    Rheumatoid arthritis (Multi)   Continue taking her potassium pills that she has at home to help her hypokalemia we will  recheck her BMP in 1 week and notify her obviously stressed importance of rehydration frequent small meals  Follow-up cardiology will continue her 25 of metoprolol twice a day for earlier atrial tachycardia  Rheumatoid arthritis will continue prednisone 5 mg daily which has helped her back as well as diffuse arthralgia.  Will continue Neurontin at nighttime for peripheral neuropathy  With slightly low TSH will take your Synthroid 50 mcg 6 days a week instead of 7 days a week.  Importance of good local care increase liquids and will now go back to Macrodantin milligrams daily for urinary suppression since her today's urine is completely negative which is good to see  Follow-up in 4 to 6 weeks  Above reviewed with both  as well as patient in detail all questions were addressed  @discharge  The above diagnosis and treatment plan was discussed with the patient patient will continue appropriate diet and exercise as reviewed  Patient will recheck earlier if any interval problems of significance or clinical worsening of the above problems.  Agrees above surveillance.  All question were addressed regarding above meds

## 2024-08-16 ENCOUNTER — PATIENT OUTREACH (OUTPATIENT)
Dept: CARE COORDINATION | Facility: CLINIC | Age: 74
End: 2024-08-16
Payer: COMMERCIAL

## 2024-08-16 NOTE — PROGRESS NOTES
Call regarding appt. with PCP on 8/13/24 after hospitalization.  At time of outreach call the patient feels as if their condition has improved/worsened since last visit.  Reviewed the PCP appointment with the pt and addressed any questions or concerns.

## 2024-08-19 ENCOUNTER — APPOINTMENT (OUTPATIENT)
Dept: CARDIOLOGY | Facility: CLINIC | Age: 74
End: 2024-08-19
Payer: COMMERCIAL

## 2024-08-19 PROBLEM — I47.19 ATRIAL TACHYCARDIA (CMS-HCC): Status: ACTIVE | Noted: 2024-08-19

## 2024-08-19 ASSESSMENT — ENCOUNTER SYMPTOMS
UNEXPECTED WEIGHT CHANGE: 0
NUMBNESS: 1
ABDOMINAL DISTENTION: 0
BLOOD IN STOOL: 0
HEMATURIA: 0
WEAKNESS: 1
MYALGIAS: 0
DYSPHORIC MOOD: 1
FEVER: 0
DYSURIA: 0
SLEEP DISTURBANCE: 0
ARTHRALGIAS: 0
PALPITATIONS: 0
ABDOMINAL PAIN: 0
CONFUSION: 1
NAUSEA: 1
SHORTNESS OF BREATH: 0
VOMITING: 0
STRIDOR: 0
CHEST TIGHTNESS: 0
FATIGUE: 1
FLANK PAIN: 0
LIGHT-HEADEDNESS: 1
COUGH: 0
HEADACHES: 1
DIARRHEA: 1
CONSTIPATION: 0
FREQUENCY: 0

## 2024-08-30 ENCOUNTER — PATIENT OUTREACH (OUTPATIENT)
Dept: PRIMARY CARE | Facility: CLINIC | Age: 74
End: 2024-08-30
Payer: COMMERCIAL

## 2024-08-30 NOTE — PROGRESS NOTES
Successful outreach to patient regarding hospitalization as patient continues TCM program. This CM spoke with the patients  who states she is doing much better and is eating better again.   At time of outreach call the patient feels as if their condition has returned to baseline) since initial visit with PCP or specialist.  Questions or concerns addressed at this time with patient.   Provided contact information to patient if any further non-emergent needs arise.

## 2024-09-09 ENCOUNTER — APPOINTMENT (OUTPATIENT)
Dept: CARDIOLOGY | Facility: CLINIC | Age: 74
End: 2024-09-09
Payer: COMMERCIAL

## 2024-09-11 DIAGNOSIS — M06.9 RHEUMATOID ARTHRITIS, INVOLVING UNSPECIFIED SITE, UNSPECIFIED WHETHER RHEUMATOID FACTOR PRESENT (MULTI): ICD-10-CM

## 2024-09-11 RX ORDER — PREDNISONE 5 MG/1
5 TABLET ORAL DAILY
Qty: 30 TABLET | Refills: 0 | Status: SHIPPED | OUTPATIENT
Start: 2024-09-11

## 2024-09-11 NOTE — TELEPHONE ENCOUNTER
Rx Refill Request     Name: Alisa Fregoso  :  1950   Medication Name:  PREDNISONE 5MG  Specific Pharmacy location:  [facility]  Date of last appointment:  2024   Date of next appointment:  Visit date not found   Best number to reach patient:  [phone]

## 2024-09-30 ENCOUNTER — APPOINTMENT (OUTPATIENT)
Dept: PRIMARY CARE | Facility: CLINIC | Age: 74
End: 2024-09-30
Payer: COMMERCIAL

## 2024-09-30 VITALS
BODY MASS INDEX: 17.93 KG/M2 | SYSTOLIC BLOOD PRESSURE: 108 MMHG | DIASTOLIC BLOOD PRESSURE: 62 MMHG | HEART RATE: 57 BPM | TEMPERATURE: 97.1 F | OXYGEN SATURATION: 97 % | HEIGHT: 63 IN | RESPIRATION RATE: 16 BRPM

## 2024-09-30 DIAGNOSIS — M54.16 LUMBAR RADICULOPATHY, CHRONIC: ICD-10-CM

## 2024-09-30 DIAGNOSIS — R53.1 GENERALIZED WEAKNESS: Primary | ICD-10-CM

## 2024-09-30 DIAGNOSIS — M06.9 RHEUMATOID ARTHRITIS, INVOLVING UNSPECIFIED SITE, UNSPECIFIED WHETHER RHEUMATOID FACTOR PRESENT (MULTI): ICD-10-CM

## 2024-09-30 DIAGNOSIS — G61.9 INFLAMMATORY NEUROPATHY (MULTI): ICD-10-CM

## 2024-09-30 DIAGNOSIS — Z23 NEED FOR INFLUENZA VACCINATION: ICD-10-CM

## 2024-09-30 DIAGNOSIS — E03.9 ACQUIRED HYPOTHYROIDISM: ICD-10-CM

## 2024-09-30 PROCEDURE — 99214 OFFICE O/P EST MOD 30 MIN: CPT | Performed by: FAMILY MEDICINE

## 2024-09-30 PROCEDURE — G0008 ADMIN INFLUENZA VIRUS VAC: HCPCS | Performed by: FAMILY MEDICINE

## 2024-09-30 PROCEDURE — 1123F ACP DISCUSS/DSCN MKR DOCD: CPT | Performed by: FAMILY MEDICINE

## 2024-09-30 PROCEDURE — 1036F TOBACCO NON-USER: CPT | Performed by: FAMILY MEDICINE

## 2024-09-30 PROCEDURE — 1160F RVW MEDS BY RX/DR IN RCRD: CPT | Performed by: FAMILY MEDICINE

## 2024-09-30 PROCEDURE — 90662 IIV NO PRSV INCREASED AG IM: CPT | Performed by: FAMILY MEDICINE

## 2024-09-30 PROCEDURE — 1159F MED LIST DOCD IN RCRD: CPT | Performed by: FAMILY MEDICINE

## 2024-09-30 RX ORDER — GABAPENTIN 300 MG/1
600 CAPSULE ORAL NIGHTLY
Qty: 180 CAPSULE | Refills: 3 | Status: SHIPPED | OUTPATIENT
Start: 2024-09-30

## 2024-09-30 RX ORDER — PREDNISONE 5 MG/1
5 TABLET ORAL DAILY
Qty: 30 TABLET | Refills: 0 | Status: SHIPPED | OUTPATIENT
Start: 2024-09-30

## 2024-09-30 NOTE — PROGRESS NOTES
Subjective   Patient ID: Alisa Fregoso is a 74 y.o. female who presents for Rheumatoid Arthritis (6 week follow up ).  HPI  Patient is 74-year-old with worsening with significant blindness as well as peripheral neuropathy is pretty much wheelchair confined with her mild dementia and generalized weakness was hospitalized in August at which time had a UTI.  She has no dysuria at this time she is off narcotics which did not worsen her chronic constipation.  She does take anticonstipation maneuvers in the form of MiraLAX and also nightly stool softeners.  No chest pain or shortness of breath no active dysuria at this time I did review her chronic meds with both the patient and the spouse today.  Does take gabapentin for painful peripheral neuropathy at times takes 50 mcg daily for hypothyroidism I did note her TSH was low and did instruct him to take 6 days a week i.e. skip Sunday on her 50 mcg of Synthroid.  Her rheumatoid is taken 5 mg which is controlled her symptoms and pain fairly well without Silgen side effects or immunosuppression really does not tolerate NSAIDs well at all take Macrodantin 50 mg for urinary suppression to prevent recurrent UTIs which has been a significant disability over the last 3 years takes we will do low-dose Naprosyn if needed with food if extreme pain does take metoprolol 25 mg for earlier atrial tachycardia in the hospital at 25 mg twice a day also takes Bentyl if needed for lower abdominal cramping.  CT of the abdomen in August was unremarkable  No longer takes Protonix on a regular basis but does have at home in case her GERD symptoms return followed by ophthalmology for significant retinal disease every 6 months according to the  no recent chest pain fever no burning of urine recently chronic meds reviewed  Review of Systems   Constitutional:  Positive for fatigue and unexpected weight change. Negative for fever.   HENT:  Negative for sinus pressure, sinus pain and sore  "throat.    Eyes:  Positive for visual disturbance.   Respiratory:  Negative for cough, chest tightness and shortness of breath.    Cardiovascular:  Negative for chest pain, palpitations and leg swelling.   Gastrointestinal:  Positive for constipation. Negative for abdominal distention, abdominal pain, blood in stool, nausea and vomiting.   Genitourinary:  Positive for frequency. Negative for dysuria and hematuria.   Musculoskeletal:  Positive for arthralgias, back pain and gait problem. Negative for myalgias.   Skin:  Negative for rash.   Neurological:  Positive for weakness, numbness and headaches.   Psychiatric/Behavioral:  Positive for confusion and dysphoric mood. Negative for behavioral problems, sleep disturbance and suicidal ideas.        Objective   /62 (BP Location: Right arm, Patient Position: Sitting, BP Cuff Size: Adult)   Pulse 57   Temp 36.2 °C (97.1 °F) (Temporal)   Resp 16   Ht 1.6 m (5' 3\")   SpO2 97%   BMI 17.93 kg/m²           Physical Exam  Vital sign reviewed and blood pressure 112/72 respirations are easy pulse 56-62 pulse ox is good at 97  General inspection reveals a tired appearing thin white female with impaired vision in a wheelchair.  Neck neck is supple out masses adenopathy bruits rigidity  Cardiovascular regular sinus rhythm without significant murmurs gallop or ectopy pulse is 56-60.  Chest chest shows slightly reduced breath sound the bases otherwise no wheezing or rales or rhonchi  Abdominal minimal tenderness to the lower quadrant but otherwise no rebound tenderness bowel sounds stable no CVA tenderness at this time  Peripheral vascular diffuse muscle wasting atrophy of the lower legs.  No peripheral edema noted distal pulses slightly reduced otherwise symmetrical decrease sensation to touch  Musculoskeletal continued pain in the medial SI joints and midline L3-L5 today posterior thoracic rashes  Mood mood is withdrawn mildly depressed otherwise no striking anxiety at " this time defers many questions to her   Reviewed chronic meds with spouse as well as patient today  Skin-minor ecchymosis but otherwise no new rashes petechiae or jaundice    Assessment/Plan   Problem List Items Addressed This Visit       Rheumatoid arthritis (Multi)    Relevant Medications    predniSONE (Deltasone) 5 mg tablet     Other Visit Diagnoses       Lumbar radiculopathy, chronic        Relevant Medications    gabapentin (Neurontin) 300 mg capsule    Need for influenza vaccination        Relevant Orders    Flu vaccine, trivalent, preservative free, HIGH-DOSE, age 65y+ (Fluzone) (Completed)        Update influenza will continue 5 mg of prednisone on a daily basis for rheumatoid arthritis of her back especially on her hands rarely takes Naprosyn.  Otherwise is off Prilosec at this time or Protonix  Continue anticonstipation maneuvers to help her lower abdominal pain form of daily MiraLAX and nighttime stool softeners.  Continue Macrobid low dose of 50 mg or event urinary infections which is because repeated and hospitalizations in the last 3 years  Continue follow-up with ophthalmology every 6 months  Continue gabapentin if needed for leg pain  Small meals strongly urged with increasing liquids.  Follow-up in 4 months otherwise with mild dementia  is the primary caregiver with great assistance in patient's  Patient declines further antidepressants at this time with history of tachycardia hospital she is continuing 25 mg of metoprolol tartrate twice a day with food pressure less than 100 and informed by  to hold that dosing of metoprolol all questions were addressed increasing liquids prevent secondary UTIs and also to help constipation.  @discharge  The above diagnosis and treatment plan was discussed with the patient patient will continue appropriate diet and exercise as reviewed  Patient will recheck earlier if any interval problems of significance or clinical worsening of the above  problems.  Agrees above surveillance.  All question were addressed regarding above meds

## 2024-10-03 PROBLEM — R10.84 GENERALIZED ABDOMINAL PAIN: Status: RESOLVED | Noted: 2019-04-04 | Resolved: 2024-10-03

## 2024-10-03 PROBLEM — N10 ACUTE PYELONEPHRITIS: Status: RESOLVED | Noted: 2019-03-27 | Resolved: 2024-10-03

## 2024-10-03 PROBLEM — Z23 NEED FOR INFLUENZA VACCINATION: Status: ACTIVE | Noted: 2024-10-03

## 2024-10-03 PROBLEM — M54.16 LUMBAR RADICULOPATHY, CHRONIC: Status: ACTIVE | Noted: 2024-10-03

## 2024-10-03 ASSESSMENT — ENCOUNTER SYMPTOMS
FATIGUE: 1
MYALGIAS: 0
FEVER: 0
CONSTIPATION: 1
BLOOD IN STOOL: 0
SORE THROAT: 0
HEMATURIA: 0
NUMBNESS: 1
BACK PAIN: 1
UNEXPECTED WEIGHT CHANGE: 1
ABDOMINAL PAIN: 0
HEADACHES: 1
NAUSEA: 0
SINUS PRESSURE: 0
SHORTNESS OF BREATH: 0
COUGH: 0
WEAKNESS: 1
FREQUENCY: 1
DYSPHORIC MOOD: 1
ABDOMINAL DISTENTION: 0
CONFUSION: 1
CHEST TIGHTNESS: 0
SLEEP DISTURBANCE: 0
SINUS PAIN: 0
PALPITATIONS: 0
DYSURIA: 0
ARTHRALGIAS: 1
VOMITING: 0

## 2024-10-09 DIAGNOSIS — N39.0 RECURRENT UTI: ICD-10-CM

## 2024-10-09 RX ORDER — NITROFURANTOIN MACROCRYSTALS 50 MG/1
50 CAPSULE ORAL DAILY
Qty: 30 CAPSULE | Refills: 0 | Status: SHIPPED | OUTPATIENT
Start: 2024-10-09

## 2024-10-23 ENCOUNTER — PATIENT OUTREACH (OUTPATIENT)
Dept: PRIMARY CARE | Facility: CLINIC | Age: 74
End: 2024-10-23
Payer: COMMERCIAL

## 2024-10-23 NOTE — PROGRESS NOTES
Final call. Called and spoke with patient to address any questions or concerns regarding hospitalization. This CM spoke with the patients  who reports the pt is doing well at home since hospital discharge and is aware that they can reach out to Dr Forde's office for any needs.  Patient reports their condition has (returned to baseline).   Patient encouraged to keep my contact information in case any needs arise.

## 2024-10-24 DIAGNOSIS — M54.16 LUMBAR RADICULOPATHY, CHRONIC: ICD-10-CM

## 2024-10-24 RX ORDER — CYCLOBENZAPRINE HCL 10 MG
10 TABLET ORAL 3 TIMES DAILY PRN
Qty: 90 TABLET | Refills: 3 | Status: SHIPPED | OUTPATIENT
Start: 2024-10-24

## 2024-10-24 NOTE — TELEPHONE ENCOUNTER
Rx Refill Request     Name: Alisa Fregoso  :  1950   Medication Name:  CYCLOBENZAPRINE 10MG   [facility]  Date of last appointment:  2024   Date of next appointment:  1/15/2025   Best number to reach patient:  [phone]

## 2024-10-31 ENCOUNTER — TELEPHONE (OUTPATIENT)
Dept: PRIMARY CARE | Facility: CLINIC | Age: 74
End: 2024-10-31
Payer: COMMERCIAL

## 2024-10-31 DIAGNOSIS — M54.50 ACUTE BILATERAL LOW BACK PAIN, UNSPECIFIED WHETHER SCIATICA PRESENT: ICD-10-CM

## 2024-10-31 RX ORDER — TRAMADOL HYDROCHLORIDE 50 MG/1
50 TABLET ORAL EVERY 8 HOURS PRN
Qty: 15 TABLET | Refills: 0 | Status: SHIPPED | OUTPATIENT
Start: 2024-10-31 | End: 2024-11-05

## 2024-11-06 DIAGNOSIS — M06.9 RHEUMATOID ARTHRITIS, INVOLVING UNSPECIFIED SITE, UNSPECIFIED WHETHER RHEUMATOID FACTOR PRESENT (MULTI): ICD-10-CM

## 2024-11-06 RX ORDER — PREDNISONE 5 MG/1
5 TABLET ORAL DAILY
Qty: 30 TABLET | Refills: 0 | Status: SHIPPED | OUTPATIENT
Start: 2024-11-06

## 2024-11-06 NOTE — TELEPHONE ENCOUNTER
Rx Refill Request     Name: Alisa Fregoso  :  1950   Medication Name:  predniSONE (Deltasone) 5 mg tablet - Take 1 tablet (5 mg) by mouth once daily.  Specific Pharmacy location:  [address]  Date of last appointment:  2024   Date of next appointment:  1/15/2025   Best number to reach patient:  [phone]

## 2024-11-12 DIAGNOSIS — N39.0 RECURRENT UTI: ICD-10-CM

## 2024-11-12 RX ORDER — NITROFURANTOIN MACROCRYSTALS 50 MG/1
50 CAPSULE ORAL DAILY
Qty: 30 CAPSULE | Refills: 0 | Status: SHIPPED | OUTPATIENT
Start: 2024-11-12

## 2024-11-12 NOTE — TELEPHONE ENCOUNTER
Rx Refill Request Telephone Encounter    Name:  Alisa Fregoso  :  [id number]  Medication Name:  nitrofurantoin (Macrodantin) 50 mg   Specific Pharmacy location:  [facility]  Date of last appointment:    Date of next appointment:  1/15/2025  Best number to reach patient:  [phone]

## 2024-12-03 DIAGNOSIS — M54.50 ACUTE BILATERAL LOW BACK PAIN, UNSPECIFIED WHETHER SCIATICA PRESENT: ICD-10-CM

## 2024-12-03 RX ORDER — TRAMADOL HYDROCHLORIDE 50 MG/1
50 TABLET ORAL EVERY 8 HOURS PRN
Qty: 15 TABLET | Refills: 0 | Status: SHIPPED | OUTPATIENT
Start: 2024-12-03

## 2024-12-09 DIAGNOSIS — E03.9 ACQUIRED HYPOTHYROIDISM: ICD-10-CM

## 2024-12-09 DIAGNOSIS — N39.0 RECURRENT UTI: ICD-10-CM

## 2024-12-09 DIAGNOSIS — M06.9 RHEUMATOID ARTHRITIS, INVOLVING UNSPECIFIED SITE, UNSPECIFIED WHETHER RHEUMATOID FACTOR PRESENT (MULTI): ICD-10-CM

## 2024-12-09 RX ORDER — PREDNISONE 5 MG/1
5 TABLET ORAL DAILY
Qty: 30 TABLET | Refills: 0 | Status: SHIPPED | OUTPATIENT
Start: 2024-12-09 | End: 2025-01-08

## 2024-12-09 RX ORDER — NITROFURANTOIN MACROCRYSTALS 50 MG/1
50 CAPSULE ORAL DAILY
Qty: 30 CAPSULE | Refills: 0 | Status: SHIPPED | OUTPATIENT
Start: 2024-12-09

## 2024-12-09 RX ORDER — LEVOTHYROXINE SODIUM 50 UG/1
50 TABLET ORAL DAILY
Qty: 90 TABLET | Refills: 3 | Status: SHIPPED | OUTPATIENT
Start: 2024-12-09 | End: 2025-12-09

## 2024-12-09 NOTE — TELEPHONE ENCOUNTER
Rx Refill Request Telephone Encounter    Name:  Alisa Fregoso  :  [id number]  Medication Name:    1. predniSONE (Deltasone) 5 mg tablet    Last fill: 2024 30 day supply Q 30 R 0  2. levothyroxine (Synthroid, Levoxyl) 50 mcg tablet    Last fill: 2024 90 day supply Q 90 R 0    Specific Pharmacy location:  [id number] [place] [address]   [address] [address] [address]   Date of last appointment:  24  Date of next appointment:  1/15/25  Best number to reach patient:  Phone:   [phone]

## 2024-12-09 NOTE — TELEPHONE ENCOUNTER
Rx Refill Request Telephone Encounter    Name:  Alisa Fregoso  :  [id number]  Medication Name:    predniSONE (Deltasone) 5 mg tablet   levothyroxine (Synthroid, Levoxyl) 50 mcg tablet     nitrofurantoin (Macrodantin) 50 mg capsule      Specific Pharmacy location:  [address] [address]  [address]   [address]  [address] [address]   Date of last appointment:  24  Date of next appointment:  1/15/25  Best number to reach patient:  Phone:   [phone]

## 2025-01-15 ENCOUNTER — APPOINTMENT (OUTPATIENT)
Dept: PRIMARY CARE | Facility: CLINIC | Age: 75
End: 2025-01-15
Payer: COMMERCIAL

## 2025-01-15 DIAGNOSIS — M06.9 RHEUMATOID ARTHRITIS, INVOLVING UNSPECIFIED SITE, UNSPECIFIED WHETHER RHEUMATOID FACTOR PRESENT (MULTI): ICD-10-CM

## 2025-01-15 DIAGNOSIS — K21.9 GASTROESOPHAGEAL REFLUX DISEASE, UNSPECIFIED WHETHER ESOPHAGITIS PRESENT: ICD-10-CM

## 2025-01-15 DIAGNOSIS — N39.0 RECURRENT UTI: ICD-10-CM

## 2025-01-15 DIAGNOSIS — I48.0 PAROXYSMAL ATRIAL FIBRILLATION (MULTI): ICD-10-CM

## 2025-01-15 RX ORDER — PANTOPRAZOLE SODIUM 40 MG/1
40 TABLET, DELAYED RELEASE ORAL DAILY
Qty: 90 TABLET | Refills: 3 | Status: SHIPPED | OUTPATIENT
Start: 2025-01-15 | End: 2026-01-10

## 2025-01-15 RX ORDER — PREDNISONE 5 MG/1
5 TABLET ORAL DAILY
Qty: 30 TABLET | Refills: 0 | Status: SHIPPED | OUTPATIENT
Start: 2025-01-15 | End: 2025-02-14

## 2025-01-15 RX ORDER — NITROFURANTOIN MACROCRYSTALS 50 MG/1
50 CAPSULE ORAL DAILY
Qty: 30 CAPSULE | Refills: 0 | Status: SHIPPED | OUTPATIENT
Start: 2025-01-15

## 2025-01-15 RX ORDER — METOPROLOL TARTRATE 25 MG/1
25 TABLET, FILM COATED ORAL 2 TIMES DAILY
Qty: 60 TABLET | Refills: 5 | Status: SHIPPED | OUTPATIENT
Start: 2025-01-15

## 2025-01-15 NOTE — TELEPHONE ENCOUNTER
Rx Refill Request     Name: Alisa Fregoso  :  1950   Medication Name:    predniSONE (Deltasone) 5 mg tablet - Take 1 tablet (5 mg) by mouth once daily.  nitrofurantoin (Macrodantin) 50 mg capsule - Take 1 capsule by mouth once daily.  metoprolol tartrate (Lopressor) 25 mg tablet - Take 1 tablet by mouth 2 times a day.  pantoprazole (ProtoNix) 40 mg EC tablet - Take 1 tablet (40 mg) by mouth once daily.  Specific Pharmacy location:    [address]  Date of last appointment:  2024   Date of next appointment:  1/15/2025   Best number to reach patient:  [phone]

## 2025-01-22 DIAGNOSIS — M54.50 ACUTE BILATERAL LOW BACK PAIN, UNSPECIFIED WHETHER SCIATICA PRESENT: ICD-10-CM

## 2025-01-22 RX ORDER — TRAMADOL HYDROCHLORIDE 50 MG/1
50 TABLET ORAL EVERY 8 HOURS PRN
Qty: 15 TABLET | Refills: 0 | Status: SHIPPED | OUTPATIENT
Start: 2025-01-22

## 2025-01-22 NOTE — TELEPHONE ENCOUNTER
Rx Refill Request     Name: Alisa Fregoso  :  1950   Medication Name:  traMADol (Ultram) 50 mg tablet - Take 1 tablet (50 mg) by mouth every 8 hours if needed for severe pain (7-10).  Specific Pharmacy location:  [address]  Date of last appointment:  2024   Date of next appointment:  Visit date not found   Best number to reach patient:  [phone]

## 2025-02-12 ENCOUNTER — APPOINTMENT (OUTPATIENT)
Dept: PRIMARY CARE | Facility: CLINIC | Age: 75
End: 2025-02-12
Payer: COMMERCIAL

## 2025-02-12 DIAGNOSIS — N39.0 RECURRENT UTI: ICD-10-CM

## 2025-02-12 DIAGNOSIS — M06.9 RHEUMATOID ARTHRITIS, INVOLVING UNSPECIFIED SITE, UNSPECIFIED WHETHER RHEUMATOID FACTOR PRESENT (MULTI): ICD-10-CM

## 2025-02-12 RX ORDER — PREDNISONE 5 MG/1
5 TABLET ORAL DAILY
Qty: 30 TABLET | Refills: 0 | Status: SHIPPED | OUTPATIENT
Start: 2025-02-12 | End: 2025-03-14

## 2025-02-12 RX ORDER — NITROFURANTOIN MACROCRYSTALS 50 MG/1
50 CAPSULE ORAL DAILY
Qty: 30 CAPSULE | Refills: 0 | Status: SHIPPED | OUTPATIENT
Start: 2025-02-12

## 2025-02-12 NOTE — TELEPHONE ENCOUNTER
Rx Refill Request     Name: Alisa Fregoso  :  1950   Medication Name:    nitrofurantoin (Macrodantin) 50 mg capsule - Take 1 capsule (50 mg) by mouth once daily.  predniSONE (Deltasone) 5 mg tablet - Take 1 tablet (5 mg) by mouth once daily.  Specific Pharmacy location:    [address]  Date of last appointment:  2024   Date of next appointment:  2025   Best number to reach patient:  [phone]

## 2025-03-14 DIAGNOSIS — N39.0 RECURRENT UTI: ICD-10-CM

## 2025-03-14 DIAGNOSIS — M06.9 RHEUMATOID ARTHRITIS, INVOLVING UNSPECIFIED SITE, UNSPECIFIED WHETHER RHEUMATOID FACTOR PRESENT (MULTI): ICD-10-CM

## 2025-03-14 DIAGNOSIS — M54.50 ACUTE BILATERAL LOW BACK PAIN, UNSPECIFIED WHETHER SCIATICA PRESENT: ICD-10-CM

## 2025-03-14 RX ORDER — NITROFURANTOIN MACROCRYSTALS 50 MG/1
50 CAPSULE ORAL DAILY
Qty: 30 CAPSULE | Refills: 0 | Status: SHIPPED | OUTPATIENT
Start: 2025-03-14

## 2025-03-14 RX ORDER — PREDNISONE 5 MG/1
5 TABLET ORAL DAILY
Qty: 30 TABLET | Refills: 0 | Status: SHIPPED | OUTPATIENT
Start: 2025-03-14 | End: 2025-04-13

## 2025-03-14 RX ORDER — TRAMADOL HYDROCHLORIDE 50 MG/1
50 TABLET ORAL EVERY 8 HOURS PRN
Qty: 15 TABLET | Refills: 0 | Status: SHIPPED | OUTPATIENT
Start: 2025-03-14

## 2025-03-14 NOTE — TELEPHONE ENCOUNTER
Rx Refill Request     Name: Alisa Fregoso  :  1950   Medication Name:  nitrofurantoin (Macrodantin) 50 mg capsule, predniSONE (Deltasone) 5 mg tablet, traMADol (Ultram) 50 mg tablet   Specific Pharmacy location:  [facility] [address] [place] [address]   Date of last appointment:  Visit date not found   Date of next appointment:  2025  Best number to reach patient:  [phone]

## 2025-03-24 ENCOUNTER — APPOINTMENT (OUTPATIENT)
Dept: PRIMARY CARE | Facility: CLINIC | Age: 75
End: 2025-03-24
Payer: COMMERCIAL

## 2025-04-07 ENCOUNTER — APPOINTMENT (OUTPATIENT)
Dept: PRIMARY CARE | Facility: CLINIC | Age: 75
End: 2025-04-07
Payer: COMMERCIAL

## 2025-04-07 DIAGNOSIS — R68.89 FORGETFULNESS: ICD-10-CM

## 2025-04-07 DIAGNOSIS — F02.B0 MODERATE DEMENTIA ASSOCIATED WITH OTHER UNDERLYING DISEASE, WITHOUT BEHAVIORAL DISTURBANCE, PSYCHOTIC DISTURBANCE, MOOD DISTURBANCE, OR ANXIETY: ICD-10-CM

## 2025-04-07 DIAGNOSIS — M05.79 RHEUMATOID ARTHRITIS OF MULTIPLE SITES WITHOUT ORGAN OR SYSTEM INVOLVEMENT WITH POSITIVE RHEUMATOID FACTOR (MULTI): ICD-10-CM

## 2025-04-07 DIAGNOSIS — M54.50 ACUTE BILATERAL LOW BACK PAIN, UNSPECIFIED WHETHER SCIATICA PRESENT: ICD-10-CM

## 2025-04-07 DIAGNOSIS — H65.00 NON-RECURRENT ACUTE SEROUS OTITIS MEDIA, UNSPECIFIED LATERALITY: ICD-10-CM

## 2025-04-07 DIAGNOSIS — K59.00 CONSTIPATION IN FEMALE: ICD-10-CM

## 2025-04-07 DIAGNOSIS — Z79.899 MEDICATION MANAGEMENT: ICD-10-CM

## 2025-04-07 DIAGNOSIS — R53.1 GENERALIZED WEAKNESS: ICD-10-CM

## 2025-04-07 DIAGNOSIS — H61.23 IMPACTED CERUMEN OF BOTH EARS: Primary | ICD-10-CM

## 2025-04-07 DIAGNOSIS — M06.9 RHEUMATOID ARTHRITIS, INVOLVING UNSPECIFIED SITE, UNSPECIFIED WHETHER RHEUMATOID FACTOR PRESENT (MULTI): ICD-10-CM

## 2025-04-07 DIAGNOSIS — E03.9 ACQUIRED HYPOTHYROIDISM: ICD-10-CM

## 2025-04-07 DIAGNOSIS — N39.0 RECURRENT UTI: ICD-10-CM

## 2025-04-07 DIAGNOSIS — G44.1 INTRACTABLE VASCULAR HEADACHE: ICD-10-CM

## 2025-04-07 PROCEDURE — 99214 OFFICE O/P EST MOD 30 MIN: CPT | Performed by: FAMILY MEDICINE

## 2025-04-07 PROCEDURE — 1036F TOBACCO NON-USER: CPT | Performed by: FAMILY MEDICINE

## 2025-04-07 PROCEDURE — 1123F ACP DISCUSS/DSCN MKR DOCD: CPT | Performed by: FAMILY MEDICINE

## 2025-04-07 RX ORDER — NALOXONE HYDROCHLORIDE 4 MG/.1ML
1 SPRAY NASAL AS NEEDED
Qty: 2 EACH | Refills: 0 | Status: SHIPPED | OUTPATIENT
Start: 2025-04-07

## 2025-04-07 RX ORDER — TRAMADOL HYDROCHLORIDE 50 MG/1
50 TABLET ORAL DAILY PRN
Qty: 30 TABLET | Refills: 0 | Status: SHIPPED | OUTPATIENT
Start: 2025-04-07 | End: 2025-05-07

## 2025-04-07 RX ORDER — AMOXICILLIN AND CLAVULANATE POTASSIUM 500; 125 MG/1; MG/1
500 TABLET, FILM COATED ORAL 3 TIMES DAILY
Qty: 30 TABLET | Refills: 0 | Status: SHIPPED | OUTPATIENT
Start: 2025-04-07 | End: 2025-04-17

## 2025-04-07 RX ORDER — NITROFURANTOIN MACROCRYSTALS 50 MG/1
50 CAPSULE ORAL DAILY
Qty: 30 CAPSULE | Refills: 0 | Status: SHIPPED | OUTPATIENT
Start: 2025-04-07

## 2025-04-07 RX ORDER — PREDNISONE 5 MG/1
5 TABLET ORAL DAILY
Qty: 30 TABLET | Refills: 0 | Status: SHIPPED | OUTPATIENT
Start: 2025-04-07 | End: 2025-05-07

## 2025-04-07 NOTE — PROGRESS NOTES
Subjective   Patient ID: Alisa Fregoso is a 74 y.o. female who presents for Tinnitus.  HPI  74-year-old patient with a history of significant visual loss dementia as well as lumbar radiculopathy is here because the ringing of the ears specially on the right side has had some rhinorrhea over the last week.  No high fever shaking chills headache vomiting or change neurologically.  Weakness of the lower legs continues.  Otherwise the patient does take Ultram intermittently for headache urine tox cream was obtained template completed OARRS performed  Denies any significant sore throat chest pain short of breath or cough above reviewed more with the  and put today as the patient defers many questions to her .  Did review earlier CT showing enlarged ventricles as well as well as diffuse cortical loss from last summer with no significant change from baseline vascular dementia suggested no recent stroke or neoplasm  Taking 5 mg of prednisone for many years in the morning with definite improvement of baseline rheumatoid of the shoulders knees and back  With recurrent UTIs because of inadequate oral intake and diminished ability to empty bladder she does take Macrodantin if needed for 1 week if needed for urinary symptoms or flank pain  Does take Colace for recurrent constipation and Bentyl as needed for cramping  Metoprolol 25 mg twice a day magnesium and Synthroid 50 mcg daily with normal TSH  Review of Systems   Constitutional:  Positive for fatigue. Negative for fever and unexpected weight change.   HENT:  Positive for ear pain, rhinorrhea, sinus pressure and tinnitus. Negative for voice change.    Eyes:  Positive for visual disturbance.   Respiratory:  Negative for cough, chest tightness and shortness of breath.    Cardiovascular:  Negative for chest pain, palpitations and leg swelling.   Gastrointestinal:  Positive for constipation. Negative for abdominal distention, abdominal pain, blood in stool, nausea  and vomiting.   Genitourinary:  Positive for frequency and urgency. Negative for dysuria, flank pain and hematuria.   Musculoskeletal:  Positive for arthralgias, back pain, gait problem and myalgias. Negative for neck pain.   Skin:  Negative for rash.   Neurological:  Positive for weakness, numbness and headaches. Negative for syncope and light-headedness.   Psychiatric/Behavioral:  Positive for confusion, decreased concentration, dysphoric mood and sleep disturbance. Negative for behavioral problems and suicidal ideas.    OARRS:  No data recorded  I have personally reviewed the OARRS report for Alisa Fregoso. I have considered the risks of abuse, dependence, addiction and diversion    Is the patient prescribed a combination of a benzodiazepine and opioid?  No    Last Urine Drug Screen / ordered today: Yes  Recent Results (from the past 8760 hours)   Opiate/Opioid/Benzo Prescription Compliance    Collection Time: 04/08/25  2:36 PM   Result Value Ref Range    Creatinine 79.8 > or = 20.0 mg/dL    pH 7.3 4.5 - 9.0    Oxidant NEGATIVE <200 mcg/mL    Amphetamines NEGATIVE <500 ng/mL    Barbiturates NEGATIVE <300 ng/mL    Cocaine Metabolite NEGATIVE <150 ng/mL    Marijuana Metabolite NEGATIVE <20 ng/mL    Phencyclidine NEGATIVE <25 ng/mL    Alphahydroxyalprazolam      medMATCH aOH alprazolam      Alphahydroxymidazolam      medMATCH aOH midazolam      Alphahydroxytriazolam      medMATCH aOH triazolam      Aminoclonazepam      medMATCH Aminoclonazepam      Hydroxyethylflurazepam      medMATCH [place],Et flurazepam      Lorazepam      medMATCH Lorazepam      Nordiazepam      medMATCH Nordiazepam      Oxazepam      medMATCH Oxazepam      Temazepam      medMATCH Temazepam      Benzodiazepines Comments      Fentanyl      medMATCH Fentanyl      Norfentanyl      medMATCH Norfentanyl      Fentanyl Comments      6 Acetylmorphine NEGATIVE <10 ng/mL    Heroin Metab Comments PENDING     EDDP NEGATIVE <100 ng/mL    Methadone NEGATIVE <100  "ng/mL    Methadone Comments      Codeine      medMATCH Codeine      Hydrocodone      medMATCH Hydrocodone      Hydromorphone      medMATCH Hydromorphone      Morphine      medMATCH Morphine      Norhydrocodone      medMATCH Norhydrocodone      Opiates Comments      Noroxycodone      medMATCH Noroxycodone      Oxycodone      medMATCH Oxycodone      Oxymorphone      medMATCH Oxymorphone      Oxycodone Comments      Desmethyltramadol 3,763 (H) <100 ng/mL    Tramadol 4,033 (H) <100 ng/mL    Tramadol Comments      Zolpidem NEGATIVE <5 ng/mL    Zolpidem Metabolite NEGATIVE <5 ng/mL    Zolpidem Comments      Notes and Comments PRELIM     Patient Historical Report PENDING      N/A    Void but will bring in urine according to the     Controlled Substance Agreement:  Date of the Last Agreement: April 7, 2025  Reviewed Controlled Substance Agreement including but not limited to the benefits, risks, and alternatives to treatment with a Controlled Substance medication(s).    Opioids:  What is the patient's goal of therapy?  Control of at nighttime vascular type tension headaches benefit is outweighed the risk and no evidence abuse side effects or divergence  Is this being achieved with current treatment?y es    I have calculated the patient's Morphine Dose Equivalent (MED):   I have considered referral to Pain Management and/or a specialist, and do not feel it is necessary at this time.    I feel that it is clinically indicated to continue this current medication regimen after consideration of alternative therapies, and other non-opioid treatment.    Opioid Risk Screening:  No data recorded    Pain Assessment:  No data recorded    Objective   /76 (BP Location: Right arm, Patient Position: Sitting, BP Cuff Size: Adult)   Pulse 60   Temp 35.9 °C (96.6 °F) (Temporal)   Resp 16   Ht 1.6 m (5' 3\")   SpO2 94%   BMI 17.93 kg/m²         CT head wo IV contrast  Status: Final result     PACS Images     Show images for " CT head wo IV contrast  Signed by    Signed Time Phone Pager   Simon May MD 7/30/2024 15:10 [phone]     Exam Information    Status Exam Begun Exam Ended   Final 7/30/2024 14:11 7/30/2024 14:49     Study Result    Narrative & Impression   Interpreted By:  Simon May,   STUDY:  CT HEAD WO IV CONTRAST;  7/30/2024 2:49 pm      INDICATION:  Signs/Symptoms:weakness confusion.      COMPARISON:  10/08/2023      ACCESSION NUMBER(S):  [id number]      ORDERING CLINICIAN:  SHANTELLE PENDLETON      TECHNIQUE:  Sequential trans axial images were obtained  .      FINDINGS:  INTRACRANIAL:      There is mild prominence of the cortical sulci indicating atrophy.      There is moderate ventriculomegaly, again consistent with atrophy.  Ventriculomegaly appears slightly greater proportion to the sulcal  prominence, possibly with a degree of normal pressure hydrocephalus.      Mild decreased attenuation of the periventricular and long tracks of  the white matter most consistent with gliosis from arterial disease.  There is no evidence of definite subacute infarction, intracranial  hemorrhage or mass.          EXTRACRANIAL:  Visualized paranasal sinuses and mastoids are clear.  The calvarium is intact.      IMPRESSION:  Age related degenerative change as described without acute findings  or significant change from the prior exam.      Signed by: Simon QUIÑONEZAN:  JAK CARSON      TECHNIQUE:  Sonographic evaluation of the thyroid was performed.      FINDINGS:  Thyroid is not enlarged. Right thyroid lobe measures 2.6 x 1.1 x 0.9  cm. Left thyroid lobe measures 2.5 x 0.9 x 1.1 cm. Thyroid isthmus  measures 4 mm in AP diameter. Thyroid parenchyma is homogeneous. No  thyroid nodule is demonstrated.      IMPRESSION:  Unremarkable thyroid ultrasound.          MACRO:  None  Physical Exam  Vital signs reviewed and normal pulse ox good blood pressure good  EENT eyes clear PERRL bilaterally no nystagmus both right and left  auditory canals obstructed with wax left canal completely removed with wax the right incompletely right TM is hyperemic superiorly.  Nose with engorged dermis with yellow rhinorrhea right greater than left oral exam is benign  Neck neck is supple without significant masses adenopathy bruits rigidity no stridor no JVD thyroid is normal  Chest diminished breath sound the bases otherwise no wheezing rales or rhonchi  Cardiovascular RSR without significant murmurs gallop or ectopy  Abdominal no CVA tenderness really no abdominal bloating bowel sounds normal no abdominal rebound tenderness today no suprapubic fullness.  Neuromuscular weakness of the lower legs bilaterally with reduced sensation to touch and vibration from the top of the sock area to the toes no pretibial edema noted  Psych significant reduction in recent recall with intermittent confusion.  Defers to  to answer most of the questions alert and oriented no striking anxiety or depressive symptoms ocular no tenderness of the temporal arteries  Collected urine but urine tox screen was ordered for the patient to check mildly presence of infection but also urine tox screen  ET of the head reviewed as well as lab.      Assessment/Plan   Problem List Items Addressed This Visit       Constipation in female    Forgetfulness    Hypothyroidism    Intractable vascular headache    Recurrent UTI    Relevant Medications    nitrofurantoin (Macrodantin) 50 mg capsule    Rheumatoid arthritis    Relevant Medications    traMADol (Ultram) 50 mg tablet    predniSONE (Deltasone) 5 mg tablet    Back pain    Relevant Medications    traMADol (Ultram) 50 mg tablet    naloxone (Narcan) 4 mg/0.1 mL nasal spray    Other Relevant Orders    Opiate/Opioid/Benzo Prescription Compliance (Completed)    Rheumatoid arthritis of multiple sites without organ or system involvement with positive rheumatoid factor (Multi)    Relevant Orders    Opiate/Opioid/Benzo Prescription Compliance  (Completed)    Generalized weakness    Impacted cerumen of both ears - Primary    Relevant Orders    Ear cerumen removal    Moderate dementia associated with other underlying disease, without behavioral disturbance, psychotic disturbance, mood disturbance, or anxiety     Forgetfulness and mild cognitive symptoms today in the office and at home according to the  short-term memory is significantly impaired at this time         Non-recurrent acute serous otitis media    Relevant Medications    amoxicillin-pot clavulanate (Augmentin) 500-125 mg tablet     Other Visit Diagnoses       Medication management        Relevant Orders    Opiate/Opioid/Benzo Prescription Compliance (Completed)        With early right eustachian tube dysfunction contributing to the tinnitus and impacted cerumen almost complete evacuation of the cerumen of the right ear will give Augmentin for the presumed infection sinus as well as right otitis media  Will get urine for tox screen with home-going pad as well as urine collection cup to rule out any recurrent UTI  Continue frequent small meals continue above chronic medicines  All TRAM's use intermittently for nighttime headache compatible with her migraines in the past use and side effects reviewed med agreement performed as well as urine tox screen ordered above reviewed in detail with  prescription renewed    With control of her hand arthritis shoulder arthritis and low back with 5 mg of prednisone daily spouse declines neurological referral check dementia.  Will notify me if significant change neurologically  Increasing liquids to help recurrent UTI  Advised 5 days of nasal Afrin to help eustachian tube dysfunction continues may refer to ENT.  Low-salt diet to be maintained follow-up in 3 to 4 weeks if persistence  @discharge  The above diagnosis and treatment plan was discussed with the patient patient will continue appropriate diet and exercise as reviewed  Patient will recheck  earlier if any interval problems of significance or clinical worsening of the above problems.  Agrees above surveillance.  All question were addressed regarding above meds

## 2025-04-10 VITALS
HEART RATE: 60 BPM | SYSTOLIC BLOOD PRESSURE: 122 MMHG | RESPIRATION RATE: 16 BRPM | OXYGEN SATURATION: 94 % | BODY MASS INDEX: 17.93 KG/M2 | TEMPERATURE: 96.6 F | HEIGHT: 63 IN | DIASTOLIC BLOOD PRESSURE: 76 MMHG

## 2025-04-10 PROBLEM — R10.9 FLANK PAIN: Status: RESOLVED | Noted: 2019-04-04 | Resolved: 2025-04-10

## 2025-04-10 PROBLEM — H61.23 IMPACTED CERUMEN OF BOTH EARS: Status: ACTIVE | Noted: 2025-04-10

## 2025-04-10 PROBLEM — I47.19 ATRIAL TACHYCARDIA (CMS-HCC): Status: RESOLVED | Noted: 2024-08-19 | Resolved: 2025-04-10

## 2025-04-10 PROBLEM — F02.B0: Status: ACTIVE | Noted: 2025-04-10

## 2025-04-10 PROBLEM — H90.3 BILATERAL SENSORINEURAL HEARING LOSS: Status: RESOLVED | Noted: 2023-03-16 | Resolved: 2025-04-10

## 2025-04-10 PROBLEM — H65.00 NON-RECURRENT ACUTE SEROUS OTITIS MEDIA: Status: ACTIVE | Noted: 2025-04-10

## 2025-04-10 ASSESSMENT — ENCOUNTER SYMPTOMS
UNEXPECTED WEIGHT CHANGE: 0
SLEEP DISTURBANCE: 1
VOICE CHANGE: 0
HEMATURIA: 0
DYSPHORIC MOOD: 1
PALPITATIONS: 0
DECREASED CONCENTRATION: 1
NAUSEA: 0
CONSTIPATION: 1
CONFUSION: 1
DYSURIA: 0
FATIGUE: 1
ARTHRALGIAS: 1
LIGHT-HEADEDNESS: 0
BLOOD IN STOOL: 0
SHORTNESS OF BREATH: 0
MYALGIAS: 1
COUGH: 0
BACK PAIN: 1
SINUS PRESSURE: 1
FEVER: 0
FREQUENCY: 1
ABDOMINAL PAIN: 0
CHEST TIGHTNESS: 0
NECK PAIN: 0
NUMBNESS: 1
FLANK PAIN: 0
HEADACHES: 1
VOMITING: 0
WEAKNESS: 1
RHINORRHEA: 1
ABDOMINAL DISTENTION: 0

## 2025-04-11 LAB
1OH-MIDAZOLAM UR-MCNC: NEGATIVE NG/ML
7AMINOCLONAZEPAM UR-MCNC: NEGATIVE NG/ML
A-OH ALPRAZ UR-MCNC: NEGATIVE NG/ML
A-OH-TRIAZOLAM UR-MCNC: NEGATIVE NG/ML
AMPHETAMINES UR QL: NEGATIVE NG/ML
BARBITURATES UR QL: NEGATIVE NG/ML
BZE UR QL: NEGATIVE NG/ML
CODEINE UR-MCNC: NEGATIVE NG/ML
CREAT UR-MCNC: 79.8 MG/DL
DRUG SCREEN COMMENT UR-IMP: ABNORMAL
EDDP UR-MCNC: NEGATIVE NG/ML
FENTANYL UR-MCNC: NEGATIVE NG/ML
HYDROCODONE UR-MCNC: NEGATIVE NG/ML
HYDROMORPHONE UR-MCNC: NEGATIVE NG/ML
LORAZEPAM UR-MCNC: NEGATIVE NG/ML
METHADONE UR-MCNC: NEGATIVE NG/ML
MORPHINE UR-MCNC: NEGATIVE NG/ML
NORDIAZEPAM UR-MCNC: NEGATIVE NG/ML
NORFENTANYL UR-MCNC: NEGATIVE NG/ML
NORHYDROCODONE UR CFM-MCNC: NEGATIVE NG/ML
NOROXYCODONE UR CFM-MCNC: NEGATIVE NG/ML
NORTRAMADOL UR-MCNC: 3763 NG/ML
OH-ETHYLFLURAZ UR-MCNC: NEGATIVE NG/ML
OXAZEPAM UR-MCNC: NEGATIVE NG/ML
OXIDANTS UR QL: NEGATIVE MCG/ML
OXYCODONE UR CFM-MCNC: NEGATIVE NG/ML
OXYMORPHONE UR CFM-MCNC: NEGATIVE NG/ML
PCP UR QL: NEGATIVE NG/ML
PH UR: 7.3 [PH] (ref 4.5–9)
QUEST 6 ACETYLMORPHINE: NEGATIVE NG/ML
QUEST NOTES AND COMMENTS: ABNORMAL
QUEST ZOLPIDEM: NEGATIVE NG/ML
TEMAZEPAM UR-MCNC: NEGATIVE NG/ML
THC UR QL: NEGATIVE NG/ML
TRAMADOL UR-MCNC: 4033 NG/ML
ZOLPIDEM PHENYL-4-CARB UR CFM-MCNC: NEGATIVE NG/ML

## 2025-04-11 NOTE — ASSESSMENT & PLAN NOTE
Forgetfulness and mild cognitive symptoms today in the office and at home according to the  short-term memory is significantly impaired at this time

## 2025-04-16 ENCOUNTER — TELEPHONE (OUTPATIENT)
Dept: PRIMARY CARE | Facility: CLINIC | Age: 75
End: 2025-04-16
Payer: COMMERCIAL

## 2025-04-16 DIAGNOSIS — H93.13 TINNITUS OF BOTH EARS: Primary | ICD-10-CM

## 2025-04-16 NOTE — TELEPHONE ENCOUNTER
Pt was seen on 4/7/25 for ringing in her ears and was prescribed Augmentin. She has 2 days of this med left.  states the ringing has not improved at all. He wants to know what should be done at this point. Please advise.

## 2025-05-27 DIAGNOSIS — M06.9 RHEUMATOID ARTHRITIS, INVOLVING UNSPECIFIED SITE, UNSPECIFIED WHETHER RHEUMATOID FACTOR PRESENT (MULTI): ICD-10-CM

## 2025-05-27 DIAGNOSIS — N39.0 RECURRENT UTI: ICD-10-CM

## 2025-05-27 DIAGNOSIS — R10.84 GENERALIZED ABDOMINAL PAIN: ICD-10-CM

## 2025-05-27 RX ORDER — DICYCLOMINE HYDROCHLORIDE 20 MG/1
20 TABLET ORAL EVERY 6 HOURS PRN
Qty: 60 TABLET | Refills: 3 | Status: SHIPPED | OUTPATIENT
Start: 2025-05-27

## 2025-05-27 RX ORDER — NITROFURANTOIN MACROCRYSTALS 50 MG/1
50 CAPSULE ORAL DAILY
Qty: 30 CAPSULE | Refills: 0 | Status: SHIPPED | OUTPATIENT
Start: 2025-05-27

## 2025-05-27 RX ORDER — PREDNISONE 5 MG/1
5 TABLET ORAL DAILY
Qty: 30 TABLET | Refills: 2 | Status: SHIPPED | OUTPATIENT
Start: 2025-05-27 | End: 2025-08-25

## 2025-05-27 NOTE — TELEPHONE ENCOUNTER
Rx Refill Request     Name: Alisa Fregoso  :  1950   Medication Name:  PREDNISONE 5 MG, NITROFURANTOIN 50 MG, DICYCLOMINE 20 MG  Specific Pharmacy location:  [facility]  Date of last appointment:  2025   Date of next appointment:  2025   Best number to reach patient:  [phone]

## 2025-06-12 DIAGNOSIS — M54.50 ACUTE BILATERAL LOW BACK PAIN, UNSPECIFIED WHETHER SCIATICA PRESENT: ICD-10-CM

## 2025-06-12 DIAGNOSIS — M06.9 RHEUMATOID ARTHRITIS, INVOLVING UNSPECIFIED SITE, UNSPECIFIED WHETHER RHEUMATOID FACTOR PRESENT (MULTI): ICD-10-CM

## 2025-06-12 RX ORDER — TRAMADOL HYDROCHLORIDE 50 MG/1
50 TABLET, FILM COATED ORAL DAILY PRN
Qty: 30 TABLET | Refills: 0 | Status: SHIPPED | OUTPATIENT
Start: 2025-06-12 | End: 2025-07-12

## 2025-06-12 NOTE — TELEPHONE ENCOUNTER
Rx Refill Request     Name: Alisa Fregoso  :  1950   Medication Name:  tramadol 50mg  Specific Pharmacy location:  [facility]  Date of last appointment:  2025   Date of next appointment:  2025   Best number to reach patient:  [phone]

## 2025-07-01 DIAGNOSIS — N39.0 RECURRENT UTI: ICD-10-CM

## 2025-07-01 RX ORDER — NITROFURANTOIN MACROCRYSTALS 50 MG/1
50 CAPSULE ORAL DAILY
Qty: 30 CAPSULE | Refills: 2 | Status: SHIPPED | OUTPATIENT
Start: 2025-07-01

## 2025-07-01 NOTE — TELEPHONE ENCOUNTER
Rx Refill Request     Name: Alisa Fregoso  :  1950   Medication Name:  NITROFURANTOIN 50 MG  [facility]  Date of last appointment:  2025   Date of next appointment:  2025   Best number to reach patient:  [phone]

## 2025-07-09 ENCOUNTER — APPOINTMENT (OUTPATIENT)
Dept: PRIMARY CARE | Facility: CLINIC | Age: 75
End: 2025-07-09
Payer: COMMERCIAL

## 2025-07-09 VITALS
HEART RATE: 57 BPM | DIASTOLIC BLOOD PRESSURE: 76 MMHG | RESPIRATION RATE: 16 BRPM | HEIGHT: 63 IN | SYSTOLIC BLOOD PRESSURE: 122 MMHG | OXYGEN SATURATION: 98 % | TEMPERATURE: 97.1 F | BODY MASS INDEX: 17.93 KG/M2

## 2025-07-09 DIAGNOSIS — K21.9 GASTROESOPHAGEAL REFLUX DISEASE WITHOUT ESOPHAGITIS: ICD-10-CM

## 2025-07-09 DIAGNOSIS — Z00.00 ROUTINE GENERAL MEDICAL EXAMINATION AT HEALTH CARE FACILITY: ICD-10-CM

## 2025-07-09 DIAGNOSIS — R91.8 PULMONARY NODULES: ICD-10-CM

## 2025-07-09 DIAGNOSIS — M05.79 RHEUMATOID ARTHRITIS OF MULTIPLE SITES WITHOUT ORGAN OR SYSTEM INVOLVEMENT WITH POSITIVE RHEUMATOID FACTOR (MULTI): ICD-10-CM

## 2025-07-09 DIAGNOSIS — E03.9 ACQUIRED HYPOTHYROIDISM: ICD-10-CM

## 2025-07-09 DIAGNOSIS — K29.30 CHRONIC SUPERFICIAL GASTRITIS WITHOUT BLEEDING: ICD-10-CM

## 2025-07-09 DIAGNOSIS — Z13.220 ENCOUNTER FOR SCREENING FOR LIPID DISORDER: ICD-10-CM

## 2025-07-09 DIAGNOSIS — N39.0 RECURRENT UTI: ICD-10-CM

## 2025-07-09 DIAGNOSIS — H35.89 RETINAL MACULAR ATROPHY: ICD-10-CM

## 2025-07-09 DIAGNOSIS — Z00.00 MEDICARE ANNUAL WELLNESS VISIT, SUBSEQUENT: Primary | ICD-10-CM

## 2025-07-09 DIAGNOSIS — F43.21 ADJUSTMENT REACTION WITH PROLONGED DEPRESSIVE REACTION: ICD-10-CM

## 2025-07-09 DIAGNOSIS — I48.0 PAROXYSMAL ATRIAL FIBRILLATION (MULTI): ICD-10-CM

## 2025-07-09 DIAGNOSIS — F02.B0 MODERATE DEMENTIA ASSOCIATED WITH OTHER UNDERLYING DISEASE, WITHOUT BEHAVIORAL DISTURBANCE, PSYCHOTIC DISTURBANCE, MOOD DISTURBANCE, OR ANXIETY: ICD-10-CM

## 2025-07-09 PROCEDURE — 1159F MED LIST DOCD IN RCRD: CPT | Performed by: FAMILY MEDICINE

## 2025-07-09 PROCEDURE — G0439 PPPS, SUBSEQ VISIT: HCPCS | Performed by: FAMILY MEDICINE

## 2025-07-09 PROCEDURE — 99212 OFFICE O/P EST SF 10 MIN: CPT | Performed by: FAMILY MEDICINE

## 2025-07-09 PROCEDURE — 1160F RVW MEDS BY RX/DR IN RCRD: CPT | Performed by: FAMILY MEDICINE

## 2025-07-09 PROCEDURE — 1036F TOBACCO NON-USER: CPT | Performed by: FAMILY MEDICINE

## 2025-07-09 PROCEDURE — 1170F FXNL STATUS ASSESSED: CPT | Performed by: FAMILY MEDICINE

## 2025-07-09 RX ORDER — FAMOTIDINE 40 MG/1
40 TABLET, FILM COATED ORAL 2 TIMES DAILY
Qty: 60 TABLET | Refills: 2 | Status: SHIPPED | OUTPATIENT
Start: 2025-07-09 | End: 2025-08-08

## 2025-07-09 ASSESSMENT — ENCOUNTER SYMPTOMS
OCCASIONAL FEELINGS OF UNSTEADINESS: 0
DEPRESSION: 0
LOSS OF SENSATION IN FEET: 0

## 2025-07-09 ASSESSMENT — ACTIVITIES OF DAILY LIVING (ADL)
DRESSING: NEEDS ASSISTANCE
BATHING: NEEDS ASSISTANCE
GROCERY_SHOPPING: TOTAL CARE
DOING_HOUSEWORK: TOTAL CARE
TAKING_MEDICATION: NEEDS ASSISTANCE
MANAGING_FINANCES: NEEDS ASSISTANCE

## 2025-07-09 ASSESSMENT — PATIENT HEALTH QUESTIONNAIRE - PHQ9
2. FEELING DOWN, DEPRESSED OR HOPELESS: NOT AT ALL
1. LITTLE INTEREST OR PLEASURE IN DOING THINGS: NOT AT ALL
SUM OF ALL RESPONSES TO PHQ9 QUESTIONS 1 AND 2: 0

## 2025-07-09 NOTE — PROGRESS NOTES
Subjective   Reason for Visit: Alisa Fregoso is an 74 y.o. female here for a Medicare Wellness visit.          Reviewed all medications by prescribing practitioner or clinical pharmacist (such as prescriptions, OTCs, herbal therapies and supplements) and documented in the medical record.    HPI  Pleasant 74-year-old lady is here for Medicare wellness past medical history extensive with hereditary ataxia generalized weakness as well as poor conditioning.  Had a history of remote CVA and subsequent mild dementia.  History of recurrent intermittent vascular and tension headache and does take Ultram if needed on daily basis or as performed  The patient does have history of recurrent UTIs since her mild CVA and has been taking low-dose for prophylaxis i.e. 50 mg of Macrodantin with some mild increased nausea on Protonix we suggested switching to Pepcid and totally stopping the Macrodantin unless symptoms would occur in the spouse agrees  Has had a history of thyroid nodules followed by endocrinology and does take 50 mcg of Synthroid daily milligrams gabapentin for restless leg syndrome which is done well gratefully no constipation melena hematochezia needs to follow-up with GI for repeat colonoscopy  Does occasional self breast examination but for many years declined mammography lengthy review in regards of both spouses patient interested in performing mammogram she declines today but will think about it after I reviewed.  She will continue self breast notify me of any change.  Patient also declines Cologuard at this time  New neurological problems and history of RA.  Takes 5 mg daily fairly good control of her arthralgia.  Stability gait, uses wheelchair and away from home  Recent chest pain shortness of breath with a history of pulmonary nodules 1 to 2 years ago we will repeat CT for surveillance  No cough hemoptysis shortness of breath  No palpitations or chest pain  No recent dysuria.  Mild upper abdominal  discomfort and may well be due to chronic Macrobid use  25 mg for hypertension as well as magnesium and stool softeners prevent her constipation left lower quadrant pain from constipation.  Did review earlier CT of the abdomen last 2 years CT of the head  Patient Care Team:  Alvarado Forde DO as PCP - General  Alvarado Forde DO as PCP - [facility] [employer] PCP  Ray Jaramillo MD as Cardiologist (Cardiology)   OARRS:  No data recorded  I have personally reviewed the OARRS report for Alisa Fregoso. I have considered the risks of abuse, dependence, addiction and diversion    Is the patient prescribed a combination of a benzodiazepine and opioid?  No    Last Urine Drug Screen / ordered today: Yes  Recent Results (from the past 8760 hours)   Opiate/Opioid/Benzo Prescription Compliance    Collection Time: 04/08/25  2:36 PM   Result Value Ref Range    Creatinine 79.8 > or = 20.0 mg/dL    pH 7.3 4.5 - 9.0    Oxidant NEGATIVE <200 mcg/mL    Amphetamines NEGATIVE <500 ng/mL    Barbiturates NEGATIVE <300 ng/mL    Cocaine Metabolite NEGATIVE <150 ng/mL    Marijuana Metabolite NEGATIVE <20 ng/mL    Phencyclidine NEGATIVE <25 ng/mL    Alphahydroxyalprazolam NEGATIVE <25 ng/mL    Alphahydroxymidazolam NEGATIVE <50 ng/mL    Alphahydroxytriazolam NEGATIVE <50 ng/mL    Aminoclonazepam NEGATIVE <25 ng/mL    Hydroxyethylflurazepam NEGATIVE <50 ng/mL    Lorazepam NEGATIVE <50 ng/mL    Nordiazepam NEGATIVE <50 ng/mL    Oxazepam NEGATIVE <50 ng/mL    Temazepam NEGATIVE <50 ng/mL    Benzodiazepines Comments      Fentanyl NEGATIVE <0.5 ng/mL    Norfentanyl NEGATIVE <0.5 ng/mL    Fentanyl Comments      6 Acetylmorphine NEGATIVE <10 ng/mL    Heroin Metab Comments      EDDP NEGATIVE <100 ng/mL    Methadone NEGATIVE <100 ng/mL    Methadone Comments      Codeine NEGATIVE <50 ng/mL    Hydrocodone NEGATIVE <50 ng/mL    Hydromorphone NEGATIVE <50 ng/mL    Morphine NEGATIVE <50 ng/mL    Norhydrocodone NEGATIVE <50 ng/mL    Opiates  Comments      Noroxycodone NEGATIVE <50 ng/mL    Oxycodone NEGATIVE <50 ng/mL    Oxymorphone NEGATIVE <50 ng/mL    Oxycodone Comments      Desmethyltramadol 3,763 (H) <100 ng/mL    Tramadol 4,033 (H) <100 ng/mL    Tramadol Comments      Zolpidem NEGATIVE <5 ng/mL    Zolpidem Metabolite NEGATIVE <5 ng/mL    Zolpidem Comments      Notes and Comments       Results are as expected.     Done 4-8-2025-both agreement and urine tox screen      Controlled Substance Agreement:  Date of the Last Agreement: 4- 8-2025  Reviewed Controlled Substance Agreement including but not limited to the benefits, risks, and alternatives to treatment with a Controlled Substance medication(s).    Opioids:  What is the patient's goal of therapy? -Use for posterior and anterior headache.  Does help her a lot according the spouse.  No side effects and good tolerance to it template completed and otherwise no acute abuse or side effects  Is this being achieved with current treatment? yes    I have calculated the patient's Morphine Dose Equivalent (MED):   I have considered referral to Pain Management and/or a specialist, and do not feel it is necessary at this time.    I feel that it is clinically indicated to continue this current medication regimen after consideration of alternative therapies, and other non-opioid treatment.    Opioid Risk Screening:  No data recorded    Pain Assessment:  No data recorded  Review of Systems   Constitutional:  Positive for fatigue. Negative for fever and unexpected weight change.   Eyes:  Positive for visual disturbance.   Respiratory:  Negative for cough, chest tightness and shortness of breath.    Cardiovascular:  Negative for chest pain, palpitations and leg swelling.   Gastrointestinal:  Positive for abdominal pain and constipation. Negative for abdominal distention, blood in stool, nausea, rectal pain and vomiting.   Genitourinary:  Positive for frequency. Negative for dysuria, flank pain and hematuria.  "  Musculoskeletal:  Positive for arthralgias, back pain, gait problem and neck pain. Negative for myalgias.   Skin:  Negative for rash.   Neurological:  Positive for weakness, light-headedness and headaches. Negative for syncope.   Psychiatric/Behavioral:  Positive for confusion and decreased concentration. Negative for behavioral problems, sleep disturbance and suicidal ideas. The patient is not nervous/anxious.        Objective   Vitals:  /76 (BP Location: Right arm, Patient Position: Sitting, BP Cuff Size: Adult)   Pulse 57   Temp 36.2 °C (97.1 °F) (Temporal)   Resp 16   Ht 1.6 m (5' 3\")   SpO2 98%   BMI 17.93 kg/m²     Narrative & Impression   Interpreted By:  Simon May,   STUDY:  CT HEAD WO IV CONTRAST;  7/30/2024 2:49 pm      INDICATION:  Signs/Symptoms:weakness confusion.      COMPARISON:  10/08/2023      ACCESSION NUMBER(S):  [id number]      ORDERING CLINICIAN:  SHANTELLE PENDLETON      TECHNIQUE:  Sequential trans axial images were obtained  .      FINDINGS:  INTRACRANIAL:      There is mild prominence of the cortical sulci indicating atrophy.      There is moderate ventriculomegaly, again consistent with atrophy.  Ventriculomegaly appears slightly greater proportion to the sulcal  prominence, possibly with a degree of normal pressure hydrocephalus.      Mild decreased attenuation of the periventricular and long tracks of  the white matter most consistent with gliosis from arterial disease.  There is no evidence of definite subacute infarction, intracranial  hemorrhage or mass.          EXTRACRANIAL:  Visualized paranasal sinuses and mastoids are clear.  The calvarium is intact.      IMPRESSION:  Age related degenerative change as described without acute findings  or significant change from the prior exam.      Signed by: Simon    CT abdomen pelvis w IV contrast  Status: Final result     PACS Images     Show images for CT abdomen pelvis w IV contrast  Signed by    Signed Time Phone Pager   Zack BIRMINGHAM " MD Buster 7/30/2024 15:50 [phone]      Exam Information    Status Exam Begun Exam Ended   Final 7/30/2024 14:11 7/30/2024 14:48     Study Result    Narrative & Impression   STUDY:  CT Abdomen and Pelvis with IV Contrast; 07/30/2024 at 2:48 PM  INDICATION:  Abdominal pain, cramping, nausea.  COMPARISON:  CT abdomen and pelvis 04/29/24, 10/08/23, 09/15/23, 11/22/22.  ACCESSION NUMBER(S):  [id number]  ORDERING CLINICIAN:  SHANTELLE PENDLETON  TECHNIQUE:  CT of the abdomen and pelvis was performed.  Contiguous axial images  were obtained at 3 mm slice thickness through the abdomen and pelvis.   Coronal and sagittal reconstructions at 3 mm slice thickness were  performed.  Omnipaque-350 75 mL was administered intravenously.    FINDINGS:  LOWER CHEST:  No cardiomegaly.  No pericardial effusion.  There are interstitial  changes posteriorly in the lung bases consistent with dependent  atelectasis.     ABDOMEN:     LIVER:  No hepatomegaly.  Smooth surface contour.  There is mild fatty  infiltration of liver.     BILE DUCTS:  No intrahepatic or extrahepatic biliary ductal dilatation.     GALLBLADDER:  The gallbladder is absent.  STOMACH:  Postsurgical changes to the stomach.     PANCREAS:  No masses or ductal dilatation.     SPLEEN:  No splenomegaly or focal splenic lesion.     ADRENAL GLANDS:  No thickening or nodules.     KIDNEYS AND URETERS:  Kidneys are normal in size and location.  No renal or ureteral  calculi.     PELVIS:     BLADDER:  No abnormalities identified.     REPRODUCTIVE ORGANS:  No abnormalities identified.     BOWEL:  There is diverticulosis.  The appendix is identified and is normal.     VESSELS:  No abnormalities identified.  Abdominal aorta is normal in caliber.      PERITONEUM/RETROPERITONEUM/LYMPH NODES:  No free fluid.  No pneumoperitoneum.  No lymphadenopathy.     ABDOMINAL WALL:  No abnormalities identified.  SOFT TISSUES:   No abnormalities identified.     BONES:  There is a left hip  arthroplasty.  There is rotoscoliosis of the  lumbosacral spine with the curve to the right.  There is diffuse  neural foraminal narrowing.  There are old right-sided pelvic  fractures.  No acute fracture or aggressive osseous lesion.  IMPRESSION:  Hepatic steatosis.  Diverticulosis without diverticulitis.  No acute process.  Signed by Zack Goins MD     Result History  Basic metabolic panel  Order: [id number]   Status: Final result       Dx: Hypokalemia    Test Result Released: No (inaccessible in [facility])    2 Result Notes       1  Topic            Component  Ref Range & Units 11 mo ago  (8/13/24) 11 mo ago  (8/2/24) 11 mo ago  (8/1/24) 11 mo ago  (7/31/24) 11 mo ago  (7/30/24) 11 mo ago  (7/30/24) 1 yr ago  (4/29/24)   Glucose  74 - 99 mg/dL 76 92 79 83 100 High  82 114 High    Sodium  136 - 145 mmol/L 140 142 144 140 141 143 141   Potassium  3.5 - 5.3 mmol/L 4.5 3.7 3.3 Low  3.4 Low  2.9 Low Panic  2.7 Low Panic  2.8 Low Panic    Chloride  98 - 107 mmol/L 103 110 High  109 High  107 102 104 101   Bicarbonate  21 - 32 mmol/L 28 24 25 28 30 30 31   Anion Gap  10 - 20 mmol/L 14 12 13 8 Low  12 12 12   Urea Nitrogen  6 - 23 mg/dL 15 5 Low  5 Low  9 9 11 9   Creatinine  0.50 - 1.05 mg/dL 0.60 0.42 Low  0.46 Low  0.51 0.54 0.60 0.75   eGFR  >60 mL/min/1.73m*2 >90 >90 CM >90 CM >90 CM >90 CM >90 CM 84 CM   Comment: Calculations of estimated GFR are performed using the 2021 CKD-EPI Study Refit equation without the race variable for the IDMS-Traceable creatinine methods.  https://jasn.asnjournals.org/content/early/2021/09/22/ASN.3381780132   Calcium  8.6 - 10.3 mg/dL 9.2 8.3 Low  8.5 Low  8.2 Low  8.7 9.0 9.4   Resulting Agency [facility]              Order: [id number]   Status: Final result    Test Result Released: No ([facility])    0 Result Notes            Component  Ref Range & Units 11 mo ago  (8/2/24) 11 mo ago  (8/1/24) 11 mo ago  (7/31/24) 11 mo ago  (7/30/24) 1 yr ago  (4/29/24) 1 yr ago  (10/10/23)  1 yr ago  (10/9/23)   WBC  4.4 - 11.3 x10*3/uL 6.8 6.6 14.1 High  19.9 High  9.4 8.1 8.8   nRBC  0.0 - 0.0 /100 WBCs 0.0 0.0 0.0 0.0 0.0 0.0 0.0   RBC  4.00 - 5.20 x10*6/uL 4.21 4.43 3.97 Low  4.24 4.59 4.44 4.26   Hemoglobin  12.0 - 16.0 g/dL 11.8 Low  12.5 11.3 Low  11.9 Low  13.0 9.7 Low  9.2 Low    Hematocrit  36.0 - 46.0 % 39.2 40.7 36.8 39.4 42.5 36.0 33.9 Low    MCV  80 - 100 fL 93 92 93 93 93 81 80   MCH  26.0 - 34.0 pg 28.0 28.2 28.5 28.1 28.3 21.8 Low  21.6 Low    MCHC  32.0 - 36.0 g/dL 30.1 Low  30.7 Low  30.7 Low  30.2 Low  30.6 Low  26.9 Low  27.1 Low    RDW  11.5 - 14.5 % 14.9 High  14.9 High  14.8 High  14.6 High  17.3 High  23.5 High  23.2 High    Platelets  150 - 450 x10*3/uL 164 1           SH with reflex to Free T4 if abnormal  Order: [id number]   Status: Final result    Test Result Released: No (inaccessible in [facility])    0 Result Notes       1  Topic         Component  Ref Range & Units 11 mo ago 3 yr ago 4 yr ago 5 yr ago   Thyroid Stimulating Hormone  0.44 - 3.98 mIU/L 0.10 Low  0.34 Low  CM 1.73 CM 0.41 Low  CM   Resulting Agency [facility]                        - Stool in the rectum and in the sigmoid colon.                         - Diverticulosis in the recto-sigmoid colon.                         - No specimens collected.  Recommendation:        - [facility] to [facility] for [facility].                         - Repeat colonoscopy in 2 weeks because the                          examination was incomplete. If patient not having                          active GI bleed, otherwise need to be done as                          inpatient with 2 days prep if patient has active GI                          bleed                         - Patient has a contact number available for                          emergencies. The signs and symptoms of potential                          delayed complications were dis  Arrhythmia     Interpretation Summary  Show Result ComparisonSinus rhythm with  Premature atrial complexes  Nonspecific T wave abnormality  Abnormal ECG  When compared with ECG of 01-AUG-2024 08:42, (unconfirmed)  Premature ventricular complexes are no longer Present  Premature atrial complexes are now Present  Vent. rate has decrecussed with the patient.  1. Left ventricular systolic function is normal with a 55-60% estimated ejection fraction.  2. Spectral Doppler shows an impaired relaxation pattern of left ventricular diastolic filling.  3. There is no evidence of mitral valve stenosis.  4. No evidence of mitral valve regurgitation.  5. Trace to mild tricuspid regurgitation.  6. Aortic valve stenosis is not present.  7. The main pulmonary artery is normal in size, and position, with normal bifurcation into the left and right pulmonary arteries.     RECOMMENDATIONS:  Technically suboptimal a  Physical Exam  Vitals Reviewed and normal  General Inspection volar withdrawn otherwise patient with decreased vision and impaired hearing  Neck is supple without mass adenopathy bruits rigidity  Chest diminished breath sound the bases otherwise no wheezing rales or rhonchi  Cardiovascular really regular sinus rhythm with only 1 ectopy per minute no gallop no systolic or diastolic murmurs I no distention bowel sounds are normal and gratefully no upper or lower abdominal tenderness or rebound  Assessment & Plan  Acquired hypothyroidism    Orders:    TSH; Future    Follow Up In [facility] - PCP - Established; Future    Rheumatoid arthritis of multiple sites without organ or system involvement with positive rheumatoid factor (Multi)    Orders:    Comprehensive metabolic panel; Future    Encounter for screening for lipid disorder    Orders:    Lipid panel; Future    Pulmonary nodules    Orders:    CT chest wo IV contrast; Future    Chronic superficial gastritis without bleeding    Orders:    famotidine (Pepcid) 40 mg tablet; Take 1 tablet (40 mg) by mouth 2 times a day.    Retinal macular  atrophy         Gastroesophageal reflux disease without esophagitis         Recurrent UTI         Adjustment reaction with prolonged depressive reaction         Moderate dementia associated with other underlying disease, without behavioral disturbance, psychotic disturbance, mood disturbance, or anxiety         Medicare annual wellness visit, subsequent         Paroxysmal atrial fibrillation (Multi)  Element of atrial fibrillation cardiac wise is stable         Routine general medical examination at health care facility    Orders:    1 Year Follow Up In [facility] - PCP - Wellness Exam; Future            Earlier abdominal CTs she declines Cologuard and informed her it would be nice to follow-up with the colonoscopy as discussed with spouse and patient he declined at this time since bowel movements been good and no abdominal pain  Very lengthy review and regarding to my recommendation for mammography she  again declines but will continue SBE and notify me of any change---if  change her mind regarding mammography let me know  Continue the hs gabapentin  Will certainly stop Macrodantin make sure this is not contributing to her upper GI distress we will stop Prilosec with multiple medicines placed on Pepcid before breakfast and supper and then check in 6 weeks to check abdominal discomfort otherwise antifall reviewed.  Above medicines diagnosed treatment plan reviewed with spouse as well as patient  No new neurological problems with chronic weakness.  No dysuria symptoms at this time so she can only take Macrodantin if symptoms  Continue her beta-blocker is done very well is no evidence of atrial fibs  Patient is a fairly up-to-date did suggest shingles they will think about it  Will check CMP TSH only for 60-week appointment all question addressed importance of frequent small meals follow-up with her retinal disease with her ophthalmologist  Endocrinology and will repeat her CT for stability of the pulmonary  nodules  @discharge  The above diagnosis and treatment plan was discussed with the patient patient will continue appropriate diet and exercise as reviewed  Patient will recheck earlier if any interval problems of significance or clinical worsening of the above problems.  Agrees above surveillance.  All question were addressed regarding above meds

## 2025-07-13 PROBLEM — Z13.220 ENCOUNTER FOR SCREENING FOR LIPID DISORDER: Status: ACTIVE | Noted: 2025-07-13

## 2025-07-13 PROBLEM — R10.9 ABDOMINAL PAIN: Status: RESOLVED | Noted: 2023-10-08 | Resolved: 2025-07-13

## 2025-07-13 PROBLEM — H61.23 IMPACTED CERUMEN OF BOTH EARS: Status: RESOLVED | Noted: 2025-04-10 | Resolved: 2025-07-13

## 2025-07-13 PROBLEM — I48.0 PAROXYSMAL ATRIAL FIBRILLATION (MULTI): Status: ACTIVE | Noted: 2025-07-13

## 2025-07-13 PROBLEM — K29.30 CHRONIC SUPERFICIAL GASTRITIS WITHOUT BLEEDING: Status: ACTIVE | Noted: 2025-07-13

## 2025-07-13 PROBLEM — R91.8 PULMONARY NODULES: Status: ACTIVE | Noted: 2025-07-13

## 2025-07-13 ASSESSMENT — ENCOUNTER SYMPTOMS
NAUSEA: 0
FLANK PAIN: 0
FREQUENCY: 1
CHEST TIGHTNESS: 0
CONFUSION: 1
WEAKNESS: 1
CONSTIPATION: 1
HEADACHES: 1
SLEEP DISTURBANCE: 0
ABDOMINAL PAIN: 1
BACK PAIN: 1
VOMITING: 0
UNEXPECTED WEIGHT CHANGE: 0
DECREASED CONCENTRATION: 1
LIGHT-HEADEDNESS: 1
RECTAL PAIN: 0
PALPITATIONS: 0
FEVER: 0
ABDOMINAL DISTENTION: 0
HEMATURIA: 0
NECK PAIN: 1
SHORTNESS OF BREATH: 0
MYALGIAS: 0
FATIGUE: 1
DYSURIA: 0
BLOOD IN STOOL: 0
ARTHRALGIAS: 1
COUGH: 0
NERVOUS/ANXIOUS: 0

## 2025-07-21 ENCOUNTER — APPOINTMENT (OUTPATIENT)
Dept: OTOLARYNGOLOGY | Facility: CLINIC | Age: 75
End: 2025-07-21
Payer: COMMERCIAL

## 2025-07-30 ENCOUNTER — HOSPITAL ENCOUNTER (OUTPATIENT)
Dept: RADIOLOGY | Facility: HOSPITAL | Age: 75
Discharge: HOME | End: 2025-07-30
Payer: COMMERCIAL

## 2025-07-30 DIAGNOSIS — R91.8 PULMONARY NODULES: ICD-10-CM

## 2025-07-30 PROCEDURE — 71250 CT THORAX DX C-: CPT

## 2025-07-31 LAB
ALBUMIN SERPL-MCNC: 4.3 G/DL (ref 3.6–5.1)
ALP SERPL-CCNC: 60 U/L (ref 37–153)
ALT SERPL-CCNC: 13 U/L (ref 6–29)
ANION GAP SERPL CALCULATED.4IONS-SCNC: 8 MMOL/L (CALC) (ref 7–17)
AST SERPL-CCNC: 21 U/L (ref 10–35)
BILIRUB SERPL-MCNC: 0.5 MG/DL (ref 0.2–1.2)
BUN SERPL-MCNC: 20 MG/DL (ref 7–25)
CALCIUM SERPL-MCNC: 10.1 MG/DL (ref 8.6–10.4)
CHLORIDE SERPL-SCNC: 100 MMOL/L (ref 98–110)
CHOLEST SERPL-MCNC: 296 MG/DL
CHOLEST/HDLC SERPL: 7.6 (CALC)
CO2 SERPL-SCNC: 28 MMOL/L (ref 20–32)
CREAT SERPL-MCNC: 0.79 MG/DL (ref 0.6–1)
EGFRCR SERPLBLD CKD-EPI 2021: 78 ML/MIN/1.73M2
GLUCOSE SERPL-MCNC: 98 MG/DL (ref 65–139)
HDLC SERPL-MCNC: 39 MG/DL
LDLC SERPL CALC-MCNC: ABNORMAL MG/DL
NONHDLC SERPL-MCNC: 257 MG/DL (CALC)
POTASSIUM SERPL-SCNC: 5 MMOL/L (ref 3.5–5.3)
PROT SERPL-MCNC: 7.5 G/DL (ref 6.1–8.1)
SODIUM SERPL-SCNC: 136 MMOL/L (ref 135–146)
TRIGL SERPL-MCNC: 413 MG/DL
TSH SERPL-ACNC: 0.95 MIU/L (ref 0.4–4.5)

## 2025-08-13 DIAGNOSIS — M54.50 ACUTE BILATERAL LOW BACK PAIN, UNSPECIFIED WHETHER SCIATICA PRESENT: ICD-10-CM

## 2025-08-13 DIAGNOSIS — M06.9 RHEUMATOID ARTHRITIS, INVOLVING UNSPECIFIED SITE, UNSPECIFIED WHETHER RHEUMATOID FACTOR PRESENT (MULTI): ICD-10-CM

## 2025-08-13 RX ORDER — TRAMADOL HYDROCHLORIDE 50 MG/1
50 TABLET, FILM COATED ORAL DAILY PRN
Qty: 30 TABLET | Refills: 0 | Status: SHIPPED | OUTPATIENT
Start: 2025-08-13 | End: 2025-09-12

## 2025-08-15 DIAGNOSIS — I48.0 PAROXYSMAL ATRIAL FIBRILLATION (MULTI): ICD-10-CM

## 2025-08-15 RX ORDER — METOPROLOL TARTRATE 25 MG/1
25 TABLET, FILM COATED ORAL 2 TIMES DAILY
Qty: 60 TABLET | Refills: 3 | Status: SHIPPED | OUTPATIENT
Start: 2025-08-15

## 2025-08-31 DIAGNOSIS — M06.9 RHEUMATOID ARTHRITIS, INVOLVING UNSPECIFIED SITE, UNSPECIFIED WHETHER RHEUMATOID FACTOR PRESENT (MULTI): ICD-10-CM

## 2025-09-02 ENCOUNTER — HOSPITAL ENCOUNTER (EMERGENCY)
Age: 75
Discharge: HOME | End: 2025-09-03
Payer: COMMERCIAL

## 2025-09-02 ENCOUNTER — APPOINTMENT (OUTPATIENT)
Dept: CT IMAGING | Age: 75
End: 2025-09-02
Payer: COMMERCIAL

## 2025-09-02 VITALS
OXYGEN SATURATION: 92 % | TEMPERATURE: 97.9 F | SYSTOLIC BLOOD PRESSURE: 160 MMHG | WEIGHT: 130 LBS | BODY MASS INDEX: 22.2 KG/M2 | HEART RATE: 68 BPM | RESPIRATION RATE: 16 BRPM | DIASTOLIC BLOOD PRESSURE: 103 MMHG | HEIGHT: 64 IN

## 2025-09-02 DIAGNOSIS — K60.2 ANAL FISSURE: Primary | ICD-10-CM

## 2025-09-02 DIAGNOSIS — K62.5 RECTAL BLEED: ICD-10-CM

## 2025-09-02 LAB
ALBUMIN SERPL-MCNC: 4.1 G/DL (ref 3.5–4.6)
ALP SERPL-CCNC: 61 U/L (ref 40–130)
ALT SERPL-CCNC: 10 U/L (ref 0–33)
ANION GAP SERPL CALCULATED.3IONS-SCNC: 13 MEQ/L (ref 9–15)
AST SERPL-CCNC: 30 U/L (ref 0–35)
BASOPHILS # BLD: 0 K/UL (ref 0–0.1)
BASOPHILS NFR BLD: 0.3 % (ref 0.1–1.2)
BILIRUB SERPL-MCNC: 0.4 MG/DL (ref 0.2–0.7)
BUN SERPL-MCNC: 17 MG/DL (ref 8–23)
CALCIUM SERPL-MCNC: 9.7 MG/DL (ref 8.5–9.9)
CHLORIDE SERPL-SCNC: 105 MEQ/L (ref 95–107)
CO2 SERPL-SCNC: 23 MEQ/L (ref 20–31)
CREAT SERPL-MCNC: 0.7 MG/DL (ref 0.5–0.9)
EOSINOPHIL # BLD: 0 K/UL (ref 0–0.4)
EOSINOPHIL NFR BLD: 0 % (ref 0.7–5.8)
ERYTHROCYTE [DISTWIDTH] IN BLOOD BY AUTOMATED COUNT: 18.8 % (ref 11.7–14.4)
GLOBULIN SER CALC-MCNC: 3.3 G/DL (ref 2.3–3.5)
GLUCOSE SERPL-MCNC: 142 MG/DL (ref 70–99)
HCT VFR BLD AUTO: 47 % (ref 37–47)
HGB BLD-MCNC: 14.3 G/DL (ref 11.2–15.7)
IMM GRANULOCYTES # BLD: 0.1 K/UL
IMM GRANULOCYTES NFR BLD: 0.7 %
INR PPP: 1
LYMPHOCYTES # BLD: 1.7 K/UL (ref 1.2–3.7)
LYMPHOCYTES NFR BLD: 15 %
MCH RBC QN AUTO: 27.4 PG (ref 25.6–32.2)
MCHC RBC AUTO-ENTMCNC: 30.4 % (ref 32.2–35.5)
MCV RBC AUTO: 90.2 FL (ref 79.4–94.8)
MONOCYTES # BLD: 0.3 K/UL (ref 0.2–0.9)
MONOCYTES NFR BLD: 2.9 % (ref 4.7–12.5)
NEUTROPHILS # BLD: 9.4 K/UL (ref 1.6–6.1)
NEUTS SEG NFR BLD: 81.1 % (ref 34–71.1)
PLATELET # BLD AUTO: 228 K/UL (ref 182–369)
POTASSIUM SERPL-SCNC: 4.7 MEQ/L (ref 3.4–4.9)
PROT SERPL-MCNC: 7.4 G/DL (ref 6.3–8)
PROTHROMBIN TIME: 13.8 SEC (ref 12.3–14.9)
RBC # BLD AUTO: 5.21 M/UL (ref 3.93–5.22)
SODIUM SERPL-SCNC: 141 MEQ/L (ref 135–144)
WBC # BLD AUTO: 11.6 K/UL (ref 4–10)

## 2025-09-02 PROCEDURE — 85025 COMPLETE CBC W/AUTO DIFF WBC: CPT

## 2025-09-02 PROCEDURE — 6360000004 HC RX CONTRAST MEDICATION

## 2025-09-02 PROCEDURE — 80053 COMPREHEN METABOLIC PANEL: CPT

## 2025-09-02 PROCEDURE — 74177 CT ABD & PELVIS W/CONTRAST: CPT

## 2025-09-02 PROCEDURE — 99285 EMERGENCY DEPT VISIT HI MDM: CPT

## 2025-09-02 PROCEDURE — 85610 PROTHROMBIN TIME: CPT

## 2025-09-02 PROCEDURE — 36415 COLL VENOUS BLD VENIPUNCTURE: CPT

## 2025-09-02 RX ORDER — TRAMADOL HYDROCHLORIDE 50 MG/1
50 TABLET ORAL DAILY PRN
COMMUNITY
Start: 2025-08-13 | End: 2025-09-12

## 2025-09-02 RX ORDER — IOPAMIDOL 755 MG/ML
75 INJECTION, SOLUTION INTRAVASCULAR
Status: COMPLETED | OUTPATIENT
Start: 2025-09-02 | End: 2025-09-02

## 2025-09-02 RX ORDER — FAMOTIDINE 40 MG/1
40 TABLET, FILM COATED ORAL 2 TIMES DAILY
COMMUNITY
Start: 2025-07-09

## 2025-09-02 RX ORDER — PREDNISONE 5 MG/1
5 TABLET ORAL DAILY
Qty: 30 TABLET | Refills: 0 | Status: SHIPPED | OUTPATIENT
Start: 2025-09-02

## 2025-09-02 RX ORDER — METOPROLOL TARTRATE 25 MG/1
25 TABLET, FILM COATED ORAL 2 TIMES DAILY
COMMUNITY
Start: 2025-08-15

## 2025-09-02 RX ORDER — LANOLIN ALCOHOL/MO/W.PET/CERES
400 CREAM (GRAM) TOPICAL DAILY
COMMUNITY
Start: 2024-09-11

## 2025-09-02 RX ADMIN — IOPAMIDOL 75 ML: 755 INJECTION, SOLUTION INTRAVENOUS at 21:47

## 2025-09-02 ASSESSMENT — LIFESTYLE VARIABLES
HOW OFTEN DO YOU HAVE A DRINK CONTAINING ALCOHOL: NEVER
HOW MANY STANDARD DRINKS CONTAINING ALCOHOL DO YOU HAVE ON A TYPICAL DAY: PATIENT DOES NOT DRINK

## 2025-09-02 ASSESSMENT — PAIN - FUNCTIONAL ASSESSMENT: PAIN_FUNCTIONAL_ASSESSMENT: 0-10

## 2025-09-03 LAB
BILIRUB UR QL STRIP: NEGATIVE
CLARITY UR: CLEAR
COLOR UR: YELLOW
GLUCOSE UR STRIP-MCNC: NEGATIVE MG/DL
HGB UR QL STRIP: NEGATIVE
KETONES UR STRIP-MCNC: NEGATIVE MG/DL
LEUKOCYTE ESTERASE UR QL STRIP: NEGATIVE
NITRITE UR QL STRIP: NEGATIVE
PH UR STRIP: 7 [PH] (ref 5–9)
PROT UR STRIP-MCNC: NEGATIVE MG/DL
SP GR UR STRIP: 1.02 (ref 1–1.03)
URINE REFLEX TO CULTURE: NORMAL
UROBILINOGEN UR STRIP-ACNC: 1 E.U./DL

## 2025-09-03 PROCEDURE — 81003 URINALYSIS AUTO W/O SCOPE: CPT

## 2025-09-03 ASSESSMENT — ENCOUNTER SYMPTOMS
BACK PAIN: 0
SHORTNESS OF BREATH: 0
ANAL BLEEDING: 1
ABDOMINAL PAIN: 0
VOMITING: 0
NAUSEA: 0
DIARRHEA: 0
COUGH: 0
SORE THROAT: 0

## 2025-11-05 ENCOUNTER — APPOINTMENT (OUTPATIENT)
Dept: PRIMARY CARE | Facility: CLINIC | Age: 75
End: 2025-11-05
Payer: COMMERCIAL

## (undated) DEVICE — COVER LT HNDL BLU PLAS

## (undated) DEVICE — GAUZE,SPONGE,4"X4",16PLY,XRAY,STRL,LF: Brand: MEDLINE

## (undated) DEVICE — NEEDLE SPINAL 22GA L3.5IN SPINOCAN

## (undated) DEVICE — BANDAGE ADH W2XL4IN NITRL FAB STRP CURAD

## (undated) DEVICE — BLADE,CARBON-STEEL,11,STRL,DISPOSABLE,TB: Brand: MEDLINE

## (undated) DEVICE — 1010 S-DRAPE TOWEL DRAPE 10/BX: Brand: STERI-DRAPE™

## (undated) DEVICE — BONE CEMENT C01B HV-R WITH MIXER  US: Brand: KYPHON® HV-R® BONE CEMENT AND KYPHON® MIXER PACK

## (undated) DEVICE — INTRODUCER T15J OIS ONE-STEP TRO AND DIA: Brand: KYPHON® ONE-STEP™ OSTEO INTRODUCER™ SYSTEM

## (undated) DEVICE — GOWN,AURORA,NONREINFORCED,LARGE: Brand: MEDLINE

## (undated) DEVICE — LABEL MED MINI W/ MARKER

## (undated) DEVICE — 3M™ IOBAN™ 2 ANTIMICROBIAL INCISE DRAPE 6650EZ: Brand: IOBAN™ 2

## (undated) DEVICE — GLOVE ORANGE PI 7   MSG9070

## (undated) DEVICE — COUNTER NDL 40 COUNT HLD 70 FOAM BLK ADH W/ MAG

## (undated) DEVICE — SUTURE MONOCRYL SZ 4-0 L27IN ABSRB UD L19MM PS-2 1/2 CIR PRIM Y426H

## (undated) DEVICE — SYRINGE MED 10ML LUERLOCK TIP W/O SFTY DISP

## (undated) DEVICE — SNAP KOVER: Brand: UNBRANDED

## (undated) DEVICE — MARKER SURG SKIN GENTIAN VLT REG TIP W/ 6IN RUL DYNJSM01

## (undated) DEVICE — LIQUIBAND RAPID ADHESIVE 36/CS 0.8ML: Brand: MEDLINE

## (undated) DEVICE — TOWEL,OR,DSP,ST,BLUE,STD,4/PK,20PK/CS: Brand: MEDLINE

## (undated) DEVICE — PACK,LAPAROTOMY,NO GOWNS: Brand: MEDLINE

## (undated) DEVICE — SHEET,DRAPE,53X77,STERILE: Brand: MEDLINE

## (undated) DEVICE — DRAPE,MEDI-SLUSH,STERILE: Brand: MEDLINE

## (undated) DEVICE — APPLICATOR MEDICATED 26 CC SOLUTION HI LT ORNG CHLORAPREP